# Patient Record
Sex: FEMALE | Race: WHITE | NOT HISPANIC OR LATINO | Employment: PART TIME | URBAN - METROPOLITAN AREA
[De-identification: names, ages, dates, MRNs, and addresses within clinical notes are randomized per-mention and may not be internally consistent; named-entity substitution may affect disease eponyms.]

---

## 2017-01-18 ENCOUNTER — ALLSCRIPTS OFFICE VISIT (OUTPATIENT)
Dept: OTHER | Facility: OTHER | Age: 22
End: 2017-01-18

## 2017-02-27 ENCOUNTER — ALLSCRIPTS OFFICE VISIT (OUTPATIENT)
Dept: OTHER | Facility: OTHER | Age: 22
End: 2017-02-27

## 2017-04-18 ENCOUNTER — ALLSCRIPTS OFFICE VISIT (OUTPATIENT)
Dept: OTHER | Facility: OTHER | Age: 22
End: 2017-04-18

## 2017-05-18 ENCOUNTER — ALLSCRIPTS OFFICE VISIT (OUTPATIENT)
Dept: OTHER | Facility: OTHER | Age: 22
End: 2017-05-18

## 2017-07-14 ENCOUNTER — HOSPITAL ENCOUNTER (EMERGENCY)
Facility: HOSPITAL | Age: 22
Discharge: HOME | End: 2017-07-14
Attending: EMERGENCY MEDICINE
Payer: COMMERCIAL

## 2017-07-14 VITALS
HEART RATE: 98 BPM | TEMPERATURE: 99.7 F | OXYGEN SATURATION: 99 % | SYSTOLIC BLOOD PRESSURE: 133 MMHG | DIASTOLIC BLOOD PRESSURE: 72 MMHG | RESPIRATION RATE: 18 BRPM | WEIGHT: 165.57 LBS

## 2017-07-14 DIAGNOSIS — H66.91 RIGHT OTITIS MEDIA: Primary | ICD-10-CM

## 2017-07-14 PROCEDURE — 86308 HETEROPHILE ANTIBODY SCREEN: CPT | Performed by: EMERGENCY MEDICINE

## 2017-07-14 PROCEDURE — 36415 COLL VENOUS BLD VENIPUNCTURE: CPT | Performed by: EMERGENCY MEDICINE

## 2017-07-14 PROCEDURE — 99284 EMERGENCY DEPT VISIT MOD MDM: CPT

## 2017-07-14 RX ORDER — IBUPROFEN 600 MG/1
600 TABLET ORAL ONCE
Status: COMPLETED | OUTPATIENT
Start: 2017-07-14 | End: 2017-07-14

## 2017-07-14 RX ORDER — AMOXICILLIN 250 MG/1
250 CAPSULE ORAL EVERY 8 HOURS SCHEDULED
Qty: 30 CAPSULE | Refills: 0 | Status: SHIPPED | OUTPATIENT
Start: 2017-07-14 | End: 2017-07-24

## 2017-07-14 RX ORDER — AMOXICILLIN 250 MG/1
250 CAPSULE ORAL ONCE
Status: COMPLETED | OUTPATIENT
Start: 2017-07-14 | End: 2017-07-14

## 2017-07-14 RX ORDER — PREDNISONE 20 MG/1
60 TABLET ORAL ONCE
Status: COMPLETED | OUTPATIENT
Start: 2017-07-14 | End: 2017-07-14

## 2017-07-14 RX ORDER — FLUTICASONE PROPIONATE 50 MCG
1 SPRAY, SUSPENSION (ML) NASAL DAILY
Qty: 16 G | Refills: 0 | Status: SHIPPED | OUTPATIENT
Start: 2017-07-14 | End: 2019-04-15 | Stop reason: ALTCHOICE

## 2017-07-14 RX ADMIN — AMOXICILLIN 250 MG: 250 CAPSULE ORAL at 08:03

## 2017-07-14 RX ADMIN — IBUPROFEN 600 MG: 600 TABLET ORAL at 08:03

## 2017-07-14 RX ADMIN — PREDNISONE 60 MG: 20 TABLET ORAL at 08:03

## 2017-07-15 LAB — HETEROPH AB SER QL: NEGATIVE

## 2017-08-09 ENCOUNTER — ALLSCRIPTS OFFICE VISIT (OUTPATIENT)
Dept: OTHER | Facility: OTHER | Age: 22
End: 2017-08-09

## 2017-12-12 ENCOUNTER — ALLSCRIPTS OFFICE VISIT (OUTPATIENT)
Dept: OTHER | Facility: OTHER | Age: 22
End: 2017-12-12

## 2017-12-13 NOTE — PROGRESS NOTES
Assessment  1  Multiple benign melanocytic nevi (216 9) (D22 9)    Plan     · Follow-up PRN Evaluation and Treatment  Follow-up  Status: Complete  Done:[id number]    Discussion/Summary  Discussion Summary:   Lesion appeared to be a congenital like nevus  No reason to remove this at this point photograph obtained for baseline appearance if any further changes are noted patient is to return follow-up  Nothing else of concern noted cursory exam       Chief Complaint  Chief Complaint Free Text Note Form: Patient states that she has a mole on her stomach that she wants checked out      History of Present Illness  HPI: 48-year-old female presents for concerns regarding a mole on her abdomen mother was concerned that the mole has developed some increased pigmentation  Patient notes that the mole been present long she can remember      Review of Systems  Complete Female Dermat[facility] [place] Patient:  Constitutional: Denies constitutional symptoms  Eyes: Denies eye symptoms  ENT:  denies ear symptoms, nasal symptoms, mouth or throat symptoms  Cardiovascular: Denies cardiovascular symptoms  Respiratory: Denies respiratory symptoms  Gastrointestinal: Denies gastrointestinal symptoms  Musculoskeletal: Denies musculoskeletal symptoms  Integumentary: Denies skin, hair and nail symptoms  Neurological: Denies neurologic symptoms  Psychiatric: Denies psychiatric symptoms  Endocrine: Denies endocrine symptoms  Hematologic/Lymphatic: Denies hematologic symptoms  Active Problems  1  Cervicalgia (723 1) (M54 2)   2  Flu vaccine need (V04 81) (Z23)   3  Heavy menstrual bleeding (626 2) (N92 0)   4  Iron deficiency anemia due to chronic blood loss (280 0) (D50 0)   5  Multiple benign melanocytic nevi (216 9) (D22 9)   6  Screening for skin condition (V82 0) (Z13 89)   7  Warts (078 10) (B07 9)    Past Medical History  Past Medical History Reviewed- Derm:   The past medical history was reviewed  Surgical History  1  History of Tonsillectomy With Adenoidectomy  Surgical History Reviewed [facility]- Derm:   Surgical History reviewed      Family History  Maternal Grandfather    1  Family history of Kidney Cancer (V16 51)  Maternal Aunt    2  Family history of Breast Cancer (V16 3)  Family History Reviewed- Derm:   Family History was reviewed      Social History     · Being A Social Drinker   · Never A Smoker  Social History Reviewed [facility]- Derm: The social history was reviewed      Current Meds   1  Viorele 0 15-0 02/0 01 MG (21/5) Oral Tablet; as directed; Therapy: [id number] to Recorded  Medication List Reviewed: The medication list was reviewed and updated today  Allergies    1  No Known Drug Allergies    Physical Exam   Constitutional  General appearance: Appears healthy and well developed  Lymphatic  No visible disturbance  Musculoskeletal  Digits and nails: No clubbing, cyanosis or edema  Cutaneous and nail exam normal    Skin  Scalp skin texture and hair distribution: Normal skin texture on scalp, normal hair distribution  Abdomen: Normal turgor, no rashes, no lesions  Neuro/Psych  Alert and oriented x 3  Displays comfort and cooperation during encounterl  Affect is normal    Finding 9mm pigmented macule Right mid abdomen without really any regular borders or multi colors        Signatures   Electronically signed by : KEATON Chowdhury ; Dec 12 2017  2:45PM EST                       (Author)

## 2018-09-06 ENCOUNTER — OFFICE VISIT (OUTPATIENT)
Dept: DERMATOLOGY | Facility: CLINIC | Age: 23
End: 2018-09-06
Payer: COMMERCIAL

## 2018-09-06 DIAGNOSIS — D22.9 NEVUS: Primary | ICD-10-CM

## 2018-09-06 PROCEDURE — 88305 TISSUE EXAM BY PATHOLOGIST: CPT | Performed by: PATHOLOGY

## 2018-09-06 PROCEDURE — 99212 OFFICE O/P EST SF 10 MIN: CPT | Performed by: DERMATOLOGY

## 2018-09-06 PROCEDURE — 11301 SHAVE SKIN LESION 0.6-1.0 CM: CPT | Performed by: DERMATOLOGY

## 2018-09-06 RX ORDER — DESOGESTREL AND ETHINYL ESTRADIOL 21-5 (28)
KIT ORAL
COMMUNITY
Start: 2015-01-14 | End: 2019-04-15 | Stop reason: ALTCHOICE

## 2018-09-06 NOTE — PROGRESS NOTES
[facility] DERMATOLOGY  [address] [place] [address]  [phone]     MRN: [id number] : 1995  Encounter: [id number]  Patient Information: Kelly Mensah    Subjective:     29-year-old female presents for follow-up for the congenital type nevus that we noted last year  Patient feels that the lesion has changed and darkened  Objective: There were no vitals taken for this visit  Physical Exam:    General Appearance:    Alert, cooperative, no distress   Skin:    9 mm pigmented area noted on abdomen no essential change noted from previous photograph  Shave excision Procedure Note    Pre-operative Diagnosis:   Congenital nevus    Plan:  1  Instructed to keep the wound dry and covered for 24 and clean thereafter  2  Warning signs of infection were reviewed  3  Recommended that the patient use OTC acetaminophen as needed for pain  Locations:   Right lower  abdomen  Indications:   Patient concerned that the areas changing    Anesthesia: Lidocaine 1% without epinephrine without added sodium bicarbonate    Procedure Details     Patient informed of the risks (including bleeding and infection) and benefits of the   procedure and Verbal informed consent obtained  The lesion and surrounding area were prepped using alcohol  A Blue blade razor was used to remove the lesion  Hemostasis achieved with aluminum chloride  Petrolatum and a sterile dressing applied  The specimen was sent for pathologic examination  The patient tolerated the procedure(s) well  Complications:  none  Assessment:     1  Nevus           Plan:    nevus removed because the patient concern await results of further treatment indicated probably congenital type nevus      Prior to Admission medications    Medication Sig Start Date End Date Taking?  Authorizing Provider   desogestrel-ethinyl estradiol (VIORELE) 0 15-0 02/0 01 MG () per tablet Take by mouth 1/14/15  Yes Historical Provider, MD   fluticasone Kellie Jersey) 50 mcg/act nasal spray 1 spray into each nostril daily  Patient not taking: Reported on 9/6/2018 7/14/17   Wong Gonzales MD     No Known Allergies    Alec Rivera MD  9/6/2018,10:26 AM    Portions of the record may have been created with voice recognition software   Occasional wrong word or "sound a like" substitutions may have occurred due to the inherent limitations of voice recognition software   Read the chart carefully and recognize, using context, where substitutions have occurred

## 2018-09-06 NOTE — PATIENT INSTRUCTIONS
nevus removed because the patient concern await results of further treatment indicated probably congenital type nevus  Wound care instructions given to patient

## 2018-10-01 ENCOUNTER — PROCEDURE VISIT (OUTPATIENT)
Dept: DERMATOLOGY | Facility: CLINIC | Age: 23
End: 2018-10-01
Payer: COMMERCIAL

## 2018-10-01 DIAGNOSIS — C43.59 MALIGNANT MELANOMA OF SKIN OF ABDOMEN (HCC): Primary | ICD-10-CM

## 2018-10-01 PROCEDURE — 11603 EXC TR-EXT MAL+MARG 2.1-3 CM: CPT | Performed by: DERMATOLOGY

## 2018-10-01 PROCEDURE — 88305 TISSUE EXAM BY PATHOLOGIST: CPT | Performed by: PATHOLOGY

## 2018-10-01 PROCEDURE — 12032 INTMD RPR S/A/T/EXT 2.6-7.5: CPT | Performed by: DERMATOLOGY

## 2018-10-01 NOTE — PROGRESS NOTES
[facility] DERMATOLOGY  [address] [facility] [address]  [phone]  [phone]     MRN: [id number] : 1995  Encounter: [id number]  Patient Information: Ana Setting    Subjective:     26-year-old female presents for planned excision of melanoma previously biopsied on the abdomen  Biopsy revealed a mostly superficial in situ lesion question of invasion noted to 0 4 mm Level II     Objective: There were no vitals taken for this visit  Physical Exam:    General Appearance:    Alert, cooperative, no distress   Skin:    Previous site of biopsy noted    Procedure name: Excision with intermediate layered closure        Location:  Right abdomen    Diagnosis: Melanoma    Size of lesion: 9 mm    Margins: 10 mm    Size + Margins: 29 mm    I explained the diagnosis to the patient and we recommend an excision of the lesion for diagnosis and/or treatment  Potential complications include, but are not limited to: scarring, bleeding, infection, incomplete removal, recurrence, and nerve damage  The risks, benefits, and alternatives were discussed with the patient in detail  The location of the tumor was identified, circled, and confirmed by the patient  The correct patient, site, and procedure were confirmed  Anesthesia: 1% xylocaine with 1:100,000 epinephrine 9 cc  The patient was brought into the room, prepped and draped sterilely in the usual manner and anesthesia was administered by local infiltration  A fusiform shape was drawn around the lesion, and the margins were incised to the level of the subcutaneous fat with a number 15cc Bard-Loki blade  The tissue was removed with sharp and blunt dissection  The lateral margins of the resulting defect were undermined with sharp and blunt dissection  Hemostasis was achieved with electrocautery    The deeper layers of the defect, including subcutaneous fat and fascia, had to be approximated to reduce tension on the suture line  Layered wound closure was performed  The wound was cleaned with saline, dried off, petroleum applied, and the wound was covered with a pressure dressing  The patient was given detailed verbal and written instructions on postoperative care  Suture for adipose/fascial/dermal layer closure: 4-0  vicryl 4sutures interrupted    Suture used to approximate epidermis: 4-0  nylon 11sutures interrupted    Final wound length: 6 cm    Follow-up in: 11 days  Patient tolerated procedure well without complications  Assessment:     1  Malignant melanoma of skin of abdomen ([facility] [address] )           Plan:   Wound care instructions given to patient        Prior to Admission medications    Medication Sig Start Date End Date Taking? Authorizing Provider   desogestrel-ethinyl estradiol (Floretta Locust) 0 15-0 02/0 01 MG (21/5) per tablet Take by mouth 1/14/15   Historical Provider, MD   fluticasone (FLONASE) 50 mcg/act nasal spray 1 spray into each nostril daily  Patient not taking: Reported on 9/6/2018 7/14/17   Jude Montejo MD     No Known Allergies    Yousif Boothe MD  10/1/2018,10:43 AM    Portions of the record may have been created with voice recognition software   Occasional wrong word or "sound a like" substitutions may have occurred due to the inherent limitations of voice recognition software   Read the chart carefully and recognize, using context, where substitutions have occurred

## 2018-10-09 ENCOUNTER — TELEPHONE (OUTPATIENT)
Dept: DERMATOLOGY | Facility: CLINIC | Age: 23
End: 2018-10-09

## 2018-10-11 ENCOUNTER — CLINICAL SUPPORT (OUTPATIENT)
Dept: DERMATOLOGY | Facility: CLINIC | Age: 23
End: 2018-10-11

## 2018-10-11 DIAGNOSIS — C43.59 MALIGNANT MELANOMA OF SKIN OF ABDOMEN (HCC): Primary | ICD-10-CM

## 2018-10-11 PROCEDURE — 99024 POSTOP FOLLOW-UP VISIT: CPT | Performed by: DERMATOLOGY

## 2018-10-11 NOTE — PROGRESS NOTES
[facility] DERMATOLOGY  [address] [place] [address]  [phone]  [phone]     MRN: [id number] : 1995  Encounter: [id number]  Patient Information: Lukasz Lynne    Subjective:     34-year-old female presents for follow-up secondary to previous excised melanoma on the abdomen     Objective: There were no vitals taken for this visit  Physical Exam:    General Appearance:    Alert, cooperative, no distress   Skin:   Previous site is healing well  Procedure:  Suture removal  Site of excision:  Abdomen  Wound was cleansed with saline  Wound is intact  Sutures removed  Steri-Strips applied  Biopsy revealed no further evidence of any atypical lesion margins adequate     Assessment:     1  Malignant melanoma of skin of abdomen ([address] )           Plan:   Area healing well will plan follow-up again in 3 months      Prior to Admission medications    Medication Sig Start Date End Date Taking? Authorizing Provider   desogestrel-ethinyl estradiol (Tiara Double) 0 15-0 02/0 01 MG () per tablet Take by mouth 1/14/15   Historical Provider, MD   fluticasone (FLONASE) 50 mcg/act nasal spray 1 spray into each nostril daily  Patient not taking: Reported on 2018   Selvin Gonsalves MD     No Known Allergies    Charity Talley MD  10/11/2018,8:49 AM    Portions of the record may have been created with voice recognition software   Occasional wrong word or "sound a like" substitutions may have occurred due to the inherent limitations of voice recognition software   Read the chart carefully and recognize, using context, where substitutions have occurred

## 2018-10-11 NOTE — PATIENT INSTRUCTIONS
Area healing well will plan follow-up again in 3 months  STERI-STRIPS (BUTTERFLY) POST SURGERY        Steri-Strips have been placed over your incision site to give added support  Please continue to bathe the area as usual     Eventually the Steri-Strips will begin to peel off on the ends  Snip the raised ends off with scissors and let the rest fall off on their own  If you have any questions, please call our office   [phone]

## 2019-01-14 ENCOUNTER — OFFICE VISIT (OUTPATIENT)
Dept: DERMATOLOGY | Facility: CLINIC | Age: 24
End: 2019-01-14
Payer: COMMERCIAL

## 2019-01-14 DIAGNOSIS — L81.9 CHANGING PIGMENTED SKIN LESION: ICD-10-CM

## 2019-01-14 DIAGNOSIS — D22.9 NEVUS: ICD-10-CM

## 2019-01-14 DIAGNOSIS — Z13.89 SCREENING FOR SKIN CONDITION: ICD-10-CM

## 2019-01-14 DIAGNOSIS — Z85.820 HISTORY OF MELANOMA: Primary | Chronic | ICD-10-CM

## 2019-01-14 PROCEDURE — 88342 IMHCHEM/IMCYTCHM 1ST ANTB: CPT | Performed by: PATHOLOGY

## 2019-01-14 PROCEDURE — 99213 OFFICE O/P EST LOW 20 MIN: CPT | Performed by: DERMATOLOGY

## 2019-01-14 PROCEDURE — 88305 TISSUE EXAM BY PATHOLOGIST: CPT | Performed by: PATHOLOGY

## 2019-01-14 PROCEDURE — 11102 TANGNTL BX SKIN SINGLE LES: CPT | Performed by: DERMATOLOGY

## 2019-01-14 NOTE — PROGRESS NOTES
[address] [facility] DERMATOLOGY  [address]  [address] [place] [address]  [phone]     MRN: [id number] : 1995  Encounter: [id number]  Patient Information: Broderick Regalado  Chief complaint: Three-month checkup    History of present illness:  71-year-old female status post excision of a melanoma which was mostly the in situ lesion that was excised patient was concerned regarding scarring of the area  Also patient concerned regarding a new mole she noted on her left forearm that is new  She also has moles on her breast that have been present for a long time has not changed as far she is aware no other concerns  No past medical history on file  No past surgical history on file  Social History   History   Alcohol Use    Yes     Comment: Socially     History   Drug Use No     History   Smoking Status    Never Smoker   Smokeless Tobacco    Not on file     No family history on file  Meds/Allergies   No Known Allergies    Meds:  Prior to Admission medications    Medication Sig Start Date End Date Taking?  Authorizing Provider   desogestrel-ethinyl estradiol (Darliss Fuse) 0 15-0 02/0 01 MG () per tablet Take by mouth 1/14/15  Yes Historical Provider, MD   fluticasone (FLONASE) 50 mcg/act nasal spray 1 spray into each nostril daily  Patient not taking: Reported on 2019   Nishant Bo MD       Subjective:     Review of Systems:    General: negative for - chills, fatigue, fever,  weight gain or weight loss  Psychological: negative for - anxiety, behavioral disorder, concentration difficulties, decreased libido, depression, irritability, memory difficulties, mood swings, sleep disturbances or suicidal ideation  ENT: negative for - hearing difficulties , nasal congestion, nasal discharge, oral lesions, sinus pain, sneezing, sore throat  Allergy and Immunology: negative for - hives, insect bite sensitivity,  Hematological and Lymphatic: negative for - bleeding problems, blood clots,bruising, swollen lymph nodes  Endocrine: negative for - hair pattern changes, hot flashes, malaise/lethargy, mood swings, palpitations, polydipsia/polyuria, skin changes, temperature intolerance or unexpected weight change  Respiratory: negative for - cough, hemoptysis, orthopnea, shortness of breath, or wheezing  Cardiovascular: negative for - chest pain, dyspnea on exertion, edema,  Gastrointestinal: negative for - abdominal pain, nausea/vomiting  Genito-Urinary: negative for - dysuria, incontinence, irregular/heavy menses or urinary frequency/urgency  Musculoskeletal: negative for - gait disturbance, joint pain, joint stiffness, joint swelling, muscle pain, muscular weakness  Dermatological:  As in HPI  Neurological: negative for confusion, dizziness, headaches, impaired coordination/balance, memory loss, numbness/tingling, seizures, speech problems, tremors or weakness       Objective: There were no vitals taken for this visit  Physical Exam:    General Appearance:    Alert, cooperative, no distress   Head:    Normocephalic, without obvious abnormality, atraumatic   Lymphatics:    No lymphadenopathy noted      Abdomen:   No hepatosplenomegaly   Skin:   A full skin exam was performed including scalp, head scalp, eyes, ears, nose, lips, neck, chest, axilla, abdomen, back, buttocks, bilateral upper extremities, bilateral lower extremities, hands, feet, fingers, toes, fingernails, and toenails 3 mm pigmented area noted on the left elbow area also pigmented areas both measure about 5 mm size on the breasts with some dysplasia noted normal pigmented lesions regular shape and color other than that the area on the excision site appears to be slightly hypertrophic scarring at both the suture line as well as where the sutures emanate from the skin    Nothing else of concern noted exam          Shave excision Procedure Note    Pre-operative Diagnosis:  Changing pigmented lesion    Plan:  1  Instructed to keep the wound dry and covered for 24 and clean thereafter  2  Warning signs of infection were reviewed  3  Recommended that the patient use OTC acetaminophen as needed for pain  Locations:  Left elbow  Indications:  Changing lesion    Anesthesia: Lidocaine 1% without epinephrine without added sodium bicarbonate    Procedure Details     Patient informed of the risks (including bleeding and infection) and benefits of the   procedure and Verbal informed consent obtained  The lesion and surrounding area were prepped using alcohol  A Blue blade razor was used to remove the lesion  Hemostasis achieved with aluminum chloride  Petrolatum and a sterile dressing applied  The specimen was sent for pathologic examination  The patient tolerated the procedure(s) well  Complications:  none  Assessment:     1  History of melanoma      Sukhdeep level 2 Breslow thickness 0 4mm right upper abdomen 10/18   2  Nevus     3  Screening for skin condition     4  Changing pigmented skin lesion           Plan:   Nevi reviewed the concept of ABCDE and ugly duckling nothing markedly atypical patient reassured changing pigmented lesions lesion biopsy because the patient concern and change in pigmentation noted  History of melanoma previous site shows a hypertrophic scar advised patient that it may take up to 1 year for this to settle down she can consider treatment with Arabellachantal Erwin however if no improvement noted over time we will consider intralesional steroid injection    Continue sun protection follow-up in 3 months  Screening for Dermatologic Disorders: Nothing else of concern noted on complete exam follow up in 3 months       Aury Vera MD  1/14/2019,9:08 AM    Portions of the record may have been created with voice recognition software   Occasional wrong word or "sound a like" substitutions may have occurred due to the inherent limitations of voice recognition software   Read the chart carefully and recognize, using context, where substitutions have occurred

## 2019-01-14 NOTE — PATIENT INSTRUCTIONS
Nevi reviewed the concept of ABCDE and ugly duckling nothing markedly atypical patient reassured changing pigmented lesions lesion biopsy because the patient concern and change in pigmentation noted  History of melanoma previous site shows a hypertrophic scar advised patient that it may take up to 1 year for this to settle down she can consider treatment with Lavone Guayama however if no improvement noted over time we will consider intralesional steroid injection    Continue sun protection follow-up in 3 months  Screening for Dermatologic Disorders: Nothing else of concern noted on complete exam follow up in 3 months   Wound care instructions given to patient

## 2019-01-24 ENCOUNTER — OFFICE VISIT (OUTPATIENT)
Dept: INTERNAL MEDICINE CLINIC | Facility: CLINIC | Age: 24
End: 2019-01-24
Payer: COMMERCIAL

## 2019-01-24 VITALS
TEMPERATURE: 96.6 F | SYSTOLIC BLOOD PRESSURE: 96 MMHG | WEIGHT: 162 LBS | HEIGHT: 64 IN | DIASTOLIC BLOOD PRESSURE: 60 MMHG | HEART RATE: 83 BPM | OXYGEN SATURATION: 97 % | RESPIRATION RATE: 20 BRPM | BODY MASS INDEX: 27.66 KG/M2

## 2019-01-24 DIAGNOSIS — H81.13 BENIGN PAROXYSMAL POSITIONAL VERTIGO DUE TO BILATERAL VESTIBULAR DISORDER: ICD-10-CM

## 2019-01-24 DIAGNOSIS — H92.02 DISCOMFORT OF LEFT EAR: ICD-10-CM

## 2019-01-24 DIAGNOSIS — D50.0 IRON DEFICIENCY ANEMIA DUE TO CHRONIC BLOOD LOSS: Primary | ICD-10-CM

## 2019-01-24 DIAGNOSIS — T75.3XXA SEA SICKNESS, INITIAL ENCOUNTER: ICD-10-CM

## 2019-01-24 PROBLEM — H60.333 SWIMMER'S EAR OF BOTH SIDES: Status: RESOLVED | Noted: 2019-01-24 | Resolved: 2019-01-24

## 2019-01-24 PROBLEM — H60.333 SWIMMER'S EAR OF BOTH SIDES: Status: ACTIVE | Noted: 2019-01-24

## 2019-01-24 PROBLEM — Z30.41 SURVEILLANCE FOR BIRTH CONTROL, ORAL CONTRACEPTIVES: Status: ACTIVE | Noted: 2018-04-24

## 2019-01-24 PROCEDURE — 99214 OFFICE O/P EST MOD 30 MIN: CPT | Performed by: INTERNAL MEDICINE

## 2019-01-24 PROCEDURE — 3008F BODY MASS INDEX DOCD: CPT | Performed by: INTERNAL MEDICINE

## 2019-01-24 RX ORDER — SCOLOPAMINE TRANSDERMAL SYSTEM 1 MG/1
1 PATCH, EXTENDED RELEASE TRANSDERMAL
Qty: 5 PATCH | Refills: 1 | Status: SHIPPED | OUTPATIENT
Start: 2019-01-24 | End: 2019-04-15 | Stop reason: ALTCHOICE

## 2019-01-24 NOTE — PATIENT INSTRUCTIONS
Benign Paroxysmal Positional Vertigo   WHAT YOU NEED TO KNOW:   What is benign paroxysmal positional vertigo (BPPV)? BPPV is an inner ear condition that causes you to suddenly feel dizzy  Benign means it is not serious or life-threatening  BPPV is caused by a problem with the nerves and structure of your inner ear  BPPV happens when small pieces of calcium break loose and lump together in one of your inner ear canals  What are the signs and symptoms of BPPV? You may feel that you or the room is moving or spinning  Turning your head, rolling over in bed, getting up or lying down may lead to sudden vertigo  You may also have any of the following symptoms:  · Nystagmus (quick shaky eye movement that you cannot control)    · Nausea    · Poor balance and feeling unsteady when you walk  What increases my risk for BPPV? · Older age    · An injury or trauma to your head or neck    · Frequent ear infections    · Long-term bed rest    · A medical condition such as diabetes, high blood pressure, migraine headaches, or Ménière disease  How is BPPV diagnosed? Your healthcare provider will ask about your symptoms and examine you  Your healthcare provider can usually determine if you have BPPV by doing a few simple tests  He or she may have you move your head or body in certain ways  Tell him or her if you feel dizzy or nauseated during these movements  How is BPPV managed? · Your healthcare provider will teach you how to move your head and body to prevent symptoms  For example, he or she may teach you certain ways to move your head or body  These movements usually help relieve your symptoms and keep the dizziness from returning  The exercises help move the calcium pieces to a different part of your ear  Do the movements only as directed  · Vestibular and balance rehabilitation therapy (VBRT)  is used to teach you exercises to improve your balance and strength   VBRT may help decrease your dizziness and prevent injuries if you are at risk for falls  · Medicines  may be recommended or prescribed to treat dizziness or nausea  How can I help prevent my symptoms? · Try to avoid sudden head movements  Stand up and lie down slowly  · Raise and support your head when you lie down  Place pillows under your upper back and head or rest in a recliner  · Change your position often when you are lying down  Try not to lie with your head on the same side for long periods of time  Roll over slowly  · Wear protective gear  when you ride a bike or play sports  A helmet helps protect your head from injury  When should I seek immediate care? · You fall during a BPPV episode and are injured  · You have a severe headache that does not go away  · You have new changes in your vision or feel weak or confused  · You have problems hearing, or you have ringing or buzzing in your ears  When should I contact my healthcare provider? · Your BPPV symptoms do not go away or they return  · You have problems with your balance, or you are falling often  · You have new or increased nausea or vomiting with vertigo  · You feel anxious or depressed and do not want to leave your home  · You have questions or concerns about your condition or care  CARE AGREEMENT:   You have the right to help plan your care  Learn about your health condition and how it may be treated  Discuss treatment options with your caregivers to decide what care you want to receive  You always have the right to refuse treatment  The above information is an  only  It is not intended as medical advice for individual conditions or treatments  Talk to your doctor, nurse or pharmacist before following any medical regimen to see if it is safe and effective for you  © 2017 [employer] Information is for End User's use only and may not be sold, redistributed or otherwise used for commercial purposes   All illustrations and images included in CareNotes® are the copyrighted property of A D A M , Inc  or Arjun Troy

## 2019-01-24 NOTE — PROGRESS NOTES
Assessment/Plan:    1  Left ear discomfort ear exam was normal will observe at this time  2  Patient with episode of benign positional vertigo will be trying exercises for that also the Epley maneuver on youtube if symptoms worsen to let me know  3  Patient is going on a cruise will Rx scopolamine patches as needed  4 history of iron deficiency from heavy periods which has resolved on birth control pills will check CBC and iron levels            Diagnoses and all orders for this visit:    Iron deficiency anemia due to chronic blood loss  -     CBC and differential; Future  -     Iron Panel; Future  -     Ferritin; Future        The patient was counseled regarding instructions for management, risk factor reductions, patient and family education,impressions, risks and benefits of treatment options, side effects of medications, importance of compliance with treatment  The treatment plan was reviewed with the patient/guardian and patient/guardian understands and agrees with the treatment plan  Current Outpatient Prescriptions:     desogestrel-ethinyl estradiol (VIORELE) 0 15-0 02/0 01 MG (21/5) per tablet, Take by mouth, Disp: , Rfl:     fluticasone (FLONASE) 50 mcg/act nasal spray, 1 spray into each nostril daily (Patient not taking: Reported on 1/14/2019 ), Disp: 16 g, Rfl: 0    Subjective:      Patient ID: Markie Tidwell is a 24 y o  female  Left ear clogged hearing alcholol burns itching at time, doing work out class, vertigo lasted an hour, postional doing abdominal         The following portions of the patient's history were reviewed and updated as appropriate:   She has no past medical history on file  ,   does not have any pertinent problems on file  ,   has a past surgical history that includes Tonsillectomy and adenoidectomy  ,  family history includes Breast cancer in her maternal aunt; Kidney cancer in her maternal grandfather  ,   reports that she has never smoked   She does not have any smokeless tobacco history on file  She reports that she drinks alcohol  She reports that she does not use drugs  ,  has No Known Allergies       Review of Systems   Constitutional: Negative for appetite change, chills, fatigue, fever and unexpected weight change  HENT: Positive for ear pain  Negative for congestion, facial swelling, hearing loss, mouth sores, nosebleeds, postnasal drip, rhinorrhea, sinus pain, sore throat, trouble swallowing and voice change  Eyes: Negative for pain, discharge, redness and visual disturbance  Respiratory: Negative for apnea, chest tightness, shortness of breath, wheezing and stridor  Cardiovascular: Negative for chest pain, palpitations and leg swelling  Gastrointestinal: Negative for abdominal distention, abdominal pain, blood in stool, constipation, diarrhea and vomiting  Endocrine: Negative for cold intolerance, heat intolerance, polydipsia, polyphagia and polyuria  Genitourinary: Negative for difficulty urinating, dysuria, flank pain, frequency, genital sores, hematuria and urgency  Musculoskeletal: Negative for arthralgias and back pain  Skin: Negative for rash and wound  Allergic/Immunologic: Negative for environmental allergies, food allergies and immunocompromised state  Neurological: Positive for dizziness  Negative for tremors, seizures, syncope, facial asymmetry, speech difficulty, weakness, light-headedness, numbness and headaches  Hematological: Negative for adenopathy  Does not bruise/bleed easily  Psychiatric/Behavioral: Negative for agitation, behavioral problems, dysphoric mood, hallucinations, self-injury, sleep disturbance and suicidal ideas  The patient is not hyperactive            Objective:  Pulse 83   Temp (!) 96 6 °F (35 9 °C)   Resp 20   Ht 5' 4" (1 626 m)   Wt 73 5 kg (162 lb)   SpO2 97%   BMI 27 81 kg/m²     Lab Review  Procedure visit on 10/01/2018   Component Date Value    Case Report 10/01/2018 Value:Surgical Pathology Report                         Case: [id number]                                   Authorizing Provider:  Homar Arreola MD          Collected:           10/01/2018 1107              Ordering Location:     [facility]           Received:            10/01/2018 1107                                     [id number]  [facility]                                                           Pathologist:           Marian Spangler MD                                                                  Specimen:    Abdominal, abdomen                                                                         Final Diagnosis 10/01/2018                      Value: This result contains rich text formatting which cannot be displayed here   Additional Information 10/01/2018                      Value: This result contains rich text formatting which cannot be displayed here  [facility] Gross Description 10/01/2018                      Value: This result contains rich text formatting which cannot be displayed here      Clinical Information 10/01/2018                      Value:malignant melanoma[excision/check margins   Office Visit on 09/06/2018   Component Date Value    Case Report 09/06/2018                      Value:Surgical Pathology Report                         Case: [id number]                                   Authorizing Provider:  Homar Arreola MD          Collected:           09/06/2018                   Ordering Location:     [facility]           Received:            09/07/2018 [address] [facility]                                                           Pathologist:           Marcela Reddy MD                                                             Specimen:    Abdominal, abdomen                                                                         Final Diagnosis 09/06/2018                      Value: This result contains rich text formatting which cannot be displayed here   Note 09/06/2018                      Value: This result contains rich text formatting which cannot be displayed here   Additional Information 09/06/2018                      Value: This result contains rich text formatting which cannot be displayed here  [facility] Gross Description 09/06/2018                      Value: This result contains rich text formatting which cannot be displayed here   Clinical Information 09/06/2018                      Value:probable congenital nevus[shave        Imaging: No results found  No orders to display     No results found for this or any previous visit  Physical Exam   Constitutional: She is oriented to person, place, and time  She appears well-developed  HENT:   Right Ear: Tympanic membrane, external ear and ear canal normal  No decreased hearing is noted  Left Ear: Tympanic membrane, external ear and ear canal normal  No decreased hearing is noted  Eyes: Right eye exhibits no discharge  Left eye exhibits no discharge  No scleral icterus  Neck: Carotid bruit is not present  No tracheal deviation present  No thyroid mass and no thyromegaly present  Cardiovascular: Normal rate, regular rhythm, normal heart sounds and intact distal pulses  Exam reveals no gallop and no friction rub  No murmur heard  Pulmonary/Chest: No respiratory distress  She has no wheezes  She has no rales  Musculoskeletal: She exhibits no edema  Lymphadenopathy:     She has no cervical adenopathy  Neurological: She is alert and oriented to person, place, and time  Coordination normal    - halpix maneuver   Psychiatric: She has a normal mood and affect  Her behavior is normal  Judgment and thought content normal    Nursing note and vitals reviewed

## 2019-02-05 ENCOUNTER — PROCEDURE VISIT (OUTPATIENT)
Dept: DERMATOLOGY | Facility: CLINIC | Age: 24
End: 2019-02-05
Payer: COMMERCIAL

## 2019-02-05 DIAGNOSIS — D22.62 SPITZ NEVUS OF FOREARM, LEFT: Primary | ICD-10-CM

## 2019-02-05 PROCEDURE — 12032 INTMD RPR S/A/T/EXT 2.6-7.5: CPT | Performed by: DERMATOLOGY

## 2019-02-05 PROCEDURE — 88305 TISSUE EXAM BY PATHOLOGIST: CPT | Performed by: PATHOLOGY

## 2019-02-05 PROCEDURE — 11601 EXC TR-EXT MAL+MARG 0.6-1 CM: CPT | Performed by: DERMATOLOGY

## 2019-02-05 NOTE — PROGRESS NOTES
[address]  [address]  [facility] [address]  [phone]  [phone]     MRN: [id number] : 1995  Encounter: [id number]  Patient Information: Adeola Pierre    Subjective:     58-year-old female presents for excision of previously biopsied Spitz nevus because of her recent history of melanoma and because of atypical nature we elected to perform a complete excision to make sure that this is completely resolved     Objective: There were no vitals taken for this visit  Physical Exam:    General Appearance:    Alert, cooperative, no distress   Skin:     Procedure name: Excision with intermediate layered closure        Location:  Left forearm    Diagnosis:  Spitz nevus    Size of lesion: 4 mm    Margins: 3 mm    Size + Margins: 10 mm    I explained the diagnosis to the patient and we recommend an excision of the lesion for diagnosis and/or treatment  Potential complications include, but are not limited to: scarring, bleeding, infection, incomplete removal, recurrence, and nerve damage  The risks, benefits, and alternatives were discussed with the patient in detail  The location of the tumor was identified, circled, and confirmed by the patient  The correct patient, site, and procedure were confirmed  Anesthesia: 1% xylocaine with 1:100,000 epinephrine 5 cc  The patient was brought into the room, prepped and draped sterilely in the usual manner and anesthesia was administered by local infiltration  A fusiform shape was drawn around the lesion, and the margins were incised to the level of the subcutaneous fat with a number 15cc Bard-Loki blade  The tissue was removed with sharp and blunt dissection  The lateral margins of the resulting defect were undermined with sharp and blunt dissection  Hemostasis was achieved with electrocautery    The deeper layers of the defect, including subcutaneous fat and fascia, had to be approximated to reduce tension on the suture line  Layered wound closure was performed  The wound was cleaned with saline, dried off, petroleum applied, and the wound was covered with a pressure dressing  The patient was given detailed verbal and written instructions on postoperative care  Suture for adipose/fascial/dermal layer closure: 4-0  vicryl 2 sutures interrupted    Suture used to approximate epidermis: 5-0  nylon 5 sutures interrupted    Final wound length: 2 8 cm    Follow-up in: 10 days   Patient tolerated procedure well without complications  Assessment:     1  Spitz nevus of forearm, left           Plan:   Wound care instructions given to patient        Prior to Admission medications    Medication Sig Start Date End Date Taking? Authorizing Provider   desogestrel-ethinyl estradiol (VIORELE) 0 15-0 02/0 01 MG (21/5) per tablet Take by mouth 1/14/15  Yes Historical Provider, MD   fluticasone (FLONASE) 50 mcg/act nasal spray 1 spray into each nostril daily  Patient not taking: Reported on 1/14/2019 7/14/17   Marcos Brennan MD   scopolamine (TRANSDERM-SCOP) 1 5 mg/3 days TD 72 hr patch Place 1 patch on the skin every third day  Patient not taking: Reported on 2/5/2019 1/24/19   Giorgi Wilson, DO     No Known Allergies    Hemant Marte MD  2/5/2019,10:34 AM    Portions of the record may have been created with voice recognition software   Occasional wrong word or "sound a like" substitutions may have occurred due to the inherent limitations of voice recognition software   Read the chart carefully and recognize, using context, where substitutions have occurred

## 2019-02-15 ENCOUNTER — CLINICAL SUPPORT (OUTPATIENT)
Dept: DERMATOLOGY | Facility: CLINIC | Age: 24
End: 2019-02-15

## 2019-02-15 DIAGNOSIS — D22.62 SPITZ NEVUS OF FOREARM, LEFT: Primary | ICD-10-CM

## 2019-02-15 PROCEDURE — 99024 POSTOP FOLLOW-UP VISIT: CPT | Performed by: DERMATOLOGY

## 2019-02-15 NOTE — PATIENT INSTRUCTIONS
STERI-STRIPS (BUTTERFLY) POST SURGERY        Steri-Strips have been placed over your incision site to give added support  Please continue to bathe the area as usual     Eventually the Steri-Strips will begin to peel off on the ends  Snip the raised ends off with scissors and let the rest fall off on their own  If you have any questions, please call our office   [phone]

## 2019-02-15 NOTE — PROGRESS NOTES
[address]  [address] [place] [address]  [phone]  [phone]     MRN: [id number] : 1995  Encounter: [id number]  Patient Information: Unknown Lard    Subjective:     74-year-old female presents for follow-up and suture removal from previously excised Spitz nevus left forearm no problems noted     Objective: There were no vitals taken for this visit  Physical Exam:    General Appearance:    Alert, cooperative, no distress   Skin:  Previous site of excision healing well  Procedure:  Suture removal  Site of excision:  Left forearm  Wound was cleansed with saline  Wound is intact  Sutures removed  Steri-Strips applied  Biopsy revealed Spitz nevus margins clear     Assessment:     1  Spitz nevus of forearm, left           Plan:   Wound care instructions given to patient      Prior to Admission medications    Medication Sig Start Date End Date Taking? Authorizing Provider   desogestrel-ethinyl estradiol (VIORELE) 0 15-0 02/0 01 MG () per tablet Take by mouth 1/14/15   Historical Provider, MD   fluticasone (FLONASE) 50 mcg/act nasal spray 1 spray into each nostril daily  Patient not taking: Reported on 2019   Julia Thorne MD   scopolamine (TRANSDERM-SCOP) 1 5 mg/3 days TD 72 hr patch Place 1 patch on the skin every third day  Patient not taking: Reported on 2019   Rosangela Howard,      No Known Allergies    Dharmesh Lara MD  2/15/2019,11:52 AM    Portions of the record may have been created with voice recognition software   Occasional wrong word or "sound a like" substitutions may have occurred due to the inherent limitations of voice recognition software   Read the chart carefully and recognize, using context, where substitutions have occurred

## 2019-04-01 ENCOUNTER — OFFICE VISIT (OUTPATIENT)
Dept: DERMATOLOGY | Facility: CLINIC | Age: 24
End: 2019-04-01

## 2019-04-01 DIAGNOSIS — D22.62 SPITZ NEVUS OF FOREARM, LEFT: Primary | ICD-10-CM

## 2019-04-01 PROCEDURE — 99024 POSTOP FOLLOW-UP VISIT: CPT | Performed by: DERMATOLOGY

## 2019-04-15 ENCOUNTER — APPOINTMENT (OUTPATIENT)
Dept: LAB | Facility: HOSPITAL | Age: 24
End: 2019-04-15
Attending: INTERNAL MEDICINE
Payer: COMMERCIAL

## 2019-04-15 ENCOUNTER — OFFICE VISIT (OUTPATIENT)
Dept: INTERNAL MEDICINE CLINIC | Facility: CLINIC | Age: 24
End: 2019-04-15
Payer: COMMERCIAL

## 2019-04-15 VITALS
HEIGHT: 64 IN | SYSTOLIC BLOOD PRESSURE: 114 MMHG | BODY MASS INDEX: 28.38 KG/M2 | RESPIRATION RATE: 18 BRPM | DIASTOLIC BLOOD PRESSURE: 60 MMHG | TEMPERATURE: 97.7 F | WEIGHT: 166.2 LBS | OXYGEN SATURATION: 98 % | HEART RATE: 75 BPM

## 2019-04-15 DIAGNOSIS — E04.9 ENLARGED THYROID: Primary | ICD-10-CM

## 2019-04-15 DIAGNOSIS — E04.9 ENLARGED THYROID: ICD-10-CM

## 2019-04-15 DIAGNOSIS — D50.0 IRON DEFICIENCY ANEMIA DUE TO CHRONIC BLOOD LOSS: ICD-10-CM

## 2019-04-15 LAB
ALBUMIN SERPL BCP-MCNC: 3.7 G/DL (ref 3.5–5)
ALP SERPL-CCNC: 52 U/L (ref 46–116)
ALT SERPL W P-5'-P-CCNC: 28 U/L (ref 12–78)
ANION GAP SERPL CALCULATED.3IONS-SCNC: 8 MMOL/L (ref 4–13)
AST SERPL W P-5'-P-CCNC: 16 U/L (ref 5–45)
BASOPHILS # BLD AUTO: 0.02 THOUSANDS/ΜL (ref 0–0.1)
BASOPHILS NFR BLD AUTO: 0 % (ref 0–1)
BILIRUB SERPL-MCNC: 0.4 MG/DL (ref 0.2–1)
BUN SERPL-MCNC: 6 MG/DL (ref 5–25)
CALCIUM SERPL-MCNC: 8.9 MG/DL (ref 8.3–10.1)
CHLORIDE SERPL-SCNC: 105 MMOL/L (ref 100–108)
CO2 SERPL-SCNC: 27 MMOL/L (ref 21–32)
CREAT SERPL-MCNC: 0.71 MG/DL (ref 0.6–1.3)
EOSINOPHIL # BLD AUTO: 0.1 THOUSAND/ΜL (ref 0–0.61)
EOSINOPHIL NFR BLD AUTO: 2 % (ref 0–6)
ERYTHROCYTE [DISTWIDTH] IN BLOOD BY AUTOMATED COUNT: 13.5 % (ref 11.6–15.1)
FERRITIN SERPL-MCNC: 7 NG/ML (ref 8–388)
GFR SERPL CREATININE-BSD FRML MDRD: 120 ML/MIN/1.73SQ M
GLUCOSE P FAST SERPL-MCNC: 86 MG/DL (ref 65–99)
HCT VFR BLD AUTO: 39.8 % (ref 34.8–46.1)
HGB BLD-MCNC: 12.6 G/DL (ref 11.5–15.4)
IMM GRANULOCYTES # BLD AUTO: 0.02 THOUSAND/UL (ref 0–0.2)
IMM GRANULOCYTES NFR BLD AUTO: 0 % (ref 0–2)
IRON SATN MFR SERPL: 25 %
IRON SERPL-MCNC: 118 UG/DL (ref 50–170)
LYMPHOCYTES # BLD AUTO: 1.97 THOUSANDS/ΜL (ref 0.6–4.47)
LYMPHOCYTES NFR BLD AUTO: 31 % (ref 14–44)
MCH RBC QN AUTO: 28.1 PG (ref 26.8–34.3)
MCHC RBC AUTO-ENTMCNC: 31.7 G/DL (ref 31.4–37.4)
MCV RBC AUTO: 89 FL (ref 82–98)
MONOCYTES # BLD AUTO: 0.47 THOUSAND/ΜL (ref 0.17–1.22)
MONOCYTES NFR BLD AUTO: 7 % (ref 4–12)
NEUTROPHILS # BLD AUTO: 3.85 THOUSANDS/ΜL (ref 1.85–7.62)
NEUTS SEG NFR BLD AUTO: 60 % (ref 43–75)
NRBC BLD AUTO-RTO: 0 /100 WBCS
PLATELET # BLD AUTO: 268 THOUSANDS/UL (ref 149–390)
PMV BLD AUTO: 11.2 FL (ref 8.9–12.7)
POTASSIUM SERPL-SCNC: 4 MMOL/L (ref 3.5–5.3)
PROT SERPL-MCNC: 7.4 G/DL (ref 6.4–8.2)
RBC # BLD AUTO: 4.48 MILLION/UL (ref 3.81–5.12)
SODIUM SERPL-SCNC: 140 MMOL/L (ref 136–145)
TIBC SERPL-MCNC: 464 UG/DL (ref 250–450)
TSH SERPL DL<=0.05 MIU/L-ACNC: 1.24 UIU/ML (ref 0.36–3.74)
WBC # BLD AUTO: 6.43 THOUSAND/UL (ref 4.31–10.16)

## 2019-04-15 PROCEDURE — 83550 IRON BINDING TEST: CPT

## 2019-04-15 PROCEDURE — 80053 COMPREHEN METABOLIC PANEL: CPT

## 2019-04-15 PROCEDURE — 99213 OFFICE O/P EST LOW 20 MIN: CPT | Performed by: NURSE PRACTITIONER

## 2019-04-15 PROCEDURE — 84443 ASSAY THYROID STIM HORMONE: CPT

## 2019-04-15 PROCEDURE — 83540 ASSAY OF IRON: CPT

## 2019-04-15 PROCEDURE — 85025 COMPLETE CBC W/AUTO DIFF WBC: CPT

## 2019-04-15 PROCEDURE — 82728 ASSAY OF FERRITIN: CPT

## 2019-04-15 PROCEDURE — 36415 COLL VENOUS BLD VENIPUNCTURE: CPT

## 2019-04-15 RX ORDER — NORETHINDRONE ACETATE AND ETHINYL ESTRADIOL AND FERROUS FUMARATE 1MG-20(24)
1 KIT ORAL
COMMUNITY
Start: 2016-04-13 | End: 2021-11-05

## 2019-04-23 ENCOUNTER — HOSPITAL ENCOUNTER (OUTPATIENT)
Dept: ULTRASOUND IMAGING | Facility: HOSPITAL | Age: 24
Discharge: HOME | End: 2019-04-23
Payer: COMMERCIAL

## 2019-04-23 DIAGNOSIS — E04.9 ENLARGED THYROID: ICD-10-CM

## 2019-04-23 PROCEDURE — 76536 US EXAM OF HEAD AND NECK: CPT

## 2019-05-21 ENCOUNTER — OFFICE VISIT (OUTPATIENT)
Dept: DERMATOLOGY | Facility: CLINIC | Age: 24
End: 2019-05-21
Payer: COMMERCIAL

## 2019-05-21 DIAGNOSIS — Z85.820 HISTORY OF MELANOMA: Chronic | ICD-10-CM

## 2019-05-21 DIAGNOSIS — D22.9 MULTIPLE BENIGN MELANOCYTIC NEVI: Primary | ICD-10-CM

## 2019-05-21 DIAGNOSIS — Z13.89 SCREENING FOR SKIN CONDITION: ICD-10-CM

## 2019-05-21 PROCEDURE — 99213 OFFICE O/P EST LOW 20 MIN: CPT | Performed by: DERMATOLOGY

## 2019-08-21 ENCOUNTER — OFFICE VISIT (OUTPATIENT)
Dept: DERMATOLOGY | Facility: CLINIC | Age: 24
End: 2019-08-21
Payer: COMMERCIAL

## 2019-08-21 DIAGNOSIS — Z13.89 SCREENING FOR SKIN CONDITION: ICD-10-CM

## 2019-08-21 DIAGNOSIS — D22.9 NEVUS: Primary | ICD-10-CM

## 2019-08-21 DIAGNOSIS — Z85.820 HISTORY OF MELANOMA: ICD-10-CM

## 2019-08-21 PROCEDURE — 99213 OFFICE O/P EST LOW 20 MIN: CPT | Performed by: DERMATOLOGY

## 2019-08-21 NOTE — PROGRESS NOTES
[facility]  [address] [place] [id number] [phone]     MRN: [id number] : 1995  Encounter: [id number]  Patient Information: Tiffanie Mann  Chief complaint: Three-month checkup for history of melanoma    History of present illness:  24-year-old female with history of both excision of melanoma and Spitz nevus presents for overall checkup no specific concerns noted today except he is bothered by a mole on the right back also hypertrophic scar on the abdomen still present may be getting little bit less indurated  No past medical history on file  Past Surgical History:   Procedure Laterality Date    TONSILLECTOMY AND ADENOIDECTOMY       Social History   Social History     Substance and Sexual Activity   Alcohol Use Yes    Comment: Socially     Social History     Substance and Sexual Activity   Drug Use No     Social History     Tobacco Use   Smoking Status Never Smoker   Smokeless Tobacco Never Used     Family History   Problem Relation Age of Onset    Kidney cancer Maternal Grandfather     Breast cancer Maternal Aunt      Meds/Allergies   No Known Allergies    Meds:  Prior to Admission medications    Medication Sig Start Date End Date Taking?  Authorizing Provider   Norethin Ace-Eth Estrad-FE (MINASTRIN 24 FE) 1-20 MG-MCG(24) CHEW Chew 1 tablet 16  Yes Historical Provider, MD       Subjective:     Review of Systems:    General: negative for - chills, fatigue, fever,  weight gain or weight loss  Psychological: negative for - anxiety, behavioral disorder, concentration difficulties, decreased libido, depression, irritability, memory difficulties, mood swings, sleep disturbances or suicidal ideation  ENT: negative for - hearing difficulties , nasal congestion, nasal discharge, oral lesions, sinus pain, sneezing, sore throat  Allergy and Immunology: negative for - hives, insect bite sensitivity,  Hematological and Lymphatic: negative for - bleeding problems, blood clots,bruising, swollen lymph nodes  Endocrine: negative for - hair pattern changes, hot flashes, malaise/lethargy, mood swings, palpitations, polydipsia/polyuria, skin changes, temperature intolerance or unexpected weight change  Respiratory: negative for - cough, hemoptysis, orthopnea, shortness of breath, or wheezing  Cardiovascular: negative for - chest pain, dyspnea on exertion, edema,  Gastrointestinal: negative for - abdominal pain, nausea/vomiting  Genito-Urinary: negative for - dysuria, incontinence, irregular/heavy menses or urinary frequency/urgency  Musculoskeletal: negative for - gait disturbance, joint pain, joint stiffness, joint swelling, muscle pain, muscular weakness  Dermatological:  As in HPI  Neurological: negative for confusion, dizziness, headaches, impaired coordination/balance, memory loss, numbness/tingling, seizures, speech problems, tremors or weakness       Objective: There were no vitals taken for this visit  Physical Exam:    General Appearance:    Alert, cooperative, no distress   Head:    Normocephalic, without obvious abnormality, atraumatic   Lymphatics:    No lymphadenopathy noted      Abdomen:   No hepatosplenomegaly   Skin:   A full skin exam was performed including scalp, head scalp, eyes, ears, nose, lips, neck, chest, axilla, abdomen, back, buttocks, bilateral upper extremities, bilateral lower extremities, hands, feet, fingers, toes, fingernails, and toenails previous sites of melanoma and Spitz nevus well-healed without signs of any atypia there is some hypertrophic scar noted it appears less than previous normal pigmented lesions regular shape and color lesion on the back that she is concerned about slightly elevated does not appear at all atypical also  dysplastic appearing moles on the breast have not changed when compared to previous obtain photographs nothing else atypical noted     Assessment:     1  Nevus     2  History of melanoma     3  Screening for skin condition           Plan:   Nevi reviewed the concept of ABCDE and ugly duckling nothing markedly atypical patient reassured  History of melanoma sun protection recommended continue to monitor closely follow-up in 3 months if all is well and six-month follow-up after that  Screening for Dermatologic Disorders: Nothing else of concern noted on complete exam follow up in 3 months  Meliton Smith MD  8/21/2019,2:42 PM    Portions of the record may have been created with voice recognition software   Occasional wrong word or "sound a like" substitutions may have occurred due to the inherent limitations of voice recognition software   Read the chart carefully and recognize, using context, where substitutions have occurred

## 2019-08-21 NOTE — PATIENT INSTRUCTIONS
Nevi reviewed the concept of ABCDE and ugly duckling nothing markedly atypical patient reassured  History of melanoma sun protection recommended continue to monitor closely follow-up in 3 months if all is well and six-month follow-up after that  Screening for Dermatologic Disorders: Nothing else of concern noted on complete exam follow up in 3 months

## 2019-10-09 ENCOUNTER — CLINICAL SUPPORT (OUTPATIENT)
Dept: INTERNAL MEDICINE CLINIC | Facility: CLINIC | Age: 24
End: 2019-10-09
Payer: COMMERCIAL

## 2019-10-09 DIAGNOSIS — Z23 ENCOUNTER FOR IMMUNIZATION: ICD-10-CM

## 2019-10-09 PROCEDURE — 90686 IIV4 VACC NO PRSV 0.5 ML IM: CPT

## 2019-10-09 PROCEDURE — 90471 IMMUNIZATION ADMIN: CPT

## 2020-01-28 ENCOUNTER — TELEPHONE (OUTPATIENT)
Dept: INTERNAL MEDICINE CLINIC | Facility: CLINIC | Age: 25
End: 2020-01-28

## 2020-01-28 DIAGNOSIS — Z20.828 EXPOSURE TO THE FLU: Primary | ICD-10-CM

## 2020-01-28 RX ORDER — OSELTAMIVIR PHOSPHATE 75 MG/1
75 CAPSULE ORAL DAILY
Qty: 10 CAPSULE | Refills: 0 | Status: CANCELLED | OUTPATIENT
Start: 2020-01-28 | End: 2020-02-07

## 2020-01-28 NOTE — TELEPHONE ENCOUNTER
CALL in Tamiflu??     Pt uses a pharmacy in [phone] and needs a Tamiflu script sent in as her boyfriend was just tested positive for flu by his fam pcp        [address], [address]

## 2020-07-01 ENCOUNTER — OFFICE VISIT (OUTPATIENT)
Dept: DERMATOLOGY | Facility: CLINIC | Age: 25
End: 2020-07-01
Payer: COMMERCIAL

## 2020-07-01 VITALS — TEMPERATURE: 99 F

## 2020-07-01 DIAGNOSIS — Z85.820 HISTORY OF MELANOMA: ICD-10-CM

## 2020-07-01 DIAGNOSIS — D22.9 NEVUS: Primary | ICD-10-CM

## 2020-07-01 DIAGNOSIS — Z13.89 SCREENING FOR SKIN CONDITION: ICD-10-CM

## 2020-07-01 PROCEDURE — 88305 TISSUE EXAM BY PATHOLOGIST: CPT | Performed by: STUDENT IN AN ORGANIZED HEALTH CARE EDUCATION/TRAINING PROGRAM

## 2020-07-01 PROCEDURE — 11300 SHAVE SKIN LESION 0.5 CM/<: CPT | Performed by: DERMATOLOGY

## 2020-07-01 PROCEDURE — 1036F TOBACCO NON-USER: CPT | Performed by: DERMATOLOGY

## 2020-07-01 PROCEDURE — 99213 OFFICE O/P EST LOW 20 MIN: CPT | Performed by: DERMATOLOGY

## 2020-07-01 NOTE — PATIENT INSTRUCTIONS
Nevus nothing really remarkable took photographs has baseline other lesion if any further changes noted remove the mole on her back because of her concerns  History of melanoma no recurrence nothing else markedly atypical advised the importance of follow up every 6 months especially if she relocated she should find someone else in that area  Screening for Dermatologic Disorders: Nothing else of concern noted on complete exam follow up in 6 months  Wound care instructions given to patient

## 2020-07-01 NOTE — PROGRESS NOTES
[facility] DERMATOLOGY  [address] [place] [id number]  [phone]  [phone]     MRN: [id number] : 1995  Encounter: [id number]  Patient Information: Alden Shepard  Chief complaint:  Skin cancer checkup    History of present illness:  41-year-old female with history of both melanoma and atypical Spitz nevus who had not seen for almost a year presents for follow-up patient relocated for awhile down in [place]  Patient on concerned regarding mole on her right lower back that gets irritated by her clothing no other concerns noted  History reviewed  No pertinent past medical history  Past Surgical History:   Procedure Laterality Date    TONSILLECTOMY AND ADENOIDECTOMY       Social History   Social History     Substance and Sexual Activity   Alcohol Use Yes    Comment: Socially     Social History     Substance and Sexual Activity   Drug Use No     Social History     Tobacco Use   Smoking Status Never Smoker   Smokeless Tobacco Never Used     Family History   Problem Relation Age of Onset    Kidney cancer Maternal Grandfather     Breast cancer Maternal Aunt      Meds/Allergies   No Known Allergies    Meds:  Prior to Admission medications    Medication Sig Start Date End Date Taking?  Authorizing Provider   Norethin Ace-Eth Estrad-FE (MINASTRIN 24 FE) 1-20 MG-MCG(24) CHEW Chew 1 tablet 16  Yes Historical Provider, MD       Subjective:     Review of Systems:    General: negative for - chills, fatigue, fever,  weight gain or weight loss  Psychological: negative for - anxiety, behavioral disorder, concentration difficulties, decreased libido, depression, irritability, memory difficulties, mood swings, sleep disturbances or suicidal ideation  ENT: negative for - hearing difficulties , nasal congestion, nasal discharge, oral lesions, sinus pain, sneezing, sore throat  Allergy and Immunology: negative for - hives, insect bite sensitivity,  Hematological and Lymphatic: negative for - bleeding problems, blood clots,bruising, swollen lymph nodes  Endocrine: negative for - hair pattern changes, hot flashes, malaise/lethargy, mood swings, palpitations, polydipsia/polyuria, skin changes, temperature intolerance or unexpected weight change  Respiratory: negative for - cough, hemoptysis, orthopnea, shortness of breath, or wheezing  Cardiovascular: negative for - chest pain, dyspnea on exertion, edema,  Gastrointestinal: negative for - abdominal pain, nausea/vomiting  Genito-Urinary: negative for - dysuria, incontinence, irregular/heavy menses or urinary frequency/urgency  Musculoskeletal: negative for - gait disturbance, joint pain, joint stiffness, joint swelling, muscle pain, muscular weakness  Dermatological:  As in HPI  Neurological: negative for confusion, dizziness, headaches, impaired coordination/balance, memory loss, numbness/tingling, seizures, speech problems, tremors or weakness       Objective:   Temp 99 °F (37 2 °C)     Physical Exam:    General Appearance:    Alert, cooperative, no distress   Head:    Normocephalic, without obvious abnormality, atraumatic           Skin:   A full skin exam was performed including scalp, head scalp, eyes, ears, nose, lips, neck, chest, axilla, abdomen, back, buttocks, bilateral upper extremities, bilateral lower extremities, hands, feet, fingers, toes, fingernails, and toenails previous sites of melanoma and Spitz nevus excisions well-healed without signs of any local recurrence several pigmented lesions all pretty much regular shape and color lesions noted on the breast previously have not really changed with examination previous photo on left lower back there is a dome-shaped papule which is flesh-colored next to it more this slightly dysplastic 4 mm macule elected remove this because the patient concern also photographed on couple other moles that she has          Shave excision Procedure Note    Pre-operative Diagnosis:  Irritated nevus    Plan:  1  Instructed to keep the wound dry and covered for 24 and clean thereafter  2  Warning signs of infection were reviewed  3  Recommended that the patient use OTC acetaminophen as needed for pain  Locations:  Right lower back  Indications:  Patient concerned    Anesthesia: Lidocaine 1% with epinephrine without added sodium bicarbonate    Procedure Details     Patient informed of the risks (including bleeding and infection) and benefits of the   procedure and Verbal informed consent obtained  The lesion and surrounding area were prepped using alcohol  A Blue blade razor was used to remove the lesion  Hemostasis achieved with aluminum chloride  Petrolatum and a sterile dressing applied  The specimen was sent for pathologic examination  The patient tolerated the procedure(s) well  Complications:  none  Assessment:     1  Nevus     2  History of melanoma      level II Breslow thickness 0 4 right lower abdomen 9/2018   3  Screening for skin condition           Plan:   Nevus nothing really remarkable took photographs has baseline other lesion if any further changes noted remove the mole on her back because of her concerns  History of melanoma no recurrence nothing else markedly atypical advised the importance of follow up every 6 months especially if she relocated she should find someone else in that area  Screening for Dermatologic Disorders: Nothing else of concern noted on complete exam follow up in 6 months    Gonzalo Knight MD  7/1/2020,4:18 PM    Portions of the record may have been created with voice recognition software   Occasional wrong word or "sound a like" substitutions may have occurred due to the inherent limitations of voice recognition software   Read the chart carefully and recognize, using context, where substitutions have occurred

## 2020-07-07 ENCOUNTER — TELEPHONE (OUTPATIENT)
Dept: DERMATOLOGY | Facility: CLINIC | Age: 25
End: 2020-07-07

## 2020-07-07 NOTE — TELEPHONE ENCOUNTER
----- Message from Lawrence Valle MD sent at 7/7/2020  2:28 PM EDT -----  Please call her of normal pathology

## 2020-07-09 ENCOUNTER — TELEPHONE (OUTPATIENT)
Dept: DERMATOLOGY | Facility: CLINIC | Age: 25
End: 2020-07-09

## 2021-06-29 ENCOUNTER — TELEPHONE (OUTPATIENT)
Dept: HEMATOLOGY ONCOLOGY | Facility: CLINIC | Age: 26
End: 2021-06-29

## 2021-06-29 ENCOUNTER — TELEPHONE (OUTPATIENT)
Dept: SURGICAL ONCOLOGY | Facility: CLINIC | Age: 26
End: 2021-06-29

## 2021-06-29 ENCOUNTER — TELEPHONE (OUTPATIENT)
Dept: MAMMOGRAPHY | Facility: CLINIC | Age: 26
End: 2021-06-29

## 2021-06-29 PROBLEM — Z17.0 MALIGNANT NEOPLASM OF UPPER-INNER QUADRANT OF RIGHT BREAST IN FEMALE, ESTROGEN RECEPTOR POSITIVE (HCC): Status: ACTIVE | Noted: 2021-06-29

## 2021-06-29 PROBLEM — C50.211 MALIGNANT NEOPLASM OF UPPER-INNER QUADRANT OF RIGHT BREAST IN FEMALE, ESTROGEN RECEPTOR POSITIVE (HCC): Status: ACTIVE | Noted: 2021-06-29

## 2021-06-29 NOTE — TELEPHONE ENCOUNTER
New Patient Request   Patient Details:     Mallory Roberts      1995      [id number]      Reason for Appointment   Who is calling to schedule? Physician Office   If not Patient, what is their name? Donna with [facility]    What is the diagnosis? Right invasive breast cancer     Who is the referring doctor? Scheduling Information   Which department are you scheduling with ? [address] [place] [address] Number to call back on? [phone]   Miscellaneous Information: Colon Peacemaker with [facility] calling to schedule patient  Patient was DX at [facility] and is not happy   David Durán has been in contact with Marge Fu with Dr Azalia Ndiaye team    Please advise the patient, a new patient  will be calling them back within 1 business day

## 2021-06-29 NOTE — TELEPHONE ENCOUNTER
Call placed to patient to discuss options of Dr Brina Fu and Dr Bernard Barrera, pt would like to see Dr Brina Fu but not opposed to seeing Dr Bernard Barrera depending on what dates are available, adv I will check on scheduling and give her a call back     [phone] - Call placed to Caroline Sands, Dr Sergei Maddox office, adv that first available appt is 7/7    1117 - Call placed to [address] [address] to start scheduling process, adv that someone will call patient to get her scheduled with surgeon appt    [phone] - Call placed to patient, adv that Dr Bernard Barrera can see her tomorrow however Dr Brina Fu is unable to see her until 7/7, pt states she will see Dr Bernard Barrera tomorrow, adv that scheduling will be calling her to set that appointment up, pt with no further questions at this time, pt has name/# for any further needs

## 2021-06-29 NOTE — TELEPHONE ENCOUNTER
New Patient Breast Form   Patient Details:     Melbourne Councilman     1995     [id number]     Background Information:   [address] starts by opening a telephone encounter and gathering the following information   Who is calling to schedule and relationship? Patient   Referring Provider [facility]   To which speciality is the referral?  Surgical Oncology   Reason for Visit? New Diagnosis   Tumor Type?  invasive breast cancer   Is this Cancer or Non-Cancer? Cancer   Is there a confimed diagnosis from biopsy/tissue reviewed by Pathology? If no, why?  yes   Date of Tissue Diagnosis (If done outside of [facility] please obtain report and slides) 6/2021 [facility]   Is patient aware of diagnosis? Whoo made them aware? If patient not aware, why? Yes [facility]   If no tissue diagnosis, please stop and discuss with Navigator prior to scheduling   When was the diagnosis made? 6/2021   Were outside slides requested? If no, why?  yes   If biopsy done externally, obtain reports and slides for internal review   Have you had any imaging or labs done? If no, why?  yes   If YES, when and  where was the blood work and imaging done? [facility]   If outside of [facility] obtain records   Was the patient told to bring a disk? If no why? yes   Are records in EPIC? If no, why? yes   Is there a personal history of cancer? If no, why? yes   If YES please list type and YR diagnosed Melanoma 10/2018   If patient has a prior history of breast cancer were old records obtained? If no, why? N/A   Have you ever had genetic testing done for breast cancer? If so, can you please bring a copy to appointment for timely treatment planning  If no, why? no   Is there a family history of cancer?  yes   If YES please list type breast   Scheduling Information:   [facility]   Are you willing to see another provider to be seen sooner?  yes   Are there any days the patient cannot be seen? If yes, please list dates   no   How was New Patient Packet Sent? Email   Miscellaneous:pt was treated for melanoma at [address] [place] [address] w/ Dr Gary Mena, did not see med onc or surg onc for this  Records are in [facility]   After completing the above information, please route to finance, nurse navigation and clinical trials for review

## 2021-06-30 ENCOUNTER — DOCUMENTATION (OUTPATIENT)
Dept: GENETICS | Facility: CLINIC | Age: 26
End: 2021-06-30

## 2021-06-30 ENCOUNTER — APPOINTMENT (OUTPATIENT)
Dept: LAB | Facility: HOSPITAL | Age: 26
End: 2021-06-30
Attending: SURGERY
Payer: COMMERCIAL

## 2021-06-30 ENCOUNTER — CONSULT (OUTPATIENT)
Dept: SURGICAL ONCOLOGY | Facility: CLINIC | Age: 26
End: 2021-06-30
Payer: COMMERCIAL

## 2021-06-30 VITALS
TEMPERATURE: 98.1 F | HEIGHT: 63 IN | DIASTOLIC BLOOD PRESSURE: 80 MMHG | HEART RATE: 76 BPM | WEIGHT: 167.2 LBS | BODY MASS INDEX: 29.62 KG/M2 | RESPIRATION RATE: 12 BRPM | OXYGEN SATURATION: 98 % | SYSTOLIC BLOOD PRESSURE: 128 MMHG

## 2021-06-30 DIAGNOSIS — Z80.3 FAMILY HISTORY OF MALIGNANT NEOPLASM OF BREAST: ICD-10-CM

## 2021-06-30 DIAGNOSIS — Z17.0 MALIGNANT NEOPLASM OF UPPER-INNER QUADRANT OF RIGHT BREAST IN FEMALE, ESTROGEN RECEPTOR POSITIVE (HCC): Primary | ICD-10-CM

## 2021-06-30 DIAGNOSIS — C50.211 MALIGNANT NEOPLASM OF UPPER-INNER QUADRANT OF RIGHT BREAST IN FEMALE, ESTROGEN RECEPTOR POSITIVE (HCC): Primary | ICD-10-CM

## 2021-06-30 DIAGNOSIS — C43.59 MELANOMA OF TRUNK (HCC): ICD-10-CM

## 2021-06-30 PROCEDURE — 36415 COLL VENOUS BLD VENIPUNCTURE: CPT

## 2021-06-30 PROCEDURE — 99205 OFFICE O/P NEW HI 60 MIN: CPT | Performed by: SURGERY

## 2021-06-30 NOTE — PROGRESS NOTES
Surgical Oncology Consult Note       [facility] [address] [facility] [address]  [address] [facility] [address]  [facility] [address] [address]  1995  [id number]      Chief Complaint   Patient presents with    Consult        Assessment/Plan    1  Malignant neoplasm of upper-inner quadrant of right breast in female, estrogen receptor positive ([facility] [address] )  -     Ambulatory referral to Hematology / Oncology; Future  -     NM bone scan whole body; Future; Expected date: 06/30/2021  -     MRI breast bilateral w and wo contrast w cad; Future; Expected date: 06/30/2021  -     CT chest abdomen pelvis w wo contrast; Future; Expected date: 06/30/2021         Oncology History   Malignant neoplasm of upper-inner quadrant of right breast in female, estrogen receptor positive ([address] )   6/24/2021 Initial Diagnosis    Malignant neoplasm of upper-inner quadrant of right breast in female, estrogen receptor positive ([address] )          Sirena Cantrell is a very pleasant 67-year-old female was recently diagnosed with right breast cancer  She is here with her mom, dad and sister  May of 2021 she was vacationing with her [place] at the beach  She palpated lump in her right breast upper inner quadrant just adjacent to the cleavage  This prompted her to see yTler Klein at [facility]  She promptly ordered ultrasound and met head and mammogram   These these studies were read BIRAD 4  This was biopsied and LYNDSAY  was placed at the same time  She has a personal history of resection of melanoma  Family history of breast cancer maternal aunt  She has family history of prostate cancer  Pathology is consistent  breast, right, 1 o'clock, 13-14 cm from nipple, needle biopsy:    Invasive ductal carcinoma, grade 3     Ductal carcinoma in situ, grade 3, comedo type     Perineural invasion present    INVASIVE COMPONENT: Present    HISTOLOGIC GRADE (MSBR):  3    Nuclear thGthrthathdtheth:th th4th Tubule Formation:  3    Mitotic Rate:  2    TUMOR SIZE: at least 13 mm    LYMPHATIC INVASION: Not identified    IN-SITU COMPONENT: Present    Nuclear grade: high grade, grade 3    Necrosis: Present, Central    Percent in-situ tumor:  <5%    MICROCALCIFICATIONS: absent    ER (SP1): Positive (90% of tumor nuclei stain moderately to    strongly positive)     SD (1E2): Positive (70% of tumor nuclei stain moderately to    strongly positive)     ERBB2 (HER2) (Pathway HER2/ratna-4B5): Positive (score +3)      She is here for further workup and management  Review of Systems   Constitutional: Negative for chills and fever  HENT: Negative for ear pain and sore throat  Eyes: Negative for pain and visual disturbance  Respiratory: Negative for cough and shortness of breath  Cardiovascular: Negative for chest pain and palpitations  Gastrointestinal: Negative for abdominal pain and vomiting  Genitourinary: Negative for dysuria and hematuria  Musculoskeletal: Negative for arthralgias and back pain  Skin: Negative for color change and rash  Neurological: Negative for seizures and syncope  All other systems reviewed and are negative  Past Medical History:      Patient Active Problem List   Diagnosis    Nevus    Malignant melanoma of skin of abdomen ([facility] [address] )    Neck pain    Multiple benign melanocytic nevi    Iron deficiency anemia due to chronic blood loss    Warts    Surveillance for birth control, oral contraceptives    Discomfort of left ear    Benign paroxysmal positional vertigo due to bilateral vestibular disorder    Spitz nevus of forearm, left    Enlarged thyroid    Malignant neoplasm of upper-inner quadrant of right breast in female, estrogen receptor positive ([facility] [address] )      History reviewed  No pertinent past medical history       Past Surgical History:   Procedure Laterality Date    TONSILLECTOMY AND ADENOIDECTOMY      WISDOM TOOTH EXTRACTION Family History   Problem Relation Age of Onset    Kidney cancer Maternal Grandfather     Breast cancer Maternal Aunt         Social History     Socioeconomic History    Marital status: Single     Spouse name: Not on file    Number of children: Not on file    Years of education: Not on file    Highest education level: Not on file   Occupational History    Not on file   Tobacco Use    Smoking status: Never Smoker    Smokeless tobacco: Never Used   Vaping Use    Vaping Use: Never used   Substance and Sexual Activity    Alcohol use: Yes     Comment: Socially    Drug use: No    Sexual activity: Yes   Other Topics Concern    Not on file   Social History Narrative    Not on file     Social Determinants of Health     Financial Resource Strain:     Difficulty of Paying Living Expenses:    Food Insecurity:     Worried About Running Out of Food in the Last Year:     Ran Out of Food in the Last Year:    Transportation Needs:     Lack of Transportation (Medical):      Lack of Transportation (Non-Medical):    Physical Activity:     Days of Exercise per Week:     Minutes of Exercise per Session:    Stress:     Feeling of Stress :    Social Connections:     Frequency of Communication with Friends and Family:     Frequency of Social Gatherings with Friends and Family:     Attends Worship Services:     Active Member of Clubs or Organizations:     Attends Club or Organization Meetings:     Marital Status:    Intimate Partner Violence:     Fear of Current or Ex-Partner:     Emotionally Abused:     Physically Abused:     Sexually Abused:         Current Outpatient Medications:     Norethin Ace-Eth Estrad-FE (MINASTRIN 24 FE) 1-20 MG-MCG(24) CHEW, Chew 1 tablet (Patient not taking: Reported on 6/30/2021), Disp: , Rfl:    No Known Allergies    Physical Exam:     Vitals:    06/30/21 1339   BP: 128/80   Pulse: 76   Resp: 12   Temp: 98 1 °F (36 7 °C)   SpO2: 98%     Physical Exam  Constitutional: Appearance: Normal appearance  HENT:      Head: Normocephalic and atraumatic  Nose: Nose normal       Mouth/Throat:      Mouth: Mucous membranes are moist    Eyes:      Pupils: Pupils are equal, round, and reactive to light  Cardiovascular:      Rate and Rhythm: Normal rate  Pulses: Normal pulses  Heart sounds: Normal heart sounds  Pulmonary:      Effort: Pulmonary effort is normal       Breath sounds: Normal breath sounds  Chest:          Comments: The right Breast(s) was examined  There was not any sign of an inverted nipple,  nipple discharge, skin changes or tenderness  Palpable mass at 1:00 a m  position right breast   Roughly 2 x 2 cm  Steri-Strips in place from recent biopsy  The right  breast(s) was examined in the sitting and supine position  There is not any worrisome skin changes, tenderness, nipple changes, swelling ,bleeding or evidence of mass/s in all four quadrants  Deni survey demonstrated that there is not any evidence of any clinically suspicious axillary, pectoral or supraclavicular lymph nodes  The left Breast(s) was examined  There was not any sign of an inverted nipple, mass, nipple discharge, skin changes or tenderness  The left  breast(s) was examined in the sitting and supine position  There is not any worrisome skin changes, tenderness, nipple changes, swelling ,bleeding or evidence of mass/s in all four quadrants  Deni survey demonstrated that there is not any evidence of any clinically suspicious axillary, pectoral or supraclavicular lymph nodes  Abdominal:      General: Bowel sounds are normal       Palpations: Abdomen is soft  Musculoskeletal:         General: Normal range of motion  Cervical back: Normal range of motion and neck supple  Skin:     General: Skin is warm  Neurological:      General: No focal deficit present  Mental Status: She is alert and oriented to person, place, and time     Psychiatric:         Mood and Affect: Mood normal          Behavior: Behavior normal          Thought Content: Thought content normal          Judgment: Judgment normal          Results:   Her films as well as the pathology results from outside were reviewed  Discussion/Summary: This is a 58-year-old female with newly diagnosed right breast cancer  We detail nature of breast cancer further workup and management  genetic testing  Bilateral breast MRI  Staging CT chest abdomen pelvis  Bone scan  Given her cancer is HER2 positive the may be new adjuvant Herceptin may benefit to delineate this we will also obtain medical oncology consult within this week  We talked to Medical Oncology and they agreed to see her this Friday 8 am   We will bring after all these studies next week to plan for further management  Overall I discussed with them in detail the nature of breast cancer specifically related to her cancer  For patient's and family members questions are answered to their satisfaction  In addition I also have provided my personal contact information for them to call me with any questions or concerns  They understand that I am available for them at any time  Advance Care Planning/Advance Directives: Khanh Onofre MD discussed the disease status, and treatment plans with Stevo Alexandra today 06/30/21 and will follow-up with the patient

## 2021-07-01 ENCOUNTER — HOSPITAL ENCOUNTER (OUTPATIENT)
Dept: RADIOLOGY | Facility: MEDICAL CENTER | Age: 26
Discharge: HOME | End: 2021-07-01
Payer: COMMERCIAL

## 2021-07-01 ENCOUNTER — PATIENT OUTREACH (OUTPATIENT)
Dept: SURGICAL ONCOLOGY | Facility: CLINIC | Age: 26
End: 2021-07-01

## 2021-07-01 ENCOUNTER — HOSPITAL ENCOUNTER (OUTPATIENT)
Dept: MRI IMAGING | Facility: HOSPITAL | Age: 26
Discharge: HOME | End: 2021-07-01
Attending: SURGERY
Payer: COMMERCIAL

## 2021-07-01 VITALS — WEIGHT: 167 LBS | HEIGHT: 62 IN | BODY MASS INDEX: 30.73 KG/M2

## 2021-07-01 DIAGNOSIS — Z78.9 NEED FOR FOLLOW-UP BY SOCIAL WORKER: Primary | ICD-10-CM

## 2021-07-01 DIAGNOSIS — Z17.0 MALIGNANT NEOPLASM OF UPPER-INNER QUADRANT OF RIGHT BREAST IN FEMALE, ESTROGEN RECEPTOR POSITIVE (HCC): ICD-10-CM

## 2021-07-01 DIAGNOSIS — C50.211 MALIGNANT NEOPLASM OF UPPER-INNER QUADRANT OF RIGHT BREAST IN FEMALE, ESTROGEN RECEPTOR POSITIVE (HCC): ICD-10-CM

## 2021-07-01 PROCEDURE — 74177 CT ABD & PELVIS W/CONTRAST: CPT

## 2021-07-01 PROCEDURE — C8908 MRI W/O FOL W/CONT, BREAST,: HCPCS

## 2021-07-01 PROCEDURE — C8937 CAD BREAST MRI: HCPCS

## 2021-07-01 PROCEDURE — 71260 CT THORAX DX C+: CPT

## 2021-07-01 PROCEDURE — A9585 GADOBUTROL INJECTION: HCPCS | Performed by: SURGERY

## 2021-07-01 RX ADMIN — GADOBUTROL 7 ML: 604.72 INJECTION INTRAVENOUS at 10:42

## 2021-07-01 RX ADMIN — IOHEXOL 100 ML: 350 INJECTION, SOLUTION INTRAVENOUS at 09:00

## 2021-07-01 NOTE — PROGRESS NOTES
Breast Oncology Nurse Navigator    Called patient for initial navigation outreach  Explained reason for call  Discussed the role of breast oncology nurse navigator as well as additional cancer support services available to utilize as needed  Patient appears to be very overwhelmed and eager to meet with medical oncology tomorrow  Currently denies any questions or support needs  Provided patient with my contact information and availability, encouraged to call as needed throughout treatment course with any questions or support needs  I will continue to follow up with patient as needed   referral completed

## 2021-07-01 NOTE — PROGRESS NOTES
Hematology/Oncology Consult Note    Date of Service: 7/2/2021    [facility] [address] [address]    Reason for Consultation:  Right breast cancer    Cancer Stage: [id number], Stage IA     Referral Physician: Dr Arvin Desouza    Oncology/Hematology History:  · History of skin malignant lymphoma, status post wide local excision  · May 2021, patient palpated a lump in the right breast at upper inner quadrant  · June 23, 2021 , Mammogram and ultrasound in [facility] showed an irregular complex cystic mass with microlobulated margins measuring 1 5 x 1 3 x 1 4 cm  No discrete nodes are found in the right axilla  · June 24, 2021, patient underwent ultrasound guided core biopsy of the right breast mass: Pathology showed evaluated Grade 3 invasive ductal carcinoma, positive for carcinoma in-situ component, positive perineural remission  Negative for lymphovascular invasion  ER 90%, AZ 70%, HER2 positive by IHC  · 06/30/2021, patient consulted Dr Mohsen Kaur  Staging CT scan and bone scan ordered  Patient was refered to genetic counseling  · July 1, 2021 CT scan chest/abdomen/ pelvis with contrast for staging showed bilateral ovarian cysts  with left adnexal cyst measures 4 2 x 4 4 x 3 3 cm, and right adnexal cyst measures 4 3 x 3 0 x 3 5 cm  No evidence of metastatic disease in C/ A/P   · MRI of bilateral breast in July 1, 2021  redemonstrated the 1:00 O'Clock  biopsy-proven breast cancer  No axillary adenopathy  No extension to the chest wall  No concurrent lesions in either breast   · July 2, 2021, bone scan showed no evidence of metastatic disease  Assessment and Recommendations:   1  Right breast invasive ductal carcinoma, grade 3, ER 90%, AZ 70%, HER2 positive by IHC  I had a  lengthy discussed with the patient and patient's family    We discussed the image study results, pathologic report, the cancer biology, stage and treatment options  The patient understood that this is an aggressive type breast cancer that will benefit from systemic chemotherapy as per NCCN guideline  Because of her young age, she needs genetic testing before surgery  I recommend neoadjuvant chemotherapy with TCHP x 6 cycles,  which has the highest pathologic CR data compared to THP  Patient is very emotional,  anxious and crying after we discussed the benefits and risks of the chemotherapy  She is very concerned about her hair loss  As we discussed,  in order to get better outcome, traditional chemotherapy is necessary in the chemo recommended  Traditional chemotherapy does cause hair loss,  but usually it is temporary  HER2 targeted therapy combination is an option but is a suboptimal treatment  Patient understood that I will change TCHP to THP if she cannot tolerate it  I also promised to get  on board for better support  By the end of the interview,  patient seems more calm and willing to consider the chemotherapy  I will check CBC with differential, CMP,  schedule MediPort placement,  Echo study to evaluate cardiac function  Patient will have a formal chemo medication to discuss  the side effect of the chemotherapy and the support care plan  2    Genetic predisposition:  Patient will have a genetic counseling as per NCCN guideline  3    History of palpitation  I will get echo study to evaluate the cardiac function  4    Fertility  Preservation:   The patient is 11-year-old and unmarried  Chemotherapy potentially will affect her fertility  Especially, she will need endocrine therapy as well  She is interested in embryo preservation  I will refer patient to Reproductive Medicine as soon as possible  5   History of melanoma  Status post wide local excision  No evidence of local recurrence  5   Follow-up: Return to clinic in 1-2 weeks  Thank you very much for your consultation and making us part of this nice patient's care  I will continue to follow closely with you  Please contact me with any additional questions  Disclaimer: This document was prepared using [employer] Fluency Direct technology  If a word or phrase is confusing, or does not make sense, this is likely due to recognition error which was not discovered during the providers review  If you believe an error has occurred, please contact me through [address] [place] line for toya? cation  HPI:   Heron Walter is a 31 y o  female with a past medical history of cutaneous malignant melanoma, status post wide local excision, iron deficiency anemia, and a benign paroxysmal positional vertigo, who noticed a lump in the right breast in May 2021  Patient underwent ultrasound and mammogram which confirmed the mass in the right breast at the 1 o'clock position  Biopsy of the mass in June 24, 2021 showed G3, invasive ductal carcinoma, triple positive  Positive perineural invasion, negative lymphovascular invasion  Bone scan,MRI of the breasts and CT scan chest abdomen pelvis showed no evidence of metastatic disease  No other suspicious masses in both breast   No evidence of lymphadenopathy  Dr Henry Chopra  consulted the patient yesterday  Genetic counseling was requested  Patient is referred to Medical Oncology for comanagement of breast cancer  Patient feels anxious and crying  because of her cancer diagnosis  Patient denied fever or chills  No chest pain or shortness breath  No cough and phlegm  No GI  symptoms  Appetite good  Her weight has been stable  No abnormal discharge from nipples      Review of system:  12-point review of system was performed, pertinent positive and negative were detailed as above    Past Medical History:   Diagnosis Date    Breast cancer ([facility] [address] )        Nevus    Malignant melanoma of skin of abdomen (HCC)    Neck pain    Multiple benign melanocytic nevi    Iron deficiency anemia due to chronic blood loss    Warts    Surveillance for birth control, oral contraceptives    Discomfort of left ear    Benign paroxysmal positional vertigo due to bilateral vestibular disorder    Spitz nevus of forearm, left    Enlarged thyroid    Malignant neoplasm of upper-inner quadrant of right breast in female, estrogen receptor positive ([facility] [address] )         Past Surgical History:   Procedure Laterality Date    BREAST BIOPSY      TONSILLECTOMY AND ADENOIDECTOMY      WISDOM TOOTH EXTRACTION         Family History   Problem Relation Age of Onset    Kidney cancer Maternal Grandfather     Breast cancer Maternal Aunt        Social History     Socioeconomic History    Marital status: Single     Spouse name: Not on file    Number of children: Not on file    Years of education: Not on file    Highest education level: Not on file   Occupational History    Not on file   Tobacco Use    Smoking status: Never Smoker    Smokeless tobacco: Never Used   Vaping Use    Vaping Use: Never used   Substance and Sexual Activity    Alcohol use: Yes     Comment: Socially    Drug use: No    Sexual activity: Yes   Other Topics Concern    Not on file   Social History Narrative    Not on file     Social Determinants of Health     Financial Resource Strain:     Difficulty of Paying Living Expenses:    Food Insecurity:     Worried About Running Out of Food in the Last Year:     Ran Out of Food in the Last Year:    Transportation Needs:     Lack of Transportation (Medical):      Lack of Transportation (Non-Medical):    Physical Activity:     Days of Exercise per Week:     Minutes of Exercise per Session:    Stress:     Feeling of Stress :    Social Connections:     Frequency of Communication with Friends and Family:     Frequency of Social Gatherings with Friends and Family:     Attends [employer] Services:     Active Member of Clubs or Organizations:     Attends Club or Organization Meetings:     Marital Status:    Intimate Partner Violence:     Fear of Current or Ex-Partner:     Emotionally Abused:     Physically Abused:     Sexually Abused:        No Known Allergies    Current Outpatient Medications   Medication Sig Dispense Refill    lidocaine-prilocaine (EMLA) cream Apply topically as needed for mild pain 30 g 0    Norethin Ace-Eth Estrad-FE (MINASTRIN 24 FE) 1-20 MG-MCG(24) CHEW Chew 1 tablet (Patient not taking: Reported on 7/2/2021)      ondansetron (ZOFRAN) 4 mg tablet Take 1 tablet (4 mg total) by mouth every 8 (eight) hours as needed for nausea or vomiting 20 tablet 0    promethazine (PHENERGAN) 25 mg tablet Take 1 tablet (25 mg total) by mouth every 6 (six) hours as needed for nausea or vomiting 60 tablet 2     No current facility-administered medications for this visit  (Not in a hospital admission)      Pain Score: 0    ECOG PS: 0    Objective:     24 Hour Vitals Assessment:  Vitals:    07/02/21 0810   BP: 132/88   Pulse: 72   Resp: 18   Temp: 97 8 °F (36 6 °C)   SpO2: 98%       PHYSICIAN EXAM:    General: Appearance: alert, cooperative, no distress  HEENT: Normocephalic, atraumatic  No scleral icterus  conjunctivae clear  PERRL, EOM's intact  No sinus drainage or tenderness  Chest: No tenderness  Breasts:  Bilateral breast symmetrical  There is a about 2cm tender mass in the right breast at 1 o'clock position  No skin change  No nipple retraction or abnormal discharge  No palpable mass  No palpable axillary LN enlargement  Lungs: Clear to auscultation bilaterally, Respirations unlabored  Cardiac: Regular rate and rhythm, S1and S2 are normal, no murmur, click, rubs or gallops  Abdomen: Soft, non-tender, non-distended  Bowel sounds are normal  No masses, no organomegaly  Pelvic: deferred  Extremities:  No edema, cyanosis, clubbing  Skin: Skin color, turgor are normal  No rashes    Lymphatics: no palpable adenopathy  Neurologic: Alert and oriented X 3, Cranial nerve 2-12 grosely intact  Normal strength and sensation  DATA REVIEW:    Pathology Result:    Reviewed    Image Results: They are reviewed and documented in Hematology/Oncology history    NM bone scan whole body  Narrative: BONE SCAN  WHOLE BODY    INDICATION: C50 211: Malignant neoplasm of upper-inner quadrant of right female breast  Z17 0: Estrogen receptor positive status (ER+)    PREVIOUS FILM CORRELATION:    CT chest abdomen pelvis of 7/1/21    TECHNIQUE:   This study was performed following the intravenous administration of 24 3 mCi Tc-99m labeled MDP  Delayed, anterior and posterior whole body images were acquired, 2-3 hours after radiopharmaceutical administration  Additional delayed   oblique static images acquired of the bilateral ribs and pelvis  FINDINGS:     There is a normal distribution of radiotracer throughout the visualized skeleton  There is no scintigraphic evidence of osseous metastasis  Renal activity is symmetric  Impression: 1  No scintigraphic evidence of osseous metastasis  Workstation performed: [id number]      Mammo outside images    Result Date: 6/29/2021  Narrative: 7 99 287 8 [id number]    US breast outside images    Result Date: 6/29/2021  Narrative: 3 52 396 5 [id number]    US breast outside images    Result Date: 6/29/2021  Narrative: 2 16 840 1 [id number]      LABS:  Lab data are reviewed and documented in HemOnc history  No results found for this or any previous visit (from the past 48 hour(s))      By:  Ingrid Sands MD, 7/2/2021, 6:07 PM                                  Primary Care Physician:  Maggie Aguayo DO

## 2021-07-02 ENCOUNTER — PATIENT OUTREACH (OUTPATIENT)
Dept: CASE MANAGEMENT | Facility: HOSPITAL | Age: 26
End: 2021-07-02

## 2021-07-02 ENCOUNTER — HOSPITAL ENCOUNTER (OUTPATIENT)
Dept: NUCLEAR MEDICINE | Facility: HOSPITAL | Age: 26
Discharge: HOME | End: 2021-07-02
Payer: COMMERCIAL

## 2021-07-02 ENCOUNTER — CONSULT (OUTPATIENT)
Dept: HEMATOLOGY ONCOLOGY | Facility: CLINIC | Age: 26
End: 2021-07-02
Payer: COMMERCIAL

## 2021-07-02 ENCOUNTER — TELEPHONE (OUTPATIENT)
Dept: HEMATOLOGY ONCOLOGY | Facility: CLINIC | Age: 26
End: 2021-07-02

## 2021-07-02 VITALS
HEIGHT: 62 IN | SYSTOLIC BLOOD PRESSURE: 132 MMHG | WEIGHT: 168 LBS | OXYGEN SATURATION: 98 % | DIASTOLIC BLOOD PRESSURE: 88 MMHG | BODY MASS INDEX: 30.91 KG/M2 | HEART RATE: 72 BPM | TEMPERATURE: 97.8 F | RESPIRATION RATE: 18 BRPM

## 2021-07-02 DIAGNOSIS — Z51.11 CHEMOTHERAPY MANAGEMENT, ENCOUNTER FOR: ICD-10-CM

## 2021-07-02 DIAGNOSIS — Z17.0 MALIGNANT NEOPLASM OF UPPER-INNER QUADRANT OF RIGHT BREAST IN FEMALE, ESTROGEN RECEPTOR POSITIVE (HCC): Primary | ICD-10-CM

## 2021-07-02 DIAGNOSIS — C50.211 MALIGNANT NEOPLASM OF UPPER-INNER QUADRANT OF RIGHT BREAST IN FEMALE, ESTROGEN RECEPTOR POSITIVE (HCC): Primary | ICD-10-CM

## 2021-07-02 DIAGNOSIS — Z51.11 CHEMOTHERAPY MANAGEMENT, ENCOUNTER FOR: Primary | ICD-10-CM

## 2021-07-02 DIAGNOSIS — R11.0 NAUSEA: ICD-10-CM

## 2021-07-02 DIAGNOSIS — C50.211 MALIGNANT NEOPLASM OF UPPER-INNER QUADRANT OF RIGHT BREAST IN FEMALE, ESTROGEN RECEPTOR POSITIVE (HCC): ICD-10-CM

## 2021-07-02 DIAGNOSIS — Z17.0 MALIGNANT NEOPLASM OF UPPER-INNER QUADRANT OF RIGHT BREAST IN FEMALE, ESTROGEN RECEPTOR POSITIVE (HCC): ICD-10-CM

## 2021-07-02 DIAGNOSIS — R52 PAIN AT APPLICATION SITE: ICD-10-CM

## 2021-07-02 DIAGNOSIS — Z95.828 PORT-A-CATH IN PLACE: Primary | ICD-10-CM

## 2021-07-02 DIAGNOSIS — C43.59 MALIGNANT MELANOMA OF SKIN OF ABDOMEN (HCC): ICD-10-CM

## 2021-07-02 PROCEDURE — 99205 OFFICE O/P NEW HI 60 MIN: CPT | Performed by: INTERNAL MEDICINE

## 2021-07-02 PROCEDURE — 78306 BONE IMAGING WHOLE BODY: CPT

## 2021-07-02 PROCEDURE — A9503 TC99M MEDRONATE: HCPCS

## 2021-07-02 RX ORDER — SODIUM CHLORIDE 9 MG/ML
20 INJECTION, SOLUTION INTRAVENOUS ONCE
Status: CANCELLED | OUTPATIENT
Start: 2021-09-07

## 2021-07-02 RX ORDER — SODIUM CHLORIDE 9 MG/ML
20 INJECTION, SOLUTION INTRAVENOUS ONCE
Status: CANCELLED | OUTPATIENT
Start: 2021-08-17

## 2021-07-02 RX ORDER — ONDANSETRON 4 MG/1
4 TABLET, FILM COATED ORAL EVERY 8 HOURS PRN
Qty: 20 TABLET | Refills: 0 | Status: SHIPPED | OUTPATIENT
Start: 2021-07-02 | End: 2021-11-16

## 2021-07-02 RX ORDER — PROMETHAZINE HYDROCHLORIDE 25 MG/1
25 TABLET ORAL EVERY 6 HOURS PRN
Qty: 60 TABLET | Refills: 2 | Status: SHIPPED | OUTPATIENT
Start: 2021-07-02 | End: 2021-11-16

## 2021-07-02 RX ORDER — SODIUM CHLORIDE 9 MG/ML
20 INJECTION, SOLUTION INTRAVENOUS ONCE
Status: CANCELLED | OUTPATIENT
Start: 2021-09-28

## 2021-07-02 RX ORDER — LIDOCAINE AND PRILOCAINE 25; 25 MG/G; MG/G
CREAM TOPICAL AS NEEDED
Qty: 30 G | Refills: 0 | Status: SHIPPED | OUTPATIENT
Start: 2021-07-02 | End: 2021-11-16

## 2021-07-02 NOTE — PROGRESS NOTES
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  I encouraged her to not give that up and to see how she feels, as we have many patients who work through their treatment and this could still be possible for her  MSW suggested a future tour of the infusion center, pt felt that she would like to do that today  I showed pt and her family around the infusion center and talked about what she could bring with her to treatment, that she can now have a guest, etc   Pt and her family were understandably very emotional throughout the tour  MSW offered to email pt a list of resources so that she can review them at her own pace, as today's appointment was full of a lot of information, appointment dates, etc   She was agreeable; earlier today I sent her an email with information on support available through the [facility], [facility], [facility] [place], and online support groups with cancer[facility] org   I also included information on wig salons  I did confirm with Dr Roula Hobbs afterward that he is referring pt to reproductive endocrinology to discuss fertility preservation before she starts treatment as well  MSW will r/o to pt next week to touch base and see how she is doing  I will continue to support pt and her family as needed moving forward  No other needs at this time

## 2021-07-02 NOTE — PROGRESS NOTES
Oncology LSW received a referral for outreach from breast cancer NN  Noted pt had appointment with medical oncology this morning at [address] then received message from MSW colleague (Hawa Gomez ) she was asked by staff to assist pt and family for emotional support during visit  This writer will cancel referral since colleague provided in-person support today

## 2021-07-02 NOTE — TELEPHONE ENCOUNTER
Returned call to patient  Patient uses lotion with self tanning properties  OK to use in moderation  Patient verbalizes understanding  FYI to DR Kaufman RNs

## 2021-07-02 NOTE — PROGRESS NOTES
Completed nursing teach on TCHP regimen for patient's breast cancer  Patient was accompanied by both parents and her sister  Reviewed general information of the infusion center and what to expect for her first day of treatment  Pt is aware that she can be accompanied by one visitor during treatment  Reviewed the hypersensitivity protocol in place if she was to experience an allergic reaction  Discussed the need to get blood work completed 3 to 5 days prior to her next treatment  Briefly reviewed the port placement process and how it will be accessed for treatment, as well as Emla cream for port discomfort  Reviewed potential side effects of the medication and proper interventions  Discussion of potential hemolytic side effects  Use of Zofran for nausea and Imodium for diarrhea was reviewed  Due to patients age, egg banking was reviewed and the option for a cranial prosthetic for hair loss  I did review the "hope line" process if she was to call in  Stated our office number is a 24 hour service for non emergent situations  Pt and family members verbalized understanding of above information  Pt was agreeable to start treatment and sign consent  Zofran and Emla cream pended to Dr Jesus Gonzales for signature  Tin Trejo,  came and met with patient and family after my teach

## 2021-07-02 NOTE — TELEPHONE ENCOUNTER
Patient would like to confirm if she can continue to use a self tanning lotion  Best call back  [phone]

## 2021-07-06 ENCOUNTER — TELEPHONE (OUTPATIENT)
Dept: HEMATOLOGY ONCOLOGY | Facility: CLINIC | Age: 26
End: 2021-07-06

## 2021-07-06 NOTE — TELEPHONE ENCOUNTER
I called patient to explain to her the infusion appoints scheduled for her starting 7/26/21  I also explained to her that she can check her mychart or call for a better understanding

## 2021-07-08 DIAGNOSIS — Z17.0 MALIGNANT NEOPLASM OF UPPER-INNER QUADRANT OF RIGHT BREAST IN FEMALE, ESTROGEN RECEPTOR POSITIVE (HCC): Primary | ICD-10-CM

## 2021-07-08 DIAGNOSIS — C50.211 MALIGNANT NEOPLASM OF UPPER-INNER QUADRANT OF RIGHT BREAST IN FEMALE, ESTROGEN RECEPTOR POSITIVE (HCC): Primary | ICD-10-CM

## 2021-07-09 ENCOUNTER — HOSPITAL ENCOUNTER (OUTPATIENT)
Dept: NON INVASIVE DIAGNOSTICS | Facility: CLINIC | Age: 26
Discharge: HOME | End: 2021-07-09
Payer: COMMERCIAL

## 2021-07-09 DIAGNOSIS — Z17.0 MALIGNANT NEOPLASM OF UPPER-INNER QUADRANT OF RIGHT BREAST IN FEMALE, ESTROGEN RECEPTOR POSITIVE (HCC): ICD-10-CM

## 2021-07-09 DIAGNOSIS — C50.211 MALIGNANT NEOPLASM OF UPPER-INNER QUADRANT OF RIGHT BREAST IN FEMALE, ESTROGEN RECEPTOR POSITIVE (HCC): ICD-10-CM

## 2021-07-09 PROCEDURE — 93306 TTE W/DOPPLER COMPLETE: CPT

## 2021-07-09 PROCEDURE — 93306 TTE W/DOPPLER COMPLETE: CPT | Performed by: INTERNAL MEDICINE

## 2021-07-12 ENCOUNTER — DOCUMENTATION (OUTPATIENT)
Dept: SURGICAL ONCOLOGY | Facility: CLINIC | Age: 26
End: 2021-07-12

## 2021-07-12 ENCOUNTER — APPOINTMENT (OUTPATIENT)
Dept: LAB | Facility: HOSPITAL | Age: 26
End: 2021-07-12
Attending: SURGERY
Payer: COMMERCIAL

## 2021-07-12 ENCOUNTER — OFFICE VISIT (OUTPATIENT)
Dept: SURGICAL ONCOLOGY | Facility: CLINIC | Age: 26
End: 2021-07-12
Payer: COMMERCIAL

## 2021-07-12 ENCOUNTER — TELEPHONE (OUTPATIENT)
Dept: HEMATOLOGY ONCOLOGY | Facility: CLINIC | Age: 26
End: 2021-07-12

## 2021-07-12 VITALS
HEART RATE: 84 BPM | OXYGEN SATURATION: 99 % | WEIGHT: 167.2 LBS | TEMPERATURE: 98.3 F | SYSTOLIC BLOOD PRESSURE: 118 MMHG | DIASTOLIC BLOOD PRESSURE: 74 MMHG | BODY MASS INDEX: 30.77 KG/M2 | RESPIRATION RATE: 12 BRPM | HEIGHT: 62 IN

## 2021-07-12 DIAGNOSIS — C50.211 MALIGNANT NEOPLASM OF UPPER-INNER QUADRANT OF RIGHT BREAST IN FEMALE, ESTROGEN RECEPTOR POSITIVE (HCC): Primary | ICD-10-CM

## 2021-07-12 DIAGNOSIS — C50.211 MALIGNANT NEOPLASM OF UPPER-INNER QUADRANT OF RIGHT BREAST IN FEMALE, ESTROGEN RECEPTOR POSITIVE (HCC): ICD-10-CM

## 2021-07-12 DIAGNOSIS — Z17.0 MALIGNANT NEOPLASM OF UPPER-INNER QUADRANT OF RIGHT BREAST IN FEMALE, ESTROGEN RECEPTOR POSITIVE (HCC): ICD-10-CM

## 2021-07-12 DIAGNOSIS — Z01.818 PRE-OP EXAMINATION: ICD-10-CM

## 2021-07-12 DIAGNOSIS — Z17.0 MALIGNANT NEOPLASM OF UPPER-INNER QUADRANT OF RIGHT BREAST IN FEMALE, ESTROGEN RECEPTOR POSITIVE (HCC): Primary | ICD-10-CM

## 2021-07-12 LAB
ALBUMIN SERPL BCP-MCNC: 3.8 G/DL (ref 3.5–5)
ALP SERPL-CCNC: 80 U/L (ref 46–116)
ALT SERPL W P-5'-P-CCNC: 25 U/L (ref 12–78)
ANION GAP SERPL CALCULATED.3IONS-SCNC: 5 MMOL/L (ref 4–13)
AST SERPL W P-5'-P-CCNC: 16 U/L (ref 5–45)
BASOPHILS # BLD AUTO: 0.03 THOUSANDS/ΜL (ref 0–0.1)
BASOPHILS NFR BLD AUTO: 0 % (ref 0–1)
BILIRUB SERPL-MCNC: 0.29 MG/DL (ref 0.2–1)
BUN SERPL-MCNC: 10 MG/DL (ref 5–25)
CALCIUM SERPL-MCNC: 9.1 MG/DL (ref 8.3–10.1)
CHLORIDE SERPL-SCNC: 104 MMOL/L (ref 100–108)
CO2 SERPL-SCNC: 29 MMOL/L (ref 21–32)
CREAT SERPL-MCNC: 0.64 MG/DL (ref 0.6–1.3)
EOSINOPHIL # BLD AUTO: 0.15 THOUSAND/ΜL (ref 0–0.61)
EOSINOPHIL NFR BLD AUTO: 2 % (ref 0–6)
ERYTHROCYTE [DISTWIDTH] IN BLOOD BY AUTOMATED COUNT: 12.8 % (ref 11.6–15.1)
GFR SERPL CREATININE-BSD FRML MDRD: 124 ML/MIN/1.73SQ M
GLUCOSE P FAST SERPL-MCNC: 91 MG/DL (ref 65–99)
HCT VFR BLD AUTO: 42.5 % (ref 34.8–46.1)
HGB BLD-MCNC: 13.7 G/DL (ref 11.5–15.4)
IMM GRANULOCYTES # BLD AUTO: 0.03 THOUSAND/UL (ref 0–0.2)
IMM GRANULOCYTES NFR BLD AUTO: 0 % (ref 0–2)
LYMPHOCYTES # BLD AUTO: 2.5 THOUSANDS/ΜL (ref 0.6–4.47)
LYMPHOCYTES NFR BLD AUTO: 33 % (ref 14–44)
MCH RBC QN AUTO: 28.5 PG (ref 26.8–34.3)
MCHC RBC AUTO-ENTMCNC: 32.2 G/DL (ref 31.4–37.4)
MCV RBC AUTO: 89 FL (ref 82–98)
MONOCYTES # BLD AUTO: 0.63 THOUSAND/ΜL (ref 0.17–1.22)
MONOCYTES NFR BLD AUTO: 8 % (ref 4–12)
NEUTROPHILS # BLD AUTO: 4.21 THOUSANDS/ΜL (ref 1.85–7.62)
NEUTS SEG NFR BLD AUTO: 57 % (ref 43–75)
NRBC BLD AUTO-RTO: 0 /100 WBCS
PLATELET # BLD AUTO: 292 THOUSANDS/UL (ref 149–390)
PMV BLD AUTO: 10.6 FL (ref 8.9–12.7)
POTASSIUM SERPL-SCNC: 4 MMOL/L (ref 3.5–5.3)
PROT SERPL-MCNC: 7.6 G/DL (ref 6.4–8.2)
RBC # BLD AUTO: 4.8 MILLION/UL (ref 3.81–5.12)
SODIUM SERPL-SCNC: 138 MMOL/L (ref 136–145)
WBC # BLD AUTO: 7.55 THOUSAND/UL (ref 4.31–10.16)

## 2021-07-12 PROCEDURE — 3008F BODY MASS INDEX DOCD: CPT | Performed by: SURGERY

## 2021-07-12 PROCEDURE — 80053 COMPREHEN METABOLIC PANEL: CPT

## 2021-07-12 PROCEDURE — 99215 OFFICE O/P EST HI 40 MIN: CPT | Performed by: SURGERY

## 2021-07-12 PROCEDURE — 36415 COLL VENOUS BLD VENIPUNCTURE: CPT

## 2021-07-12 PROCEDURE — 1036F TOBACCO NON-USER: CPT | Performed by: SURGERY

## 2021-07-12 PROCEDURE — 85025 COMPLETE CBC W/AUTO DIFF WBC: CPT

## 2021-07-12 RX ORDER — OXYCODONE HYDROCHLORIDE AND ACETAMINOPHEN 5; 325 MG/1; MG/1
1 TABLET ORAL EVERY 6 HOURS PRN
Qty: 20 TABLET | Refills: 0 | Status: SHIPPED | OUTPATIENT
Start: 2021-07-12 | End: 2021-11-16

## 2021-07-12 NOTE — TELEPHONE ENCOUNTER
Patient was seen in the office today and is now at the lab  No lab orders from Dr Gerry Joshua on file      Please generate appropriate orders

## 2021-07-12 NOTE — PROGRESS NOTES
Surgical Oncology Follow Up  [facility] [address] [facility]  [address] [address]  Ankush Oleary [address]  1995  [id number]      Chief Complaint   Patient presents with    Follow-up     2 week         Assessment & Plan:   Volga Party is here with her entire family status post workup included genetic testing  Diagnosis of right breast invasive ductal carcinoma ER [place] and HER2 positive  We did discuss in detail in our breast working group this morning  Consents service was to undergo breast conservation surgery with dual tracer sentinel lymph node biopsy  She will receive her Herceptin , hormone therapy +/-chemotherapy based on final pathology   She already has LYNDSAY  placed at outside institution  Plan is LYNDSAY directed right breast lumpectomy with dual tracersentinel lymph node biopsy   Surgical consent was obtained after explaining benefits, alternative, procedure and possible complications with regards above mentioned procedure  All her questions regarding the visit  as well as the  surgery were answered to  the patient's satisfaction  Cancer History:     Oncology History   Malignant neoplasm of upper-inner quadrant of right breast in female, estrogen receptor positive ([facility] [address] )   6/24/2021 Biopsy    US guided Right breast core biopsy:  1 o'clock 13-14 cm from the nipple  Right invasive ductal carcinoma  Grade 3  ER 90  WV 70   HER2 3+  Lymphovascular invasion: not identified    Results are malignant and concordant  Recommend surgical consultation  Additionally, given patient's age and density of breast tissue, recommend enhanced breast MRI for further evaluation  Done at [facility]     6/30/2021 Genetic Testing    Genetics pending       7/1/2021 Observation    Bilateral breast MRI with and without contrast: B6     Biopsy confirmed malignancy  01:00 o'clock location right breast   No axillary lymphadenopathy, extension to the chest wall, or concurrent lesions in either breast        7/19/2021 -  Chemotherapy    fosaprepitant (EMEND) IVPB, 150 mg, Intravenous, Once, 0 of 6 cycles  pertuzumab (PERJETA) IVPB, 840 mg, Intravenous, Once, 0 of 18 cycles  CARBOplatin (PARAPLATIN) IVPB (GOG AUC DOSING), 760 8 mg, Intravenous, Once, 0 of 6 cycles  DOCEtaxel (TAXOTERE) chemo infusion, 75 mg/m2 = 132 8 mg, Intravenous, Once, 0 of 6 cycles  trastuzumab (HERCEPTIN) chemo infusion, 8 mg/kg = 610 mg, Intravenous, Once, 0 of 18 cycles           Interval History:   Staging CT scan, bone scan breast MRI and genetic testing results were reviewed and discussed in detail  Review of Systems:   Review of Systems   Constitutional: Negative for chills and fever  HENT: Negative for ear pain and sore throat  Eyes: Negative for pain and visual disturbance  Respiratory: Negative for cough and shortness of breath  Cardiovascular: Negative for chest pain and palpitations  Gastrointestinal: Negative for abdominal pain and vomiting  Genitourinary: Negative for dysuria and hematuria  Musculoskeletal: Negative for arthralgias and back pain  Skin: Negative for color change and rash  Neurological: Negative for seizures and syncope  All other systems reviewed and are negative        Past Medical History     Patient Active Problem List   Diagnosis    Nevus    Malignant melanoma of skin of abdomen ([address] )    Neck pain    Multiple benign melanocytic nevi    Iron deficiency anemia due to chronic blood loss    Warts    Surveillance for birth control, oral contraceptives    Discomfort of left ear    Benign paroxysmal positional vertigo due to bilateral vestibular disorder    Spitz nevus of forearm, left    Enlarged thyroid    Malignant neoplasm of upper-inner quadrant of right breast in female, estrogen receptor positive ([address] )     Past Medical History:   Diagnosis Date    Breast cancer [facility])      Past Surgical History:   Procedure Laterality Date    BREAST BIOPSY      TONSILLECTOMY AND ADENOIDECTOMY      WISDOM TOOTH EXTRACTION       Family History   Problem Relation Age of Onset    Kidney cancer Maternal Grandfather     Breast cancer Maternal Aunt      Social History     Socioeconomic History    Marital status: Single     Spouse name: Not on file    Number of children: Not on file    Years of education: Not on file    Highest education level: Not on file   Occupational History    Not on file   Tobacco Use    Smoking status: Never Smoker    Smokeless tobacco: Never Used   Vaping Use    Vaping Use: Never used   Substance and Sexual Activity    Alcohol use: Yes     Comment: Socially    Drug use: No    Sexual activity: Yes   Other Topics Concern    Not on file   Social History Narrative    Not on file     Social Determinants of Health     Financial Resource Strain:     Difficulty of Paying Living Expenses:    Food Insecurity:     Worried About Running Out of Food in the Last Year:     Ran Out of Food in the Last Year:    Transportation Needs:     Lack of Transportation (Medical):      Lack of Transportation (Non-Medical):    Physical Activity:     Days of Exercise per Week:     Minutes of Exercise per Session:    Stress:     Feeling of Stress :    Social Connections:     Frequency of Communication with Friends and Family:     Frequency of Social Gatherings with Friends and Family:     Attends Mandaeism Services:     Active Member of Clubs or Organizations:     Attends Club or Organization Meetings:     Marital Status:    Intimate Partner Violence:     Fear of Current or Ex-Partner:     Emotionally Abused:     Physically Abused:     Sexually Abused:        Current Outpatient Medications:     lidocaine-prilocaine (EMLA) cream, Apply topically as needed for mild pain, Disp: 30 g, Rfl: 0    ondansetron (ZOFRAN) 4 mg tablet, Take 1 tablet (4 mg total) by mouth every 8 (eight) hours as needed for nausea or vomiting, Disp: 20 tablet, Rfl: 0    promethazine (PHENERGAN) 25 mg tablet, Take 1 tablet (25 mg total) by mouth every 6 (six) hours as needed for nausea or vomiting, Disp: 60 tablet, Rfl: 2    Norethin Ace-Eth Estrad-FE (MINASTRIN 24 FE) 1-20 MG-MCG(24) CHEW, Chew 1 tablet (Patient not taking: Reported on 7/2/2021), Disp: , Rfl:   No Known Allergies    Physical Exam:     Vitals:    07/12/21 1113   BP: 118/74   Pulse: 84   Resp: 12   Temp: 98 3 °F (36 8 °C)   SpO2: 99%     Physical Exam  Constitutional:       Appearance: Normal appearance  HENT:      Head: Normocephalic and atraumatic  Nose: Nose normal       Mouth/Throat:      Mouth: Mucous membranes are moist    Eyes:      Pupils: Pupils are equal, round, and reactive to light  Cardiovascular:      Rate and Rhythm: Normal rate  Pulses: Normal pulses  Heart sounds: Normal heart sounds  Pulmonary:      Effort: Pulmonary effort is normal       Breath sounds: Normal breath sounds  Chest:          Comments: The right Breast(s) was examined  There was not any sign of an inverted nipple,  nipple discharge, skin changes or tenderness  The right  breast(s) was examined in the sitting and supine position  There is not any worrisome skin changes, tenderness, nipple changes, swelling or bleeding   Right breast mass at 1:00 a m  position  Deni survey demonstrated that there is not any evidence of any clinically suspicious axillary, pectoral or supraclavicular lymph nodes  The left Breast(s) was examined  There was not any sign of an inverted nipple, mass, nipple discharge, skin changes or tenderness  The left  breast(s) was examined in the sitting and supine position  There is not any worrisome skin changes, tenderness, nipple changes, swelling ,bleeding or evidence of mass/s in all four quadrants   Deni survey demonstrated that there is not any evidence of any clinically suspicious axillary, pectoral or supraclavicular lymph nodes   Abdominal:      General: Bowel sounds are normal       Palpations: Abdomen is soft  Musculoskeletal:         General: Normal range of motion  Cervical back: Normal range of motion and neck supple  Skin:     General: Skin is warm  Neurological:      General: No focal deficit present  Mental Status: She is alert and oriented to person, place, and time  Psychiatric:         Mood and Affect: Mood normal          Behavior: Behavior normal          Thought Content: Thought content normal          Judgment: Judgment normal            Results & Discussion:   I did review I did review the imaging, pathology and genetic testing in detail  I also discussed in detail our multidisciplinary disciplinary tumor board discussion with the family  She will undergo breast conservation surgery with sentinel lymph node biopsy followed by whole breast radiation, Herceptin as well as hormone therapy +/- additional chemotherapy based on final pathology  We also discussed need for additional surgery based on final pathology such as re-excision as well as mastectomy  We discussed about cosmetic deformities surgical scarring lymphedema etc  chronic breast pain as well  Advance Care Planning/Advance Directives: Bita Harrell MD discussed the disease status with Luis Dominguez  today 07/12/21  treatment plans and follow-up with the patient  Barbaraann Seip as well as the entire family is in agreement with the above plan  All patient's questions were answered to their satisfaction

## 2021-07-12 NOTE — H&P (VIEW-ONLY)
Surgical Oncology Follow Up  [facility] [address] [facility] [address] [address]  Ankush Graves [place]  1995  [id number]      Chief Complaint   Patient presents with    Follow-up     2 week         Assessment & Plan:   Erick Tony is here with her entire family status post workup included genetic testing  Diagnosis of right breast invasive ductal carcinoma ER [place] and HER2 positive  We did discuss in detail in our breast working group this morning  Consents service was to undergo breast conservation surgery with dual tracer sentinel lymph node biopsy  She will receive her Herceptin , hormone therapy +/-chemotherapy based on final pathology   She already has LYNDSAY  placed at outside institution  Plan is LYNDSAY directed right breast lumpectomy with dual tracersentinel lymph node biopsy   Surgical consent was obtained after explaining benefits, alternative, procedure and possible complications with regards above mentioned procedure  All her questions regarding the visit  as well as the  surgery were answered to  the patient's satisfaction  Cancer History:     Oncology History   Malignant neoplasm of upper-inner quadrant of right breast in female, estrogen receptor positive ([facility] [address] )   6/24/2021 Biopsy    US guided Right breast core biopsy:  1 o'clock 13-14 cm from the nipple  Right invasive ductal carcinoma  Grade 3  ER 90  OR 70   HER2 3+  Lymphovascular invasion: not identified    Results are malignant and concordant  Recommend surgical consultation  Additionally, given patient's age and density of breast tissue, recommend enhanced breast MRI for further evaluation  Done at [facility]     6/30/2021 Genetic Testing    Genetics pending       7/1/2021 Observation    Bilateral breast MRI with and without contrast: B6     Biopsy confirmed malignancy  01:00 o'clock location right breast   No axillary lymphadenopathy, extension to the chest wall, or concurrent lesions in either breast        7/19/2021 -  Chemotherapy    fosaprepitant (EMEND) IVPB, 150 mg, Intravenous, Once, 0 of 6 cycles  pertuzumab (PERJETA) IVPB, 840 mg, Intravenous, Once, 0 of 18 cycles  CARBOplatin (PARAPLATIN) IVPB (GOG AUC DOSING), 760 8 mg, Intravenous, Once, 0 of 6 cycles  DOCEtaxel (TAXOTERE) chemo infusion, 75 mg/m2 = 132 8 mg, Intravenous, Once, 0 of 6 cycles  trastuzumab (HERCEPTIN) chemo infusion, 8 mg/kg = 610 mg, Intravenous, Once, 0 of 18 cycles           Interval History:   Staging CT scan, bone scan breast MRI and genetic testing results were reviewed and discussed in detail  Review of Systems:   Review of Systems   Constitutional: Negative for chills and fever  HENT: Negative for ear pain and sore throat  Eyes: Negative for pain and visual disturbance  Respiratory: Negative for cough and shortness of breath  Cardiovascular: Negative for chest pain and palpitations  Gastrointestinal: Negative for abdominal pain and vomiting  Genitourinary: Negative for dysuria and hematuria  Musculoskeletal: Negative for arthralgias and back pain  Skin: Negative for color change and rash  Neurological: Negative for seizures and syncope  All other systems reviewed and are negative        Past Medical History     Patient Active Problem List   Diagnosis    Nevus    Malignant melanoma of skin of abdomen ([address] )    Neck pain    Multiple benign melanocytic nevi    Iron deficiency anemia due to chronic blood loss    Warts    Surveillance for birth control, oral contraceptives    Discomfort of left ear    Benign paroxysmal positional vertigo due to bilateral vestibular disorder    Spitz nevus of forearm, left    Enlarged thyroid    Malignant neoplasm of upper-inner quadrant of right breast in female, estrogen receptor positive ([address] )     Past Medical History:   Diagnosis Date    Breast cancer [facility])      Past Surgical History:   Procedure Laterality Date    BREAST BIOPSY      TONSILLECTOMY AND ADENOIDECTOMY      WISDOM TOOTH EXTRACTION       Family History   Problem Relation Age of Onset    Kidney cancer Maternal Grandfather     Breast cancer Maternal Aunt      Social History     Socioeconomic History    Marital status: Single     Spouse name: Not on file    Number of children: Not on file    Years of education: Not on file    Highest education level: Not on file   Occupational History    Not on file   Tobacco Use    Smoking status: Never Smoker    Smokeless tobacco: Never Used   Vaping Use    Vaping Use: Never used   Substance and Sexual Activity    Alcohol use: Yes     Comment: Socially    Drug use: No    Sexual activity: Yes   Other Topics Concern    Not on file   Social History Narrative    Not on file     Social Determinants of Health     Financial Resource Strain:     Difficulty of Paying Living Expenses:    Food Insecurity:     Worried About Running Out of Food in the Last Year:     Ran Out of Food in the Last Year:    Transportation Needs:     Lack of Transportation (Medical):      Lack of Transportation (Non-Medical):    Physical Activity:     Days of Exercise per Week:     Minutes of Exercise per Session:    Stress:     Feeling of Stress :    Social Connections:     Frequency of Communication with Friends and Family:     Frequency of Social Gatherings with Friends and Family:     Attends Confucianist Services:     Active Member of Clubs or Organizations:     Attends Club or Organization Meetings:     Marital Status:    Intimate Partner Violence:     Fear of Current or Ex-Partner:     Emotionally Abused:     Physically Abused:     Sexually Abused:        Current Outpatient Medications:     lidocaine-prilocaine (EMLA) cream, Apply topically as needed for mild pain, Disp: 30 g, Rfl: 0    ondansetron (ZOFRAN) 4 mg tablet, Take 1 tablet (4 mg total) by mouth every 8 (eight) hours as needed for nausea or vomiting, Disp: 20 tablet, Rfl: 0    promethazine (PHENERGAN) 25 mg tablet, Take 1 tablet (25 mg total) by mouth every 6 (six) hours as needed for nausea or vomiting, Disp: 60 tablet, Rfl: 2    Norethin Ace-Eth Estrad-FE (MINASTRIN 24 FE) 1-20 MG-MCG(24) CHEW, Chew 1 tablet (Patient not taking: Reported on 7/2/2021), Disp: , Rfl:   No Known Allergies    Physical Exam:     Vitals:    07/12/21 1113   BP: 118/74   Pulse: 84   Resp: 12   Temp: 98 3 °F (36 8 °C)   SpO2: 99%     Physical Exam  Constitutional:       Appearance: Normal appearance  HENT:      Head: Normocephalic and atraumatic  Nose: Nose normal       Mouth/Throat:      Mouth: Mucous membranes are moist    Eyes:      Pupils: Pupils are equal, round, and reactive to light  Cardiovascular:      Rate and Rhythm: Normal rate  Pulses: Normal pulses  Heart sounds: Normal heart sounds  Pulmonary:      Effort: Pulmonary effort is normal       Breath sounds: Normal breath sounds  Chest:          Comments: The right Breast(s) was examined  There was not any sign of an inverted nipple,  nipple discharge, skin changes or tenderness  The right  breast(s) was examined in the sitting and supine position  There is not any worrisome skin changes, tenderness, nipple changes, swelling or bleeding   Right breast mass at 1:00 a m  position  Deni survey demonstrated that there is not any evidence of any clinically suspicious axillary, pectoral or supraclavicular lymph nodes  The left Breast(s) was examined  There was not any sign of an inverted nipple, mass, nipple discharge, skin changes or tenderness  The left  breast(s) was examined in the sitting and supine position  There is not any worrisome skin changes, tenderness, nipple changes, swelling ,bleeding or evidence of mass/s in all four quadrants   Deni survey demonstrated that there is not any evidence of any clinically suspicious axillary, pectoral or supraclavicular lymph nodes   Abdominal:      General: Bowel sounds are normal       Palpations: Abdomen is soft  Musculoskeletal:         General: Normal range of motion  Cervical back: Normal range of motion and neck supple  Skin:     General: Skin is warm  Neurological:      General: No focal deficit present  Mental Status: She is alert and oriented to person, place, and time  Psychiatric:         Mood and Affect: Mood normal          Behavior: Behavior normal          Thought Content: Thought content normal          Judgment: Judgment normal            Results & Discussion:   I did review I did review the imaging, pathology and genetic testing in detail  I also discussed in detail our multidisciplinary disciplinary tumor board discussion with the family  She will undergo breast conservation surgery with sentinel lymph node biopsy followed by whole breast radiation, Herceptin as well as hormone therapy +/- additional chemotherapy based on final pathology  We also discussed need for additional surgery based on final pathology such as re-excision as well as mastectomy  We discussed about cosmetic deformities surgical scarring lymphedema etc  chronic breast pain as well  Advance Care Planning/Advance Directives: Abdullahi Leon MD discussed the disease status with Brooke Norman  today 07/12/21  treatment plans and follow-up with the patient  Jaiden Garcia as well as the entire family is in agreement with the above plan  All patient's questions were answered to their satisfaction

## 2021-07-12 NOTE — PATIENT INSTRUCTIONS
Pre-Surgery Instructions:   Medication Instructions    lidocaine-prilocaine (EMLA) cream per anesthesia guidelines     ondansetron (ZOFRAN) 4 mg tablet Take morning of surgery if needed     promethazine (PHENERGAN) 25 mg tablet Take morning of surgery if needed       Breast Lumpectomy   AMBULATORY CARE:   What you need to know about a lumpectomy:  A lumpectomy is surgery to remove a mass in your breast  Breast tissue that surrounds the mass may also be taken  A lumpectomy is also known as breast-conserving surgery, a partial mastectomy, or a segmental mastectomy  How to prepare for a lumpectomy:   · You may need a mammogram or ultrasound before surgery  These tests may be done the same day as your surgery or at an earlier time  Your healthcare provider may use pictures from these tests to carrol the location of the mass  The marker will show him where to make your incision  · Your healthcare provider will talk to you about how to prepare for surgery  He may tell you not to eat or drink anything after midnight on the day of your surgery  He will tell you what medicines to take or not take on the day of your surgery  You may need to stop taking blood thinners or aspirin several days before your surgery  Arrange for someone to drive you home and stay with you for 24 hours after surgery  This person can help care for you, and monitor for any problems  What will happen during a lumpectomy:  You will be given general anesthesia to keep you asleep and free from pain during surgery  You may be given an antibiotic through your IV to help prevent a bacterial infection  Your healthcare provider will make an incision in your breast and remove the mass  He may also remove breast tissue or lymph nodes that are close to the mass  A drain may be inserted near your incision to remove extra fluid   This will decrease swelling and help your incision heal  Your healthcare provider will close your incision with stitches or strips of medical tape and cover it with a bandage  He may also wrap a tight-fitting bandage around both of your breasts  This may decrease swelling, bleeding, and pain  What will happen after a lumpectomy:  Healthcare providers will monitor you until you are awake  You may able to go home when you are awake and your pain is controlled  Instead you may need to spend the night in the hospital    Risks of a lumpectomy:  You may bleed more than expected or get an infection  Nerves, blood vessels, and muscles may be damaged during your surgery  You may have swelling in your arm closest to the lumpectomy or where lymph nodes were removed  This swelling is called lymphedema  Lymphedema may cause tingling, numbness, stiffness, and weakness in your arm  This may be permanent  You may get a blood clot in your arm or leg  The blood clot may travel to your heart lungs, or brain  This may become life-threatening  Call 911 for any of the following:   · You feel lightheaded, short of breath, and have chest pain  · You cough up blood  · You have trouble breathing  Seek care immediately if:   · Blood soaks through your bandage  · Your stitches come apart  · Your bruise suddenly gets bigger  · Your leg or arm is larger than normal and painful  Contact your healthcare provider if:   · You have a fever or chills  · Your wound is red, swollen, or draining pus  · You have nausea or are vomiting  · Your skin is itchy, swollen, or you have a rash  · Your pain does not get better after you take pain medicine  · Your drain falls out or stops draining fluid  · Your drain has pus or foul-smelling fluid coming out of it  · You have questions or concerns about your condition or care  Medicines: You may need any of the following:  · Antibiotics  help prevent a bacterial infection  · Prescription pain medicine  may be given  Ask your healthcare provider how to take this medicine safely  Some prescription pain medicines contain acetaminophen  Do not take other medicines that contain acetaminophen without talking to your healthcare provider  Too much acetaminophen may cause liver damage  Prescription pain medicine may cause constipation  Ask your healthcare provider how to prevent or treat constipation  · NSAIDs , such as ibuprofen, help decrease swelling, pain, and fever  NSAIDs can cause stomach bleeding or kidney problems in certain people  If you take blood thinner medicine, always ask your healthcare provider if NSAIDs are safe for you  Always read the medicine label and follow directions  · Take your medicine as directed  Contact your healthcare provider if you think your medicine is not helping or if you have side effects  Tell him or her if you are allergic to any medicine  Keep a list of the medicines, vitamins, and herbs you take  Include the amounts, and when and why you take them  Bring the list or the pill bottles to follow-up visits  Carry your medicine list with you in case of an emergency  Care for your wound as directed: If you have a tight-fitting bandage, you can remove it in 24 to 48 hours, or as directed  Ask your healthcare provider when your incision can get wet  You may need to take a sponge bath until your drain is removed  Carefully wash around the incision with soap and water  It is okay to allow the soap and water to gently run over your incision  Gently pat dry the area and put on new, clean bandages as directed  Change your bandages when they get wet or dirty  Check your incision every day for redness, pus, or swelling  Self-care:   · Apply ice  on your breast for 15 to 20 minutes every hour or as directed  Use an ice pack, or put crushed ice in a plastic bag  Cover it with a towel  Ice helps prevent tissue damage and decreases swelling and pain  · Rest  as directed  Do not lift anything heavy  Do not push or pull with your arms   Take short walks around the house  Gradually walk further as you feel better  Ask your healthcare provider when you can return to your normal activities  · Empty your drain  as directed  You may need to write down how much you empty from your drain each day  Ask your healthcare provider for more information about how to empty your drain  · Wear a supportive bra  as directed  Wait until you remove the tight-fitting bandage to wear a bra  You may be given a surgical bra or told to wear a sports bra  A supportive bra may help hold your bandages in place  It may also help with swelling and pain  Do not  wear bras with lace or underwire  They may rub against your incision and cause discomfort  Arm stretches: Your healthcare provider may show you how to do arm stretches  Arm stretches may prevent stiff arms or shoulders  You may need to wait until after your drains are removed to begin stretching  Do not do arm stretches until your healthcare provider says it is okay  Ask your healthcare provider for more information about arm stretches  Follow up with your healthcare provider as directed:  Write down your questions so you remember to ask them during your visits  © Copyright [address] Information is for End User's use only and may not be sold, redistributed or otherwise used for commercial purposes  All illustrations and images included in CareNotes® are the copyrighted property of A D A M , Inc  or [address] [place] [address]  The above information is an  only  It is not intended as medical advice for individual conditions or treatments  Talk to your doctor, nurse or pharmacist before following any medical regimen to see if it is safe and effective for you  [place] Lymph Node Biopsy   AMBULATORY CARE:   What you need to know about a sentinel lymph node biopsy:  A sentinel lymph node (SLN) is usually the lymph node closest to a tumor  A biopsy is a procedure used to find and remove a SLN   During the biopsy, the SLN will be tested for cancer cells  If the test is positive, it may mean that cancer has spread to other places in your body  This information can help your healthcare provider decide what other treatments you need  How to prepare for a sentinel lymph node biopsy:   · You may need a nuclear scan before your procedure  During a nuclear scan, healthcare providers will inject a small amount of radioactive liquid near the tumor  Radioactive liquid will move to the location of your lymph nodes and help them show up better in pictures  A camera will take pictures of the lymph nodes  The pictures will help your healthcare provider plan for your procedure  · Your healthcare provider will talk to you about how to prepare for your procedure  He may tell you not to eat or drink anything after midnight on the day of your procedure  He will tell you what medicines to take or not take on the day of your procedure  You may be given contrast liquid during your biopsy  Tell your healthcare provider if you have ever had an allergic reaction to contrast liquid  Arrange for someone to drive you home and stay with you after your procedure  What will happen during a sentinel lymph node biopsy:   · You may be given an antibiotic through your IV to help prevent a bacterial infection  You may be given general anesthesia to keep you asleep and free from pain during procedure  You may instead be given local anesthesia to numb the area  With local anesthesia, you may still feel pressure or pushing during the procedure, but you should not feel any pain  · Your healthcare provider will inject blue contrast liquid, radioactive liquid, or both near the tumor  The liquid will move to the SLN  Your healthcare provider may use an instrument to help find the SLN  He will do this by gently moving an instrument over your skin  The instrument will show pictures of the SLN on a monitor   Your healthcare provider will make a small incision in the skin that covers the SLN  The SLN will be removed and checked for cancer cells  If cancer is found, your healthcare provider may remove several more lymph nodes for testing  Your incision may be closed with stitches or strips of medical tape and covered with a bandage  What will happen after a sentinel lymph node biopsy:  Healthcare providers will monitor you until you are awake  You may be able to go home after you are awake and your pain is controlled  Your urine or bowel movement may be blue for 24 to 48 hours after your procedure  This is caused by the blue contrast liquid given to you during the procedure  You may have bruising or swelling at the biopsy site  This is normal and expected  The arm or leg closest to the biopsy site may be sore  This should get better within 48 to 72 hours  Risks of a sentinel lymph node biopsy: You may bleed more than expected or get an infection  You may develop a condition called lymphedema  Lymphedema is tissue swelling in the body part nearest to where the SLN was removed  You may have long-term pain or discomfort in this area  Your skin in this area may be permanently thick or hard  Your nerves may be damaged during the procedure  This may cause numbness or tingling where the SLN was removed  It may also cause difficulty moving the body part closest to the SLN  You may have an allergic reaction to the contrast liquid  This may require medicine or other treatments  Seek care immediately if:   · Blood soaks through your bandage  · Your stitches come apart  · Your bruise suddenly gets larger and feels firm  Contact your healthcare provider if:   · You have a fever or chills  · Your wound is red, swollen, or draining pus  · You have nausea or are vomiting  · Your skin is itchy, swollen, or you have a rash  · Your pain does not get better after you take medicine for pain       · You have questions or concerns about your condition or care     Medicines: You may need any of the following:  · NSAIDs , such as ibuprofen, help decrease swelling, pain, and fever  This medicine is available with or without a doctor's order  NSAIDs can cause stomach bleeding or kidney problems in certain people  If you take blood thinner medicine, always ask your healthcare provider if NSAIDs are safe for you  Always read the medicine label and follow directions  · Acetaminophen  decreases pain and fever  It is available without a doctor's order  Ask how much to take and how often to take it  Follow directions  Read the labels of all other medicines you are using to see if they also contain acetaminophen, or ask your doctor or pharmacist  Acetaminophen can cause liver damage if not taken correctly  Do not use more than 4 grams (4,000 milligrams) total of acetaminophen in one day  · Prescription pain medicine  may be given  Ask your healthcare provider how to take this medicine safely  Some prescription pain medicines contain acetaminophen  Do not take other medicines that contain acetaminophen without talking to your healthcare provider  Too much acetaminophen may cause liver damage  Prescription pain medicine may cause constipation  Ask your healthcare provider how to prevent or treat constipation  · Take your medicine as directed  Contact your healthcare provider if you think your medicine is not helping or if you have side effects  Tell him or her if you are allergic to any medicine  Keep a list of the medicines, vitamins, and herbs you take  Include the amounts, and when and why you take them  Bring the list or the pill bottles to follow-up visits  Carry your medicine list with you in case of an emergency  Care for your wound as directed:  Ask your healthcare provider when your incision can get wet  Carefully wash around the incision with soap and water  It is okay to let soap and water gently run over your incision  Do not  scrub your incision  Gently pat dry the area and put on new, clean bandages as directed  Change your bandages when they get wet or dirty  If you have strips of medical tape, let them fall of on their own  It may take 10 to 14 days for them to fall off  Check your incision every day for signs of infection, such as redness, swelling, or pus  Do not put powders or lotions on your incision  If lymph nodes have been taken from your armpit, ask your healthcare provider when you can wear deodorant  Self-care:   · Apply ice  on your incision for 15 to 20 minutes every hour or as directed  Use an ice pack, or put crushed ice in a plastic bag  Cover it with a towel before you apply it to your skin  Ice helps prevent tissue damage and decreases swelling and pain  · Elevate  your arm or leg nearest to the biopsy site as often as you can  This will help decrease swelling and pain  Prop your arm or leg on pillows or blankets to keep it elevated above the level of your heart comfortably  · Do not do strenuous activities  for 24 to 48 hours  Strenuous activities include heavy lifting, sports, or running  If lymph nodes were taken from your armpit, do not push or pull with your arm  These activities may put too much stress on your incision  Rest and take short walks around the house  Ask your healthcare provider when you can return to your normal activities  · Drink plenty of liquids  as directed  This will help flush out contrast liquid from your body  Ask how much liquid to drink each day and which liquids are best for you  Ask your healthcare provider how to prevent lymphedema and infection:  Lymphedema is fluid buildup in fatty tissues under your skin  Lymphedema may happen where lymph nodes were removed or in the arm or leg closest to this area  An infection in your skin can make lymphedema worse  Ask your healthcare provider how you can decrease your risk for skin infections and lymphedema     Follow up with your healthcare provider as directed:  Write down your questions so you remember to ask them during your visits  © Copyright [address] Information is for End User's use only and may not be sold, redistributed or otherwise used for commercial purposes  All illustrations and images included in CareNotes® are the copyrighted property of A D A M , Inc  or Faizan Pressley  The above information is an  only  It is not intended as medical advice for individual conditions or treatments  Talk to your doctor, nurse or pharmacist before following any medical regimen to see if it is safe and effective for you

## 2021-07-13 ENCOUNTER — PATIENT OUTREACH (OUTPATIENT)
Dept: CASE MANAGEMENT | Facility: HOSPITAL | Age: 26
End: 2021-07-13

## 2021-07-13 LAB — MISCELLANEOUS LAB TEST RESULT: NORMAL

## 2021-07-13 NOTE — PROGRESS NOTES
MSW s/w pt by phone this morning following her appt with Dr Gerry Joshua yesterday  Pt shared that the plans have changed somewhat and she is scheduled for surgery next week, chemo has been pushed off at this point  She is happy with this news and is feeling better after seeing Dr Gerry Joshua yesterday  She says that she did get a wig at the [facility] and had a good experience there  She denies any other needs at this point but is appreciative of the call and support so far  MSW will check in with pt following her surgery to see how she is doing and will continue to support her as needs moving forward  No other needs at this time

## 2021-07-14 NOTE — PRE-PROCEDURE INSTRUCTIONS
Pre-Surgery Instructions:   Medication Instructions    lidocaine-prilocaine (EMLA) cream Post-Op    ondansetron (ZOFRAN) 4 mg tablet Post-Op    oxyCODONE-acetaminophen (PERCOCET) 5-325 mg per tablet Post-Op    promethazine (PHENERGAN) 25 mg tablet Post-Op      My Surgical Experience    The following information was developed to assist you to prepare for your operation  What do I need to do before coming to the hospital?   Arrange for a responsible person to drive you to and from the hospital    Arrange care for your children at home  Children are not allowed in the recovery areas of the hospital   Plan to wear clothing that is easy to put on and take off  If you are having shoulder surgery, wear a shirt that buttons or zippers in the front  Bathing  o Shower the evening before and the morning of your surgery with an antibacterial soap  Please refer to the Pre Op Showering Instructions for Surgery Patients Sheet   o Remove nail polish and all body piercing jewelry  o Do not shave any body part for at least 24 hours before surgery-this includes face, arms, legs and upper body  Food  o Nothing to eat or drink after midnight the night before your surgery  This includes candy and chewing gum  o Exception: If your surgery is after 12:00pm (noon), you may have clear liquids such as 7-Up®, ginger ale, apple or cranberry juice, Jell-O®, water, or clear broth until 8:00 am  o Do not drink milk or juice with pulp on the morning before surgery  o Do not drink alcohol 24 hours before surgery  Medicine  o Follow instructions you received from your surgeon about which medicines you may take on the day of surgery  o If instructed to take medicine on the morning of surgery, take pills with just a small sip of water   Call your prescribing doctor for specific infroamtion on what to do if you take insulin    What should I bring to the hospital?    Bring:  Breanne Cortes or a walker, if you have them, for foot or knee surgery   A list of the daily medicines, vitamins, minerals, herbals and nutritional supplements you take  Include the dosages of medicines and the time you take them each day   Glasses, dentures or hearing aids   Minimal clothing; you will be wearing hospital sleepwear   Photo ID; required to verify your identity   If you have a Living Will or Power of , bring a copy of the documents   If you have an ostomy, bring an extra pouch and any supplies you use    Do not bring   Medicines or inhalers   Money, valuables or jewelry    What other information should I know about the day of surgery?  Notify your surgeons if you develop a cold, sore throat, cough, fever, rash or any other illness   Report to the Ambulatory Surgical/Same Day Surgery Unit   You will be instructed to stop at Registration only if you have not been pre-registered   Inform your  fi they do not stay that they will be asked by the staff to leave a phone number where they can be reached   Be available to be reached before surgery  In the event the operating room schedule changes, you may be asked to come in earlier or later than expected    *It is important to tell your doctor and others involved in your health care if you are taking or have been taking any non-prescription drugs, vitamins, minerals, herbals or other nutritional supplements   Any of these may interact with some food or medicines and cause a reaction

## 2021-07-19 ENCOUNTER — ANESTHESIA EVENT (OUTPATIENT)
Dept: PERIOP | Facility: HOSPITAL | Age: 26
End: 2021-07-19
Payer: COMMERCIAL

## 2021-07-19 ENCOUNTER — APPOINTMENT (OUTPATIENT)
Dept: MAMMOGRAPHY | Facility: CLINIC | Age: 26
End: 2021-07-19
Payer: COMMERCIAL

## 2021-07-19 ENCOUNTER — ANESTHESIA (OUTPATIENT)
Dept: PERIOP | Facility: HOSPITAL | Age: 26
End: 2021-07-19
Payer: COMMERCIAL

## 2021-07-19 ENCOUNTER — HOSPITAL ENCOUNTER (OUTPATIENT)
Facility: HOSPITAL | Age: 26
Setting detail: OUTPATIENT SURGERY
Discharge: HOME | End: 2021-07-19
Attending: SURGERY | Admitting: SURGERY
Payer: COMMERCIAL

## 2021-07-19 ENCOUNTER — HOSPITAL ENCOUNTER (OUTPATIENT)
Dept: NUCLEAR MEDICINE | Facility: HOSPITAL | Age: 26
Discharge: HOME | End: 2021-07-19
Attending: SURGERY
Payer: COMMERCIAL

## 2021-07-19 VITALS
HEART RATE: 78 BPM | DIASTOLIC BLOOD PRESSURE: 63 MMHG | WEIGHT: 166.23 LBS | RESPIRATION RATE: 14 BRPM | BODY MASS INDEX: 29.45 KG/M2 | SYSTOLIC BLOOD PRESSURE: 109 MMHG | TEMPERATURE: 97.3 F | OXYGEN SATURATION: 97 % | HEIGHT: 63 IN

## 2021-07-19 DIAGNOSIS — Z01.818 PRE-OP EXAMINATION: ICD-10-CM

## 2021-07-19 DIAGNOSIS — Z17.0 MALIGNANT NEOPLASM OF UPPER-INNER QUADRANT OF RIGHT BREAST IN FEMALE, ESTROGEN RECEPTOR POSITIVE (HCC): ICD-10-CM

## 2021-07-19 DIAGNOSIS — Z17.0 MALIGNANT NEOPLASM OF UPPER-INNER QUADRANT OF RIGHT BREAST IN FEMALE, ESTROGEN RECEPTOR POSITIVE (HCC): Primary | ICD-10-CM

## 2021-07-19 DIAGNOSIS — C50.211 MALIGNANT NEOPLASM OF UPPER-INNER QUADRANT OF RIGHT BREAST IN FEMALE, ESTROGEN RECEPTOR POSITIVE (HCC): ICD-10-CM

## 2021-07-19 DIAGNOSIS — C50.211 MALIGNANT NEOPLASM OF UPPER-INNER QUADRANT OF RIGHT BREAST IN FEMALE, ESTROGEN RECEPTOR POSITIVE (HCC): Primary | ICD-10-CM

## 2021-07-19 DIAGNOSIS — N63.0 BREAST LUMP OR MASS: ICD-10-CM

## 2021-07-19 LAB
EXT PREGNANCY TEST URINE: NEGATIVE
EXT. CONTROL: NORMAL

## 2021-07-19 PROCEDURE — 78195 LYMPH SYSTEM IMAGING: CPT

## 2021-07-19 PROCEDURE — 19301 PARTIAL MASTECTOMY: CPT | Performed by: CLINICAL NURSE SPECIALIST

## 2021-07-19 PROCEDURE — 88361 TUMOR IMMUNOHISTOCHEM/COMPUT: CPT | Performed by: PATHOLOGY

## 2021-07-19 PROCEDURE — 88363 XM ARCHIVE TISSUE MOLEC ANAL: CPT | Performed by: PATHOLOGY

## 2021-07-19 PROCEDURE — 88342 IMHCHEM/IMCYTCHM 1ST ANTB: CPT | Performed by: PATHOLOGY

## 2021-07-19 PROCEDURE — 88341 IMHCHEM/IMCYTCHM EA ADD ANTB: CPT | Performed by: PATHOLOGY

## 2021-07-19 PROCEDURE — 88307 TISSUE EXAM BY PATHOLOGIST: CPT | Performed by: PATHOLOGY

## 2021-07-19 PROCEDURE — 38900 IO MAP OF SENT LYMPH NODE: CPT | Performed by: SURGERY

## 2021-07-19 PROCEDURE — 19301 PARTIAL MASTECTOMY: CPT | Performed by: SURGERY

## 2021-07-19 PROCEDURE — 81025 URINE PREGNANCY TEST: CPT | Performed by: SURGERY

## 2021-07-19 PROCEDURE — 88305 TISSUE EXAM BY PATHOLOGIST: CPT | Performed by: PATHOLOGY

## 2021-07-19 PROCEDURE — NC001 PR NO CHARGE: Performed by: CLINICAL NURSE SPECIALIST

## 2021-07-19 PROCEDURE — 38525 BIOPSY/REMOVAL LYMPH NODES: CPT | Performed by: SURGERY

## 2021-07-19 PROCEDURE — A9541 TC99M SULFUR COLLOID: HCPCS

## 2021-07-19 RX ORDER — METOCLOPRAMIDE HYDROCHLORIDE 5 MG/ML
10 INJECTION INTRAMUSCULAR; INTRAVENOUS ONCE AS NEEDED
Status: DISCONTINUED | OUTPATIENT
Start: 2021-07-19 | End: 2021-07-19 | Stop reason: HOSPADM

## 2021-07-19 RX ORDER — SODIUM CHLORIDE, SODIUM LACTATE, POTASSIUM CHLORIDE, CALCIUM CHLORIDE 600; 310; 30; 20 MG/100ML; MG/100ML; MG/100ML; MG/100ML
50 INJECTION, SOLUTION INTRAVENOUS CONTINUOUS
Status: CANCELLED | OUTPATIENT
Start: 2021-07-19

## 2021-07-19 RX ORDER — PROPOFOL 10 MG/ML
INJECTION, EMULSION INTRAVENOUS AS NEEDED
Status: DISCONTINUED | OUTPATIENT
Start: 2021-07-19 | End: 2021-07-19

## 2021-07-19 RX ORDER — DEXAMETHASONE 4 MG/1
8 TABLET ORAL 2 TIMES DAILY WITH MEALS
Qty: 12 TABLET | Refills: 6 | Status: SHIPPED | OUTPATIENT
Start: 2021-07-19 | End: 2021-07-22

## 2021-07-19 RX ORDER — LIDOCAINE HYDROCHLORIDE 10 MG/ML
INJECTION, SOLUTION EPIDURAL; INFILTRATION; INTRACAUDAL; PERINEURAL AS NEEDED
Status: DISCONTINUED | OUTPATIENT
Start: 2021-07-19 | End: 2021-07-19

## 2021-07-19 RX ORDER — SODIUM CHLORIDE, SODIUM LACTATE, POTASSIUM CHLORIDE, CALCIUM CHLORIDE 600; 310; 30; 20 MG/100ML; MG/100ML; MG/100ML; MG/100ML
125 INJECTION, SOLUTION INTRAVENOUS CONTINUOUS
Status: DISCONTINUED | OUTPATIENT
Start: 2021-07-19 | End: 2021-07-19 | Stop reason: HOSPADM

## 2021-07-19 RX ORDER — DEXMEDETOMIDINE HYDROCHLORIDE 100 UG/ML
INJECTION, SOLUTION INTRAVENOUS AS NEEDED
Status: DISCONTINUED | OUTPATIENT
Start: 2021-07-19 | End: 2021-07-19

## 2021-07-19 RX ORDER — MIDAZOLAM HYDROCHLORIDE 2 MG/2ML
INJECTION, SOLUTION INTRAMUSCULAR; INTRAVENOUS AS NEEDED
Status: DISCONTINUED | OUTPATIENT
Start: 2021-07-19 | End: 2021-07-19

## 2021-07-19 RX ORDER — FENTANYL CITRATE/PF 50 MCG/ML
50 SYRINGE (ML) INJECTION
Status: COMPLETED | OUTPATIENT
Start: 2021-07-19 | End: 2021-07-19

## 2021-07-19 RX ORDER — ISOSULFAN BLUE 50 MG/5ML
INJECTION, SOLUTION SUBCUTANEOUS AS NEEDED
Status: DISCONTINUED | OUTPATIENT
Start: 2021-07-19 | End: 2021-07-19 | Stop reason: HOSPADM

## 2021-07-19 RX ORDER — MAGNESIUM HYDROXIDE 1200 MG/15ML
LIQUID ORAL AS NEEDED
Status: DISCONTINUED | OUTPATIENT
Start: 2021-07-19 | End: 2021-07-19 | Stop reason: HOSPADM

## 2021-07-19 RX ORDER — ONDANSETRON 2 MG/ML
INJECTION INTRAMUSCULAR; INTRAVENOUS AS NEEDED
Status: DISCONTINUED | OUTPATIENT
Start: 2021-07-19 | End: 2021-07-19

## 2021-07-19 RX ORDER — ONDANSETRON 4 MG/1
4 TABLET, ORALLY DISINTEGRATING ORAL EVERY 6 HOURS PRN
Status: DISCONTINUED | OUTPATIENT
Start: 2021-07-19 | End: 2021-07-19 | Stop reason: HOSPADM

## 2021-07-19 RX ORDER — GLYCOPYRROLATE 0.2 MG/ML
INJECTION INTRAMUSCULAR; INTRAVENOUS AS NEEDED
Status: DISCONTINUED | OUTPATIENT
Start: 2021-07-19 | End: 2021-07-19

## 2021-07-19 RX ORDER — OXYCODONE HYDROCHLORIDE AND ACETAMINOPHEN 5; 325 MG/1; MG/1
1 TABLET ORAL EVERY 4 HOURS PRN
Status: DISCONTINUED | OUTPATIENT
Start: 2021-07-19 | End: 2021-07-19 | Stop reason: HOSPADM

## 2021-07-19 RX ORDER — CEFAZOLIN SODIUM 1 G/50ML
1000 SOLUTION INTRAVENOUS ONCE
Status: COMPLETED | OUTPATIENT
Start: 2021-07-19 | End: 2021-07-19

## 2021-07-19 RX ORDER — HYDROMORPHONE HCL/PF 1 MG/ML
0.5 SYRINGE (ML) INJECTION
Status: DISCONTINUED | OUTPATIENT
Start: 2021-07-19 | End: 2021-07-19 | Stop reason: HOSPADM

## 2021-07-19 RX ORDER — ONDANSETRON 2 MG/ML
4 INJECTION INTRAMUSCULAR; INTRAVENOUS ONCE AS NEEDED
Status: DISCONTINUED | OUTPATIENT
Start: 2021-07-19 | End: 2021-07-19 | Stop reason: HOSPADM

## 2021-07-19 RX ORDER — DEXAMETHASONE SODIUM PHOSPHATE 4 MG/ML
INJECTION, SOLUTION INTRA-ARTICULAR; INTRALESIONAL; INTRAMUSCULAR; INTRAVENOUS; SOFT TISSUE AS NEEDED
Status: DISCONTINUED | OUTPATIENT
Start: 2021-07-19 | End: 2021-07-19

## 2021-07-19 RX ORDER — FENTANYL CITRATE 50 UG/ML
INJECTION, SOLUTION INTRAMUSCULAR; INTRAVENOUS AS NEEDED
Status: DISCONTINUED | OUTPATIENT
Start: 2021-07-19 | End: 2021-07-19

## 2021-07-19 RX ORDER — KETAMINE HCL IN NACL, ISO-OSM 100MG/10ML
SYRINGE (ML) INJECTION AS NEEDED
Status: DISCONTINUED | OUTPATIENT
Start: 2021-07-19 | End: 2021-07-19

## 2021-07-19 RX ADMIN — FENTANYL CITRATE 25 MCG: 50 INJECTION, SOLUTION INTRAMUSCULAR; INTRAVENOUS at 14:08

## 2021-07-19 RX ADMIN — FENTANYL CITRATE 50 MCG: 50 INJECTION, SOLUTION INTRAMUSCULAR; INTRAVENOUS at 12:31

## 2021-07-19 RX ADMIN — Medication 10 MG: at 12:55

## 2021-07-19 RX ADMIN — SODIUM CHLORIDE, SODIUM LACTATE, POTASSIUM CHLORIDE, AND CALCIUM CHLORIDE 125 ML/HR: .6; .31; .03; .02 INJECTION, SOLUTION INTRAVENOUS at 12:09

## 2021-07-19 RX ADMIN — Medication 10 MG: at 12:31

## 2021-07-19 RX ADMIN — FENTANYL CITRATE 25 MCG: 50 INJECTION, SOLUTION INTRAMUSCULAR; INTRAVENOUS at 14:23

## 2021-07-19 RX ADMIN — MIDAZOLAM HYDROCHLORIDE 2 MG: 1 INJECTION, SOLUTION INTRAMUSCULAR; INTRAVENOUS at 12:15

## 2021-07-19 RX ADMIN — SODIUM CHLORIDE, SODIUM LACTATE, POTASSIUM CHLORIDE, AND CALCIUM CHLORIDE: .6; .31; .03; .02 INJECTION, SOLUTION INTRAVENOUS at 13:23

## 2021-07-19 RX ADMIN — GLYCOPYRROLATE 0.2 MG: 0.2 INJECTION, SOLUTION INTRAMUSCULAR; INTRAVENOUS at 12:24

## 2021-07-19 RX ADMIN — FENTANYL CITRATE 50 MCG: 50 INJECTION, SOLUTION INTRAMUSCULAR; INTRAVENOUS at 14:39

## 2021-07-19 RX ADMIN — OXYCODONE HYDROCHLORIDE AND ACETAMINOPHEN 1 TABLET: 5; 325 TABLET ORAL at 15:37

## 2021-07-19 RX ADMIN — DEXMEDETOMIDINE HCL 8 MCG: 100 INJECTION INTRAVENOUS at 12:45

## 2021-07-19 RX ADMIN — DEXAMETHASONE SODIUM PHOSPHATE 8 MG: 4 INJECTION, SOLUTION INTRAMUSCULAR; INTRAVENOUS at 12:26

## 2021-07-19 RX ADMIN — FENTANYL CITRATE 50 MCG: 50 INJECTION, SOLUTION INTRAMUSCULAR; INTRAVENOUS at 14:47

## 2021-07-19 RX ADMIN — FENTANYL CITRATE 25 MCG: 50 INJECTION, SOLUTION INTRAMUSCULAR; INTRAVENOUS at 13:19

## 2021-07-19 RX ADMIN — DEXMEDETOMIDINE HCL 8 MCG: 100 INJECTION INTRAVENOUS at 12:35

## 2021-07-19 RX ADMIN — ONDANSETRON 4 MG: 4 TABLET, ORALLY DISINTEGRATING ORAL at 15:57

## 2021-07-19 RX ADMIN — Medication 30 MG: at 12:24

## 2021-07-19 RX ADMIN — FENTANYL CITRATE 50 MCG: 50 INJECTION, SOLUTION INTRAMUSCULAR; INTRAVENOUS at 12:54

## 2021-07-19 RX ADMIN — ONDANSETRON 4 MG: 2 INJECTION INTRAMUSCULAR; INTRAVENOUS at 13:04

## 2021-07-19 RX ADMIN — CEFAZOLIN SODIUM 1000 MG: 1 SOLUTION INTRAVENOUS at 12:08

## 2021-07-19 RX ADMIN — DEXMEDETOMIDINE HCL 8 MCG: 100 INJECTION INTRAVENOUS at 12:31

## 2021-07-19 RX ADMIN — DEXMEDETOMIDINE HCL 8 MCG: 100 INJECTION INTRAVENOUS at 12:25

## 2021-07-19 RX ADMIN — LIDOCAINE HYDROCHLORIDE 50 MG: 10 INJECTION, SOLUTION EPIDURAL; INFILTRATION; INTRACAUDAL; PERINEURAL at 12:23

## 2021-07-19 RX ADMIN — PROPOFOL 50 MG: 10 INJECTION, EMULSION INTRAVENOUS at 12:30

## 2021-07-19 RX ADMIN — PROPOFOL 200 MG: 10 INJECTION, EMULSION INTRAVENOUS at 12:24

## 2021-07-19 RX ADMIN — PHENYLEPHRINE HYDROCHLORIDE 10 MCG/MIN: 10 INJECTION INTRAVENOUS at 13:16

## 2021-07-19 RX ADMIN — FENTANYL CITRATE 25 MCG: 50 INJECTION, SOLUTION INTRAMUSCULAR; INTRAVENOUS at 13:20

## 2021-07-19 NOTE — OP NOTE
OPERATIVE REPORT  PATIENT NAME: Pryor Baumgarten    :  1995  MRN: [id number]  Pt Location: [facility] OR ROOM 04    SURGERY DATE: 2021    Surgeon(s) and Role:     * Jl Payne MD - Primary     * Alis Reyna PA-C - Assisting    Preop Diagnosis:  Malignant neoplasm of upper-inner quadrant of right breast in female, estrogen receptor positive ([facility] [address] ) [C50 211, Z17 0]    Post-Op Diagnosis Codes:     * Malignant neoplasm of upper-inner quadrant of right breast in female, estrogen receptor positive ([facility] [address] ) [C50 211, Z17 0]    Procedure(s) (LRB):  LYNDSAY  DIRECTED LUMPECTOMY (Right)  LYMPHATIC MAPPING WITH BLUE AND RADIOACTIVE DYES, SENTINEL LYMPH NODE BIOPSY, injection at 1030 (Right)    Specimen(s):  ID Type Source Tests Collected by Time Destination   1 : RIGHT BREAST LUMPECTOMY  MARKED PER PROTOCOL WITH MARGIN MARKER  Tissue Breast, Right TISSUE EXAM Jl Payne MD 2021 1254    2 : RIGHT AXILLARY SENTINEL LYMPH NODE #1 HOT AND BLUE  Tissue Lymph Node, [place] TISSUE EXAM Jl Payne MD 2021 1258    3 : RIGHT AXILLARY ADIPOSE TISSUE WITH NON SENTINEL LYMPH NODES Tissue Soft Tissue, Other TISSUE EXAM Jl Payne MD 2021 1301    4 : SUPERIOR MARGIN ORANGE  Tissue Breast, Right TISSUE EXAM Jl Payne MD 2021 1327    5 : MEDIAL MARGIN YELLOW Tissue Breast, Right TISSUE EXAM Jl Payne MD 2021 1342    6 : INFERIOR MARGIN BLUE  Tissue Breast, Right TISSUE EXAM Jl Payne MD 2021 1343    7 : LATERAL MARGIN RED Tissue Breast, Right TISSUE EXAM Jl Payne MD 2021 1359        Estimated Blood Loss:   50 mL    Drains:  * No LDAs found *    Anesthesia Type:   General    Operative Indications:  Malignant neoplasm of upper-inner quadrant of right breast in female, estrogen receptor positive ([facility] [address] ) [C50 211, Z17 0]      Operative Findings:  Satisfactory specimen with LYNDSAY in place      Complications:   None    Procedure and Technique:    The prior post pathology  SLN Findings  The SLN was blue and radioactive  The lymph node was grossly normal and sent for permanent pathologic analysis    This wound also was irrigated extensively: superficial bleeding was controlled with electrocautery and then closed in 2 layers - an inner layer of 3-0 Vicryl and a subcuticular layer of 4-0 Monocryl  Prineo was  applied  I was present for the entire procedure and A physician assistant was required during the procedure for retraction tissue handling,dissection and suturing Elijah Francois assisted      Patient Disposition:  PACU     SIGNATURE: Rodolfo Mane MD  DATE: July 19, 2021  TIME: 2:02 PM

## 2021-07-19 NOTE — INTERVAL H&P NOTE
H&P reviewed  After examining the patient I find no changes in the patients condition since the H&P had been written      Vitals:    07/19/21 0916   BP: 128/77   Pulse: 91   Resp: 18   Temp: 97 7 °F (36 5 °C)   SpO2: 96%

## 2021-07-19 NOTE — ANESTHESIA PREPROCEDURE EVALUATION
Procedure:  LYNDSAY  DIRECTED LUMPECTOMY (Right Breast)  LYMPHATIC MAPPING WITH BLUE AND RADIOACTIVE DYES, SENTINEL LYMPH NODE BIOPSY, injection at 1030 (Right Axilla)    Relevant Problems   GYN   (+) Malignant neoplasm of upper-inner quadrant of right breast in female, estrogen receptor positive (HCC)        Physical Exam    Airway    Mallampati score: II  TM Distance: >3 FB  Neck ROM: full     Dental   Comment: Denies loose teeth  Lower permanent retainer,     Cardiovascular  Cardiovascular exam normal    Pulmonary  Pulmonary exam normal     Other Findings  Portions of exam deferred due to low yield and/or unknown COVID status      Anesthesia Plan  ASA Score- 2     Anesthesia Type- general with ASA Monitors  Additional Monitors:   Airway Plan: LMA  Plan Factors-Exercise tolerance (METS): >4 METS  Chart reviewed  Existing labs reviewed  Patient summary reviewed  Patient is not a current smoker  Induction- intravenous  Postoperative Plan-     Informed Consent- Anesthetic plan and risks discussed with patient  I personally reviewed this patient with the CRNA  Discussed and agreed on the Anesthesia Plan with the CRNA  Anne Hauser

## 2021-07-26 NOTE — PROGRESS NOTES
07/12/2021 Breast Working Group Recommended Treatment plan to be considered       Lumpectomy with SLNB     Herceptin after surgery (pending patients decision/final pathology)    Endocrine therapy    The final treatment plan will be left to the discretion of the patient and the treating physician  DISCLAIMERS:    TO THE TREATING PHYSICIAN:  This conference is a meeting of clinicians from various specialty areas who evaluate and discuss patients for whom a multidisciplinary treatment approach is being considered  Please note that the above opinion was a consensus of the conference attendees and is intended only to assist in quality care of the discussed patient  The responsibility for follow up on the input given during the conference, along with any final decisions regarding plan of care, is that of the patient and the patient's provider  Accordingly, appointments have only been recommended based on this information and have NOT been scheduled unless otherwise noted  TO THE PATIENT:  This summary is a brief record of major aspects of your cancer treatment  You may choose to share a copy with any of your doctors or nurses  However, this is not a detailed or comprehensive record of your care

## 2021-08-02 ENCOUNTER — OFFICE VISIT (OUTPATIENT)
Dept: HEMATOLOGY ONCOLOGY | Facility: CLINIC | Age: 26
End: 2021-08-02
Payer: COMMERCIAL

## 2021-08-02 ENCOUNTER — OFFICE VISIT (OUTPATIENT)
Dept: SURGICAL ONCOLOGY | Facility: CLINIC | Age: 26
End: 2021-08-02

## 2021-08-02 VITALS
TEMPERATURE: 97 F | DIASTOLIC BLOOD PRESSURE: 72 MMHG | SYSTOLIC BLOOD PRESSURE: 116 MMHG | RESPIRATION RATE: 12 BRPM | OXYGEN SATURATION: 99 % | WEIGHT: 167.2 LBS | HEART RATE: 76 BPM | BODY MASS INDEX: 29.62 KG/M2 | HEIGHT: 63 IN

## 2021-08-02 VITALS
WEIGHT: 168 LBS | RESPIRATION RATE: 16 BRPM | DIASTOLIC BLOOD PRESSURE: 78 MMHG | OXYGEN SATURATION: 99 % | HEART RATE: 79 BPM | HEIGHT: 63 IN | BODY MASS INDEX: 29.77 KG/M2 | TEMPERATURE: 97.8 F | SYSTOLIC BLOOD PRESSURE: 112 MMHG

## 2021-08-02 DIAGNOSIS — Z71.89 OTHER SPECIFIED COUNSELING: Primary | ICD-10-CM

## 2021-08-02 DIAGNOSIS — C50.211 MALIGNANT NEOPLASM OF UPPER-INNER QUADRANT OF RIGHT BREAST IN FEMALE, ESTROGEN RECEPTOR POSITIVE (HCC): Primary | ICD-10-CM

## 2021-08-02 DIAGNOSIS — Z17.0 MALIGNANT NEOPLASM OF UPPER-INNER QUADRANT OF RIGHT BREAST IN FEMALE, ESTROGEN RECEPTOR POSITIVE (HCC): Primary | ICD-10-CM

## 2021-08-02 DIAGNOSIS — Z98.890 STATUS POST RIGHT BREAST LUMPECTOMY: ICD-10-CM

## 2021-08-02 DIAGNOSIS — Z17.0 MALIGNANT NEOPLASM OF UPPER-INNER QUADRANT OF RIGHT BREAST IN FEMALE, ESTROGEN RECEPTOR POSITIVE (HCC): ICD-10-CM

## 2021-08-02 DIAGNOSIS — C50.211 MALIGNANT NEOPLASM OF UPPER-INNER QUADRANT OF RIGHT BREAST IN FEMALE, ESTROGEN RECEPTOR POSITIVE (HCC): ICD-10-CM

## 2021-08-02 PROCEDURE — 99215 OFFICE O/P EST HI 40 MIN: CPT | Performed by: INTERNAL MEDICINE

## 2021-08-02 PROCEDURE — 1036F TOBACCO NON-USER: CPT | Performed by: INTERNAL MEDICINE

## 2021-08-02 PROCEDURE — 99024 POSTOP FOLLOW-UP VISIT: CPT | Performed by: SURGERY

## 2021-08-02 PROCEDURE — 3008F BODY MASS INDEX DOCD: CPT | Performed by: INTERNAL MEDICINE

## 2021-08-02 NOTE — PROGRESS NOTES
Hematology/Oncology Progress Note    Date of Service: 2021    [address] [facility] [address] [address]    Hem/Onc Problem List:   1  Right breast invasive ductal carcinoma, triple positive, grade 3 ,  pT1c pN0, stage IA    Chief Complaint:       Post surgery follow-up to discuss the pathology result and management plan    Assessment/Plan:     I personally reviewed the lab results, image studies results and other specialties/physicians consult notes and recommendations  I shared the findings with patient and family, discussed the diagnosis and management plan as below  1  Right breast invasive ductal carcinoma, grade 3, ER 90%, MI 70%, HER2 positive by IHC  Genetic testing negative  Patient underwent right breast lumpectomy with sentinel lymph node biopsy on  2021  Pathology showed invasive mammary  Carcinoma of no special type  The tumor measures 1 8 cm  Grade 3  All margins negative  All lymph nodes negative  Grade 2 DCIS present  No evidence of lymphovascular invasion  Final pathologic stage [id number]  Overall, the patient tolerated the procedure very well  I recommend  adjuvant chemotherapy with THP  for 6 cycles or at least TH  weekly for 3 months followed by total of 1 year of maintenance Herceptin target therapy  Patient again, reluctant  to start chemotherapy  in fear of losing her hair  Patient asked to be treated with Herceptin only  I spent more than 1 hour of face to face discussion with the patient and family  We discussed the data of different efficacy from different combination chemotherapy by pCR rate at neoadjuvant settin- 55% with TCHP, 27-31% in [address], 26% in THP, 20% in [address], and 6% in [place]  We also discussed the APT trial data: adjuvant chemotherapy with TH can achieve 93% DFS, 95% OS in the 7-years followup   TH is a reasonable adjuvant treatment option  THP will achieve a better outcome, theoretically  with tolerable toxicity  ECHO showed LVEF 65%  Patient would like to have a 2nd opinion from [facility]  I will refer patient to [facility] breast cancer specialist for consult  Patient also will need adjuvant radiation therapy as per NCCN guideline  Patient will consult radiation oncologist to discuss the rationale, risks and benefits of adjuvant radiation therapy  We also discussed endocrine therapy with tamoxifen +/- Lupron because of positive ER/MA status  Because of the small tumor, CARRASCO alone       2    Genetic predisposition:   Invitae breast cancer panel negative     3    History of palpitation  Echo study showed normal LV function, LV EF 65%  4    Fertility  Preservation:   The patient is 54-year-old and unmarried  Chemotherapy potentially will affect her fertility  Especially, she will need endocrine therapy as well  She is interested in embryo preservation  Patient was referred to Reproductive Medicine  She has not made her decision yet  5   History of melanoma  Status post wide local excision  No evidence of local recurrence  5   Follow-up: Return to clinic in 1-2 weeks  Disclaimer: This document was prepared using [employer] technology  If a word or phrase is confusing, or does not make sense, this is likely due to recognition error which was not discovered during the providers review  If you believe an error has occurred, please Contact me through [employer] service for toya? cation  Pain Score and Plan:     0    Hematology/Oncology History:   · History of skin malignant lymphoma, status post wide local excision  · May 2021, patient palpated a lump in the right breast at upper inner quadrant  · June 23, 2021 , Mammogram and ultrasound in [facility] showed an irregular complex cystic mass with microlobulated margins measuring 1 5 x 1 3 x 1 4 cm    No discrete nodes are found in the right axilla  · June 24, 2021, patient underwent ultrasound guided core biopsy of the right breast mass: Pathology showed Grade 3 invasive ductal carcinoma, positive for carcinoma in-situ component, positive perineural remission  Negative for lymphovascular invasion  ER 90%, SD 70%, HER2 positive by IHC  · 06/30/2021, patient consulted Dr Zion Aguayo  Staging CT scan and bone scan ordered  Patient was refered to genetic counseling  · July 1, 2021 CT scan chest/abdomen/ pelvis with contrast for staging showed bilateral ovarian cysts  with left adnexal cyst measures 4 2 x 4 4 x 3 3 cm, and right adnexal cyst measures 4 3 x 3 0 x 3 5 cm  No evidence of metastatic disease in C/ A/P   · MRI of bilateral breast in July 1, 2021  redemonstrated the 1:00 O'Clock  biopsy-proven breast cancer  No axillary adenopathy  No extension to the chest wall  No concurrent lesions in either breast   · July 2, 2021, bone scan showed no evidence of metastatic disease  · This case was discussed in our multidisciplinary breast cancer tumor board with recommendation of surgery followed by adjuvant chemotherapy  · July 9, 2021, echo study showed normal left ventricle ejection fraction 65%  · July 12, 2021, genetic test: Invitae breast cancer panel negative  · July 19, 2021, patient underwent right breast lumpectomy with sentinel lymph node biopsy  Pathology showed invasive mammary carcinoma of no special type( ductal NST)  The tumor measures 18 mm, grade 3  All margins negative with closest margin 8 mm from tumor involving the posterior margin  5 lymph nodes negative for metastatic disease  Grade 2 DCIS present  No evidence of lymphovascular invasion     Final pathologic stage [id number],     History of Present Illiness:   Garfield Bradshaw is a 31 y o  female with the above-noted HemOnc history who is here  to discuss the pathology result and adjuvant treatment plan      echo study in July 9, 2021 showed normal left ventricular function, with LVEF 65%  Invitae breast cancer panel negative  This case was discussed in the multidisciplinary tumor board with recommendation of lumpectomy followed by adjuvant chemotherapy and adjuvant radiation therapy  Lumpectomy was done in July 19, 2021  Pathology showed invasive mammary carcinoma of no specific type  Triple positive  The tumor measures 1 8 cm, grade 3  All margins negative  All lymph node negative  No evidence of lymphovascular invasion  Grade 2 DCIS are present  Final pathologic stage [id number]  Overall, the patient tolerated the procedure very well  Patient still feels sore in the surgical site  Patient denies fever or chills  No chest pain or shortness breast   No cough or phlegm  No GI or  symptoms  Appetite good  No significant weight loss   or weight gain  Patient still does not want chemotherapy  She consulted reproductive medicine for egg preservation  But she has not made her final decision yet  ROS: A 12-point of review of systems is obtained and other than the above is noncontributory  Objective:   VITALS:   /78 (BP Location: Left arm, Patient Position: Sitting, Cuff Size: Large)   Pulse 79   Temp 97 8 °F (36 6 °C) (Tympanic)   Resp 16   Ht 5' 3" (1 6 m)   Wt 76 2 kg (168 lb)   SpO2 99%   BMI 29 76 kg/m²     Physical EXAM:  General:  Alert, cooperative, no distress, appears stated age  Head:  Normocephalic, without obvious abnormality, atraumatic  Eyes:  Conjunctivae/corneas clear  PERRL, EOMs intact  No evidence of conjunctivitis     Throat: Lips, mucosa, and tongue normal  No lesions in the oropharynx   Neck: Supple, symmetrical, trachea midline, no adenopathy    Lungs:   Clear to auscultation bilaterally  Respiratory effort easy, nonlabored    Heart:  Regular rate and rhythm, S1, S2 normal, no murmur, click, rub or the gallop  Abdomen:   Soft, non-tender,nondistended   Bowel sounds normal  No masses,  No organomegaly  Extremities: Extremities normal, atraumatic, no cyanosis or edema  No axillary or inguinal adenopathy   Skin: Skin color, texture, turgor normal  No rashes or lesions    Neurologic: A&Ox4  No focal neuro deficits       No Known Allergies    Past Medical History:   Diagnosis Date    [facility] cancer ([facility] )     Cancer ([facility] )     Melanoma ([facility] )        Past Surgical History:   Procedure Laterality Date    BREAST BIOPSY      BREAST LUMPECTOMY Right 7/19/2021    Procedure: LYNDSAY  DIRECTED LUMPECTOMY;  Surgeon: Carla Haney MD;  Location: [facility];  Service: Surgical Oncology    LYMPH NODE BIOPSY Right 7/19/2021    Procedure: LYMPHATIC MAPPING WITH BLUE AND RADIOACTIVE DYES, SENTINEL LYMPH NODE BIOPSY, injection at 56;  Surgeon: Carla Haney MD;  Location: [facility];  Service: Surgical Oncology    SKIN CANCER EXCISION      TONSILLECTOMY AND ADENOIDECTOMY      WISDOM TOOTH EXTRACTION         Family History   Problem Relation Age of Onset    Kidney cancer Maternal Grandfather     Breast cancer Maternal Aunt        Social History     Socioeconomic History    Marital status: Single     Spouse name: Not on file    Number of children: Not on file    Years of education: Not on file    Highest education level: Not on file   Occupational History    Not on file   Tobacco Use    Smoking status: Never Smoker    Smokeless tobacco: Never Used   Vaping Use    Vaping Use: Never used   Substance and Sexual Activity    Alcohol use: Yes     Comment: Socially    Drug use: No    Sexual activity: Yes   Other Topics Concern    Not on file   Social History Narrative    Not on file     Social Determinants of Health     Financial Resource Strain:     Difficulty of Paying Living Expenses:    Food Insecurity:     Worried About Running Out of Food in the Last Year:     Ran Out of Food in the Last Year:    Transportation Needs:     Lack of Transportation (Medical):      Lack of Transportation (Non-Medical):    Physical Activity:     Days of Exercise per Week:     Minutes of Exercise per Session:    Stress:     Feeling of Stress :    Social Connections:     Frequency of Communication with Friends and Family:     Frequency of Social Gatherings with Friends and Family:     Attends Nondenominational Services:     Active Member of Clubs or Organizations:     Attends Club or Organization Meetings:     Marital Status:    Intimate Partner Violence:     Fear of Current or Ex-Partner:     Emotionally Abused:     Physically Abused:     Sexually Abused:        Current Outpatient Medications   Medication Sig Dispense Refill    lidocaine-prilocaine (EMLA) cream Apply topically as needed for mild pain (Patient not taking: Reported on 8/2/2021) 30 g 0    Norethin Ace-Eth Estrad-FE (MINASTRIN 24 FE) 1-20 MG-MCG(24) CHEW Chew 1 tablet  (Patient not taking: Reported on 8/2/2021)      ondansetron (ZOFRAN) 4 mg tablet Take 1 tablet (4 mg total) by mouth every 8 (eight) hours as needed for nausea or vomiting (Patient not taking: Reported on 8/2/2021) 20 tablet 0    oxyCODONE-acetaminophen (PERCOCET) 5-325 mg per tablet Take 1 tablet by mouth every 6 (six) hours as needed for moderate painMax Daily Amount: 4 tablets (Patient not taking: Reported on 8/2/2021) 20 tablet 0    promethazine (PHENERGAN) 25 mg tablet Take 1 tablet (25 mg total) by mouth every 6 (six) hours as needed for nausea or vomiting (Patient not taking: Reported on 8/2/2021) 60 tablet 2     No current facility-administered medications for this visit  (Not in a hospital admission)      DATA REVIEW:    Pathology Result:    Final Diagnosis   Date Value Ref Range Status   07/19/2021   Final    A  Right breast (lumpectomy):     - Invasive mammary carcinoma of no special type (ductal NST)  - Tumor size: 18 mm  Tumor thgthrthathdtheth:th th4th of 3       - Closest surgical margin: posterior (8 mm from tumor)  Comment:  ER / [place] / Her-2 pending  B  [place] lymph node #1, right axilla (excision):      - One (1) lymph node, negative for carcinoma  C  Adipose tissue, right axilla (excision):     - Four (4) lymph nodes, negative for carcinoma  D  Superior margin, right breast (excision):     - Benign fibroadipose tissue        - Superior margin negative for carcinoma  E   Medial margin, right breast (excision):      - Benign fibroadipose tissue        - Medial margin negative for carcinoma  F  Inferior margin, right breast (excision):      - Benign fibroadipose tissue and scant benign breast tissue  - Inferior margin negative for carcinoma  G  Lateral margin, right breast (excision):     - Benign fibroadipose tissue        - Lateral margin negative for carcinoma  Comment: This is an appended report  These results have been appended to a previously preliminary verified report  07/01/2020   Final    A  Skin, right lower back, shave biopsy:    COMPOUND NEVUS; extending to the tissue edges  02/05/2019   Final    A  Skin, Left Forearm, excision:  - Early scar and residual Spitz nevus, peripheral and deep margins uninvolved  See Note  Interpretation performed at [facility], [address] [place] [address]      01/14/2019   Final    A  Skin, Arm, Left, shave biopsy:  - Spitz's nevus; see note  Note:  Sections show a fairly circumscribed and mostly nested melanocytic proliferation along the basal layer  The cells are spindled and epithelioid, with fairly uniform cytology  Occasional Kamino bodies are also seen  P16 immunostain is positive in the lesional cells  On section planes studied, lesional cells are close to bilateral tissue edges  If this is a portion of a larger clinical lesion, complete removal to preclude recurrence would be prudent  Clinical correlation is recommended        Interpretation performed at [facility], [address]       10/01/2018 Final    A  Skin, abdomen, excision:  - Prior biopsy site reaction, no residual melanoma seen  - Inked margins with no tumor seen  Interpretation performed at [facility], [address] [place] [address], [address] [place] [address]           09/06/2018   Final    A  Skin Abdominal, abdomen, shave biopsy:  - Malignant melanoma in situ with small focus suspicious for early invasive melanoma, 0 4 mm thickness,     extending to peripheral margins  MELANOMA OF SKIN TUMOR STAGING SUMMARY  1  Specimen identification:     - Procedure: Shave biopsy       - Laterality: Not specified  2  Tumor     - Tumor Site: Abdomen      - Macroscopic satellite nodule(s) (invasive tumor only): Not identified  - Histologic type:       -- Invasive Melanoma:  Superficial spreading melanoma  -- Melanoma in-situ (anatomic level I): N/A      - Maximum tumor (Breslow) thickness (pT1a)(invasive tumor only): 0 4 mm       - Anatomic (Sukhdeep) level (invasive tumor only): II       - Ulceration (invasive tumor only): Not identified  3  Margins:     - Peripheral margins: Involved by in-situ melanoma  - Deep margins (invasive tumor only): Uninvolved by invasive melanoma by 1 3 mm     4  Mitotic Rate (specify number / mm2)(invasive tumor only): 0/mm2    5  Microsatellite(s) (invasive tumor only): Not identified  6  Lymphovascular invasion (invasive tumor only): Not identified  7  Neurotropism (invasive tumor only): Not identified  8  Tumor-infiltrating lymphocytes (invasive tumor only): Present, brisk  9  Tumor regression (excision, if present < or more than 75%): Not identified  10  Growth Phase: Radial growth phase  11: Ancillary Studies:  None  -- Best representative tumor block: N/A (one block)  12  Additional pathologic findings:       -- Associated Nevus:  Cannot exclude dermal nevus  -- Other (specify): Epithelioid cell type  13  AJCC 8th Ed   Pathologic Stage Classification:  at least Stage  IA- pT1a, pNX  Interpretation performed at [facility], [address] [place] [address]  Image Results: They are reviewed and documented in Hematology/Oncology history    NM lymphatic breast  Narrative: SENTINEL NODE LYMPHOSCINTIGRAPHY    INDICATION: Right breast carcinoma    FINDINGS:    0 548 mCi Tc-99m sulfur colloid (0 6 cc volume) was administered in divided doses by Dr Cristina Field in the right biopsy site and periareolar region  Scintigraphic images were obtained over the right hemithorax and axilla in multiple projections  After 15   minutes, a node was identified  Using scintigraphic guidance, the corresponding skin site was marked with an indelible marker  The patient was transferred to the operating room in satisfactory condition  Impression: [place] lymph node localized to right axilla  Workstation performed: [id number]        LABS:  Lab data are reviewed and documented in HemOnc history  No results found for this or any previous visit (from the past 48 hour(s))            Earnest Subramanian MD  8/2/2021, 10:33 PM

## 2021-08-02 NOTE — PROGRESS NOTES
Surgical Oncology Follow Up  [facility]  [facility] [place]  [address] [facility] [address]  [place] [address]  [address]  1995  [id number]      Chief Complaint   Patient presents with    Post-op        Assessment & Plan:   Jaiden Garcia is here for postop follow-up status post right breast conservation surgery with sentinel lymph node biopsy  Pathology was discussed in detail  [place] lymph node is negative margins are clear  Mass size is 18 mm she is healing well  she has an appointment today to see her medical oncologist to discuss endocrine therapy as well as Herceptin  She will come to see me in 2 weeks after discussion with her medical oncologist    Cancer History:     Oncology History   Malignant neoplasm of upper-inner quadrant of right breast in female, estrogen receptor positive ([facility] [address] )   6/24/2021 Biopsy    US guided Right breast core biopsy:  1 o'clock 13-14 cm from the nipple  Right invasive ductal carcinoma  Grade 3  ER 90  WY 70   HER2 3+  Lymphovascular invasion: not identified    Results are malignant and concordant  Recommend surgical consultation  Additionally, given patient's age and density of breast tissue, recommend enhanced breast MRI for further evaluation       Done at [facility]     6/30/2021 Genetic Testing    The following genes were evaluated: RADHA, BRCA1, BRCA2, CDH1, CHEK2, PALB2, PTEN, STK11, TP53  Additional genes tested for a total of 36 genes  VUS NF1   [employer]     7/1/2021 Observation    Bilateral breast MRI with and without contrast: B6     Biopsy confirmed malignancy  01:00 o'clock location right breast   No axillary lymphadenopathy, extension to the chest wall, or concurrent lesions in either breast        7/19/2021 Surgery    Right breast LYNDSAY  directed lumpectomy with sentinel lymph node biopsy:  Invasive mammary carcinoma of no special type; ductal  Grade 3  ER 65  WY 90  HER2 3+  18 mm  Margins negative  0/5 Lymph nodes  Stage IA      8/17/2021 -  Chemotherapy    fosaprepitant (EMEND) IVPB, 150 mg, Intravenous, Once, 0 of 6 cycles  pertuzumab (PERJETA) IVPB, 840 mg, Intravenous, Once, 0 of 18 cycles  CARBOplatin (PARAPLATIN) IVPB ([facility] AUC DOSING), 760 8 mg, Intravenous, Once, 0 of 6 cycles  DOCEtaxel (TAXOTERE) chemo infusion, 75 mg/m2 = 132 8 mg, Intravenous, Once, 0 of 6 cycles  trastuzumab (HERCEPTIN) chemo infusion, 8 mg/kg = 610 mg, Intravenous, Once, 0 of 18 cycles           Interval History:     Final Diagnosis   A  Right breast (lumpectomy):     - Invasive mammary carcinoma of no special type (ductal NST)  - Tumor size: 18 mm  Tumor thgthrthathdtheth:th th4th of 3       - Closest surgical margin: posterior (8 mm from tumor)     Comment:  ER / CO / Her-2 pending  B  [place] lymph node #1, right axilla (excision):      - One (1) lymph node, negative for carcinoma  C  Adipose tissue, right axilla (excision):     - Four (4) lymph nodes, negative for carcinoma       D  Superior margin, right breast (excision):     - Benign fibroadipose tissue        - Superior margin negative for carcinoma  E   Medial margin, right breast (excision):      - Benign fibroadipose tissue        - Medial margin negative for carcinoma  F  Inferior margin, right breast (excision):      - Benign fibroadipose tissue and scant benign breast tissue  - Inferior margin negative for carcinoma  G  Lateral margin, right breast (excision):     - Benign fibroadipose tissue        - Lateral margin negative for carcinoma  Review of Systems:   Review of Systems   Constitutional: Negative for chills and fever  HENT: Negative for ear pain and sore throat  Eyes: Negative for pain and visual disturbance  Respiratory: Negative for cough and shortness of breath  Cardiovascular: Negative for chest pain and palpitations  Gastrointestinal: Negative for abdominal pain and vomiting     Genitourinary: Negative for dysuria and hematuria  Musculoskeletal: Negative for arthralgias and back pain  Skin: Negative for color change and rash  Neurological: Negative for seizures and syncope  All other systems reviewed and are negative        Past Medical History     Patient Active Problem List   Diagnosis    Nevus    Malignant melanoma of skin of abdomen ([facility] [address] )    Neck pain    Multiple benign melanocytic nevi    Iron deficiency anemia due to chronic blood loss    Warts    Surveillance for birth control, oral contraceptives    Discomfort of left ear    Benign paroxysmal positional vertigo due to bilateral vestibular disorder    Spitz nevus of forearm, left    Enlarged thyroid    Malignant neoplasm of upper-inner quadrant of right breast in female, estrogen receptor positive (HCC)    Breast lump or mass     Past Medical History:   Diagnosis Date    Breast cancer ([facility] [address] [facility]     Cancer ([facility] [address] [facility]     Melanoma ([facility] [address] )      Past Surgical History:   Procedure Laterality Date    BREAST BIOPSY      BREAST LUMPECTOMY Right 7/19/2021    Procedure: LYNDSAY  DIRECTED LUMPECTOMY;  Surgeon: Octavio Brunner MD;  [facility];  Service: Surgical Oncology    LYMPH NODE BIOPSY Right 7/19/2021    Procedure: LYMPHATIC MAPPING WITH BLUE AND RADIOACTIVE DYES, SENTINEL LYMPH NODE BIOPSY, injection at 56;  Surgeon: Octavio Brunner MD;  [facility];  Service: Surgical Oncology    SKIN CANCER EXCISION      TONSILLECTOMY AND ADENOIDECTOMY      WISDOM TOOTH EXTRACTION       Family History   Problem Relation Age of Onset    Kidney cancer Maternal Grandfather     Breast cancer Maternal Aunt      Social History     Socioeconomic History    Marital status: Single     Spouse name: Not on file    Number of children: Not on file    Years of education: Not on file    Highest education level: Not on file   Occupational History    Not on file   Tobacco Use    Smoking status: Never Smoker    Smokeless tobacco: Never Used   Vaping Use    Vaping Use: Never used   Substance and Sexual Activity    Alcohol use: Yes     Comment: Socially    Drug use: No    Sexual activity: Yes   Other Topics Concern    Not on file   Social History Narrative    Not on file     Social Determinants of Health     Financial Resource Strain:     Difficulty of Paying Living Expenses:    Food Insecurity:     Worried About Running Out of Food in the Last Year:     [address] in the Last Year:    Transportation Needs:     Lack of Transportation (Medical):      Lack of Transportation (Non-Medical):    Physical Activity:     Days of Exercise per Week:     Minutes of Exercise per Session:    Stress:     Feeling of Stress :    Social Connections:     Frequency of Communication with Friends and Family:     Frequency of Social Gatherings with Friends and Family:     Attends Taoist Services:     Active Member of Clubs or Organizations:     Attends Club or Organization Meetings:     Marital Status:    Intimate Partner Violence:     Fear of Current or Ex-Partner:     Emotionally Abused:     Physically Abused:     Sexually Abused:        Current Outpatient Medications:     lidocaine-prilocaine (EMLA) cream, Apply topically as needed for mild pain (Patient not taking: Reported on 8/2/2021), Disp: 30 g, Rfl: 0    Norethin Ace-Eth Estrad-FE (MINASTRIN 24 FE) 1-20 MG-MCG(24) CHEW, Chew 1 tablet  (Patient not taking: Reported on 8/2/2021), Disp: , Rfl:     ondansetron (ZOFRAN) 4 mg tablet, Take 1 tablet (4 mg total) by mouth every 8 (eight) hours as needed for nausea or vomiting (Patient not taking: Reported on 8/2/2021), Disp: 20 tablet, Rfl: 0    oxyCODONE-acetaminophen (PERCOCET) 5-325 mg per tablet, Take 1 tablet by mouth every 6 (six) hours as needed for moderate painMax Daily Amount: 4 tablets (Patient not taking: Reported on 8/2/2021), Disp: 20 tablet, Rfl: 0    promethazine (PHENERGAN) 25 mg tablet, Take 1 tablet (25 mg total) by mouth every 6 (six) hours as needed for nausea or vomiting (Patient not taking: Reported on 8/2/2021), Disp: 60 tablet, Rfl: 2  No Known Allergies    Physical Exam:     Vitals:    08/02/21 0857   BP: 116/72   Pulse: 76   Resp: 12   Temp: (!) 97 °F (36 1 °C)   SpO2: 99%     Physical Exam  Constitutional:       Appearance: Normal appearance  HENT:      Head: Normocephalic and atraumatic  Nose: Nose normal       Mouth/Throat:      Mouth: Mucous membranes are moist    Eyes:      Pupils: Pupils are equal, round, and reactive to light  Cardiovascular:      Rate and Rhythm: Normal rate  Pulses: Normal pulses  Heart sounds: Normal heart sounds  Pulmonary:      Effort: Pulmonary effort is normal       Breath sounds: Normal breath sounds  Chest:       Abdominal:      General: Bowel sounds are normal       Palpations: Abdomen is soft  Musculoskeletal:         General: Normal range of motion  Cervical back: Normal range of motion and neck supple  Skin:     General: Skin is warm  Neurological:      General: No focal deficit present  Mental Status: She is alert and oriented to person, place, and time  Psychiatric:         Mood and Affect: Mood normal          Behavior: Behavior normal          Thought Content: Thought content normal          Judgment: Judgment normal            Results & Discussion:   I did review pathology in detail and copy of the report was given to  Patient  she understands and  agrees   All patient questions were answered  Advance Care Planning/Advance Directives: Jeff Bobo MD discussed the disease status with Dayanara Marie  today 08/02/21  treatment plans and follow-up with the patient

## 2021-08-03 ENCOUNTER — PATIENT OUTREACH (OUTPATIENT)
Dept: CASE MANAGEMENT | Facility: HOSPITAL | Age: 26
End: 2021-08-03

## 2021-08-03 NOTE — PROGRESS NOTES
YANICK s/w pt by phone this morning following her Surg Onc and Med Onc f/t appts yesterday  Pt tells me that everything went well overall, they are still recommending that she do chemo and are also encouraging her to have a second opinion  She says that she is waiting to hear back from Pallavi Nguyen for an appt date, and agrees to let me know if she needs help with getting her medical records sent there for this appointment  Pt has my direct number and my email address and says that she will keep me updated with any needs  MSW will f/u in 2 weeks if I have not heard back from her, no other needs at this time

## 2021-08-16 ENCOUNTER — TELEPHONE (OUTPATIENT)
Dept: HEMATOLOGY ONCOLOGY | Facility: CLINIC | Age: 26
End: 2021-08-16

## 2021-08-16 NOTE — TELEPHONE ENCOUNTER
Patient is calling to see if [address] offers scalp cooling cap while undergoing treatment  Patient would like information on scalp cooling cap  How much would the cost be? What would be covered by insurance? Will forward to Dr Viviana Sharif RN to review      Tova Santos (Self) [phone] (H)

## 2021-08-18 ENCOUNTER — TELEPHONE (OUTPATIENT)
Dept: HEMATOLOGY ONCOLOGY | Facility: CLINIC | Age: 26
End: 2021-08-18

## 2021-08-18 ENCOUNTER — OFFICE VISIT (OUTPATIENT)
Dept: SURGICAL ONCOLOGY | Facility: CLINIC | Age: 26
End: 2021-08-18

## 2021-08-18 VITALS
WEIGHT: 166.2 LBS | RESPIRATION RATE: 12 BRPM | HEIGHT: 63 IN | OXYGEN SATURATION: 99 % | TEMPERATURE: 99.4 F | DIASTOLIC BLOOD PRESSURE: 70 MMHG | BODY MASS INDEX: 29.45 KG/M2 | SYSTOLIC BLOOD PRESSURE: 110 MMHG | HEART RATE: 77 BPM

## 2021-08-18 DIAGNOSIS — Z17.0 MALIGNANT NEOPLASM OF UPPER-INNER QUADRANT OF RIGHT BREAST IN FEMALE, ESTROGEN RECEPTOR POSITIVE (HCC): ICD-10-CM

## 2021-08-18 DIAGNOSIS — Z98.890 STATUS POST RIGHT BREAST LUMPECTOMY: ICD-10-CM

## 2021-08-18 DIAGNOSIS — Z71.89 OTHER SPECIFIED COUNSELING: Primary | ICD-10-CM

## 2021-08-18 DIAGNOSIS — C50.211 MALIGNANT NEOPLASM OF UPPER-INNER QUADRANT OF RIGHT BREAST IN FEMALE, ESTROGEN RECEPTOR POSITIVE (HCC): ICD-10-CM

## 2021-08-18 PROCEDURE — 99024 POSTOP FOLLOW-UP VISIT: CPT | Performed by: SURGERY

## 2021-08-18 NOTE — PROGRESS NOTES
Surgical Oncology Follow Up  [facility] [address] [facility] [place]  [address] [facility] [address]  [place] [address]  [address]  1995  [id number]      Chief Complaint   Patient presents with    Post-op     2 nd         Assessment & Plan:   She is here for a postop follow-up    well-healed surgical site     No surgical site infection  she has seen medical oncologist and plan for [facility]  He also has an appointment to see radiation oncologist   We will see her in 6 months  They will call with any questions or concerns  Cancer History:     Oncology History   Malignant neoplasm of upper-inner quadrant of right breast in female, estrogen receptor positive ([facility] [address] )   6/24/2021 Biopsy    US guided Right breast core biopsy:  1 o'clock 13-14 cm from the nipple  Right invasive ductal carcinoma  Grade 3  ER 90  NM 70   HER2 3+  Lymphovascular invasion: not identified    Results are malignant and concordant  Recommend surgical consultation  Additionally, given patient's age and density of breast tissue, recommend enhanced breast MRI for further evaluation       Done at [facility]     6/30/2021 Genetic Testing    The following genes were evaluated: RADHA, BRCA1, BRCA2, CDH1, CHEK2, PALB2, PTEN, STK11, TP53  Additional genes tested for a total of 36 genes  VUS NF1   [employer]     7/1/2021 Observation    Bilateral breast MRI with and without contrast: B6     Biopsy confirmed malignancy  01:00 o'clock location right breast   No axillary lymphadenopathy, extension to the chest wall, or concurrent lesions in either breast        7/19/2021 Surgery    Right breast LYNDSAY  directed lumpectomy with sentinel lymph node biopsy:  Invasive mammary carcinoma of no special type; ductal  Grade 3  ER 65  NM 90  HER2 3+  18 mm  Margins negative  0/5 Lymph nodes  Stage IA      8/17/2021 -  Chemotherapy    fosaprepitant (EMEND) IVPB, 150 mg, Intravenous, Once, 0 of 6 cycles  pertuzumab (PERJETA) IVPB, 840 mg, Intravenous, Once, 0 of 18 cycles  CARBOplatin (PARAPLATIN) IVPB ([facility] AUC DOSING), 760 8 mg, Intravenous, Once, 0 of 6 cycles  DOCEtaxel (TAXOTERE) chemo infusion, 75 mg/m2 = 132 8 mg, Intravenous, Once, 0 of 6 cycles  trastuzumab (HERCEPTIN) chemo infusion, 8 mg/kg = 610 mg, Intravenous, Once, 0 of 18 cycles           Interval History:   Postop follow-up after medical oncology consult  She also had a 2nd opinion at [facility] with regard to her adjuvant chemotherapy  Plan for [facility]  Review of Systems:   Review of Systems   Constitutional: Negative for chills and fever  HENT: Negative for ear pain and sore throat  Eyes: Negative for pain and visual disturbance  Respiratory: Negative for cough and shortness of breath  Cardiovascular: Negative for chest pain and palpitations  Gastrointestinal: Negative for abdominal pain and vomiting  Genitourinary: Negative for dysuria and hematuria  Musculoskeletal: Negative for arthralgias and back pain  Skin: Negative for color change and rash  Neurological: Negative for seizures and syncope  All other systems reviewed and are negative        Past Medical History     Patient Active Problem List   Diagnosis    Nevus    Malignant melanoma of skin of abdomen ([facility] [address]    Neck pain    Multiple benign melanocytic nevi    Iron deficiency anemia due to chronic blood loss    Warts    Surveillance for birth control, oral contraceptives    Discomfort of left ear    Benign paroxysmal positional vertigo due to bilateral vestibular disorder    Spitz nevus of forearm, left    Enlarged thyroid    Malignant neoplasm of upper-inner quadrant of right breast in female, estrogen receptor positive (HCC)    Breast lump or mass     Past Medical History:   Diagnosis Date    Breast cancer ([facility] [address]     Cancer ([facility] [address]     Melanoma ([facility] [address] )      Past Surgical History:   Procedure Laterality Date    BREAST BIOPSY      BREAST LUMPECTOMY Right 7/19/2021    Procedure: LYNDSAY  DIRECTED LUMPECTOMY;  Surgeon: Chika Ahumdaa MD;  [facility]: [facility];  Service: Surgical Oncology    LYMPH NODE BIOPSY Right 7/19/2021    Procedure: LYMPHATIC MAPPING WITH BLUE AND RADIOACTIVE DYES, SENTINEL LYMPH NODE BIOPSY, injection at 56;  Surgeon: Chika Ahumada MD;  [facility];  Service: Surgical Oncology    SKIN CANCER EXCISION      TONSILLECTOMY AND ADENOIDECTOMY      WISDOM TOOTH EXTRACTION       Family History   Problem Relation Age of Onset    Kidney cancer Maternal Grandfather     Breast cancer Maternal Aunt      Social History     Socioeconomic History    Marital status: Single     Spouse name: Not on file    Number of children: Not on file    Years of education: Not on file    Highest education level: Not on file   Occupational History    Not on file   Tobacco Use    Smoking status: Never Smoker    Smokeless tobacco: Never Used   Vaping Use    Vaping Use: Never used   Substance and Sexual Activity    Alcohol use: Yes     Comment: Socially    Drug use: No    Sexual activity: Yes   Other Topics Concern    Not on file   Social History Narrative    Not on file     Social Determinants of Health     Financial Resource Strain:     Difficulty of Paying Living Expenses:    Food Insecurity:     Worried About Running Out of Food in the Last Year:     Ran Out of Food in the Last Year:    Transportation Needs:     Lack of Transportation (Medical):      Lack of Transportation (Non-Medical):    Physical Activity:     Days of Exercise per Week:     Minutes of Exercise per Session:    Stress:     Feeling of Stress :    Social Connections:     Frequency of Communication with Friends and Family:     Frequency of Social Gatherings with Friends and Family:     Attends Congregational Services:     Active Member of Clubs or Organizations:     Attends Club or Organization Meetings:     Marital Status:    Intimate Partner Violence:  Fear of Current or Ex-Partner:     Emotionally Abused:     Physically Abused:     Sexually Abused:        Current Outpatient Medications:     lidocaine-prilocaine (EMLA) cream, Apply topically as needed for mild pain (Patient not taking: Reported on 8/2/2021), Disp: 30 g, Rfl: 0    Norethin Ace-Eth Estrad-FE (MINASTRIN 24 FE) 1-20 MG-MCG(24) CHEW, Chew 1 tablet  (Patient not taking: Reported on 8/2/2021), Disp: , Rfl:     ondansetron (ZOFRAN) 4 mg tablet, Take 1 tablet (4 mg total) by mouth every 8 (eight) hours as needed for nausea or vomiting (Patient not taking: Reported on 8/2/2021), Disp: 20 tablet, Rfl: 0    oxyCODONE-acetaminophen (PERCOCET) 5-325 mg per tablet, Take 1 tablet by mouth every 6 (six) hours as needed for moderate painMax Daily Amount: 4 tablets (Patient not taking: Reported on 8/2/2021), Disp: 20 tablet, Rfl: 0    promethazine (PHENERGAN) 25 mg tablet, Take 1 tablet (25 mg total) by mouth every 6 (six) hours as needed for nausea or vomiting (Patient not taking: Reported on 8/2/2021), Disp: 60 tablet, Rfl: 2  No Known Allergies    Physical Exam:     Vitals:    08/18/21 1103   BP: 110/70   Pulse: 77   Resp: 12   Temp: 99 4 °F (37 4 °C)   SpO2: 99%     Physical Exam  Constitutional:       Appearance: Normal appearance  HENT:      Head: Normocephalic and atraumatic  Nose: Nose normal       Mouth/Throat:      Mouth: Mucous membranes are moist    Eyes:      Pupils: Pupils are equal, round, and reactive to light  Cardiovascular:      Rate and Rhythm: Normal rate  Pulses: Normal pulses  Heart sounds: Normal heart sounds  Pulmonary:      Effort: Pulmonary effort is normal       Breath sounds: Normal breath sounds  Chest:          Comments: The bilateral Breast(s) were examined  There was not any sign of an inverted nipple, mass, nipple discharge, skin changes or tenderness  The bilateral  breast(s) were examined in the sitting and supine position   There are not any worrisome skin changes, tenderness, nipple changes, swelling ,bleeding or evidence of mass/s in all four quadrants  Deni survey demonstrated that there is not any evidence of any clinically suspicious axillary, pectoral or supraclavicular lymph nodes  Well-healed right breast and right axillary surgical incisions  Abdominal:      General: Bowel sounds are normal       Palpations: Abdomen is soft  Musculoskeletal:         General: Normal range of motion  Cervical back: Normal range of motion and neck supple  Skin:     General: Skin is warm  Neurological:      General: No focal deficit present  Mental Status: She is alert and oriented to person, place, and time  Psychiatric:         Mood and Affect: Mood normal          Behavior: Behavior normal          Thought Content: Thought content normal          Judgment: Judgment normal            Results & Discussion:    she understands and  agrees   All patient questions were answered  Advance Care Planning/Advance Directives: Oanh Jain MD discussed the disease status with Heron Walter  today 08/18/21  treatment plans and follow-up with the patient

## 2021-08-18 NOTE — TELEPHONE ENCOUNTER
Patient would like to know how long it will take for order to process and if we would do it? Please see below note

## 2021-08-18 NOTE — TELEPHONE ENCOUNTER
Patient stated she would like to do Chemo cold cap but infusion center said that Dr Callie Cervantes needs to place an order  Please reach back to patient at [phone]

## 2021-08-19 ENCOUNTER — TELEPHONE (OUTPATIENT)
Dept: HEMATOLOGY ONCOLOGY | Facility: CLINIC | Age: 26
End: 2021-08-19

## 2021-08-19 NOTE — TELEPHONE ENCOUNTER
Call from patient  Patient starting process of freezing eggs tomorrow and will complete prior to chemotherapy starting    Patient will be fitted for cold caps at [facility] on Monday  Reviewed process of cold cap intervention  Reviewed treatment plan with patient  Emotional support given  FYI to Dr Lester Hall RNs

## 2021-08-19 NOTE — TELEPHONE ENCOUNTER
Pt to be fitted for cold cap at [facility] on Monday 8/23  Dr Alice Dubose will return order form to [address] or [address] on 8/24 for processing

## 2021-08-20 DIAGNOSIS — C50.211 MALIGNANT NEOPLASM OF UPPER-INNER QUADRANT OF RIGHT BREAST IN FEMALE, ESTROGEN RECEPTOR POSITIVE (HCC): Primary | ICD-10-CM

## 2021-08-20 DIAGNOSIS — Z17.0 MALIGNANT NEOPLASM OF UPPER-INNER QUADRANT OF RIGHT BREAST IN FEMALE, ESTROGEN RECEPTOR POSITIVE (HCC): Primary | ICD-10-CM

## 2021-08-23 ENCOUNTER — PATIENT OUTREACH (OUTPATIENT)
Dept: CASE MANAGEMENT | Facility: HOSPITAL | Age: 26
End: 2021-08-23

## 2021-08-23 ENCOUNTER — OFFICE VISIT (OUTPATIENT)
Dept: HEMATOLOGY ONCOLOGY | Facility: CLINIC | Age: 26
End: 2021-08-23
Payer: COMMERCIAL

## 2021-08-23 ENCOUNTER — TELEPHONE (OUTPATIENT)
Dept: HEMATOLOGY ONCOLOGY | Facility: CLINIC | Age: 26
End: 2021-08-23

## 2021-08-23 VITALS
TEMPERATURE: 97.7 F | RESPIRATION RATE: 16 BRPM | SYSTOLIC BLOOD PRESSURE: 120 MMHG | HEIGHT: 63 IN | HEART RATE: 71 BPM | WEIGHT: 167.5 LBS | DIASTOLIC BLOOD PRESSURE: 76 MMHG | OXYGEN SATURATION: 99 % | BODY MASS INDEX: 29.68 KG/M2

## 2021-08-23 DIAGNOSIS — Z17.0 MALIGNANT NEOPLASM OF UPPER-INNER QUADRANT OF RIGHT BREAST IN FEMALE, ESTROGEN RECEPTOR POSITIVE (HCC): Primary | ICD-10-CM

## 2021-08-23 DIAGNOSIS — C50.211 MALIGNANT NEOPLASM OF UPPER-INNER QUADRANT OF RIGHT BREAST IN FEMALE, ESTROGEN RECEPTOR POSITIVE (HCC): Primary | ICD-10-CM

## 2021-08-23 PROCEDURE — 3008F BODY MASS INDEX DOCD: CPT | Performed by: INTERNAL MEDICINE

## 2021-08-23 PROCEDURE — 99214 OFFICE O/P EST MOD 30 MIN: CPT | Performed by: INTERNAL MEDICINE

## 2021-08-23 PROCEDURE — 1036F TOBACCO NON-USER: CPT | Performed by: INTERNAL MEDICINE

## 2021-08-23 RX ORDER — SODIUM CHLORIDE 9 MG/ML
20 INJECTION, SOLUTION INTRAVENOUS ONCE
Status: CANCELLED | OUTPATIENT
Start: 2021-09-13

## 2021-08-23 RX ORDER — SODIUM CHLORIDE 9 MG/ML
20 INJECTION, SOLUTION INTRAVENOUS ONCE
Status: CANCELLED | OUTPATIENT
Start: 2021-09-20

## 2021-08-23 RX ORDER — LETROZOLE 2.5 MG/1
5 TABLET, FILM COATED ORAL EVERY EVENING
COMMUNITY
Start: 2021-08-20 | End: 2021-08-23 | Stop reason: CLARIF

## 2021-08-23 NOTE — TELEPHONE ENCOUNTER
I phoned the patient to clarify the details of the letter  After clarification was procured, the letter was completed and signed by Dr Nadia Guzmán and faxed to [phone] [facility] at the provided fax number   The letter was also emailed to the patient, per her request

## 2021-08-23 NOTE — PROGRESS NOTES
Hematology/Oncology Progress Note    Date of Service: 8/23/2021    [address] [facility] HEMATOLOGY ONCOLOGY SPECIAL[facility]  [address] [facility] [address]    Hem/Onc Problem List:   1  Right breast invasive ductal carcinoma, triple positive, grade 3 ,  pT1c pN0, stage IA    Chief Complaint:         Routine follow-up for management of breast cancer    Assessment/Plan:     I personally reviewed the old lab results, and other specialties/physicians consult notes and recommendations  I shared the findings with patient and family, discussed the diagnosis and management plan as below  1  Right breast invasive ductal carcinoma, grade 3, ER 90%, PA 70%, HER2 positive by IHC  Genetic testing negative  Status post right breast lumpectomy with sentinel lymph node biopsy on  July 19, 2021  Pathology showed invasive mammary carcinoma of no special type  The tumor measures 1 8 cm  Grade 3  All margins negative  All lymph nodes negative  Grade 2 DCIS present  No evidence of lymphovascular invasion  Final pathologic stage [id number]  Overall, the patient tolerated the procedure very well  ECHO showed EF 65%  Patient had a 2nd opinion from [facility]  Dr Michelle Suggs recommended adjuvant TH weekly for 3 months followed by total of 1 year of maintenance Herceptin target therapy  Patient agrees with the plan  The patient agreed using cold cap to preserve her hair  The cold cap device is available in our   [facility]  She also can have a wig from [employer] if she has significant hair lose  Patient will have a formal chemo education  Patient also will need adjuvant radiation therapy after chemotherapy as per NCCN guideline  Patient will consult radiation oncologist to discuss the rationale, risks and benefits of adjuvant radiation therapy           Dr Qasim Mon also agreed endocrine therapy with tamoxifen + ovarian suppression with Lupron or  AI + Lupron because of high risk ER/ND positive disease  Patient agreed AI+Lupron to avoid the risk of endometrial cancer  Endocrine therapy to be started after radiation therapy  Patient also mentioned using GnRH  agonist to protect her ovary during chemotherapy  The study showed, using GnRH as the primary ovary preservation does not increase the rate of spontaneous pregnancy or live birth rate  It may have some benefits in hormonal receptor negative breast cancer  But, she has triple positive breast cancer  I do not think it is necessary, especially she will have Oocytes cryopreservation  Patient agreed the plan  2    Genetic predisposition:   Invitae breast cancer panel negative     3    History of palpitation  Echo study showed normal LV function, LV EF 65%  4    Fertility  Preservation:   The patient is 79-year-old and unmarried  Chemotherapy potentially will affect her fertility  Especially, she will need endocrine therapy as well  Patient consulted Reproductive Medicine and will have oocytes cryopreservation  5   History of melanoma  Status post wide local excision  No evidence of local recurrence  5   Follow-up: Return to clinic in 3 weeks  Disclaimer: This document was prepared using [employer] technology  If a word or phrase is confusing, or does not make sense, this is likely due to recognition error which was not discovered during the providers review  If you believe an error has occurred, please Contact me through [employer] service for toya? cation  Pain Score and Plan:     0    Hematology/Oncology History:   · History of skin malignant lymphoma, status post wide local excision  · May 2021, patient palpated a lump in the right breast at upper inner quadrant  · June 23, 2021 , Mammogram and ultrasound in [facility] showed an irregular complex cystic mass with microlobulated margins measuring 1 5 x 1 3 x 1 4 cm  No discrete nodes are found in the right axilla  · June 24, 2021, patient underwent ultrasound guided core biopsy of the right breast mass: Pathology showed Grade 3 invasive ductal carcinoma, positive for carcinoma in-situ component, positive perineural remission  Negative for lymphovascular invasion  ER 90%, SD 70%, HER2 positive by IHC  · 06/30/2021, patient consulted Dr Velvet Castillo  Staging CT scan and bone scan ordered  Patient was refered to genetic counseling  · July 1, 2021 CT scan chest/abdomen/ pelvis with contrast for staging showed bilateral ovarian cysts  with left adnexal cyst measures 4 2 x 4 4 x 3 3 cm, and right adnexal cyst measures 4 3 x 3 0 x 3 5 cm  No evidence of metastatic disease in C/ A/P   · MRI of bilateral breast in July 1, 2021  redemonstrated the 1:00 O'Clock  biopsy-proven breast cancer  No axillary adenopathy  No extension to the chest wall  No concurrent lesions in either breast   · July 2, 2021, bone scan showed no evidence of metastatic disease  · This case was discussed in our multidisciplinary breast cancer tumor board with recommendation of surgery followed by adjuvant chemotherapy  · July 9, 2021, echo study showed normal left ventricle ejection fraction 65%  · July 12, 2021, genetic test: [employer]ae breast cancer panel negative  · July 19, 2021, patient underwent right breast lumpectomy with sentinel lymph node biopsy  Pathology showed invasive mammary carcinoma of no special type( ductal NST)  The tumor measures 18 mm, grade 3  All margins negative with closest margin 8 mm from tumor involving the posterior margin  5 lymph nodes negative for metastatic disease  Grade 2 DCIS present  No evidence of lymphovascular invasion     Final pathologic stage [id number],   · August 17, 2021, patient consulted Dr Jennifer Austin from [facility] with recommendation of [facility] [place]  · August 19 2021, patient consulted Reproductive Medicine and started oocytes cryopreservation  History of Present Illiness:   July Porras is a 31 y o  female with the above-noted HemOnc history who is here  to discuss the adjuvant treatment plan  Patient had a 2nd opinion from Dr Saida Little from [facility] with recommendation of adjuvant chemotherapy with TH, using Cooling Cap to preserve her hair, adjuvant radiation therapy followed by endocrine therapy with AI+ ovarian suppression  Patient already discussed oocytes cryopreservation with Reproductive Medicine in [place]  She currently takes   Letrozole 5 mg once daily  She will have eggs harvesting in the 1st week of September  Patient feels good  She recovered from the surgery very well  She has agreed the plan as discussed in [facility]  Patient denies fever or chills  No chest pain or shortness breast   No cough or phlegm  No GI or  symptoms  Appetite good  No significant weight loss   or weight gain  The patient is not interested in MediPort placement at this time  ROS: A 12-point of review of systems is obtained and other than the above is noncontributory  Objective:   VITALS:   /76 (BP Location: Left arm, Patient Position: Sitting, Cuff Size: Standard)   Pulse 71   Temp 97 7 °F (36 5 °C) (Tympanic)   Resp 16   Ht 5' 3" (1 6 m)   Wt 76 kg (167 lb 8 oz)   SpO2 99%   BMI 29 67 kg/m²     Physical EXAM:  General:  Alert, cooperative, no distress, appears stated age  Head:  Normocephalic, without obvious abnormality  Eyes:  Conjunctivae/corneas clear  No evidence of conjunctivitis     Throat:  no gum bleeding  Neck: Supple, symmetrical, trachea midline, no adenopathy    Lungs:   Clear to auscultation bilaterally  Respiratory effort easy, nonlabored    Heart:  Regular rate and rhythm, S1, S2 normal, no murmur, click, rub or the gallop  Abdomen:   Soft, non-tender,nondistended  Bowel sounds normal  No masses,  No organomegaly       Extremities: Extremities normal, atraumatic, no cyanosis or edema  No axillary or inguinal adenopathy   Skin: Skin color, texture, turgor normal  No rashes or lesions    Neurologic: A&Ox4  No focal neuro deficits       No Known Allergies    Past Medical History:   Diagnosis Date    Breast cancer ([facility] )     Cancer ([facility] )     Melanoma ([facility] )        Past Surgical History:   Procedure Laterality Date    BREAST BIOPSY      BREAST LUMPECTOMY Right 7/19/2021    Procedure: LYNDSAY  DIRECTED LUMPECTOMY;  Surgeon: Swathi Moss MD;  Location: [facility];  Service: Surgical Oncology    LYMPH NODE BIOPSY Right 7/19/2021    Procedure: LYMPHATIC MAPPING WITH BLUE AND RADIOACTIVE DYES, SENTINEL LYMPH NODE BIOPSY, injection at 56;  Surgeon: Swathi Moss MD;  Location: [facility];  Service: Surgical Oncology    SKIN CANCER EXCISION      TONSILLECTOMY AND ADENOIDECTOMY      WISDOM TOOTH EXTRACTION         Family History   Problem Relation Age of Onset    Kidney cancer Maternal Grandfather     Breast cancer Maternal Aunt        Social History     Socioeconomic History    Marital status: Single     Spouse name: Not on file    Number of children: Not on file    Years of education: Not on file    Highest education level: Not on file   Occupational History    Not on file   Tobacco Use    Smoking status: Never Smoker    Smokeless tobacco: Never Used   Vaping Use    Vaping Use: Never used   Substance and Sexual Activity    Alcohol use: Yes     Comment: Socially    Drug use: No    Sexual activity: Yes   Other Topics Concern    Not on file   Social History Narrative    Not on file     Social Determinants of Health     Financial Resource Strain:     Difficulty of Paying Living Expenses:    Food Insecurity:     Worried About Running Out of Food in the Last Year:     Ran Out of Food in the Last Year:    Transportation Needs:     Lack of Transportation (Medical):      Lack of Transportation (Non-Medical):    Physical Activity:     Days of Exercise per Week:     Minutes of Exercise per Session:    Stress:     Feeling of Stress :    Social Connections:     Frequency of Communication with Friends and Family:     Frequency of Social Gatherings with Friends and Family:     Attends Faith Services:     Active Member of Clubs or Organizations:     Attends Club or Organization Meetings:     Marital Status:    Intimate Partner Violence:     Fear of Current or Ex-Partner:     Emotionally Abused:     Physically Abused:     Sexually Abused:        Current Outpatient Medications   Medication Sig Dispense Refill    letrozole ([address] [facility]) 2 5 mg tablet Take 5 mg by mouth every evening      lidocaine-prilocaine (EMLA) cream Apply topically as needed for mild pain (Patient not taking: Reported on 8/2/2021) 30 g 0    Norethin Ace-Eth Estrad-FE (MINASTRIN 24 FE) 1-20 MG-MCG(24) CHEW Chew 1 tablet  (Patient not taking: Reported on 8/2/2021)      ondansetron (ZOFRAN) 4 mg tablet Take 1 tablet (4 mg total) by mouth every 8 (eight) hours as needed for nausea or vomiting (Patient not taking: Reported on 8/2/2021) 20 tablet 0    oxyCODONE-acetaminophen (PERCOCET) 5-325 mg per tablet Take 1 tablet by mouth every 6 (six) hours as needed for moderate painMax Daily Amount: 4 tablets (Patient not taking: Reported on 8/2/2021) 20 tablet 0    promethazine (PHENERGAN) 25 mg tablet Take 1 tablet (25 mg total) by mouth every 6 (six) hours as needed for nausea or vomiting (Patient not taking: Reported on 8/2/2021) 60 tablet 2     No current facility-administered medications for this visit  (Not in a hospital admission)      DATA REVIEW:    Pathology Result:    Final Diagnosis   Date Value Ref Range Status   07/19/2021   Final    A  Right breast (lumpectomy):     - Invasive mammary carcinoma of no special type (ductal NST)  - Tumor size: 18 mm  Tumor thgthrthathdtheth:th th4th of 3       - Closest surgical margin: posterior (8 mm from tumor)       Comment:  ER / NM / Her-2 pending  B  [place] lymph node #1, right axilla (excision):      - One (1) lymph node, negative for carcinoma  C  Adipose tissue, right axilla (excision):     - Four (4) lymph nodes, negative for carcinoma  D  Superior margin, right breast (excision):     - Benign fibroadipose tissue        - Superior margin negative for carcinoma  E   Medial margin, right breast (excision):      - Benign fibroadipose tissue        - Medial margin negative for carcinoma  F  Inferior margin, right breast (excision):      - Benign fibroadipose tissue and scant benign breast tissue  - Inferior margin negative for carcinoma  G  Lateral margin, right breast (excision):     - Benign fibroadipose tissue        - Lateral margin negative for carcinoma  Comment: This is an appended report  These results have been appended to a previously preliminary verified report  07/01/2020   Final    A  Skin, right lower back, shave biopsy:    COMPOUND NEVUS; extending to the tissue edges  02/05/2019   Final    A  Skin, Left Forearm, excision:  - Early scar and residual Spitz nevus, peripheral and deep margins uninvolved  See Note  Interpretation performed at [facility], [address] [place] [address]      01/14/2019   Final    A  Skin, Arm, Left, shave biopsy:  - Spitz's nevus; see note  Note:  Sections show a fairly circumscribed and mostly nested melanocytic proliferation along the basal layer  The cells are spindled and epithelioid, with fairly uniform cytology  Occasional Kamino bodies are also seen  P16 immunostain is positive in the lesional cells  On section planes studied, lesional cells are close to bilateral tissue edges  If this is a portion of a larger clinical lesion, complete removal to preclude recurrence would be prudent  Clinical correlation is recommended        Interpretation performed at [facility], [address] [place] [address], [address] [place] [address] 10/01/2018   Final    A  Skin, abdomen, excision:  - Prior biopsy site reaction, no residual melanoma seen  - Inked margins with no tumor seen  Interpretation performed at [facility], [address] [place] [address], [address] [place] [address] Drive           09/06/2018   Final    A  Skin Abdominal, abdomen, shave biopsy:  - Malignant melanoma in situ with small focus suspicious for early invasive melanoma, 0 4 mm thickness,     extending to peripheral margins  MELANOMA OF SKIN TUMOR STAGING SUMMARY  1  Specimen identification:     - Procedure: Shave biopsy       - Laterality: Not specified  2  Tumor     - Tumor Site: Abdomen      - Macroscopic satellite nodule(s) (invasive tumor only): Not identified  - Histologic type:       -- Invasive Melanoma:  Superficial spreading melanoma  -- Melanoma in-situ (anatomic level I): N/A      - Maximum tumor (Breslow) thickness (pT1a)(invasive tumor only): 0 4 mm       - Anatomic (Sukhdeep) level (invasive tumor only): II       - Ulceration (invasive tumor only): Not identified  3  Margins:     - Peripheral margins: Involved by in-situ melanoma  - Deep margins (invasive tumor only): Uninvolved by invasive melanoma by 1 3 mm     4  Mitotic Rate (specify number / mm2)(invasive tumor only): 0/mm2    5  Microsatellite(s) (invasive tumor only): Not identified  6  Lymphovascular invasion (invasive tumor only): Not identified  7  Neurotropism (invasive tumor only): Not identified  8  Tumor-infiltrating lymphocytes (invasive tumor only): Present, brisk  9  Tumor regression (excision, if present < or more than 75%): Not identified  10  Growth Phase: Radial growth phase  11: Ancillary Studies:  None  -- Best representative tumor block: N/A (one block)  12  Additional pathologic findings:       -- Associated Nevus:  Cannot exclude dermal nevus  -- Other (specify): Epithelioid cell type  13  AJCC 8th Ed   Pathologic Stage Classification: at least Stage  IA- pT1a, pNX  Interpretation performed at [facility], [address] [place] [address]  Image Results: They are reviewed and documented in Hematology/Oncology history    NM lymphatic breast  Narrative: SENTINEL NODE LYMPHOSCINTIGRAPHY    INDICATION: Right breast carcinoma    FINDINGS:    0 548 mCi Tc-99m sulfur colloid (0 6 cc volume) was administered in divided doses by Dr Brady Bustillos in the right biopsy site and periareolar region  Scintigraphic images were obtained over the right hemithorax and axilla in multiple projections  After 15   minutes, a node was identified  Using scintigraphic guidance, the corresponding skin site was marked with an indelible marker  The patient was transferred to the operating room in satisfactory condition  Impression: [place] lymph node localized to right axilla  Workstation performed: [id number]        LABS:  Lab data are reviewed and documented in HemOnc history  No results found for this or any previous visit (from the past 48 hour(s))            Chito Cheema MD  8/23/2021, 9:09 AM

## 2021-08-23 NOTE — TELEPHONE ENCOUNTER
Patient states she will need a medical clearance for the  anaesthesiologist for her fertility doctor  to preform surgery, can be sent to e mail nova [email or web address] and faxed to   Fax # [phone] call back [phone]

## 2021-08-23 NOTE — PROGRESS NOTES
Met with pt in the Med Onc office today following her appt with Dr Carey [place]  She is scheduling her infusions for [address] [address], as she wants to use the cold cap to try and preserve her hair  Pt says that she has already been fitted for the cap, and is hopeful that it will work well for her  She has been working with Yoko Levine at [facility] and is very complimentary of her services with getting a wig so far  MSW will connect pt with MSW Sharita Guerra at the [address] [address] for when she is there having treatment  Pt asked today about only having one support person with her in the infusion center, and I advised her that at this time we are not allowing visitors in the infusion or radiation offices  Pt and her mother immediately became tearful and asked if there was any way around this, I suggested that we can ask if [facility] might make an exception given pt's age and distance travelled, however I could not promise that it would be allowed  Pt and her mother are appreciative of the effort and would definitely like to try and have a support person there with her during treatment  MSW s/w Sharita Guerra afterward and made her aware of this request   Mali [place] will schedule to be with pt at her first infusion and [facility] if it would be at all possible to make an exception  MSW advised pt that I am still able to be a contact and resource for her, should she need anything else moving forward  She was appreciative of the assistance so far and agreed to keep in touch with how the cold cap is going and how she is doing overall  No other needs at this time

## 2021-08-24 ENCOUNTER — PATIENT OUTREACH (OUTPATIENT)
Dept: CASE MANAGEMENT | Facility: HOSPITAL | Age: 26
End: 2021-08-24

## 2021-08-24 DIAGNOSIS — C50.211 MALIGNANT NEOPLASM OF UPPER-INNER QUADRANT OF RIGHT BREAST IN FEMALE, ESTROGEN RECEPTOR POSITIVE (HCC): Primary | ICD-10-CM

## 2021-08-24 DIAGNOSIS — Z17.0 MALIGNANT NEOPLASM OF UPPER-INNER QUADRANT OF RIGHT BREAST IN FEMALE, ESTROGEN RECEPTOR POSITIVE (HCC): Primary | ICD-10-CM

## 2021-08-24 NOTE — PROGRESS NOTES
S/w pt today by phone to let her know that she will need to have a support person with her to help manage the cold cap during treatments  Pt is very appreciative of this information and says that she feels relieved at being able to have someone familiar with her  I explained that she should keep it to one support person only for the day, so that there is not too much in and out in the infusion center, and she voiced her understanding  No other needs at this time

## 2021-09-02 ENCOUNTER — TELEPHONE (OUTPATIENT)
Dept: SURGICAL ONCOLOGY | Facility: CLINIC | Age: 26
End: 2021-09-02

## 2021-09-02 NOTE — TELEPHONE ENCOUNTER
Called patient to report MammaPrint results which is high risk  Reviewed the results and answered all patient questions  Patient appreciative of the phone call  Confirmed next appointment with Dr Johnny Slaughter  Will mail copy of MammaPrint test results to patient

## 2021-09-08 ENCOUNTER — RADIATION ONCOLOGY CONSULT (OUTPATIENT)
Dept: RADIATION ONCOLOGY | Facility: CLINIC | Age: 26
End: 2021-09-08
Attending: RADIOLOGY
Payer: COMMERCIAL

## 2021-09-08 VITALS
HEART RATE: 107 BPM | RESPIRATION RATE: 20 BRPM | HEIGHT: 63 IN | WEIGHT: 167 LBS | TEMPERATURE: 97.6 F | SYSTOLIC BLOOD PRESSURE: 110 MMHG | DIASTOLIC BLOOD PRESSURE: 70 MMHG | BODY MASS INDEX: 29.59 KG/M2 | OXYGEN SATURATION: 99 %

## 2021-09-08 DIAGNOSIS — C50.211 MALIGNANT NEOPLASM OF UPPER-INNER QUADRANT OF RIGHT BREAST IN FEMALE, ESTROGEN RECEPTOR POSITIVE (HCC): ICD-10-CM

## 2021-09-08 DIAGNOSIS — Z17.0 MALIGNANT NEOPLASM OF UPPER-INNER QUADRANT OF RIGHT BREAST IN FEMALE, ESTROGEN RECEPTOR POSITIVE (HCC): ICD-10-CM

## 2021-09-08 DIAGNOSIS — C43.59 MALIGNANT MELANOMA OF SKIN OF ABDOMEN (HCC): Primary | ICD-10-CM

## 2021-09-08 PROCEDURE — 99204 OFFICE O/P NEW MOD 45 MIN: CPT | Performed by: RADIOLOGY

## 2021-09-08 PROCEDURE — G0463 HOSPITAL OUTPT CLINIC VISIT: HCPCS | Performed by: RADIOLOGY

## 2021-09-08 PROCEDURE — 99211 OFF/OP EST MAY X REQ PHY/QHP: CPT | Performed by: RADIOLOGY

## 2021-09-08 RX ORDER — LETROZOLE 2.5 MG/1
5 TABLET, FILM COATED ORAL DAILY
COMMUNITY
Start: 2021-08-20 | End: 2021-09-12

## 2021-09-08 NOTE — PROGRESS NOTES
Consultation - Radiation Oncology      QFE:[id number] : 1995  Encounter: [id number]  Patient Information: [address]  Chief Complaint   Patient presents with   [facility]     Cancer Staging  Malignant neoplasm of upper-inner quadrant of right breast in female, estrogen receptor positive ([facility] [address] )  Staging form: Breast, AJCC 8th Edition  - Clinical: Stage IA (cT1c, cN0, cM0, G3, ER+, NC+, HER2+) - Signed by Coy Grant MD on 2021  Histologic grading system: 3 grade system           History of Present Illness   Coral Fofana is a 31y o  year old female with pat medical history of early stage melanoma status post resection from abdomen, currently LUCINA who was recently diagnosed with stage IA, grade 3 invasive ductal carcinoma of the right breast status post resection  She presents today for consultation for adjuvant radiation  Briefly, the patient self palpated a lump in her right breast on 21 and presented to her GYN     21 [facility] - 3D bilateral mammogram and targeted ultrasound demonstrated suspicious right breast mass with 1 5cm correlate irregular cystic mass  Left breast was benign  Biopsy was recommended  21 US guided right breast core biopsy  Pathology demonstrated grade 3 invasive ductal carcinoma associated with DCIS  Tumor cells were strongly ER/NC/Nds4Mkr (+)     21 CT chest, abdomen, and pelvis was negative for metastases  21 Bilateral breast MRI showed a 17 cm irregularly shaped mass with rim enhancement in the right breast at 1 o'clock  The mass correlates with the palpable mass reported by the patient and is biopsy proven malignancy  No axillary lymphadenopathy, extension to the chest wall, or concurrent lesions in either breast       Per medical records, [facility] breast cancer genetic panel was negative      21 The patient underwent LYNDSAY directed right breast lumpectomy with sentinel lymph node biopsy under the care of Dr Gina Estrada  Pathology confirmed a 1 8cm grade 3 invasive breast carcinoma associated with grade 2 DCIS  Margins were negative  There was no LVI or dermal LVI  Perineural invasion was identified  0/1 sentinel lymph node and 0/5 axillary nodes demonstrated metastatic disease  Pathologic stage IA ([id number])     8/2/21 Consultation with Dr Darien Haney, Medical Oncology  He offered patient adjuvant chemotherapy with Taxotere, Herceptin, Perjeta versus Taxotere and Herceptin  Endocrine therapy to follow with Herceptin monotherapy  The patient had a 2nd opinion at [facility] with Dr Hossein Ritter   adjuvant chemothearpy for 3 months with 1 year of maintenance Herceptin  Endocrine therapy to be started after radiation therapy  The patient has undergone cyropreservation of ova for fertility preservation  9/13/21: to start Paclitaxel and Herceptin, per patient last cycle scheduled 11/29/21    Currently, the patient offers no breast related complaints  She denies new palpable nodules, suspicious skin changes, or nipple discharge of the breasts bilaterally  She denies significant pain or tenderness of the breasts  No upper extremity edema or shoulder restriction  Last menstrual period 8/23/21  Historical Information   Oncology History Overview Note      Malignant neoplasm of upper-inner quadrant of right breast in female, estrogen receptor positive ([facility] [address] )   6/24/2021 Biopsy    US guided Right breast core biopsy:  1 o'clock 13-14 cm from the nipple  Right invasive ductal carcinoma  Grade 3  ER 90  WA 70   HER2 3+  Lymphovascular invasion: not identified    Results are malignant and concordant  Recommend surgical consultation  Additionally, given patient's age and density of breast tissue, recommend enhanced breast MRI for further evaluation       Done at [facility]     6/30/2021 Genetic Testing    The following genes were evaluated: RADHA, BRCA1, BRCA2, CDH1, CHEK2, PALB2, PTEN, STK11, TP53  Additional genes tested for a total of 36 genes  VUS NF1   [facility]     2021 Observation    Bilateral breast MRI with and without contrast: B6     Biopsy confirmed malignancy  01:00 o'clock location right breast   No axillary lymphadenopathy, extension to the chest wall, or concurrent lesions in either breast        2021 Surgery    Right breast LYNDSAY  directed lumpectomy with sentinel lymph node biopsy:  Invasive mammary carcinoma of no special type; ductal  Grade 3  ER 65  IN 90  HER2 3+  18 mm  Margins negative  0/5 Lymph nodes  Stage IA      2021 Genomic Testing    MammaPrint: High Risk  HER2 Type     2021 -  Cancer Staged    Staging form: Breast, AJCC 8th Edition  - Clinical: Stage IA (cT1c, cN0, cM0, G3, ER+, IN+, HER2+) - Signed by Rose Nugent MD on 2021  Histologic grading system: 3 grade system       2021 - 2021 Chemotherapy    fosaprepitant (EMEND) IVPB, 150 mg, Intravenous, Once, 0 of 6 cycles  pertuzumab (PERJETA) IVPB, 840 mg, Intravenous, Once, 0 of 18 cycles  CARBOplatin (PARAPLATIN) IVPB (GOG AUC DOSING), 760 8 mg, Intravenous, Once, 0 of 6 cycles  DOCEtaxel (TAXOTERE) chemo infusion, 75 mg/m2 = 132 8 mg, Intravenous, Once, 0 of 6 cycles  trastuzumab (HERCEPTIN) chemo infusion, 8 mg/kg = 610 mg, Intravenous, Once, 0 of 18 cycles       2021 -  Chemotherapy    PACLItaxel (TAXOL) chemo IVPB, 80 mg/m2 = 143 4 mg, Intravenous, Once, 0 of 12 cycles  trastuzumab (HERCEPTIN) chemo infusion, 4 mg/kg = 304 mg, Intravenous, Once, 0 of 26 cycles         OB/GYN History:  The patient underwent menarche at 12 years  Menopause Status Pre, Krupa, Post and Unknown  No LMP recorded  Menopause at     Menopause Reason  Hormone replacement therapy: for fertility only    {0  Para 0  Age at first delivery being none  Nursing: no  Birth control pills: yes - 10 years  Pregnancy test needed:  yes      Past Medical History:   Diagnosis Date    Breast cancer ([facility] [address] )     Cancer ([facility] [address] )     Melanoma ([facility] [address] )      Past Surgical History:   Procedure Laterality Date    BREAST BIOPSY      BREAST LUMPECTOMY Right 7/19/2021    Procedure: LYNDSAY  DIRECTED LUMPECTOMY;  Surgeon: Dolan Barthel, MD;  Location: [facility];  Service: Surgical Oncology    LYMPH NODE BIOPSY Right 7/19/2021    Procedure: LYMPHATIC MAPPING WITH BLUE AND RADIOACTIVE DYES, SENTINEL LYMPH NODE BIOPSY, injection at 4633;  Surgeon: Dolan Barthel, MD;  Location: [facility];  Service: Surgical Oncology    SKIN CANCER EXCISION      TONSILLECTOMY AND ADENOIDECTOMY      WISDOM TOOTH EXTRACTION         Family History   Problem Relation Age of Onset    Kidney cancer Maternal Grandfather     Breast cancer Maternal Aunt     Prostate cancer Father        Social History   Social History     Substance and Sexual Activity   Alcohol Use Yes    Comment: Socially     Social History     Substance and Sexual Activity   Drug Use No     Social History     Tobacco Use   Smoking Status Never Smoker   Smokeless Tobacco Never Used       Meds/Allergies     Current Outpatient Medications:     letrozole (FEMARA) 2 5 mg tablet, Take 5 mg by mouth daily Egg retrival, Disp: , Rfl:     lidocaine-prilocaine (EMLA) cream, Apply topically as needed for mild pain (Patient not taking: Reported on 8/2/2021), Disp: 30 g, Rfl: 0    Norethin Ace-Eth Estrad-FE (MINASTRIN 24 FE) 1-20 MG-MCG(24) CHEW, Chew 1 tablet  (Patient not taking: Reported on 8/2/2021), Disp: , Rfl:     ondansetron (ZOFRAN) 4 mg tablet, Take 1 tablet (4 mg total) by mouth every 8 (eight) hours as needed for nausea or vomiting (Patient not taking: Reported on 8/2/2021), Disp: 20 tablet, Rfl: 0    oxyCODONE-acetaminophen (PERCOCET) 5-325 mg per tablet, Take 1 tablet by mouth every 6 (six) hours as needed for moderate painMax Daily Amount: 4 tablets (Patient not taking: Reported on 8/2/2021), Disp: 20 tablet, Rfl: 0    promethazine (PHENERGAN) 25 mg tablet, Take 1 tablet (25 mg total) by mouth every 6 (six) hours as needed for nausea or vomiting (Patient not taking: Reported on 8/2/2021), Disp: 60 tablet, Rfl: 2  No Known Allergies    Review of Systems  Constitutional: Negative  HENT: Negative  Eyes: Negative  Respiratory: Negative  Cardiovascular: Negative  Gastrointestinal: Negative  Endocrine: Negative  Genitourinary: Negative  Musculoskeletal: Negative  Allergic/Immunologic: Negative  Neurological: Negative  Hematological: Negative  Psychiatric/Behavioral: Negative          OBJECTIVE:   /70   Pulse (!) 107   Temp 97 6 °F (36 4 °C)   Resp 20   Ht 5' 3" (1 6 m)   Wt 75 8 kg (167 lb)   SpO2 99%   BMI 29 58 kg/m²   Performance Status: Karnofsky: 90 - Able to carry on normal activity; minor signs or symptoms of disease     Physical Exam  Vitals and nursing note reviewed  Constitutional:       General: She is not in acute distress  Appearance: She is well-developed  Eyes:      General: No scleral icterus  Cardiovascular:      Rate and Rhythm: Normal rate and regular rhythm  Heart sounds: Normal heart sounds  No murmur heard  Pulmonary:      Effort: No respiratory distress  Breath sounds: No wheezing, rhonchi or rales  Chest:          Comments: Breast examination demonstrates the patient to be symmetric in contour and size  There is a well-healed lumpectomy scar in the inner upper quadrant of the right breast with a second well healed axillary scar  There are no palpable masses or suspicious skin changes of the breasts bilaterally  Abdominal:      General: There is no distension  Palpations: Abdomen is soft  Tenderness: There is no abdominal tenderness  Musculoskeletal:      Right lower leg: No edema  Left lower leg: No edema  Comments: No upper extremity edema  Full range of motion of upper extremities bilaterally  Lymphadenopathy:      Cervical: No cervical adenopathy  Upper Body:      Right upper body: No supraclavicular or axillary adenopathy  Left upper body: No supraclavicular or axillary adenopathy  Neurological:      Mental Status: She is alert and oriented to person, place, and time  Gait: Gait normal           RESULTS  Lab Results    Chemistry        Component Value Date/Time    K 4 0 07/12/2021 1326     07/12/2021 1326    CO2 29 07/12/2021 1326    BUN 10 07/12/2021 1326    CREATININE 0 64 07/12/2021 1326        Component Value Date/Time    CALCIUM 9 1 07/12/2021 1326    ALKPHOS 80 07/12/2021 1326    AST 16 07/12/2021 1326    ALT 25 07/12/2021 1326          Lab Results   Component Value Date    WBC 7 55 07/12/2021    HGB 13 7 07/12/2021    HCT 42 5 07/12/2021    MCV 89 07/12/2021     07/12/2021       Pathology:  Collected:  7/19/2021 12:54 Status:  Edited Result - FINAL   Visible to patient:  Yes ([address]) Dx: Malignant neoplasm of upper-inner shoshana      0 Result Notes  Component    Case Report   Surgical Pathology Report                         Case: [id number]                                    Authorizing Provider: Jl Payne MD         Collected:           07/19/2021 1254               Ordering Location:     [facility] Received:            07/19/2021 1513                                      Operating Room                                                                Pathologist:           Kit Sam MD                                                                 Specimens:   A) - Breast, Right, RIGHT BREAST LUMPECTOMY  MARKED PER PROTOCOL WITH MARGIN MARKER                  B) - Lymph Node, [place], RIGHT AXILLARY SENTINEL LYMPH NODE #1 HOT AND BLUE                        C) - Lymph Node, RIGHT AXILLARY ADIPOSE TISSUE WITH NON SENTINEL LYMPH NODES                         D) - Breast, Right, SUPERIOR MARGIN ORANGE                                                           E) - Breast, Right, MEDIAL MARGIN YELLOW                                                             F) - Breast, Right, INFERIOR MARGIN BLUE                                                             G) - Breast, Right, LATERAL MARGIN RED                                                     Addendum 2   At the request of Dr Ronnell Landau, unstained slides from paraffin BLOCK A6 containing the patient's cancer cells were sent to [facility]  for Breast Cancer Suite testing  Upon completion of testing, the [facility]  Laboratory report will be directly sent to the requesting physician as well as posted in the Media Tab of the patient's [employer] EMR by the [place] [address] Pathology Department      Please note: The [address] IQ analysis and report is performed independently of [facility], and neither the Hospital nor the Pathology Department screen, review or comment upon [facility] Channel IQ report  Because the role of testing in cancer diagnosis and management is subject to evolving development, usage and interpretation, we strongly urge that the test limitations described in the [facility]  Lab report be carefully read, appropriately shared with the patient, and critically considered when reaching treatment decisions      This pathology material was removed from archive for purpose of molecular testing      Addendum electronically signed by Lupis Sanchez MD on 8/24/2021 at 1107   Addendum   Part A tumor:     Test Description                                     Result                            Prognostic Interpretation  Estrogen Receptor (clone SP-1)              65-70%                           Positive   Internal control: Positive                           Staining Intensity: Strong  External control: Positive                          Kayleigh Score*:  7                                                         Progesterone Receptor (clone 1E2)         90-95%                           Positive   Internal control: Positive Staining Intensity: Strong  External control: Positive                          Kayleigh Score*:  7     HER2 by IHC (clone 4B5)                        3+                                   Positive     Fixative Duration of Fixation (hrs)             Cold Ischemia Time (mins)           Sample Adequacy  Formalin            31 5 hours                                   Not stated                                    Adequate     Appropriate positive and negative controls were reviewed      These immunohistochemical tests were performed at [facility] in [place], [place] and interpreted by Dr Karthik Murray  An electronic copy of this report will be kept on file in the Medical Records Department at [facility]      * The Kayleigh score is a semi quantitative system that takes into consideration the proportion of positive cells (scored on a scale of 0-5) and staining intensity (scored on a scale of 0-3)  The proportion and intensity are summed to produce total scores of 0 or 2 through 8  A score of 0-2 is regarded as negative while 3-8 as positive            - Best representative tumor block: A6       -- Sufficient tumor present for          Agendia Mammaprint/Blueprint (1 cm2 of invasive tumor in aggregate): Yes          MI Profile/Foundation One (at least 5 x 5 mm of tumor): Yes  2   Pathologic Stage Classification (pTNM, AJCC 8th Edition): IA       8th ed, AJCC Anatomic Stage:  at least Stage IA - pT1c, pN0(sn), cM0, G3  3   8th ed  AJCC Pathologic Prognostic Stage: IA   Addendum electronically signed by Kassandra Mott MD on 7/26/2021 at 1312   Final Diagnosis   A  Right breast (lumpectomy):     - Invasive mammary carcinoma of no special type (ductal NST)  - Tumor size: 18 mm  Tumor ndgndrndanddndend:nd nd2nd of 3       - Closest surgical margin: posterior (8 mm from tumor)     Comment:  ER / UT / Her-2 pending  B   [place] lymph node #1, right axilla (excision):      - One (1) lymph node, negative for carcinoma  C  Adipose tissue, right axilla (excision):     - Four (4) lymph nodes, negative for carcinoma       D  Superior margin, right breast (excision):     - Benign fibroadipose tissue        - Superior margin negative for carcinoma  E   Medial margin, right breast (excision):      - Benign fibroadipose tissue        - Medial margin negative for carcinoma  F  Inferior margin, right breast (excision):      - Benign fibroadipose tissue and scant benign breast tissue  - Inferior margin negative for carcinoma  G  Lateral margin, right breast (excision):     - Benign fibroadipose tissue        - Lateral margin negative for carcinoma  Electronically signed by Isreal Martinez MD on 7/26/2021 at 1048   Comments: This is an appended report  These results have been appended to a previously preliminary verified report  Preliminary Diagnosis   A  Right breast (lumpectomy):      - Invasive and in-situ carcinoma, pending additional studies  B  [place] lymph node #1, right axilla (excision):      - One (1) lymph node, negative for carcinoma  C  Adipose tissue, right axilla (excision):     - Four (4) lymph nodes, negative for carcinoma       D  Superior margin, right breast (excision):     - Benign fibroadipose tissue        - Superior margin negative for carcinoma  E   Medial margin, right breast (excision):      - Benign fibroadipose tissue        - Medial margin negative for carcinoma  F  Inferior margin, right breast (excision):      - Benign fibroadipose tissue and scant benign breast tissue  - Inferior margin negative for carcinoma  G  Lateral margin, right breast (excision):     - Benign fibroadipose tissue        - Lateral margin negative for carcinoma  Preliminary result electronically signed by Isreal Martinez MD on 7/22/2021 at 1124   Microscopic Description    - Representative section: A6   - Part A tumor: Positive ROSALINA-3  E-cadherin and P120 catenin membranous  P63 and SMM-HC highlights invasive carcinoma and DCIS  Comment: This is an appended report  These results have been appended to a previously preliminary verified report  Note    - Intradepartmental consultation concurs with the diagnosis  Comment: This is an appended report  These results have been appended to a previously preliminary verified report  Additional Information    All reported additional testing was performed with appropriately reactive controls   These tests were developed and their performance characteristics determined by [address] [place] [address] Specialty Laboratory or appropriate performing facility, though some tests may be performed on tissues which have not been validated for performance characteristics (such as staining performed on alcohol exposed cell blocks and decalcified tissues)   Results should be interpreted with caution and in the context of the patients clinical condition  These tests may not be cleared or approved by the [place]  Food and Drug Administration, though the FDA has determined that such clearance or approval is not necessary  These tests are used for clinical purposes and they should not be regarded as investigational or for research  This laboratory has been approved by CLIA 88, designated as a high-complexity laboratory and is qualified to perform these tests  Interpretation performed at [facility], [address] [place] [address]  Lázaro Box    Synoptic Checklist   INVASIVE CARCINOMA OF THE BREAST: Resection  8th Edition - Protocol posted: 2/26/2020  INVASIVE CARCINOMA OF THE BREAST: COMPLETE EXCISION - All Specimens  SPECIMEN   Procedure  Excision (less than total mastectomy)    Specimen Laterality  Right    TUMOR   Tumor Site  Not specified    Histologic Type  Invasive carcinoma of no special type (ductal)    Glandular (Acinar) / Tubular Differentiation  Score 3    Nuclear Pleomorphism  Score 3    Mitotic Rate  Score 2    Overall Grade  Grade 3 (scores of 8 or 9)    Tumor Size  Greatest dimension of largest invasive focus (Millimeters): 18 mm   Additional Dimension (Millimeters)  16 mm     15 mm   Tumor Focality  Single focus of invasive carcinoma    Ductal Carcinoma In Situ (DCIS)  Present      Negative for extensive intraductal component (EIC)    Architectural Patterns  Solid    Nuclear Grade  Grade II (intermediate)    Necrosis  Present, central (expansive "comedo" necrosis)    Lobular Carcinoma In Situ (LCIS)  Not identified    Tumor Extent     Lymphovascular Invasion  Cannot be determined: Not definitively identified    Dermal Lymphovascular Invasion  Not identified    Microcalcifications  Not identified    Treatment Effect in the Breast  No known presurgical therapy    MARGINS   Invasive Carcinoma Margins  Uninvolved by invasive carcinoma    Distance from Closest Margin (Millimeters)  8 mm   Closest Margin(s)  Posterior    DCIS Margins  Uninvolved by DCIS    Distance from Closest Margin (Millimeters)  9 mm   Closest Margin(s)  Posterior    LYMPH NODES   Regional Lymph Nodes  Uninvolved by tumor cells    Total Number of Lymph Nodes Examined  5    Number of [place] Nodes Examined  1    PATHOLOGIC STAGE CLASSIFICATION (pTNM, AJCC 8th Edition)      Primary Tumor (pT)  pT1c    Regional Lymph Nodes Modifier  (sn): [place] node(s) evaluated  Regional Lymph Nodes (pN)  pN0    ADDITIONAL FINDINGS   Additional Findings  Perineural invasion by tumor      ASSESSMENT  1   Malignant neoplasm of upper-inner quadrant of right breast in female, estrogen receptor positive ([facility] [address] )  Ambulatory referral to Radiation Oncology     Cancer Staging  Malignant neoplasm of upper-inner quadrant of right breast in female, estrogen receptor positive ([facility] [address] )  Staging form: Breast, AJCC 8th Edition  - Clinical: Stage IA (cT1c, cN0, cM0, G3, ER+, MT+, HER2+) - Signed by Jyoti Elizabeth MD on 9/8/2021  Histologic grading system: 3 grade system      PLAN/DISCUSSION  Nirav Mendoza is a 31y o  year old female with past medical history of early stage melanoma status post resection from abdomen who was recently diagnosed with stage IA, grade 3 invasive ductal carcinoma of the right breast status post lumpectomy and sentinel lymph node biopsy  Negative margins were achieved  Tumor was ER/ SD / HER2 Precious positive  She is scheduled to initiate adjuvant chemotherapy with TH x 3 months with Herceptin monotherapy to follow and then endocrine therapy likely with Lupron and aromatase inhibitor  I recommended the patient undergo adjuvant hypofractionated whole breast radiation to a total dose of 40 Gy with boost to the lumpectomy cavity to a total dose of 50 Gy  Radiation improves local control and EBCTCG meta-analysis has shown survival benefit by improved local control over 20 years  Recommendation is in accordance with TRACIE and NCCN guidelines  The patient is large breasted and if dose constraints cannot be met for hypofractionated breast radiation, then we will plan for conventional radiation to 50Gy with boost to 60Gy  The rationale and potential benefits, as well as the risks and acute and late side effects and potential toxicities of radiation were discussed with the patient at length  Side effects discussed included, but were not limited to: Fatigue, skin erythema, hyperpigmentation, desquamation, fibrosis, shrinkage of the breast resulting asymmetry, rib weakening, pulmonary fibrosis, and secondary malignancy  We discussed that radiation is a known teratogen and recommended that the patient practice contraception if sexually active during treatment  We also discussed recommendation that if she wished to pursue pregnancy post radiation that she wait a minimum of 6 months  Alternative treatment options including systemic therapy and conventional whole breast radiation were discussed with the patient    Given her presentation, I did not recommend systemic therapy alone as local control can offer survival benefit in early stage breast cancer patients over a 20 year period as demonstrated on EBCTCG meta-analysis  he patient and her mother, present at consultation, were given the opportunity to ask questions and all questions were answered to their satisfaction  She wished to proceed with the recommended treatment plan  We will plan to initiate radiation after completion of chemotherapy  We will see her in follow-up and for CT simulation after chemotherapy completed  Beata Perdue MD  9/8/2021,2:25 PM      Portions of the record may have been created with voice recognition software  Occasional wrong word or "sound a like" substitutions may have occurred due to the inherent limitations of voice recognition software  Read the chart carefully and recognize, using context, where substitutions have occurred

## 2021-09-08 NOTE — PROGRESS NOTES
Coral Fofana 1995 is a 31 y o  female    Oncology History Overview Note   Coral Fofana 1995 is a 31 y o  female     Patient felt a right lump to right breast on 5/25/21 and went for imaging which revealed  right breast invasive ductal carcinoma, grade 3, ER 90%, PR70%, HER2 positive by IHC  Genetic testing negative  Status post right breast lumpectomy w ith sentinel lymph node biopsy on  July 19, 2021 with Dr Lizabeth Martinez  Pathology showed invasive mammary carcinoma of no special type  The tumor measures 1 8 cm  Grade 3  All margins negative  All lymph nodes negative  Grade 2 DCIS present  No evidence of lymphovascular invasion  Final pathologic stage [id number]       8/23:  ECHO showed EF 65%  Patient had a 2nd opinion from Felipa Swann recommended adjuvant TH weekly for 3 months followed by total of 1 year of maintenance Herceptin target therapy  Dr Sugar Medina also agreed endocrine therapy with tamoxifen + ovarian suppression with Lupron or  AI + Lupron because of high risk ER/OH positive disease  Patient agreed AI+Lupron to avoid the risk of endometrial cancer  Endocrine therapy to be started after radiation therapy  Genomic Testing Genomic Testing   MammaPrint: High Risk  HER2 Type    Referred to Rad Onc by Dr Lizabeth Martinez - Patient also will need adjuvant radiation therapy after chemotherapy  9/13: to start Paclitaxel and Herceptin       9/24 Dr Karimi Brothers    History of melanoma  Status post wide local excision  No evidence of local recurrence  Malignant neoplasm of upper-inner quadrant of right breast in female, estrogen receptor positive ([facility] [address] )   6/24/2021 Biopsy    US guided Right breast core biopsy:  1 o'clock 13-14 cm from the nipple  Right invasive ductal carcinoma  Grade 3  ER 90  OH 70   HER2 3+  Lymphovascular invasion: not identified    Results are malignant and concordant  Recommend surgical consultation   Additionally, given patient's age and density of breast tissue, recommend enhanced breast MRI for further evaluation       Done at [facility]     6/30/2021 Genetic Testing    The following genes were evaluated: RADHA, BRCA1, BRCA2, CDH1, CHEK2, PALB2, PTEN, STK11, TP53  Additional genes tested for a total of 36 genes  VUS NF1   [employer]     7/1/2021 Observation    Bilateral breast MRI with and without contrast: B6     Biopsy confirmed malignancy  01:00 o'clock location right breast   No axillary lymphadenopathy, extension to the chest wall, or concurrent lesions in either breast        7/19/2021 Surgery    Right breast LYNDSAY  directed lumpectomy with sentinel lymph node biopsy:  Invasive mammary carcinoma of no special type; ductal  Grade 3  ER 65  WV 90  HER2 3+  18 mm  Margins negative  0/5 Lymph nodes  Stage IA      8/23/2021 Genomic Testing    MammaPrint: High Risk  HER2 Type     9/13/2021 - 11/29/2021 Chemotherapy    fosaprepitant (EMEND) IVPB, 150 mg, Intravenous, Once, 0 of 6 cycles  pertuzumab (PERJETA) IVPB, 840 mg, Intravenous, Once, 0 of 18 cycles  CARBOplatin (PARAPLATIN) IVPB (GOG AUC DOSING), 760 8 mg, Intravenous, Once, 0 of 6 cycles  DOCEtaxel (TAXOTERE) chemo infusion, 75 mg/m2 = 132 8 mg, Intravenous, Once, 0 of 6 cycles  trastuzumab (HERCEPTIN) chemo infusion, 8 mg/kg = 610 mg, Intravenous, Once, 0 of 18 cycles       9/20/2021 -  Chemotherapy    PACLItaxel (TAXOL) chemo IVPB, 80 mg/m2 = 143 4 mg, Intravenous, Once, 0 of 12 cycles  trastuzumab (HERCEPTIN) chemo infusion, 4 mg/kg = 304 mg, Intravenous, Once, 0 of 26 cycles         Clinical Trial: no    [unfilled]    [facility] Maintenance   Topic Date Due    Hepatitis C Screening  Never done    HIV Screening  Never done    BMI: Followup Plan  Never done    Annual Physical  Never done    Cervical Cancer Screening  Never done    Depression Screening PHQ  01/24/2020    Influenza Vaccine (1) 09/01/2021    BMI: Adult  08/23/2022    DTaP,Tdap,and Td Vaccines (5 - Td or Tdap) 07/22/2023    Hepatitis B Vaccine  Completed    IPV Vaccine  Completed    Meningococcal ACWY Vaccine  Completed    HPV Vaccine  Completed    COVID-19 Vaccine  Completed    Pneumococcal Vaccine: Pediatrics (0 to 5 Years) and At-Risk Patients (6 to 59 Years)  Aged Out    HIB Vaccine  Aged Out    Hepatitis A Vaccine  Aged Out       Past Medical History:   Diagnosis Date    Breast cancer ([facility] [address] [facility]     Cancer ([facility] [address] [facility]     Melanoma ([facility] [address] )        Past Surgical History:   Procedure Laterality Date    BREAST BIOPSY      BREAST LUMPECTOMY Right 7/19/2021    Procedure: LYNDSAY  DIRECTED LUMPECTOMY;  Surgeon: Adonis Giraldo MD;  Location: [facility];  Service: Surgical Oncology    LYMPH NODE BIOPSY Right 7/19/2021    Procedure: LYMPHATIC MAPPING WITH BLUE AND RADIOACTIVE DYES, SENTINEL LYMPH NODE BIOPSY, injection at 56;  Surgeon: Adonis Giraldo MD;  Location: [facility];  Service: Surgical Oncology    SKIN CANCER EXCISION      TONSILLECTOMY AND ADENOIDECTOMY      WISDOM TOOTH EXTRACTION         Family History   Problem Relation Age of Onset    Kidney cancer Maternal Grandfather     Breast cancer Maternal Aunt     Prostate cancer Father        Social History     Tobacco Use    Smoking status: Never Smoker    Smokeless tobacco: Never Used   Vaping Use    Vaping Use: Never used   Substance Use Topics    Alcohol use: Yes     Comment: Socially    Drug use: No          Current Outpatient Medications:     letrozole (FEMARA) 2 5 mg tablet, Take 5 mg by mouth daily Egg retrival, Disp: , Rfl:     lidocaine-prilocaine (EMLA) cream, Apply topically as needed for mild pain (Patient not taking: Reported on 8/2/2021), Disp: 30 g, Rfl: 0    Norethin Ace-Eth Estrad-FE (MINASTRIN 24 FE) 1-20 MG-MCG(24) CHEW, Chew 1 tablet  (Patient not taking: Reported on 8/2/2021), Disp: , Rfl:     ondansetron (ZOFRAN) 4 mg tablet, Take 1 tablet (4 mg total) by mouth every 8 (eight) hours as needed for nausea or vomiting (Patient not taking: Reported on 2021), Disp: 20 tablet, Rfl: 0    oxyCODONE-acetaminophen (PERCOCET) 5-325 mg per tablet, Take 1 tablet by mouth every 6 (six) hours as needed for moderate painMax Daily Amount: 4 tablets (Patient not taking: Reported on 2021), Disp: 20 tablet, Rfl: 0    promethazine (PHENERGAN) 25 mg tablet, Take 1 tablet (25 mg total) by mouth every 6 (six) hours as needed for nausea or vomiting (Patient not taking: Reported on 2021), Disp: 60 tablet, Rfl: 2    No Known Allergies     Review of Systems:  Review of Systems   Constitutional: Negative  HENT: Negative  Eyes: Negative  Respiratory: Negative  Cardiovascular: Negative  Gastrointestinal: Negative  Endocrine: Negative  Genitourinary: Negative  Musculoskeletal: Negative  Allergic/Immunologic: Negative  Neurological: Negative  Hematological: Negative  Psychiatric/Behavioral: Negative  Vitals:    21 1316   BP: 110/70   Pulse: (!) 107   Resp: 20   Temp: 97 6 °F (36 4 °C)   SpO2: 99%   Weight: 75 8 kg (167 lb)   Height: 5' 3" (1 6 m)            OB/GYN History:  The patient underwent menarche at 14 years  Menopause Status Pre, Krupa, Post and Unknown  No LMP recorded  Menopause at   Menopause Reason  Hormone replacement therapy: for fertility only    {0  Para 0  Age at first delivery being none  Nursing: no  Birth control pills: yes - 10 years  Pregnancy test needed:  yes    ONCOTYPE/MAMMOPRINT results: yes high risk -     PFT no    Imaging:No images are attached to the encounter       Teaching: yes    MST yes    Implantable Devices (Port, Pacemaker, pain stimulator)no    Hip Replacement no

## 2021-09-08 NOTE — LETTER
2021     Stiven Stevens, [address] [address]    Patient: Kelly Mensah   YOB: 1995   Date of Visit: 2021       Dear Dr Maris Mcallister: Thank you for referring Heriberto Echevarria to me for evaluation  Below are my notes for this consultation  If you have questions, please do not hesitate to call me  I look forward to following your patient along with you  Sincerely,        Chaka Segovia MD        CC: No Recipients  Chaka Segovia MD  2021  3:15 PM  Sign when Signing Visit  Consultation - Radiation Oncology      JOQ:[id number] : 1995  Encounter: [id number]  Patient Information: [address]  Chief Complaint   Patient presents with   [facility]     Cancer Staging  Malignant neoplasm of upper-inner quadrant of right breast in female, estrogen receptor positive ([facility] )  Staging form: Breast, AJCC 8th Edition  - Clinical: Stage IA (cT1c, cN0, cM0, G3, ER+, AR+, HER2+) - Signed by Chaka Segovia MD on 2021  Histologic grading system: 3 grade system           History of Present Illness   Kelly Mensah is a 31y o  year old female with pat medical history of early stage melanoma status post resection from abdomen, currently LUCINA who was recently diagnosed with stage IA, grade 3 invasive ductal carcinoma of the right breast status post resection  She presents today for consultation for adjuvant radiation  Briefly, the patient self palpated a lump in her right breast on 21 and presented to her GYN     21 [facility] - 3D bilateral mammogram and targeted ultrasound demonstrated suspicious right breast mass with 1 5cm correlate irregular cystic mass  Left breast was benign  Biopsy was recommended  21 US guided right breast core biopsy  Pathology demonstrated grade 3 invasive ductal carcinoma associated with DCIS    Tumor cells were strongly ER/AR/Zdd6Dem (+)     21 CT chest, abdomen, and pelvis was negative for metastases  7/1/21 Bilateral breast MRI showed a 17 cm irregularly shaped mass with rim enhancement in the right breast at 1 o'clock  The mass correlates with the palpable mass reported by the patient and is biopsy proven malignancy  No axillary lymphadenopathy, extension to the chest wall, or concurrent lesions in either breast       Per medical records, [facility] breast cancer genetic panel was negative  7/9/21 The patient underwent LYNDSAY directed right breast lumpectomy with sentinel lymph node biopsy under the care of Dr Walter Taylor  Pathology confirmed a 1 8cm grade 3 invasive breast carcinoma associated with grade 2 DCIS  Margins were negative  There was no LVI or dermal LVI  Perineural invasion was identified  0/1 sentinel lymph node and 0/5 axillary nodes demonstrated metastatic disease  Pathologic stage IA ([id number])     8/2/21 Consultation with Dr Shaniqua Beck, Medical Oncology  He offered patient adjuvant chemotherapy with Taxotere, Herceptin, Perjeta versus Taxotere and Herceptin  Endocrine therapy to follow with Herceptin monotherapy  The patient had a 2nd opinion at [facility] with Dr Nakul Bal   adjuvant chemothearpy for 3 months with 1 year of maintenance Herceptin  Endocrine therapy to be started after radiation therapy  The patient has undergone cyropreservation of ova for fertility preservation  9/13/21: to start Paclitaxel and Herceptin, per patient last cycle scheduled 11/29/21    Currently, the patient offers no breast related complaints  She denies new palpable nodules, suspicious skin changes, or nipple discharge of the breasts bilaterally  She denies significant pain or tenderness of the breasts  No upper extremity edema or shoulder restriction  Last menstrual period 8/23/21      Historical Information   Oncology History Overview Note      Malignant neoplasm of upper-inner quadrant of right breast in female, estrogen receptor positive ([facility] )   6/24/2021 Biopsy    US guided Right breast core biopsy:  1 o'clock 13-14 cm from the nipple  Right invasive ductal carcinoma  Grade 3  ER 90  OR 70   HER2 3+  Lymphovascular invasion: not identified    Results are malignant and concordant  Recommend surgical consultation  Additionally, given patient's age and density of breast tissue, recommend enhanced breast MRI for further evaluation       Done at [facility]     6/30/2021 Genetic Testing    The following genes were evaluated: RADHA, BRCA1, BRCA2, CDH1, CHEK2, PALB2, PTEN, STK11, TP53  Additional genes tested for a total of 36 genes  VUS NF1   [facility]     7/1/2021 Observation    Bilateral breast MRI with and without contrast: B6     Biopsy confirmed malignancy  01:00 o'clock location right breast   No axillary lymphadenopathy, extension to the chest wall, or concurrent lesions in either breast        7/19/2021 Surgery    Right breast LYNDSAY  directed lumpectomy with sentinel lymph node biopsy:  Invasive mammary carcinoma of no special type; ductal  Grade 3  ER 65  OR 90  HER2 3+  18 mm  Margins negative  0/5 Lymph nodes  Stage IA      8/23/2021 Genomic Testing    MammaPrint: High Risk  HER2 Type     9/8/2021 -  Cancer Staged    Staging form: Breast, AJCC 8th Edition  - Clinical: Stage IA (cT1c, cN0, cM0, G3, ER+, OR+, HER2+) - Signed by Alex Rodriguez MD on 9/8/2021  Histologic grading system: 3 grade system       9/13/2021 - 11/29/2021 Chemotherapy    fosaprepitant (EMEND) IVPB, 150 mg, Intravenous, Once, 0 of 6 cycles  pertuzumab (PERJETA) IVPB, 840 mg, Intravenous, Once, 0 of 18 cycles  CARBOplatin (PARAPLATIN) IVPB (GOG AUC DOSING), 760 8 mg, Intravenous, Once, 0 of 6 cycles  DOCEtaxel (TAXOTERE) chemo infusion, 75 mg/m2 = 132 8 mg, Intravenous, Once, 0 of 6 cycles  trastuzumab (HERCEPTIN) chemo infusion, 8 mg/kg = 610 mg, Intravenous, Once, 0 of 18 cycles       9/20/2021 -  Chemotherapy    PACLItaxel (TAXOL) chemo IVPB, 80 mg/m2 = 143 4 mg, Intravenous, Once, 0 of 12 cycles  trastuzumab (HERCEPTIN) chemo infusion, 4 mg/kg = 304 mg, Intravenous, Once, 0 of 26 cycles         OB/GYN History:  The patient underwent menarche at 14 years  Menopause Status Pre, Krupa, Post and Unknown  No LMP recorded  Menopause at     Menopause Reason  Hormone replacement therapy: for fertility only    {0  Para 0  Age at first delivery being none  Nursing: no  Birth control pills: yes - 10 years  Pregnancy test needed:  yes      Past Medical History:   Diagnosis Date    Breast cancer ([facility] )     Cancer ([facility] )     Melanoma ([facility] )      Past Surgical History:   Procedure Laterality Date    BREAST BIOPSY      BREAST LUMPECTOMY Right 2021    Procedure: LYNDSAY  DIRECTED LUMPECTOMY;  Surgeon: Stiven Stevens MD;  Location: [facility];  Service: Surgical Oncology    LYMPH NODE BIOPSY Right 2021    Procedure: LYMPHATIC MAPPING WITH BLUE AND RADIOACTIVE DYES, SENTINEL LYMPH NODE BIOPSY, injection at 56;  Surgeon: Stiven Stevens MD;  Location: [facility];  Service: Surgical Oncology    SKIN CANCER EXCISION      TONSILLECTOMY AND ADENOIDECTOMY      WISDOM TOOTH EXTRACTION         Family History   Problem Relation Age of Onset    Kidney cancer Maternal Grandfather     Breast cancer Maternal Aunt     Prostate cancer Father        Social History   Social History     Substance and Sexual Activity   Alcohol Use Yes    Comment: Socially     Social History     Substance and Sexual Activity   Drug Use No     Social History     Tobacco Use   Smoking Status Never Smoker   Smokeless Tobacco Never Used       Meds/Allergies     Current Outpatient Medications:     letrozole (FEMARA) 2 5 mg tablet, Take 5 mg by mouth daily Egg retrival, Disp: , Rfl:     lidocaine-prilocaine (EMLA) cream, Apply topically as needed for mild pain (Patient not taking: Reported on 2021), Disp: 30 g, Rfl: 0    Norethin Ace-Eth Estrad-FE (MINASTRIN 24 FE) 1-20 MG-MCG(24) CHEW, Chew 1 tablet  (Patient not taking: Reported on 8/2/2021), Disp: , Rfl:     ondansetron (ZOFRAN) 4 mg tablet, Take 1 tablet (4 mg total) by mouth every 8 (eight) hours as needed for nausea or vomiting (Patient not taking: Reported on 8/2/2021), Disp: 20 tablet, Rfl: 0    oxyCODONE-acetaminophen (PERCOCET) 5-325 mg per tablet, Take 1 tablet by mouth every 6 (six) hours as needed for moderate painMax Daily Amount: 4 tablets (Patient not taking: Reported on 8/2/2021), Disp: 20 tablet, Rfl: 0    promethazine (PHENERGAN) 25 mg tablet, Take 1 tablet (25 mg total) by mouth every 6 (six) hours as needed for nausea or vomiting (Patient not taking: Reported on 8/2/2021), Disp: 60 tablet, Rfl: 2  No Known Allergies    Review of Systems  Constitutional: Negative  HENT: Negative  Eyes: Negative  Respiratory: Negative  Cardiovascular: Negative  Gastrointestinal: Negative  Endocrine: Negative  Genitourinary: Negative  Musculoskeletal: Negative  Allergic/Immunologic: Negative  Neurological: Negative  Hematological: Negative  Psychiatric/Behavioral: Negative          OBJECTIVE:   /70   Pulse (!) 107   Temp 97 6 °F (36 4 °C)   Resp 20   Ht 5' 3" (1 6 m)   Wt 75 8 kg (167 lb)   SpO2 99%   BMI 29 58 kg/m²   Performance Status: Karnofsky: 90 - Able to carry on normal activity; minor signs or symptoms of disease     Physical Exam  Vitals and nursing note reviewed  Constitutional:       General: She is not in acute distress  Appearance: She is well-developed  Eyes:      General: No scleral icterus  Cardiovascular:      Rate and Rhythm: Normal rate and regular rhythm  Heart sounds: Normal heart sounds  No murmur heard  Pulmonary:      Effort: No respiratory distress  Breath sounds: No wheezing, rhonchi or rales  Chest:          Comments: Breast examination demonstrates the patient to be symmetric in contour and size    There is a well-healed lumpectomy scar in the inner upper quadrant of the right breast with a second well healed axillary scar  There are no palpable masses or suspicious skin changes of the breasts bilaterally  Abdominal:      General: There is no distension  Palpations: Abdomen is soft  Tenderness: There is no abdominal tenderness  Musculoskeletal:      Right lower leg: No edema  Left lower leg: No edema  Comments: No upper extremity edema  Full range of motion of upper extremities bilaterally  Lymphadenopathy:      Cervical: No cervical adenopathy  Upper Body:      Right upper body: No supraclavicular or axillary adenopathy  Left upper body: No supraclavicular or axillary adenopathy  Neurological:      Mental Status: She is alert and oriented to person, place, and time  Gait: Gait normal           RESULTS  Lab Results    Chemistry        Component Value Date/Time    K 4 0 07/12/2021 1326     07/12/2021 1326    CO2 29 07/12/2021 1326    BUN 10 07/12/2021 1326    CREATININE 0 64 07/12/2021 1326        Component Value Date/Time    CALCIUM 9 1 07/12/2021 1326    ALKPHOS 80 07/12/2021 1326    AST 16 07/12/2021 1326    ALT 25 07/12/2021 1326          Lab Results   Component Value Date    WBC 7 55 07/12/2021    HGB 13 7 07/12/2021    HCT 42 5 07/12/2021    MCV 89 07/12/2021     07/12/2021       Pathology:  Collected:  7/19/2021 12:54 Status:  Edited Result - FINAL   Visible to patient:  Yes ([address]) Dx: Malignant neoplasm of upper-inner shoshana      0 Result Notes  Component    Case Report   Surgical Pathology Report                         Case: [id number]                                    Authorizing Provider: Yomaira Marr MD         Collected:           07/19/2021 1254               Ordering Location:     [facility] Received:            07/19/2021 1513                                      Operating Room                                                             Pathologist:           Raymond Arellano MD                                                                 Specimens:   A) - Breast, Right, RIGHT BREAST LUMPECTOMY  MARKED PER PROTOCOL WITH MARGIN MARKER                  B) - Lymph Node, [place], RIGHT AXILLARY SENTINEL LYMPH NODE #1 HOT AND BLUE                        C) - Lymph Node, RIGHT AXILLARY ADIPOSE TISSUE WITH NON SENTINEL LYMPH NODES                         D) - Breast, Right, SUPERIOR MARGIN ORANGE                                                           E) - Breast, Right, MEDIAL MARGIN YELLOW                                                             F) - Breast, Right, INFERIOR MARGIN BLUE                                                             G) - Breast, Right, LATERAL MARGIN RED                                                     Addendum 2   At the request of Dr Jane Zhao, unstained slides from paraffin BLOCK A6 containing the patient's cancer cells were sent to [facility]  for Breast Cancer Suite testing  Upon completion of testing, the [facility]  Laboratory report will be directly sent to the requesting physician as well as posted in the Media Tab of the patient's [facility] EMR by the [place] [address] Pathology Department      Please note: The [employer] analysis and report is performed independently of [facility], and neither the Hospital nor the Pathology Department screen, review or comment upon [facility] [employer] report  Because the role of testing in cancer diagnosis and management is subject to evolving development, usage and interpretation, we strongly urge that the test limitations described in the [facility]  Lab report be carefully read, appropriately shared with the patient, and critically considered when reaching treatment decisions      This pathology material was removed from archive for purpose of molecular testing      Addendum electronically signed by Raymond Arellano MD on 8/24/2021 at 1107   Addendum Part A tumor:     Test Description                                     Result                            Prognostic Interpretation  Estrogen Receptor (clone SP-1)              65-70%                           Positive   Internal control: Positive                           Staining Intensity: Strong  External control: Positive                          Kayleigh Score*:  7                                                         Progesterone Receptor (clone 1E2)         90-95%                           Positive   Internal control: Positive                           Staining Intensity: Strong  External control: Positive                          Kayleigh Score*:  7     HER2 by IHC (clone 4B5)                        3+                                   Positive     Fixative Duration of Fixation (hrs)             Cold Ischemia Time (mins)           Sample Adequacy  Formalin            31 5 hours                                   Not stated                                    Adequate     Appropriate positive and negative controls were reviewed      These immunohistochemical tests were performed at [facility] in [facility] and interpreted by Dr Camden Beatty  An electronic copy of this report will be kept on file in the Medical Records Department at [facility]      * The Kayleigh score is a semi quantitative system that takes into consideration the proportion of positive cells (scored on a scale of 0-5) and staining intensity (scored on a scale of 0-3)  The proportion and intensity are summed to produce total scores of 0 or 2 through 8  A score of 0-2 is regarded as negative while 3-8 as positive            - Best representative tumor block: A6       -- Sufficient tumor present for          Agendia Mammaprint/Blueprint (1 cm2 of invasive tumor in aggregate):  Yes          MI Profile/Foundation One (at least 5 x 5 mm of tumor): Yes  2   Pathologic Stage Classification (pTNM, AJCC 8th Edition): IA       8th ed, AJCC Anatomic Stage:  at least [address], pN0(sn), cM0, G3  3   8th ed  AJCC Pathologic Prognostic Stage: IA   Addendum electronically signed by Kushal Gray MD on 7/26/2021 at 1312   Final Diagnosis   A  Right breast (lumpectomy):     - Invasive mammary carcinoma of no special type (ductal NST)  - Tumor size: 18 mm  Tumor thgthrthathdtheth:th th4th of 3       - Closest surgical margin: posterior (8 mm from tumor)     Comment:  ER / MD / Her-2 pending  B  [place] lymph node #1, right axilla (excision):      - One (1) lymph node, negative for carcinoma  C  Adipose tissue, right axilla (excision):     - Four (4) lymph nodes, negative for carcinoma       D  Superior margin, right breast (excision):     - Benign fibroadipose tissue        - Superior margin negative for carcinoma  E   Medial margin, right breast (excision):      - Benign fibroadipose tissue        - Medial margin negative for carcinoma  F  Inferior margin, right breast (excision):      - Benign fibroadipose tissue and scant benign breast tissue  - Inferior margin negative for carcinoma  G  Lateral margin, right breast (excision):     - Benign fibroadipose tissue        - Lateral margin negative for carcinoma  Electronically signed by Kushal Gray MD on 7/26/2021 at 1048   Comments: This is an appended report  These results have been appended to a previously preliminary verified report  Preliminary Diagnosis   A  Right breast (lumpectomy):      - Invasive and in-situ carcinoma, pending additional studies  B  [place] lymph node #1, right axilla (excision):      - One (1) lymph node, negative for carcinoma  C  Adipose tissue, right axilla (excision):     - Four (4) lymph nodes, negative for carcinoma       D  Superior margin, right breast (excision):     - Benign fibroadipose tissue        - Superior margin negative for carcinoma  E   Medial margin, right breast (excision):      - Benign fibroadipose tissue  - Medial margin negative for carcinoma  F  Inferior margin, right breast (excision):      - Benign fibroadipose tissue and scant benign breast tissue  - Inferior margin negative for carcinoma  G  Lateral margin, right breast (excision):     - Benign fibroadipose tissue        - Lateral margin negative for carcinoma  Preliminary result electronically signed by Kianna Denney MD on 7/22/2021 at 1124   Microscopic Description    - Representative section: A6   - Part A tumor: Positive ROSALINA-3  E-cadherin and P120 catenin membranous  P63 and SMM-HC highlights invasive carcinoma and DCIS  Comment: This is an appended report  These results have been appended to a previously preliminary verified report  Note    - Intradepartmental consultation concurs with the diagnosis  Comment: This is an appended report  These results have been appended to a previously preliminary verified report  Additional Information    All reported additional testing was performed with appropriately reactive controls   These tests were developed and their performance characteristics determined by [facility] or appropriate performing facility, though some tests may be performed on tissues which have not been validated for performance characteristics (such as staining performed on alcohol exposed cell blocks and decalcified tissues)   Results should be interpreted with caution and in the context of the patients clinical condition  These tests may not be cleared or approved by the [place]  Food and Drug Administration, though the FDA has determined that such clearance or approval is not necessary  These tests are used for clinical purposes and they should not be regarded as investigational or for research  This laboratory has been approved by CLIA 88, designated as a high-complexity laboratory and is qualified to perform these tests   Interpretation performed at [facility], [address] [place] [address],[address] [place], [address] [place] [id number]  [place] [facility] Synoptic Checklist   INVASIVE CARCINOMA OF THE BREAST: Resection  8th Edition - Protocol posted: 2/26/2020  INVASIVE CARCINOMA OF THE BREAST: COMPLETE EXCISION - All Specimens  SPECIMEN   Procedure  Excision (less than total mastectomy)    Specimen Laterality  Right    TUMOR   Tumor Site  Not specified    Histologic Type  Invasive carcinoma of no special type (ductal)    Glandular (Acinar) / Tubular Differentiation  Score 3    Nuclear Pleomorphism  Score 3    Mitotic Rate  Score 2    Overall Grade  Grade 3 (scores of 8 or 9)    Tumor Size  Greatest dimension of largest invasive focus (Millimeters): 18 mm   Additional Dimension (Millimeters)  16 mm     15 mm   Tumor Focality  Single focus of invasive carcinoma    Ductal Carcinoma In Situ (DCIS)  Present      Negative for extensive intraductal component (EIC)    Architectural Patterns  Solid    Nuclear Grade  Grade II (intermediate)    Necrosis  Present, central (expansive "comedo" necrosis)    Lobular Carcinoma In Situ (LCIS)  Not identified    Tumor Extent     Lymphovascular Invasion  Cannot be determined: Not definitively identified    Dermal Lymphovascular Invasion  Not identified    Microcalcifications  Not identified    Treatment Effect in the Breast  No known presurgical therapy    MARGINS   Invasive Carcinoma Margins  Uninvolved by invasive carcinoma    Distance from Closest Margin (Millimeters)  8 mm   Closest Margin(s)  Posterior    DCIS Margins  Uninvolved by DCIS    Distance from Closest Margin (Millimeters)  9 mm   Closest Margin(s)  Posterior    LYMPH NODES   Regional Lymph Nodes  Uninvolved by tumor cells    Total Number of Lymph Nodes Examined  5    Number of [place] Nodes Examined  1    PATHOLOGIC STAGE CLASSIFICATION (pTNM, AJCC 8th Edition)      Primary Tumor (pT)  pT1c    Regional Lymph Nodes Modifier  (sn): [place] node(s) evaluated      Regional Lymph Nodes (pN)  pN0    ADDITIONAL FINDINGS   Additional Findings Perineural invasion by tumor      ASSESSMENT  1  Malignant neoplasm of upper-inner quadrant of right breast in female, estrogen receptor positive ([facility] )  Ambulatory referral to Radiation Oncology     Cancer Staging  Malignant neoplasm of upper-inner quadrant of right breast in female, estrogen receptor positive ([facility] )  Staging form: Breast, AJCC 8th Edition  - Clinical: Stage IA (cT1c, cN0, cM0, G3, ER+, CT+, HER2+) - Signed by Gama Pham MD on 9/8/2021  Histologic grading system: 3 grade system      PLAN/DISCUSSION  Garfield Bradshaw is a 31y o  year old female with past medical history of early stage melanoma status post resection from abdomen who was recently diagnosed with stage IA, grade 3 invasive ductal carcinoma of the right breast status post lumpectomy and sentinel lymph node biopsy  Negative margins were achieved  Tumor was ER/ CT / HER2 Precious positive  She is scheduled to initiate adjuvant chemotherapy with TH x 3 months with Herceptin monotherapy to follow and then endocrine therapy likely with Lupron and aromatase inhibitor  I recommended the patient undergo adjuvant hypofractionated whole breast radiation to a total dose of 40 Gy with boost to the lumpectomy cavity to a total dose of 50 Gy  Radiation improves local control and EBCTCG meta-analysis has shown survival benefit by improved local control over 20 years  Recommendation is in accordance with TRACIE and NCCN guidelines  The patient is large breasted and if dose constraints cannot be met for hypofractionated breast radiation, then we will plan for conventional radiation to 50Gy with boost to 60Gy  The rationale and potential benefits, as well as the risks and acute and late side effects and potential toxicities of radiation were discussed with the patient at length  Side effects discussed included, but were not limited to:  Fatigue, skin erythema, hyperpigmentation, desquamation, fibrosis, shrinkage of the breast resulting asymmetry, rib weakening, pulmonary fibrosis, and secondary malignancy  We discussed that radiation is a known teratogen and recommended that the patient practice contraception if sexually active during treatment  We also discussed recommendation that if she wished to pursue pregnancy post radiation that she wait a minimum of 6 months  Alternative treatment options including systemic therapy and conventional whole breast radiation were discussed with the patient  Given her presentation, I did not recommend systemic therapy alone as local control can offer survival benefit in early stage breast cancer patients over a 20 year period as demonstrated on EBCTCG meta-analysis  he patient and her mother, present at consultation, were given the opportunity to ask questions and all questions were answered to their satisfaction  She wished to proceed with the recommended treatment plan  We will plan to initiate radiation after completion of chemotherapy  We will see her in follow-up and for CT simulation after chemotherapy completed  Rosalino Singh MD  9/8/2021,2:25 PM      Portions of the record may have been created with voice recognition software  Occasional wrong word or "sound a like" substitutions may have occurred due to the inherent limitations of voice recognition software  Read the chart carefully and recognize, using context, where substitutions have occurred

## 2021-09-09 ENCOUNTER — APPOINTMENT (OUTPATIENT)
Dept: LAB | Facility: HOSPITAL | Age: 26
End: 2021-09-09
Attending: INTERNAL MEDICINE
Payer: COMMERCIAL

## 2021-09-09 ENCOUNTER — DOCUMENTATION (OUTPATIENT)
Dept: HEMATOLOGY ONCOLOGY | Facility: CLINIC | Age: 26
End: 2021-09-09

## 2021-09-09 DIAGNOSIS — C50.211 MALIGNANT NEOPLASM OF UPPER-INNER QUADRANT OF RIGHT BREAST IN FEMALE, ESTROGEN RECEPTOR POSITIVE (HCC): ICD-10-CM

## 2021-09-09 DIAGNOSIS — Z17.0 MALIGNANT NEOPLASM OF UPPER-INNER QUADRANT OF RIGHT BREAST IN FEMALE, ESTROGEN RECEPTOR POSITIVE (HCC): ICD-10-CM

## 2021-09-09 LAB
ALBUMIN SERPL BCP-MCNC: 3.6 G/DL (ref 3.5–5)
ALP SERPL-CCNC: 63 U/L (ref 46–116)
ALT SERPL W P-5'-P-CCNC: 40 U/L (ref 12–78)
ANION GAP SERPL CALCULATED.3IONS-SCNC: 10 MMOL/L (ref 4–13)
AST SERPL W P-5'-P-CCNC: 32 U/L (ref 5–45)
BASOPHILS # BLD AUTO: 0.02 THOUSANDS/ΜL (ref 0–0.1)
BASOPHILS NFR BLD AUTO: 0 % (ref 0–1)
BILIRUB SERPL-MCNC: 0.55 MG/DL (ref 0.2–1)
BUN SERPL-MCNC: 8 MG/DL (ref 5–25)
CALCIUM SERPL-MCNC: 8.5 MG/DL (ref 8.3–10.1)
CHLORIDE SERPL-SCNC: 103 MMOL/L (ref 100–108)
CO2 SERPL-SCNC: 25 MMOL/L (ref 21–32)
CREAT SERPL-MCNC: 0.65 MG/DL (ref 0.6–1.3)
EOSINOPHIL # BLD AUTO: 0.06 THOUSAND/ΜL (ref 0–0.61)
EOSINOPHIL NFR BLD AUTO: 1 % (ref 0–6)
ERYTHROCYTE [DISTWIDTH] IN BLOOD BY AUTOMATED COUNT: 12.8 % (ref 11.6–15.1)
GFR SERPL CREATININE-BSD FRML MDRD: 123 ML/MIN/1.73SQ M
GLUCOSE SERPL-MCNC: 80 MG/DL (ref 65–140)
HCT VFR BLD AUTO: 36.4 % (ref 34.8–46.1)
HGB BLD-MCNC: 11.9 G/DL (ref 11.5–15.4)
IMM GRANULOCYTES # BLD AUTO: 0.03 THOUSAND/UL (ref 0–0.2)
IMM GRANULOCYTES NFR BLD AUTO: 0 % (ref 0–2)
LYMPHOCYTES # BLD AUTO: 2.09 THOUSANDS/ΜL (ref 0.6–4.47)
LYMPHOCYTES NFR BLD AUTO: 31 % (ref 14–44)
MCH RBC QN AUTO: 28.7 PG (ref 26.8–34.3)
MCHC RBC AUTO-ENTMCNC: 32.7 G/DL (ref 31.4–37.4)
MCV RBC AUTO: 88 FL (ref 82–98)
MONOCYTES # BLD AUTO: 0.67 THOUSAND/ΜL (ref 0.17–1.22)
MONOCYTES NFR BLD AUTO: 10 % (ref 4–12)
NEUTROPHILS # BLD AUTO: 3.95 THOUSANDS/ΜL (ref 1.85–7.62)
NEUTS SEG NFR BLD AUTO: 58 % (ref 43–75)
NRBC BLD AUTO-RTO: 0 /100 WBCS
PLATELET # BLD AUTO: 300 THOUSANDS/UL (ref 149–390)
PMV BLD AUTO: 10.1 FL (ref 8.9–12.7)
POTASSIUM SERPL-SCNC: 4.1 MMOL/L (ref 3.5–5.3)
PROT SERPL-MCNC: 7.3 G/DL (ref 6.4–8.2)
RBC # BLD AUTO: 4.14 MILLION/UL (ref 3.81–5.12)
SODIUM SERPL-SCNC: 138 MMOL/L (ref 136–145)
WBC # BLD AUTO: 6.82 THOUSAND/UL (ref 4.31–10.16)

## 2021-09-09 PROCEDURE — 36415 COLL VENOUS BLD VENIPUNCTURE: CPT

## 2021-09-09 PROCEDURE — 85025 COMPLETE CBC W/AUTO DIFF WBC: CPT

## 2021-09-09 PROCEDURE — 80053 COMPREHEN METABOLIC PANEL: CPT

## 2021-09-09 NOTE — PROGRESS NOTES
recvd email from [facility] asking me to verify the benefits & to make sure we are par & in the pts network     Per my current t ins list [facility] is par & in the network w/the ins     Called ins & pt has an active [employer] y priority blue flex epo plan that runs on a cal year   Effective 01/01/21  Pt's plan is standard  Deduct $3800 met   Co-ins 40/60  Out of pocket $8500 met that 09/02/21 since the deduct & the out of pocket have been met all services are covered 100%  Emailed that to [facility]

## 2021-09-13 ENCOUNTER — HOSPITAL ENCOUNTER (OUTPATIENT)
Dept: INFUSION CENTER | Facility: HOSPITAL | Age: 26
Discharge: HOME | End: 2021-09-13
Attending: INTERNAL MEDICINE
Payer: COMMERCIAL

## 2021-09-13 ENCOUNTER — PATIENT OUTREACH (OUTPATIENT)
Dept: HEMATOLOGY ONCOLOGY | Facility: CLINIC | Age: 26
End: 2021-09-13

## 2021-09-13 VITALS
OXYGEN SATURATION: 100 % | HEART RATE: 79 BPM | DIASTOLIC BLOOD PRESSURE: 88 MMHG | WEIGHT: 168.43 LBS | BODY MASS INDEX: 28.76 KG/M2 | SYSTOLIC BLOOD PRESSURE: 144 MMHG | TEMPERATURE: 97.6 F | HEIGHT: 64 IN | RESPIRATION RATE: 16 BRPM

## 2021-09-13 DIAGNOSIS — C50.211 MALIGNANT NEOPLASM OF UPPER-INNER QUADRANT OF RIGHT BREAST IN FEMALE, ESTROGEN RECEPTOR POSITIVE (HCC): Primary | ICD-10-CM

## 2021-09-13 DIAGNOSIS — Z17.0 MALIGNANT NEOPLASM OF UPPER-INNER QUADRANT OF RIGHT BREAST IN FEMALE, ESTROGEN RECEPTOR POSITIVE (HCC): Primary | ICD-10-CM

## 2021-09-13 PROCEDURE — 96415 CHEMO IV INFUSION ADDL HR: CPT

## 2021-09-13 PROCEDURE — 96413 CHEMO IV INFUSION 1 HR: CPT

## 2021-09-13 PROCEDURE — 96417 CHEMO IV INFUS EACH ADDL SEQ: CPT

## 2021-09-13 PROCEDURE — 96367 TX/PROPH/DG ADDL SEQ IV INF: CPT

## 2021-09-13 RX ORDER — SODIUM CHLORIDE 9 MG/ML
20 INJECTION, SOLUTION INTRAVENOUS ONCE
Status: COMPLETED | OUTPATIENT
Start: 2021-09-13 | End: 2021-09-13

## 2021-09-13 RX ADMIN — PACLITAXEL 143.4 MG: 6 INJECTION, SOLUTION INTRAVENOUS at 10:41

## 2021-09-13 RX ADMIN — DIPHENHYDRAMINE HYDROCHLORIDE 25 MG: 50 INJECTION, SOLUTION INTRAMUSCULAR; INTRAVENOUS at 09:35

## 2021-09-13 RX ADMIN — TRASTUZUMAB 300 MG: 150 INJECTION, POWDER, LYOPHILIZED, FOR SOLUTION INTRAVENOUS at 11:47

## 2021-09-13 RX ADMIN — DEXAMETHASONE SODIUM PHOSPHATE 10 MG: 10 INJECTION, SOLUTION INTRAMUSCULAR; INTRAVENOUS at 09:09

## 2021-09-13 RX ADMIN — SODIUM CHLORIDE 20 ML/HR: 0.9 INJECTION, SOLUTION INTRAVENOUS at 09:09

## 2021-09-13 RX ADMIN — FAMOTIDINE 20 MG: 10 INJECTION INTRAVENOUS at 10:05

## 2021-09-13 NOTE — PROGRESS NOTES
Pt arrived for her first infusion this day at the [facility], along with her parents and boyfriend  All were tearful in the waiting room as the pt was registered  MSW went out to meet and speak with them in an attempt to reduce their anxiety, increase their comfort level and to answer any questions they may have at this time  MSW did inform the family that they could take turns spending time with the pt in the treatment area and alternate at the pt's discretion  This appeared to bring some immediate relief  The pt's family was wearing gemini shirts with a slogan for the pt and they shared how all of that came about from the making of the shirts to the selling of them in their local community  As the pt and her family discussed the shirts and the sales, they began to show signs of comfort and less anxiety  The infusion nurse came to take the pt back and the pt's mother appeared to struggle the most   MSW stayed with her in the waiting room to offer support, along with the pt's father  They shared how the pt and her boyfriend were living in [place], when 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 and they shared how this was something that she has always wanted to do, having gone to school for hospitality and management  The pt quit her job and moved home to pursue this new adventure, all before receiving her diagnosis  Her boyfriend also left his job to move to the area as well and has been supporting her emotionally through this time  MSW spent time with the pt while she was receiving her treatment and her anxiety level did decrease as her treatment started  The pt is able to verbalize her greatest fear is the loss of her hair  MSW acknowledged this fear and validated her feelings  The pt has been able to get 2 wigs and is hopeful not to have to wear them    MSW provided significant support and will continue to be available as needed

## 2021-09-13 NOTE — PROGRESS NOTES
Pt tolerated chemo treatment with no adverse reaction  Pt educated on s/s to report to MD  Pt left unit ambulatory with steady gait  AVS printed and given to pt

## 2021-09-16 ENCOUNTER — APPOINTMENT (OUTPATIENT)
Dept: LAB | Facility: HOSPITAL | Age: 26
End: 2021-09-16
Attending: INTERNAL MEDICINE
Payer: COMMERCIAL

## 2021-09-16 DIAGNOSIS — C50.211 MALIGNANT NEOPLASM OF UPPER-INNER QUADRANT OF RIGHT BREAST IN FEMALE, ESTROGEN RECEPTOR POSITIVE (HCC): ICD-10-CM

## 2021-09-16 DIAGNOSIS — Z17.0 MALIGNANT NEOPLASM OF UPPER-INNER QUADRANT OF RIGHT BREAST IN FEMALE, ESTROGEN RECEPTOR POSITIVE (HCC): ICD-10-CM

## 2021-09-16 LAB
BASOPHILS # BLD AUTO: 0.01 THOUSANDS/ΜL (ref 0–0.1)
BASOPHILS NFR BLD AUTO: 0 % (ref 0–1)
EOSINOPHIL # BLD AUTO: 0.08 THOUSAND/ΜL (ref 0–0.61)
EOSINOPHIL NFR BLD AUTO: 1 % (ref 0–6)
ERYTHROCYTE [DISTWIDTH] IN BLOOD BY AUTOMATED COUNT: 12.8 % (ref 11.6–15.1)
HCT VFR BLD AUTO: 32.9 % (ref 34.8–46.1)
HGB BLD-MCNC: 11 G/DL (ref 11.5–15.4)
IMM GRANULOCYTES # BLD AUTO: 0.02 THOUSAND/UL (ref 0–0.2)
IMM GRANULOCYTES NFR BLD AUTO: 0 % (ref 0–2)
LYMPHOCYTES # BLD AUTO: 2.18 THOUSANDS/ΜL (ref 0.6–4.47)
LYMPHOCYTES NFR BLD AUTO: 36 % (ref 14–44)
MCH RBC QN AUTO: 28.9 PG (ref 26.8–34.3)
MCHC RBC AUTO-ENTMCNC: 33.4 G/DL (ref 31.4–37.4)
MCV RBC AUTO: 87 FL (ref 82–98)
MONOCYTES # BLD AUTO: 0.3 THOUSAND/ΜL (ref 0.17–1.22)
MONOCYTES NFR BLD AUTO: 5 % (ref 4–12)
NEUTROPHILS # BLD AUTO: 3.49 THOUSANDS/ΜL (ref 1.85–7.62)
NEUTS SEG NFR BLD AUTO: 58 % (ref 43–75)
NRBC BLD AUTO-RTO: 0 /100 WBCS
PLATELET # BLD AUTO: 275 THOUSANDS/UL (ref 149–390)
PMV BLD AUTO: 9.8 FL (ref 8.9–12.7)
RBC # BLD AUTO: 3.8 MILLION/UL (ref 3.81–5.12)
WBC # BLD AUTO: 6.08 THOUSAND/UL (ref 4.31–10.16)

## 2021-09-16 PROCEDURE — 85025 COMPLETE CBC W/AUTO DIFF WBC: CPT

## 2021-09-16 PROCEDURE — 36415 COLL VENOUS BLD VENIPUNCTURE: CPT

## 2021-09-20 ENCOUNTER — HOSPITAL ENCOUNTER (OUTPATIENT)
Dept: INFUSION CENTER | Facility: HOSPITAL | Age: 26
Discharge: HOME | End: 2021-09-20
Attending: INTERNAL MEDICINE
Payer: COMMERCIAL

## 2021-09-20 VITALS
WEIGHT: 166.4 LBS | SYSTOLIC BLOOD PRESSURE: 139 MMHG | OXYGEN SATURATION: 100 % | DIASTOLIC BLOOD PRESSURE: 66 MMHG | TEMPERATURE: 96.4 F | RESPIRATION RATE: 12 BRPM | HEIGHT: 66 IN | HEART RATE: 73 BPM | BODY MASS INDEX: 26.74 KG/M2

## 2021-09-20 DIAGNOSIS — C50.211 MALIGNANT NEOPLASM OF UPPER-INNER QUADRANT OF RIGHT BREAST IN FEMALE, ESTROGEN RECEPTOR POSITIVE (HCC): Primary | ICD-10-CM

## 2021-09-20 DIAGNOSIS — Z17.0 MALIGNANT NEOPLASM OF UPPER-INNER QUADRANT OF RIGHT BREAST IN FEMALE, ESTROGEN RECEPTOR POSITIVE (HCC): Primary | ICD-10-CM

## 2021-09-20 PROCEDURE — 96417 CHEMO IV INFUS EACH ADDL SEQ: CPT

## 2021-09-20 PROCEDURE — 96367 TX/PROPH/DG ADDL SEQ IV INF: CPT

## 2021-09-20 PROCEDURE — 96413 CHEMO IV INFUSION 1 HR: CPT

## 2021-09-20 RX ORDER — SODIUM CHLORIDE 9 MG/ML
20 INJECTION, SOLUTION INTRAVENOUS ONCE
Status: CANCELLED | OUTPATIENT
Start: 2021-10-04

## 2021-09-20 RX ORDER — MELATONIN
1000 DAILY
COMMUNITY

## 2021-09-20 RX ORDER — SODIUM CHLORIDE 9 MG/ML
20 INJECTION, SOLUTION INTRAVENOUS ONCE
Status: CANCELLED | OUTPATIENT
Start: 2021-09-27

## 2021-09-20 RX ORDER — SODIUM CHLORIDE 9 MG/ML
20 INJECTION, SOLUTION INTRAVENOUS ONCE
Status: COMPLETED | OUTPATIENT
Start: 2021-09-20 | End: 2021-09-20

## 2021-09-20 RX ORDER — SODIUM CHLORIDE 9 MG/ML
20 INJECTION, SOLUTION INTRAVENOUS ONCE
Status: CANCELLED | OUTPATIENT
Start: 2021-10-11

## 2021-09-20 RX ADMIN — FAMOTIDINE 20 MG: 10 INJECTION INTRAVENOUS at 10:04

## 2021-09-20 RX ADMIN — DIPHENHYDRAMINE HYDROCHLORIDE 25 MG: 50 INJECTION, SOLUTION INTRAMUSCULAR; INTRAVENOUS at 09:39

## 2021-09-20 RX ADMIN — DEXAMETHASONE SODIUM PHOSPHATE 10 MG: 10 INJECTION, SOLUTION INTRAMUSCULAR; INTRAVENOUS at 09:14

## 2021-09-20 RX ADMIN — SODIUM CHLORIDE 20 ML/HR: 0.9 INJECTION, SOLUTION INTRAVENOUS at 09:13

## 2021-09-20 RX ADMIN — TRASTUZUMAB 150 MG: 150 INJECTION, POWDER, LYOPHILIZED, FOR SOLUTION INTRAVENOUS at 12:12

## 2021-09-20 RX ADMIN — PACLITAXEL 143.4 MG: 6 INJECTION, SOLUTION INTRAVENOUS at 11:02

## 2021-09-20 NOTE — PROGRESS NOTES
Pt tolerated tx well with no adverse reactions  Paxman cooling complete  PIV removed  Next appts scheduled  Pt left ambulatory with steady gait for d/c to home

## 2021-09-24 ENCOUNTER — APPOINTMENT (OUTPATIENT)
Dept: LAB | Facility: HOSPITAL | Age: 26
End: 2021-09-24
Attending: INTERNAL MEDICINE
Payer: COMMERCIAL

## 2021-09-24 ENCOUNTER — OFFICE VISIT (OUTPATIENT)
Dept: HEMATOLOGY ONCOLOGY | Facility: CLINIC | Age: 26
End: 2021-09-24
Payer: COMMERCIAL

## 2021-09-24 VITALS
SYSTOLIC BLOOD PRESSURE: 126 MMHG | BODY MASS INDEX: 29.23 KG/M2 | OXYGEN SATURATION: 99 % | TEMPERATURE: 97.5 F | HEART RATE: 83 BPM | RESPIRATION RATE: 16 BRPM | HEIGHT: 63 IN | WEIGHT: 165 LBS | DIASTOLIC BLOOD PRESSURE: 78 MMHG

## 2021-09-24 DIAGNOSIS — Z17.0 MALIGNANT NEOPLASM OF UPPER-INNER QUADRANT OF RIGHT BREAST IN FEMALE, ESTROGEN RECEPTOR POSITIVE (HCC): ICD-10-CM

## 2021-09-24 DIAGNOSIS — C50.211 MALIGNANT NEOPLASM OF UPPER-INNER QUADRANT OF RIGHT BREAST IN FEMALE, ESTROGEN RECEPTOR POSITIVE (HCC): ICD-10-CM

## 2021-09-24 DIAGNOSIS — C43.59 MALIGNANT MELANOMA OF SKIN OF ABDOMEN (HCC): Primary | ICD-10-CM

## 2021-09-24 LAB
BASOPHILS # BLD AUTO: 0 THOUSANDS/ΜL (ref 0–0.1)
BASOPHILS NFR BLD AUTO: 0 % (ref 0–1)
EOSINOPHIL # BLD AUTO: 0.06 THOUSAND/ΜL (ref 0–0.61)
EOSINOPHIL NFR BLD AUTO: 1 % (ref 0–6)
ERYTHROCYTE [DISTWIDTH] IN BLOOD BY AUTOMATED COUNT: 13.1 % (ref 11.6–15.1)
HCT VFR BLD AUTO: 39.5 % (ref 34.8–46.1)
HGB BLD-MCNC: 13 G/DL (ref 11.5–15.4)
IMM GRANULOCYTES # BLD AUTO: 0.01 THOUSAND/UL (ref 0–0.2)
IMM GRANULOCYTES NFR BLD AUTO: 0 % (ref 0–2)
LYMPHOCYTES # BLD AUTO: 1.66 THOUSANDS/ΜL (ref 0.6–4.47)
LYMPHOCYTES NFR BLD AUTO: 38 % (ref 14–44)
MCH RBC QN AUTO: 28.7 PG (ref 26.8–34.3)
MCHC RBC AUTO-ENTMCNC: 32.9 G/DL (ref 31.4–37.4)
MCV RBC AUTO: 87 FL (ref 82–98)
MONOCYTES # BLD AUTO: 0.24 THOUSAND/ΜL (ref 0.17–1.22)
MONOCYTES NFR BLD AUTO: 5 % (ref 4–12)
NEUTROPHILS # BLD AUTO: 2.46 THOUSANDS/ΜL (ref 1.85–7.62)
NEUTS SEG NFR BLD AUTO: 56 % (ref 43–75)
NRBC BLD AUTO-RTO: 0 /100 WBCS
PLATELET # BLD AUTO: 338 THOUSANDS/UL (ref 149–390)
PMV BLD AUTO: 10.1 FL (ref 8.9–12.7)
RBC # BLD AUTO: 4.53 MILLION/UL (ref 3.81–5.12)
WBC # BLD AUTO: 4.43 THOUSAND/UL (ref 4.31–10.16)

## 2021-09-24 PROCEDURE — 85025 COMPLETE CBC W/AUTO DIFF WBC: CPT

## 2021-09-24 PROCEDURE — 36415 COLL VENOUS BLD VENIPUNCTURE: CPT

## 2021-09-24 PROCEDURE — 3008F BODY MASS INDEX DOCD: CPT | Performed by: INTERNAL MEDICINE

## 2021-09-24 PROCEDURE — 99214 OFFICE O/P EST MOD 30 MIN: CPT | Performed by: INTERNAL MEDICINE

## 2021-09-24 PROCEDURE — 1036F TOBACCO NON-USER: CPT | Performed by: INTERNAL MEDICINE

## 2021-09-24 RX ORDER — LANOLIN ALCOHOL/MO/W.PET/CERES
1000 CREAM (GRAM) TOPICAL DAILY
COMMUNITY
End: 2022-06-23

## 2021-09-24 NOTE — PROGRESS NOTES
Hematology/Oncology Progress Note    Date of Service: 9/24/2021    [address] [facility]  [address] [address]    Hem/Onc Problem List:   1  Right breast invasive ductal carcinoma, triple positive, grade 3 ,  pT1c pN0, stage IA    Chief Complaint:         Routine follow-up for management of breast cancer    Assessment/Plan:     1  Right breast invasive ductal carcinoma, grade 3, ER 90%, MO 70%, HER2 positive by IHC  Genetic testing negative  Status post right breast lumpectomy with sentinel lymph node biopsy on  July 19, 2021  Pathology showed invasive mammary carcinoma of no special type  The tumor measures 1 8 cm  Grade 3  All margins negative  All lymph nodes negative  Grade 2 DCIS present  No evidence of lymphovascular invasion  Final pathologic stage [id number]  Overall, the patient tolerated the procedure very well  ECHO showed EF 65%  Patient had a 2nd opinion from [facility]  Dr Rod Barnett recommended adjuvant TH weekly for 3 months followed by total of 1 year of maintenance Herceptin target therapy  Patient agrees with the plan  The patient agreed using cold cap to preserve her hair  The cold cap device is available in our [facility]  She also can have a wig from [employer] if she has significant hair lose  Patient has a formal chemo medication  The patient also has oocytes cryopreservation done on September 3, 2020  Patient started adjuvant chemotherapy with TH on September 13, 2021  Patient also used cold cap to preserve her hair  She did fairly well without significant side effect  Patient will continue current treatment plan  Labs before every cycle of chemo  Patient also will need adjuvant radiation therapy after chemotherapy as per NCCN guideline    Patient will consult radiation oncologist to discuss the rationale, risks and benefits of adjuvant radiation therapy  Patient will start endocrine therapy with AI +  Zoladex or [place] + Zoladex because of high risk ER/VA positive disease  Patient agreed AI+Lupron to avoid the risk of endometrial cancer  Endocrine therapy to be started after radiation therapy  2    Genetic predisposition:   Invitae breast cancer panel negative     3    History of palpitation  Echo study showed normal LV function, LV EF 65%  4    Fertility  Preservation:  Patient consulted Reproductive Medicine  Oocytes cryopreservation with 24 eggs saved was done on September 3, 2021     5   History of melanoma  Status post wide local excision  No evidence of local recurrence  5   Follow-up: Return to clinic in 3 weeks  Disclaimer: This document was prepared using [employer] Direct technology  If a word or phrase is confusing, or does not make sense, this is likely due to recognition error which was not discovered during the providers review  If you believe an error has occurred, please Contact me through [employer] service for toya? cation  Pain Score and Plan:     0    Hematology/Oncology History:   · History of skin malignant lymphoma, status post wide local excision  · May 2021, patient palpated a lump in the right breast at upper inner quadrant  · June 23, 2021 , Mammogram and ultrasound in [facility] showed an irregular complex cystic mass with microlobulated margins measuring 1 5 x 1 3 x 1 4 cm  No discrete nodes are found in the right axilla  · June 24, 2021, patient underwent ultrasound guided core biopsy of the right breast mass: Pathology showed Grade 3 invasive ductal carcinoma, positive for carcinoma in-situ component, positive perineural remission  Negative for lymphovascular invasion  ER 90%, VA 70%, HER2 positive by IHC  · 06/30/2021, patient consulted Dr Gerry Joshua  Staging CT scan and bone scan ordered  Patient was refered to genetic counseling    · July 1, 2021 CT scan chest/abdomen/ pelvis with contrast for staging showed bilateral ovarian cysts  with left adnexal cyst measures 4 2 x 4 4 x 3 3 cm, and right adnexal cyst measures 4 3 x 3 0 x 3 5 cm  No evidence of metastatic disease in C/ A/P   · MRI of bilateral breast in July 1, 2021  redemonstrated the 1:00 O'Clock  biopsy-proven breast cancer  No axillary adenopathy  No extension to the chest wall  No concurrent lesions in either breast   · July 2, 2021, bone scan showed no evidence of metastatic disease  · This case was discussed in our multidisciplinary breast cancer tumor board with recommendation of surgery followed by adjuvant chemotherapy  · July 9, 2021, echo study showed normal left ventricle ejection fraction 65%  · July 12, 2021, genetic test: Invitae breast cancer panel negative  · July 19, 2021, patient underwent right breast lumpectomy with sentinel lymph node biopsy  Pathology showed invasive mammary carcinoma of no special type( ductal NST)  The tumor measures 18 mm, grade 3  All margins negative with closest margin 8 mm from tumor involving the posterior margin  5 lymph nodes negative for metastatic disease  Grade 2 DCIS present  No evidence of lymphovascular invasion  Final pathologic stage [id number],   · August 17, 2021, patient consulted Dr Yuridia Garcia from [facility] with recommendation of [facility]  · August 19 2021, patient consulted Reproductive Medicine and started oocytes cryopreservation,  completed on September 3, 2021 with 24  Eggs saved  · September 13, 2021, patient started adjuvant chemotherapy with TH    History of Present Illiness:   Domonique Barrera is a 31 y o  female with the above-noted HemOnc history who is here   for midcycle follow-up     patient with triple positive right breast cancer, status post lumpectomy on July 19, 2021  [id number]  Patient had Oocytes  crypreservation on September 3, 2021    Patient started adjuvant chemotherapy with TH on September 13, 2021  She also used cold cap to preserve her hair  Patient did fairly well  Patient already discussed oocytes cryopreservation with Reproductive Medicine in [place]  She currently takes   Letrozole 5 mg once daily  She will have eggs harvesting in the 1st week of September  Patient feels good  She tolerated the chemotherapy very well  Patient denies fever or chills  No chest pain or shortness breast   No cough or phlegm  No GI or  symptoms  Appetite good  No significant weight loss   or weight gain  The patient is not interested in MediPort placement at this time  ROS: A 12-point of review of systems is obtained and other than the above is noncontributory  Objective:   VITALS:   There were no vitals taken for this visit  Physical EXAM:  General:  Alert, cooperative, no distress, appears stated age  Head:  Normocephalic, without obvious abnormality  Eyes:  Conjunctivae/corneas clear  No evidence of conjunctivitis     Throat:  no gum bleeding  No nose bleeding  Neck: Supple, symmetrical, trachea midline, no adenopathy    Lungs:   Clear to auscultation bilaterally  Respiratory effort easy, nonlabored    Heart:  Regular rate and rhythm, S1, S2 normal, no murmur  Abdomen:   Soft, non-tender,nondistended  Bowel sounds normal  No masses,  No organomegaly  Extremities: Extremities normal, atraumatic, no cyanosis or edema  No axillary or inguinal adenopathy   Skin: Skin color, texture, turgor normal  No rashes or lesions    Neurologic: A&Ox4   No focal neuro deficits       No Known Allergies    Past Medical History:   Diagnosis Date    [facility]     Cancer ([facility] )     Melanoma ([facility] )        Past Surgical History:   Procedure Laterality Date    BREAST BIOPSY      BREAST LUMPECTOMY Right 7/19/2021    Procedure: LYNDSAY  DIRECTED LUMPECTOMY;  Surgeon: Gerardo Salcedo MD;  Location[facility];  Service: Surgical Oncology    LYMPH NODE BIOPSY Right 7/19/2021 Procedure: LYMPHATIC MAPPING WITH BLUE AND RADIOACTIVE DYES, SENTINEL LYMPH NODE BIOPSY, injection at 1030;  Surgeon: Octavio Brunner MD;  Location: [facility];  Service: Surgical Oncology    SKIN CANCER EXCISION      TONSILLECTOMY AND ADENOIDECTOMY      WISDOM TOOTH EXTRACTION         Family History   Problem Relation Age of Onset    Kidney cancer Maternal Grandfather     Breast cancer Maternal Aunt     Prostate cancer Father        Social History     Socioeconomic History    Marital status: Single     Spouse name: Not on file    Number of children: Not on file    Years of education: Not on file    Highest education level: Not on file   Occupational History    Not on file   Tobacco Use    Smoking status: Never Smoker    Smokeless tobacco: Never Used   Vaping Use    Vaping Use: Never used   Substance and Sexual Activity    Alcohol use: Yes     Comment: Socially    Drug use: No    Sexual activity: Yes   Other Topics Concern    Not on file   Social History Narrative    Not on file     Social Determinants of Health     Financial Resource Strain:     Difficulty of Paying Living Expenses:    Food Insecurity:     Worried About Running Out of Food in the Last Year:     Ran Out of Food in the Last Year:    Transportation Needs:     Lack of Transportation (Medical):      Lack of Transportation (Non-Medical):    Physical Activity:     Days of Exercise per Week:     Minutes of Exercise per Session:    Stress:     Feeling of Stress :    Social Connections:     Frequency of Communication with Friends and Family:     Frequency of Social Gatherings with Friends and Family:     Attends Buddhism Services:     Active Member of Clubs or Organizations:     Attends Club or Organization Meetings:     Marital Status:    Intimate Partner Violence:     Fear of Current or Ex-Partner:     Emotionally Abused:     Physically Abused:     Sexually Abused:        Current Outpatient Medications   Medication Sig Dispense Refill    cholecalciferol (VITAMIN D3) 1,000 units tablet Take 1,000 Units by mouth daily      lidocaine-prilocaine (EMLA) cream Apply topically as needed for mild pain (Patient not taking: Reported on 8/2/2021) 30 g 0    Norethin Ace-Eth Estrad-FE (MINASTRIN 24 FE) 1-20 MG-MCG(24) CHEW Chew 1 tablet  (Patient not taking: Reported on 8/2/2021)      ondansetron (ZOFRAN) 4 mg tablet Take 1 tablet (4 mg total) by mouth every 8 (eight) hours as needed for nausea or vomiting (Patient not taking: Reported on 8/2/2021) 20 tablet 0    oxyCODONE-acetaminophen (PERCOCET) 5-325 mg per tablet Take 1 tablet by mouth every 6 (six) hours as needed for moderate painMax Daily Amount: 4 tablets (Patient not taking: Reported on 8/2/2021) 20 tablet 0    promethazine (PHENERGAN) 25 mg tablet Take 1 tablet (25 mg total) by mouth every 6 (six) hours as needed for nausea or vomiting (Patient not taking: Reported on 8/2/2021) 60 tablet 2     No current facility-administered medications for this visit  (Not in a hospital admission)      DATA REVIEW:    Pathology Result:    Final Diagnosis   Date Value Ref Range Status   07/19/2021   Final    A  Right breast (lumpectomy):     - Invasive mammary carcinoma of no special type (ductal NST)  - Tumor size: 18 mm  Tumor ndgndrndanddndend:nd nd2nd of 3       - Closest surgical margin: posterior (8 mm from tumor)  Comment:  ER / [place] / Her-2 pending  B  [place] lymph node #1, right axilla (excision):      - One (1) lymph node, negative for carcinoma  C  Adipose tissue, right axilla (excision):     - Four (4) lymph nodes, negative for carcinoma  D  Superior margin, right breast (excision):     - Benign fibroadipose tissue        - Superior margin negative for carcinoma  E   Medial margin, right breast (excision):      - Benign fibroadipose tissue        - Medial margin negative for carcinoma  F   Inferior margin, right breast (excision):      - Benign fibroadipose tissue and scant benign breast tissue  - Inferior margin negative for carcinoma  G  Lateral margin, right breast (excision):     - Benign fibroadipose tissue        - Lateral margin negative for carcinoma  Comment: This is an appended report  These results have been appended to a previously preliminary verified report  07/01/2020   Final    A  Skin, right lower back, shave biopsy:    COMPOUND NEVUS; extending to the tissue edges  02/05/2019   Final    A  Skin, Left Forearm, excision:  - Early scar and residual Spitz nevus, peripheral and deep margins uninvolved  See Note  Interpretation performed at [facility], [address] [place] [address]      01/14/2019   Final    A  Skin, Arm, Left, shave biopsy:  - Spitz's nevus; see note  Note:  Sections show a fairly circumscribed and mostly nested melanocytic proliferation along the basal layer  The cells are spindled and epithelioid, with fairly uniform cytology  Occasional Kamino bodies are also seen  P16 immunostain is positive in the lesional cells  On section planes studied, lesional cells are close to bilateral tissue edges  If this is a portion of a larger clinical lesion, complete removal to preclude recurrence would be prudent  Clinical correlation is recommended  Interpretation performed at [facility], [address] [place] [address], [address]       10/01/2018   Final    A  Skin, abdomen, excision:  - Prior biopsy site reaction, no residual melanoma seen  - Inked margins with no tumor seen  Interpretation performed at [facility], [address] [place] [address], [address]           09/06/2018   Final    A  Skin Abdominal, abdomen, shave biopsy:  - Malignant melanoma in situ with small focus suspicious for early invasive melanoma, 0 4 mm thickness,     extending to peripheral margins       MELANOMA OF SKIN TUMOR STAGING SUMMARY  1  Specimen identification:     - Procedure: Shave biopsy       - Laterality: Not specified  2  Tumor     - Tumor Site: Abdomen      - Macroscopic satellite nodule(s) (invasive tumor only): Not identified  - Histologic type:       -- Invasive Melanoma:  Superficial spreading melanoma  -- Melanoma in-situ (anatomic level I): N/A      - Maximum tumor (Breslow) thickness (pT1a)(invasive tumor only): 0 4 mm       - Anatomic (Sukhdeep) level (invasive tumor only): II       - Ulceration (invasive tumor only): Not identified  3  Margins:     - Peripheral margins: Involved by in-situ melanoma  - Deep margins (invasive tumor only): Uninvolved by invasive melanoma by 1 3 mm     4  Mitotic Rate (specify number / mm2)(invasive tumor only): 0/mm2    5  Microsatellite(s) (invasive tumor only): Not identified  6  Lymphovascular invasion (invasive tumor only): Not identified  7  Neurotropism (invasive tumor only): Not identified  8  Tumor-infiltrating lymphocytes (invasive tumor only): Present, brisk  9  Tumor regression (excision, if present < or more than 75%): Not identified  10  Growth Phase: Radial growth phase  11: Ancillary Studies:  None  -- Best representative tumor block: N/A (one block)  12  Additional pathologic findings:       -- Associated Nevus:  Cannot exclude dermal nevus  -- Other (specify): Epithelioid cell type  13  AJCC 8th Ed  Pathologic Stage Classification:  at least Stage  IA- pT1a, pNX  Interpretation performed at [place] [address]  Image Results: They are reviewed and documented in Hematology/Oncology history    NM lymphatic breast  Narrative: SENTINEL NODE LYMPHOSCINTIGRAPHY    INDICATION: Right breast carcinoma    FINDINGS:    0 548 mCi Tc-99m sulfur colloid (0 6 cc volume) was administered in divided doses by Dr Viri Castellon in the right biopsy site and periareolar region   Scintigraphic images were obtained over the right hemithorax and axilla in multiple projections  After 15   minutes, a node was identified  Using scintigraphic guidance, the corresponding skin site was marked with an indelible marker  The patient was transferred to the operating room in satisfactory condition  Impression: [place] lymph node localized to right axilla  Workstation performed: [id number]        LABS:  Lab data are reviewed and documented in HemOnc history  No results found for this or any previous visit (from the past 48 hour(s))            Gasper Stone MD  9/24/2021, 7:12 AM

## 2021-09-27 ENCOUNTER — PATIENT OUTREACH (OUTPATIENT)
Dept: HEMATOLOGY ONCOLOGY | Facility: CLINIC | Age: 26
End: 2021-09-27

## 2021-09-27 ENCOUNTER — HOSPITAL ENCOUNTER (OUTPATIENT)
Dept: INFUSION CENTER | Facility: HOSPITAL | Age: 26
Discharge: HOME | End: 2021-09-27
Attending: INTERNAL MEDICINE
Payer: COMMERCIAL

## 2021-09-27 VITALS
DIASTOLIC BLOOD PRESSURE: 75 MMHG | WEIGHT: 164.8 LBS | HEART RATE: 76 BPM | SYSTOLIC BLOOD PRESSURE: 118 MMHG | HEIGHT: 65 IN | BODY MASS INDEX: 27.46 KG/M2 | OXYGEN SATURATION: 99 % | TEMPERATURE: 96.8 F | RESPIRATION RATE: 14 BRPM

## 2021-09-27 DIAGNOSIS — Z17.0 MALIGNANT NEOPLASM OF UPPER-INNER QUADRANT OF RIGHT BREAST IN FEMALE, ESTROGEN RECEPTOR POSITIVE (HCC): Primary | ICD-10-CM

## 2021-09-27 DIAGNOSIS — C50.211 MALIGNANT NEOPLASM OF UPPER-INNER QUADRANT OF RIGHT BREAST IN FEMALE, ESTROGEN RECEPTOR POSITIVE (HCC): Primary | ICD-10-CM

## 2021-09-27 PROCEDURE — 96417 CHEMO IV INFUS EACH ADDL SEQ: CPT

## 2021-09-27 PROCEDURE — 96413 CHEMO IV INFUSION 1 HR: CPT

## 2021-09-27 PROCEDURE — 96367 TX/PROPH/DG ADDL SEQ IV INF: CPT

## 2021-09-27 RX ORDER — SODIUM CHLORIDE 9 MG/ML
20 INJECTION, SOLUTION INTRAVENOUS ONCE
Status: COMPLETED | OUTPATIENT
Start: 2021-09-27 | End: 2021-09-27

## 2021-09-27 RX ADMIN — FAMOTIDINE 20 MG: 10 INJECTION INTRAVENOUS at 09:17

## 2021-09-27 RX ADMIN — DIPHENHYDRAMINE HYDROCHLORIDE 25 MG: 50 INJECTION, SOLUTION INTRAMUSCULAR; INTRAVENOUS at 09:41

## 2021-09-27 RX ADMIN — PACLITAXEL 143.4 MG: 6 INJECTION, SOLUTION INTRAVENOUS at 10:44

## 2021-09-27 RX ADMIN — DEXAMETHASONE SODIUM PHOSPHATE 10 MG: 10 INJECTION, SOLUTION INTRAMUSCULAR; INTRAVENOUS at 08:51

## 2021-09-27 RX ADMIN — TRASTUZUMAB 150 MG: 150 INJECTION, POWDER, LYOPHILIZED, FOR SOLUTION INTRAVENOUS at 11:47

## 2021-09-27 RX ADMIN — SODIUM CHLORIDE 20 ML/HR: 0.9 INJECTION, SOLUTION INTRAVENOUS at 08:48

## 2021-09-27 NOTE — PROGRESS NOTES
Pt tolerated chemo treatment with no adverse reaction  Pt left unit ambulatory with steady gait   Pt refused AVS

## 2021-09-27 NOTE — PROGRESS NOTES
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

## 2021-09-28 ENCOUNTER — TELEPHONE (OUTPATIENT)
Dept: HEMATOLOGY ONCOLOGY | Facility: CLINIC | Age: 26
End: 2021-09-28

## 2021-09-28 NOTE — TELEPHONE ENCOUNTER
I phoned the patient and discussed with her that her appointment with Dr Cherry Hsu scheduled on 10/22 would need to be rescheduled to Monday 10/25 at [place] [address] [facility], as Dr Cherry Hsu will not be in the office on 10/22  The patient was agreeable to this appointment change

## 2021-09-29 ENCOUNTER — TELEPHONE (OUTPATIENT)
Dept: SURGICAL ONCOLOGY | Facility: CLINIC | Age: 26
End: 2021-09-29

## 2021-09-29 NOTE — TELEPHONE ENCOUNTER
Please call Kimberlee Calderon from [employer] because she requires additional information on a form for a william that was sent to her from Dr Ashleigh Castillo  Please call her at [phone]

## 2021-09-29 NOTE — TELEPHONE ENCOUNTER
I phoned Warren Larry from [employer] to provide the necessary information requested  We discussed the required information (chemo meds, duration of treatment) and this was provided in order to complete the form

## 2021-09-30 ENCOUNTER — APPOINTMENT (OUTPATIENT)
Dept: LAB | Facility: HOSPITAL | Age: 26
End: 2021-09-30
Attending: INTERNAL MEDICINE
Payer: COMMERCIAL

## 2021-09-30 DIAGNOSIS — C50.211 MALIGNANT NEOPLASM OF UPPER-INNER QUADRANT OF RIGHT BREAST IN FEMALE, ESTROGEN RECEPTOR POSITIVE (HCC): ICD-10-CM

## 2021-09-30 DIAGNOSIS — Z17.0 MALIGNANT NEOPLASM OF UPPER-INNER QUADRANT OF RIGHT BREAST IN FEMALE, ESTROGEN RECEPTOR POSITIVE (HCC): ICD-10-CM

## 2021-09-30 LAB
ALBUMIN SERPL BCP-MCNC: 4.2 G/DL (ref 3.5–5)
ALP SERPL-CCNC: 78 U/L (ref 46–116)
ALT SERPL W P-5'-P-CCNC: 40 U/L (ref 12–78)
ANION GAP SERPL CALCULATED.3IONS-SCNC: 5 MMOL/L (ref 4–13)
AST SERPL W P-5'-P-CCNC: 21 U/L (ref 5–45)
BASOPHILS # BLD AUTO: 0.01 THOUSANDS/ΜL (ref 0–0.1)
BASOPHILS NFR BLD AUTO: 0 % (ref 0–1)
BILIRUB SERPL-MCNC: 0.38 MG/DL (ref 0.2–1)
BUN SERPL-MCNC: 9 MG/DL (ref 5–25)
CALCIUM SERPL-MCNC: 8.8 MG/DL (ref 8.3–10.1)
CHLORIDE SERPL-SCNC: 101 MMOL/L (ref 100–108)
CO2 SERPL-SCNC: 29 MMOL/L (ref 21–32)
CREAT SERPL-MCNC: 0.65 MG/DL (ref 0.6–1.3)
EOSINOPHIL # BLD AUTO: 0.04 THOUSAND/ΜL (ref 0–0.61)
EOSINOPHIL NFR BLD AUTO: 1 % (ref 0–6)
ERYTHROCYTE [DISTWIDTH] IN BLOOD BY AUTOMATED COUNT: 13.1 % (ref 11.6–15.1)
GFR SERPL CREATININE-BSD FRML MDRD: 123 ML/MIN/1.73SQ M
GLUCOSE SERPL-MCNC: 113 MG/DL (ref 65–140)
HCT VFR BLD AUTO: 39 % (ref 34.8–46.1)
HGB BLD-MCNC: 12.6 G/DL (ref 11.5–15.4)
IMM GRANULOCYTES # BLD AUTO: 0.03 THOUSAND/UL (ref 0–0.2)
IMM GRANULOCYTES NFR BLD AUTO: 1 % (ref 0–2)
LYMPHOCYTES # BLD AUTO: 1.63 THOUSANDS/ΜL (ref 0.6–4.47)
LYMPHOCYTES NFR BLD AUTO: 28 % (ref 14–44)
MCH RBC QN AUTO: 28.8 PG (ref 26.8–34.3)
MCHC RBC AUTO-ENTMCNC: 32.3 G/DL (ref 31.4–37.4)
MCV RBC AUTO: 89 FL (ref 82–98)
MONOCYTES # BLD AUTO: 0.35 THOUSAND/ΜL (ref 0.17–1.22)
MONOCYTES NFR BLD AUTO: 6 % (ref 4–12)
NEUTROPHILS # BLD AUTO: 3.7 THOUSANDS/ΜL (ref 1.85–7.62)
NEUTS SEG NFR BLD AUTO: 64 % (ref 43–75)
NRBC BLD AUTO-RTO: 0 /100 WBCS
PLATELET # BLD AUTO: 321 THOUSANDS/UL (ref 149–390)
PMV BLD AUTO: 10.2 FL (ref 8.9–12.7)
POTASSIUM SERPL-SCNC: 3.6 MMOL/L (ref 3.5–5.3)
PROT SERPL-MCNC: 7.7 G/DL (ref 6.4–8.2)
RBC # BLD AUTO: 4.37 MILLION/UL (ref 3.81–5.12)
SODIUM SERPL-SCNC: 135 MMOL/L (ref 136–145)
WBC # BLD AUTO: 5.76 THOUSAND/UL (ref 4.31–10.16)

## 2021-09-30 PROCEDURE — 36415 COLL VENOUS BLD VENIPUNCTURE: CPT

## 2021-09-30 PROCEDURE — 85025 COMPLETE CBC W/AUTO DIFF WBC: CPT

## 2021-09-30 PROCEDURE — 80053 COMPREHEN METABOLIC PANEL: CPT

## 2021-10-01 ENCOUNTER — TELEPHONE (OUTPATIENT)
Dept: HEMATOLOGY ONCOLOGY | Facility: CLINIC | Age: 26
End: 2021-10-01

## 2021-10-04 ENCOUNTER — PATIENT OUTREACH (OUTPATIENT)
Dept: HEMATOLOGY ONCOLOGY | Facility: CLINIC | Age: 26
End: 2021-10-04

## 2021-10-04 ENCOUNTER — HOSPITAL ENCOUNTER (OUTPATIENT)
Dept: INFUSION CENTER | Facility: HOSPITAL | Age: 26
Discharge: HOME | End: 2021-10-04
Attending: INTERNAL MEDICINE
Payer: COMMERCIAL

## 2021-10-04 VITALS
HEART RATE: 77 BPM | TEMPERATURE: 96.9 F | WEIGHT: 166.01 LBS | BODY MASS INDEX: 27.66 KG/M2 | SYSTOLIC BLOOD PRESSURE: 108 MMHG | RESPIRATION RATE: 14 BRPM | OXYGEN SATURATION: 100 % | HEIGHT: 65 IN | DIASTOLIC BLOOD PRESSURE: 60 MMHG

## 2021-10-04 DIAGNOSIS — C50.211 MALIGNANT NEOPLASM OF UPPER-INNER QUADRANT OF RIGHT BREAST IN FEMALE, ESTROGEN RECEPTOR POSITIVE (HCC): Primary | ICD-10-CM

## 2021-10-04 DIAGNOSIS — Z17.0 MALIGNANT NEOPLASM OF UPPER-INNER QUADRANT OF RIGHT BREAST IN FEMALE, ESTROGEN RECEPTOR POSITIVE (HCC): Primary | ICD-10-CM

## 2021-10-04 PROCEDURE — 96413 CHEMO IV INFUSION 1 HR: CPT

## 2021-10-04 PROCEDURE — 96367 TX/PROPH/DG ADDL SEQ IV INF: CPT

## 2021-10-04 PROCEDURE — 96417 CHEMO IV INFUS EACH ADDL SEQ: CPT

## 2021-10-04 RX ORDER — SODIUM CHLORIDE 9 MG/ML
20 INJECTION, SOLUTION INTRAVENOUS ONCE
Status: COMPLETED | OUTPATIENT
Start: 2021-10-04 | End: 2021-10-04

## 2021-10-04 RX ADMIN — PACLITAXEL 143.4 MG: 6 INJECTION, SOLUTION INTRAVENOUS at 11:28

## 2021-10-04 RX ADMIN — FAMOTIDINE 20 MG: 10 INJECTION INTRAVENOUS at 10:08

## 2021-10-04 RX ADMIN — SODIUM CHLORIDE 20 ML/HR: 0.9 INJECTION, SOLUTION INTRAVENOUS at 09:14

## 2021-10-04 RX ADMIN — TRASTUZUMAB 150 MG: 150 INJECTION, POWDER, LYOPHILIZED, FOR SOLUTION INTRAVENOUS at 12:32

## 2021-10-04 RX ADMIN — DEXAMETHASONE SODIUM PHOSPHATE 10 MG: 10 INJECTION, SOLUTION INTRAMUSCULAR; INTRAVENOUS at 09:15

## 2021-10-04 RX ADMIN — DIPHENHYDRAMINE HYDROCHLORIDE 25 MG: 50 INJECTION, SOLUTION INTRAMUSCULAR; INTRAVENOUS at 09:34

## 2021-10-07 ENCOUNTER — APPOINTMENT (OUTPATIENT)
Dept: LAB | Facility: HOSPITAL | Age: 26
End: 2021-10-07
Attending: INTERNAL MEDICINE
Payer: COMMERCIAL

## 2021-10-07 ENCOUNTER — TELEPHONE (OUTPATIENT)
Dept: HEMATOLOGY ONCOLOGY | Facility: CLINIC | Age: 26
End: 2021-10-07

## 2021-10-07 DIAGNOSIS — C50.211 MALIGNANT NEOPLASM OF UPPER-INNER QUADRANT OF RIGHT BREAST IN FEMALE, ESTROGEN RECEPTOR POSITIVE (HCC): ICD-10-CM

## 2021-10-07 DIAGNOSIS — Z17.0 MALIGNANT NEOPLASM OF UPPER-INNER QUADRANT OF RIGHT BREAST IN FEMALE, ESTROGEN RECEPTOR POSITIVE (HCC): ICD-10-CM

## 2021-10-07 LAB
BASOPHILS # BLD AUTO: 0.02 THOUSANDS/ΜL (ref 0–0.1)
BASOPHILS NFR BLD AUTO: 0 % (ref 0–1)
EOSINOPHIL # BLD AUTO: 0.06 THOUSAND/ΜL (ref 0–0.61)
EOSINOPHIL NFR BLD AUTO: 1 % (ref 0–6)
ERYTHROCYTE [DISTWIDTH] IN BLOOD BY AUTOMATED COUNT: 13.2 % (ref 11.6–15.1)
HCT VFR BLD AUTO: 35 % (ref 34.8–46.1)
HGB BLD-MCNC: 11.6 G/DL (ref 11.5–15.4)
IMM GRANULOCYTES # BLD AUTO: 0.01 THOUSAND/UL (ref 0–0.2)
IMM GRANULOCYTES NFR BLD AUTO: 0 % (ref 0–2)
LYMPHOCYTES # BLD AUTO: 1.92 THOUSANDS/ΜL (ref 0.6–4.47)
LYMPHOCYTES NFR BLD AUTO: 38 % (ref 14–44)
MCH RBC QN AUTO: 29.6 PG (ref 26.8–34.3)
MCHC RBC AUTO-ENTMCNC: 33.1 G/DL (ref 31.4–37.4)
MCV RBC AUTO: 89 FL (ref 82–98)
MONOCYTES # BLD AUTO: 0.29 THOUSAND/ΜL (ref 0.17–1.22)
MONOCYTES NFR BLD AUTO: 6 % (ref 4–12)
NEUTROPHILS # BLD AUTO: 2.77 THOUSANDS/ΜL (ref 1.85–7.62)
NEUTS SEG NFR BLD AUTO: 55 % (ref 43–75)
NRBC BLD AUTO-RTO: 0 /100 WBCS
PLATELET # BLD AUTO: 311 THOUSANDS/UL (ref 149–390)
PMV BLD AUTO: 10.1 FL (ref 8.9–12.7)
RBC # BLD AUTO: 3.92 MILLION/UL (ref 3.81–5.12)
WBC # BLD AUTO: 5.07 THOUSAND/UL (ref 4.31–10.16)

## 2021-10-07 PROCEDURE — 85025 COMPLETE CBC W/AUTO DIFF WBC: CPT

## 2021-10-07 PROCEDURE — 36415 COLL VENOUS BLD VENIPUNCTURE: CPT

## 2021-10-08 ENCOUNTER — DOCUMENTATION (OUTPATIENT)
Dept: HEMATOLOGY ONCOLOGY | Facility: CLINIC | Age: 26
End: 2021-10-08

## 2021-10-11 ENCOUNTER — HOSPITAL ENCOUNTER (OUTPATIENT)
Dept: INFUSION CENTER | Facility: HOSPITAL | Age: 26
Discharge: HOME | End: 2021-10-11
Attending: INTERNAL MEDICINE
Payer: COMMERCIAL

## 2021-10-11 VITALS
TEMPERATURE: 97.1 F | BODY MASS INDEX: 27.99 KG/M2 | WEIGHT: 167.99 LBS | SYSTOLIC BLOOD PRESSURE: 115 MMHG | OXYGEN SATURATION: 99 % | RESPIRATION RATE: 14 BRPM | HEIGHT: 65 IN | HEART RATE: 77 BPM | DIASTOLIC BLOOD PRESSURE: 56 MMHG

## 2021-10-11 DIAGNOSIS — C50.211 MALIGNANT NEOPLASM OF UPPER-INNER QUADRANT OF RIGHT BREAST IN FEMALE, ESTROGEN RECEPTOR POSITIVE (HCC): Primary | ICD-10-CM

## 2021-10-11 DIAGNOSIS — Z17.0 MALIGNANT NEOPLASM OF UPPER-INNER QUADRANT OF RIGHT BREAST IN FEMALE, ESTROGEN RECEPTOR POSITIVE (HCC): Primary | ICD-10-CM

## 2021-10-11 PROCEDURE — 96417 CHEMO IV INFUS EACH ADDL SEQ: CPT

## 2021-10-11 PROCEDURE — 96367 TX/PROPH/DG ADDL SEQ IV INF: CPT

## 2021-10-11 PROCEDURE — 96413 CHEMO IV INFUSION 1 HR: CPT

## 2021-10-11 RX ORDER — SODIUM CHLORIDE 9 MG/ML
20 INJECTION, SOLUTION INTRAVENOUS ONCE
Status: COMPLETED | OUTPATIENT
Start: 2021-10-11 | End: 2021-10-11

## 2021-10-11 RX ORDER — SODIUM CHLORIDE 9 MG/ML
20 INJECTION, SOLUTION INTRAVENOUS ONCE
Status: CANCELLED | OUTPATIENT
Start: 2021-10-25

## 2021-10-11 RX ORDER — SODIUM CHLORIDE 9 MG/ML
20 INJECTION, SOLUTION INTRAVENOUS ONCE
Status: CANCELLED | OUTPATIENT
Start: 2021-11-01

## 2021-10-11 RX ADMIN — DIPHENHYDRAMINE HYDROCHLORIDE 25 MG: 50 INJECTION, SOLUTION INTRAMUSCULAR; INTRAVENOUS at 09:41

## 2021-10-11 RX ADMIN — PACLITAXEL 143.4 MG: 6 INJECTION, SOLUTION INTRAVENOUS at 10:40

## 2021-10-11 RX ADMIN — FAMOTIDINE 20 MG: 10 INJECTION INTRAVENOUS at 10:07

## 2021-10-11 RX ADMIN — DEXAMETHASONE SODIUM PHOSPHATE 10 MG: 10 INJECTION, SOLUTION INTRAMUSCULAR; INTRAVENOUS at 09:13

## 2021-10-11 RX ADMIN — TRASTUZUMAB 150 MG: 150 INJECTION, POWDER, LYOPHILIZED, FOR SOLUTION INTRAVENOUS at 11:58

## 2021-10-11 RX ADMIN — SODIUM CHLORIDE 20 ML/HR: 0.9 INJECTION, SOLUTION INTRAVENOUS at 09:11

## 2021-10-12 RX ORDER — SODIUM CHLORIDE 9 MG/ML
20 INJECTION, SOLUTION INTRAVENOUS ONCE
Status: CANCELLED | OUTPATIENT
Start: 2021-10-18

## 2021-10-15 ENCOUNTER — TELEPHONE (OUTPATIENT)
Dept: SURGICAL ONCOLOGY | Facility: CLINIC | Age: 26
End: 2021-10-15

## 2021-10-15 ENCOUNTER — APPOINTMENT (OUTPATIENT)
Dept: LAB | Facility: HOSPITAL | Age: 26
End: 2021-10-15
Attending: INTERNAL MEDICINE
Payer: COMMERCIAL

## 2021-10-15 ENCOUNTER — OFFICE VISIT (OUTPATIENT)
Dept: HEMATOLOGY ONCOLOGY | Facility: CLINIC | Age: 26
End: 2021-10-15
Payer: COMMERCIAL

## 2021-10-15 VITALS
SYSTOLIC BLOOD PRESSURE: 116 MMHG | DIASTOLIC BLOOD PRESSURE: 64 MMHG | RESPIRATION RATE: 16 BRPM | HEART RATE: 97 BPM | BODY MASS INDEX: 27.82 KG/M2 | TEMPERATURE: 98.4 F | HEIGHT: 65 IN | OXYGEN SATURATION: 98 % | WEIGHT: 167 LBS

## 2021-10-15 DIAGNOSIS — C50.211 MALIGNANT NEOPLASM OF UPPER-INNER QUADRANT OF RIGHT BREAST IN FEMALE, ESTROGEN RECEPTOR POSITIVE (HCC): Primary | ICD-10-CM

## 2021-10-15 DIAGNOSIS — Z17.0 MALIGNANT NEOPLASM OF UPPER-INNER QUADRANT OF RIGHT BREAST IN FEMALE, ESTROGEN RECEPTOR POSITIVE (HCC): ICD-10-CM

## 2021-10-15 DIAGNOSIS — C50.211 MALIGNANT NEOPLASM OF UPPER-INNER QUADRANT OF RIGHT BREAST IN FEMALE, ESTROGEN RECEPTOR POSITIVE (HCC): ICD-10-CM

## 2021-10-15 DIAGNOSIS — Z17.0 MALIGNANT NEOPLASM OF UPPER-INNER QUADRANT OF RIGHT BREAST IN FEMALE, ESTROGEN RECEPTOR POSITIVE (HCC): Primary | ICD-10-CM

## 2021-10-15 LAB
BASOPHILS # BLD AUTO: 0.02 THOUSANDS/ΜL (ref 0–0.1)
BASOPHILS NFR BLD AUTO: 0 % (ref 0–1)
EOSINOPHIL # BLD AUTO: 0.06 THOUSAND/ΜL (ref 0–0.61)
EOSINOPHIL NFR BLD AUTO: 1 % (ref 0–6)
ERYTHROCYTE [DISTWIDTH] IN BLOOD BY AUTOMATED COUNT: 13.3 % (ref 11.6–15.1)
HCT VFR BLD AUTO: 37.2 % (ref 34.8–46.1)
HGB BLD-MCNC: 12 G/DL (ref 11.5–15.4)
IMM GRANULOCYTES # BLD AUTO: 0.02 THOUSAND/UL (ref 0–0.2)
IMM GRANULOCYTES NFR BLD AUTO: 0 % (ref 0–2)
LYMPHOCYTES # BLD AUTO: 1.69 THOUSANDS/ΜL (ref 0.6–4.47)
LYMPHOCYTES NFR BLD AUTO: 35 % (ref 14–44)
MCH RBC QN AUTO: 28.8 PG (ref 26.8–34.3)
MCHC RBC AUTO-ENTMCNC: 32.3 G/DL (ref 31.4–37.4)
MCV RBC AUTO: 89 FL (ref 82–98)
MONOCYTES # BLD AUTO: 0.31 THOUSAND/ΜL (ref 0.17–1.22)
MONOCYTES NFR BLD AUTO: 7 % (ref 4–12)
NEUTROPHILS # BLD AUTO: 2.67 THOUSANDS/ΜL (ref 1.85–7.62)
NEUTS SEG NFR BLD AUTO: 57 % (ref 43–75)
NRBC BLD AUTO-RTO: 0 /100 WBCS
PLATELET # BLD AUTO: 327 THOUSANDS/UL (ref 149–390)
PMV BLD AUTO: 9.8 FL (ref 8.9–12.7)
RBC # BLD AUTO: 4.17 MILLION/UL (ref 3.81–5.12)
WBC # BLD AUTO: 4.77 THOUSAND/UL (ref 4.31–10.16)

## 2021-10-15 PROCEDURE — 36415 COLL VENOUS BLD VENIPUNCTURE: CPT

## 2021-10-15 PROCEDURE — 99214 OFFICE O/P EST MOD 30 MIN: CPT | Performed by: INTERNAL MEDICINE

## 2021-10-15 PROCEDURE — 85025 COMPLETE CBC W/AUTO DIFF WBC: CPT

## 2021-10-18 ENCOUNTER — HOSPITAL ENCOUNTER (OUTPATIENT)
Dept: INFUSION CENTER | Facility: HOSPITAL | Age: 26
Discharge: HOME | End: 2021-10-18
Attending: INTERNAL MEDICINE
Payer: COMMERCIAL

## 2021-10-18 VITALS
SYSTOLIC BLOOD PRESSURE: 131 MMHG | DIASTOLIC BLOOD PRESSURE: 84 MMHG | RESPIRATION RATE: 16 BRPM | WEIGHT: 164.9 LBS | HEART RATE: 73 BPM | BODY MASS INDEX: 27.47 KG/M2 | OXYGEN SATURATION: 100 % | TEMPERATURE: 97.3 F | HEIGHT: 65 IN

## 2021-10-18 DIAGNOSIS — Z17.0 MALIGNANT NEOPLASM OF UPPER-INNER QUADRANT OF RIGHT BREAST IN FEMALE, ESTROGEN RECEPTOR POSITIVE (HCC): Primary | ICD-10-CM

## 2021-10-18 DIAGNOSIS — C50.211 MALIGNANT NEOPLASM OF UPPER-INNER QUADRANT OF RIGHT BREAST IN FEMALE, ESTROGEN RECEPTOR POSITIVE (HCC): Primary | ICD-10-CM

## 2021-10-18 PROCEDURE — 96413 CHEMO IV INFUSION 1 HR: CPT

## 2021-10-18 PROCEDURE — 96417 CHEMO IV INFUS EACH ADDL SEQ: CPT

## 2021-10-18 PROCEDURE — 96367 TX/PROPH/DG ADDL SEQ IV INF: CPT

## 2021-10-18 RX ORDER — SODIUM CHLORIDE 9 MG/ML
20 INJECTION, SOLUTION INTRAVENOUS ONCE
Status: COMPLETED | OUTPATIENT
Start: 2021-10-18 | End: 2021-10-18

## 2021-10-18 RX ADMIN — PACLITAXEL 143.4 MG: 6 INJECTION, SOLUTION INTRAVENOUS at 10:34

## 2021-10-18 RX ADMIN — DEXAMETHASONE SODIUM PHOSPHATE 10 MG: 10 INJECTION, SOLUTION INTRAMUSCULAR; INTRAVENOUS at 09:05

## 2021-10-18 RX ADMIN — TRASTUZUMAB 150 MG: 150 INJECTION, POWDER, LYOPHILIZED, FOR SOLUTION INTRAVENOUS at 11:38

## 2021-10-18 RX ADMIN — SODIUM CHLORIDE 20 ML/HR: 0.9 INJECTION, SOLUTION INTRAVENOUS at 09:04

## 2021-10-18 RX ADMIN — DIPHENHYDRAMINE HYDROCHLORIDE 25 MG: 50 INJECTION, SOLUTION INTRAMUSCULAR; INTRAVENOUS at 09:28

## 2021-10-18 RX ADMIN — FAMOTIDINE 20 MG: 10 INJECTION INTRAVENOUS at 09:58

## 2021-10-19 ENCOUNTER — PATIENT OUTREACH (OUTPATIENT)
Dept: CASE MANAGEMENT | Facility: HOSPITAL | Age: 26
End: 2021-10-19

## 2021-10-21 ENCOUNTER — APPOINTMENT (OUTPATIENT)
Dept: LAB | Facility: HOSPITAL | Age: 26
End: 2021-10-21
Attending: INTERNAL MEDICINE
Payer: COMMERCIAL

## 2021-10-21 DIAGNOSIS — C50.211 MALIGNANT NEOPLASM OF UPPER-INNER QUADRANT OF RIGHT BREAST IN FEMALE, ESTROGEN RECEPTOR POSITIVE (HCC): ICD-10-CM

## 2021-10-21 DIAGNOSIS — Z17.0 MALIGNANT NEOPLASM OF UPPER-INNER QUADRANT OF RIGHT BREAST IN FEMALE, ESTROGEN RECEPTOR POSITIVE (HCC): ICD-10-CM

## 2021-10-21 LAB
ALBUMIN SERPL BCP-MCNC: 4 G/DL (ref 3.5–5)
ALP SERPL-CCNC: 70 U/L (ref 46–116)
ALT SERPL W P-5'-P-CCNC: 28 U/L (ref 12–78)
ANION GAP SERPL CALCULATED.3IONS-SCNC: 8 MMOL/L (ref 4–13)
AST SERPL W P-5'-P-CCNC: 14 U/L (ref 5–45)
BASOPHILS # BLD AUTO: 0.01 THOUSANDS/ΜL (ref 0–0.1)
BASOPHILS NFR BLD AUTO: 0 % (ref 0–1)
BILIRUB SERPL-MCNC: 0.55 MG/DL (ref 0.2–1)
BUN SERPL-MCNC: 11 MG/DL (ref 5–25)
CALCIUM SERPL-MCNC: 9 MG/DL (ref 8.3–10.1)
CHLORIDE SERPL-SCNC: 105 MMOL/L (ref 100–108)
CO2 SERPL-SCNC: 28 MMOL/L (ref 21–32)
CREAT SERPL-MCNC: 0.66 MG/DL (ref 0.6–1.3)
EOSINOPHIL # BLD AUTO: 0.04 THOUSAND/ΜL (ref 0–0.61)
EOSINOPHIL NFR BLD AUTO: 1 % (ref 0–6)
ERYTHROCYTE [DISTWIDTH] IN BLOOD BY AUTOMATED COUNT: 13.4 % (ref 11.6–15.1)
GFR SERPL CREATININE-BSD FRML MDRD: 122 ML/MIN/1.73SQ M
GLUCOSE SERPL-MCNC: 90 MG/DL (ref 65–140)
HCT VFR BLD AUTO: 35.3 % (ref 34.8–46.1)
HGB BLD-MCNC: 11.9 G/DL (ref 11.5–15.4)
IMM GRANULOCYTES # BLD AUTO: 0.02 THOUSAND/UL (ref 0–0.2)
IMM GRANULOCYTES NFR BLD AUTO: 0 % (ref 0–2)
LYMPHOCYTES # BLD AUTO: 1.83 THOUSANDS/ΜL (ref 0.6–4.47)
LYMPHOCYTES NFR BLD AUTO: 37 % (ref 14–44)
MCH RBC QN AUTO: 29.4 PG (ref 26.8–34.3)
MCHC RBC AUTO-ENTMCNC: 33.7 G/DL (ref 31.4–37.4)
MCV RBC AUTO: 87 FL (ref 82–98)
MONOCYTES # BLD AUTO: 0.23 THOUSAND/ΜL (ref 0.17–1.22)
MONOCYTES NFR BLD AUTO: 5 % (ref 4–12)
NEUTROPHILS # BLD AUTO: 2.87 THOUSANDS/ΜL (ref 1.85–7.62)
NEUTS SEG NFR BLD AUTO: 57 % (ref 43–75)
NRBC BLD AUTO-RTO: 0 /100 WBCS
PLATELET # BLD AUTO: 343 THOUSANDS/UL (ref 149–390)
PMV BLD AUTO: 10.2 FL (ref 8.9–12.7)
POTASSIUM SERPL-SCNC: 3.9 MMOL/L (ref 3.5–5.3)
PROT SERPL-MCNC: 7.4 G/DL (ref 6.4–8.2)
RBC # BLD AUTO: 4.05 MILLION/UL (ref 3.81–5.12)
SODIUM SERPL-SCNC: 141 MMOL/L (ref 136–145)
WBC # BLD AUTO: 5 THOUSAND/UL (ref 4.31–10.16)

## 2021-10-21 PROCEDURE — 36415 COLL VENOUS BLD VENIPUNCTURE: CPT

## 2021-10-21 PROCEDURE — 85025 COMPLETE CBC W/AUTO DIFF WBC: CPT

## 2021-10-21 PROCEDURE — 80053 COMPREHEN METABOLIC PANEL: CPT

## 2021-10-25 ENCOUNTER — PATIENT OUTREACH (OUTPATIENT)
Dept: HEMATOLOGY ONCOLOGY | Facility: CLINIC | Age: 26
End: 2021-10-25

## 2021-10-25 ENCOUNTER — HOSPITAL ENCOUNTER (OUTPATIENT)
Dept: INFUSION CENTER | Facility: HOSPITAL | Age: 26
Discharge: HOME | End: 2021-10-25
Attending: INTERNAL MEDICINE
Payer: COMMERCIAL

## 2021-10-25 VITALS
HEART RATE: 77 BPM | TEMPERATURE: 96.6 F | BODY MASS INDEX: 27.22 KG/M2 | RESPIRATION RATE: 14 BRPM | WEIGHT: 163.36 LBS | DIASTOLIC BLOOD PRESSURE: 63 MMHG | OXYGEN SATURATION: 100 % | SYSTOLIC BLOOD PRESSURE: 124 MMHG | HEIGHT: 65 IN

## 2021-10-25 DIAGNOSIS — Z17.0 MALIGNANT NEOPLASM OF UPPER-INNER QUADRANT OF RIGHT BREAST IN FEMALE, ESTROGEN RECEPTOR POSITIVE (HCC): Primary | ICD-10-CM

## 2021-10-25 DIAGNOSIS — C50.211 MALIGNANT NEOPLASM OF UPPER-INNER QUADRANT OF RIGHT BREAST IN FEMALE, ESTROGEN RECEPTOR POSITIVE (HCC): Primary | ICD-10-CM

## 2021-10-25 PROCEDURE — 96417 CHEMO IV INFUS EACH ADDL SEQ: CPT

## 2021-10-25 PROCEDURE — 96367 TX/PROPH/DG ADDL SEQ IV INF: CPT

## 2021-10-25 PROCEDURE — 96413 CHEMO IV INFUSION 1 HR: CPT

## 2021-10-25 RX ORDER — SODIUM CHLORIDE 9 MG/ML
20 INJECTION, SOLUTION INTRAVENOUS ONCE
Status: COMPLETED | OUTPATIENT
Start: 2021-10-25 | End: 2021-10-25

## 2021-10-25 RX ADMIN — PACLITAXEL 143.4 MG: 6 INJECTION, SOLUTION INTRAVENOUS at 10:20

## 2021-10-25 RX ADMIN — DEXAMETHASONE SODIUM PHOSPHATE 10 MG: 10 INJECTION, SOLUTION INTRAMUSCULAR; INTRAVENOUS at 08:54

## 2021-10-25 RX ADMIN — DIPHENHYDRAMINE HYDROCHLORIDE 25 MG: 50 INJECTION, SOLUTION INTRAMUSCULAR; INTRAVENOUS at 09:17

## 2021-10-25 RX ADMIN — SODIUM CHLORIDE 20 ML/HR: 0.9 INJECTION, SOLUTION INTRAVENOUS at 08:53

## 2021-10-25 RX ADMIN — FAMOTIDINE 20 MG: 10 INJECTION INTRAVENOUS at 09:40

## 2021-10-25 RX ADMIN — TRASTUZUMAB 150 MG: 150 INJECTION, POWDER, LYOPHILIZED, FOR SOLUTION INTRAVENOUS at 11:24

## 2021-10-28 ENCOUNTER — APPOINTMENT (OUTPATIENT)
Dept: LAB | Facility: HOSPITAL | Age: 26
End: 2021-10-28
Payer: COMMERCIAL

## 2021-10-28 DIAGNOSIS — C50.211 MALIGNANT NEOPLASM OF UPPER-INNER QUADRANT OF RIGHT BREAST IN FEMALE, ESTROGEN RECEPTOR POSITIVE (HCC): ICD-10-CM

## 2021-10-28 DIAGNOSIS — Z17.0 MALIGNANT NEOPLASM OF UPPER-INNER QUADRANT OF RIGHT BREAST IN FEMALE, ESTROGEN RECEPTOR POSITIVE (HCC): ICD-10-CM

## 2021-10-28 LAB
ALBUMIN SERPL BCP-MCNC: 3.9 G/DL (ref 3.5–5)
ALP SERPL-CCNC: 65 U/L (ref 46–116)
ALT SERPL W P-5'-P-CCNC: 23 U/L (ref 12–78)
ANION GAP SERPL CALCULATED.3IONS-SCNC: 7 MMOL/L (ref 4–13)
AST SERPL W P-5'-P-CCNC: 14 U/L (ref 5–45)
BASOPHILS # BLD AUTO: 0.02 THOUSANDS/ΜL (ref 0–0.1)
BASOPHILS NFR BLD AUTO: 0 % (ref 0–1)
BILIRUB SERPL-MCNC: 0.56 MG/DL (ref 0.2–1)
BUN SERPL-MCNC: 5 MG/DL (ref 5–25)
CALCIUM SERPL-MCNC: 8.6 MG/DL (ref 8.3–10.1)
CHLORIDE SERPL-SCNC: 107 MMOL/L (ref 100–108)
CO2 SERPL-SCNC: 28 MMOL/L (ref 21–32)
CREAT SERPL-MCNC: 0.72 MG/DL (ref 0.6–1.3)
EOSINOPHIL # BLD AUTO: 0.09 THOUSAND/ΜL (ref 0–0.61)
EOSINOPHIL NFR BLD AUTO: 2 % (ref 0–6)
ERYTHROCYTE [DISTWIDTH] IN BLOOD BY AUTOMATED COUNT: 13.7 % (ref 11.6–15.1)
GFR SERPL CREATININE-BSD FRML MDRD: 116 ML/MIN/1.73SQ M
GLUCOSE P FAST SERPL-MCNC: 87 MG/DL (ref 65–99)
HCT VFR BLD AUTO: 35.3 % (ref 34.8–46.1)
HGB BLD-MCNC: 11.5 G/DL (ref 11.5–15.4)
IMM GRANULOCYTES # BLD AUTO: 0.02 THOUSAND/UL (ref 0–0.2)
IMM GRANULOCYTES NFR BLD AUTO: 0 % (ref 0–2)
LYMPHOCYTES # BLD AUTO: 2.04 THOUSANDS/ΜL (ref 0.6–4.47)
LYMPHOCYTES NFR BLD AUTO: 44 % (ref 14–44)
MCH RBC QN AUTO: 29 PG (ref 26.8–34.3)
MCHC RBC AUTO-ENTMCNC: 32.6 G/DL (ref 31.4–37.4)
MCV RBC AUTO: 89 FL (ref 82–98)
MONOCYTES # BLD AUTO: 0.3 THOUSAND/ΜL (ref 0.17–1.22)
MONOCYTES NFR BLD AUTO: 6 % (ref 4–12)
NEUTROPHILS # BLD AUTO: 2.19 THOUSANDS/ΜL (ref 1.85–7.62)
NEUTS SEG NFR BLD AUTO: 48 % (ref 43–75)
NRBC BLD AUTO-RTO: 0 /100 WBCS
PLATELET # BLD AUTO: 303 THOUSANDS/UL (ref 149–390)
PMV BLD AUTO: 10.1 FL (ref 8.9–12.7)
POTASSIUM SERPL-SCNC: 4.2 MMOL/L (ref 3.5–5.3)
PROT SERPL-MCNC: 7 G/DL (ref 6.4–8.2)
RBC # BLD AUTO: 3.97 MILLION/UL (ref 3.81–5.12)
SODIUM SERPL-SCNC: 142 MMOL/L (ref 136–145)
WBC # BLD AUTO: 4.66 THOUSAND/UL (ref 4.31–10.16)

## 2021-10-28 PROCEDURE — 36415 COLL VENOUS BLD VENIPUNCTURE: CPT

## 2021-10-28 PROCEDURE — 85025 COMPLETE CBC W/AUTO DIFF WBC: CPT

## 2021-10-28 PROCEDURE — 80053 COMPREHEN METABOLIC PANEL: CPT

## 2021-11-01 ENCOUNTER — HOSPITAL ENCOUNTER (OUTPATIENT)
Dept: INFUSION CENTER | Facility: HOSPITAL | Age: 26
Discharge: HOME | End: 2021-11-01
Attending: INTERNAL MEDICINE
Payer: COMMERCIAL

## 2021-11-01 VITALS
HEART RATE: 85 BPM | DIASTOLIC BLOOD PRESSURE: 67 MMHG | TEMPERATURE: 97.3 F | OXYGEN SATURATION: 98 % | HEIGHT: 65 IN | SYSTOLIC BLOOD PRESSURE: 138 MMHG | RESPIRATION RATE: 14 BRPM | WEIGHT: 162.92 LBS | BODY MASS INDEX: 27.14 KG/M2

## 2021-11-01 DIAGNOSIS — Z17.0 MALIGNANT NEOPLASM OF UPPER-INNER QUADRANT OF RIGHT BREAST IN FEMALE, ESTROGEN RECEPTOR POSITIVE (HCC): Primary | ICD-10-CM

## 2021-11-01 DIAGNOSIS — C50.211 MALIGNANT NEOPLASM OF UPPER-INNER QUADRANT OF RIGHT BREAST IN FEMALE, ESTROGEN RECEPTOR POSITIVE (HCC): Primary | ICD-10-CM

## 2021-11-01 PROCEDURE — 96417 CHEMO IV INFUS EACH ADDL SEQ: CPT

## 2021-11-01 PROCEDURE — 96367 TX/PROPH/DG ADDL SEQ IV INF: CPT

## 2021-11-01 PROCEDURE — 96413 CHEMO IV INFUSION 1 HR: CPT

## 2021-11-01 RX ORDER — SODIUM CHLORIDE 9 MG/ML
20 INJECTION, SOLUTION INTRAVENOUS ONCE
Status: CANCELLED | OUTPATIENT
Start: 2021-11-29

## 2021-11-01 RX ORDER — SODIUM CHLORIDE 9 MG/ML
20 INJECTION, SOLUTION INTRAVENOUS ONCE
Status: COMPLETED | OUTPATIENT
Start: 2021-11-01 | End: 2021-11-01

## 2021-11-01 RX ORDER — SODIUM CHLORIDE 9 MG/ML
20 INJECTION, SOLUTION INTRAVENOUS ONCE
Status: CANCELLED | OUTPATIENT
Start: 2021-11-08

## 2021-11-01 RX ORDER — SODIUM CHLORIDE 9 MG/ML
20 INJECTION, SOLUTION INTRAVENOUS ONCE
Status: CANCELLED | OUTPATIENT
Start: 2021-11-15

## 2021-11-01 RX ORDER — SODIUM CHLORIDE 9 MG/ML
20 INJECTION, SOLUTION INTRAVENOUS ONCE
Status: CANCELLED | OUTPATIENT
Start: 2021-11-22

## 2021-11-01 RX ADMIN — PACLITAXEL 143.4 MG: 6 INJECTION, SOLUTION INTRAVENOUS at 10:46

## 2021-11-01 RX ADMIN — TRASTUZUMAB 150 MG: 150 INJECTION, POWDER, LYOPHILIZED, FOR SOLUTION INTRAVENOUS at 11:50

## 2021-11-01 RX ADMIN — DIPHENHYDRAMINE HYDROCHLORIDE 25 MG: 50 INJECTION, SOLUTION INTRAMUSCULAR; INTRAVENOUS at 09:43

## 2021-11-01 RX ADMIN — DEXAMETHASONE SODIUM PHOSPHATE 10 MG: 10 INJECTION, SOLUTION INTRAMUSCULAR; INTRAVENOUS at 09:18

## 2021-11-01 RX ADMIN — SODIUM CHLORIDE 20 ML/HR: 0.9 INJECTION, SOLUTION INTRAVENOUS at 09:17

## 2021-11-01 RX ADMIN — FAMOTIDINE 20 MG: 10 INJECTION INTRAVENOUS at 10:14

## 2021-11-04 ENCOUNTER — APPOINTMENT (OUTPATIENT)
Dept: LAB | Facility: HOSPITAL | Age: 26
End: 2021-11-04
Payer: COMMERCIAL

## 2021-11-04 DIAGNOSIS — Z17.0 MALIGNANT NEOPLASM OF UPPER-INNER QUADRANT OF RIGHT BREAST IN FEMALE, ESTROGEN RECEPTOR POSITIVE (HCC): ICD-10-CM

## 2021-11-04 DIAGNOSIS — C50.211 MALIGNANT NEOPLASM OF UPPER-INNER QUADRANT OF RIGHT BREAST IN FEMALE, ESTROGEN RECEPTOR POSITIVE (HCC): ICD-10-CM

## 2021-11-04 LAB
BASOPHILS # BLD AUTO: 0.02 THOUSANDS/ΜL (ref 0–0.1)
BASOPHILS NFR BLD AUTO: 0 % (ref 0–1)
EOSINOPHIL # BLD AUTO: 0.06 THOUSAND/ΜL (ref 0–0.61)
EOSINOPHIL NFR BLD AUTO: 1 % (ref 0–6)
ERYTHROCYTE [DISTWIDTH] IN BLOOD BY AUTOMATED COUNT: 13.7 % (ref 11.6–15.1)
HCT VFR BLD AUTO: 36.6 % (ref 34.8–46.1)
HGB BLD-MCNC: 12.1 G/DL (ref 11.5–15.4)
IMM GRANULOCYTES # BLD AUTO: 0.01 THOUSAND/UL (ref 0–0.2)
IMM GRANULOCYTES NFR BLD AUTO: 0 % (ref 0–2)
LYMPHOCYTES # BLD AUTO: 1.44 THOUSANDS/ΜL (ref 0.6–4.47)
LYMPHOCYTES NFR BLD AUTO: 26 % (ref 14–44)
MCH RBC QN AUTO: 30 PG (ref 26.8–34.3)
MCHC RBC AUTO-ENTMCNC: 33.1 G/DL (ref 31.4–37.4)
MCV RBC AUTO: 91 FL (ref 82–98)
MONOCYTES # BLD AUTO: 0.24 THOUSAND/ΜL (ref 0.17–1.22)
MONOCYTES NFR BLD AUTO: 4 % (ref 4–12)
NEUTROPHILS # BLD AUTO: 3.76 THOUSANDS/ΜL (ref 1.85–7.62)
NEUTS SEG NFR BLD AUTO: 69 % (ref 43–75)
NRBC BLD AUTO-RTO: 0 /100 WBCS
PLATELET # BLD AUTO: 280 THOUSANDS/UL (ref 149–390)
PMV BLD AUTO: 10 FL (ref 8.9–12.7)
RBC # BLD AUTO: 4.03 MILLION/UL (ref 3.81–5.12)
WBC # BLD AUTO: 5.53 THOUSAND/UL (ref 4.31–10.16)

## 2021-11-04 PROCEDURE — 36415 COLL VENOUS BLD VENIPUNCTURE: CPT

## 2021-11-04 PROCEDURE — 85025 COMPLETE CBC W/AUTO DIFF WBC: CPT

## 2021-11-05 ENCOUNTER — OFFICE VISIT (OUTPATIENT)
Dept: HEMATOLOGY ONCOLOGY | Facility: CLINIC | Age: 26
End: 2021-11-05
Payer: COMMERCIAL

## 2021-11-05 VITALS
OXYGEN SATURATION: 98 % | BODY MASS INDEX: 26.99 KG/M2 | WEIGHT: 162 LBS | TEMPERATURE: 98.1 F | HEIGHT: 65 IN | HEART RATE: 83 BPM | SYSTOLIC BLOOD PRESSURE: 112 MMHG | DIASTOLIC BLOOD PRESSURE: 84 MMHG | RESPIRATION RATE: 16 BRPM

## 2021-11-05 DIAGNOSIS — Z17.0 MALIGNANT NEOPLASM OF UPPER-INNER QUADRANT OF RIGHT BREAST IN FEMALE, ESTROGEN RECEPTOR POSITIVE (HCC): Primary | ICD-10-CM

## 2021-11-05 DIAGNOSIS — C50.211 MALIGNANT NEOPLASM OF UPPER-INNER QUADRANT OF RIGHT BREAST IN FEMALE, ESTROGEN RECEPTOR POSITIVE (HCC): Primary | ICD-10-CM

## 2021-11-05 PROCEDURE — 99214 OFFICE O/P EST MOD 30 MIN: CPT | Performed by: INTERNAL MEDICINE

## 2021-11-05 RX ORDER — VITAMIN B COMPLEX
1 CAPSULE ORAL DAILY
COMMUNITY

## 2021-11-08 ENCOUNTER — PATIENT OUTREACH (OUTPATIENT)
Dept: HEMATOLOGY ONCOLOGY | Facility: CLINIC | Age: 26
End: 2021-11-08

## 2021-11-08 ENCOUNTER — HOSPITAL ENCOUNTER (OUTPATIENT)
Dept: INFUSION CENTER | Facility: HOSPITAL | Age: 26
Discharge: HOME | End: 2021-11-08
Attending: INTERNAL MEDICINE
Payer: COMMERCIAL

## 2021-11-08 VITALS
DIASTOLIC BLOOD PRESSURE: 61 MMHG | OXYGEN SATURATION: 98 % | BODY MASS INDEX: 27.18 KG/M2 | SYSTOLIC BLOOD PRESSURE: 113 MMHG | HEART RATE: 68 BPM | TEMPERATURE: 96.8 F | WEIGHT: 163.14 LBS | RESPIRATION RATE: 14 BRPM | HEIGHT: 65 IN

## 2021-11-08 DIAGNOSIS — C50.211 MALIGNANT NEOPLASM OF UPPER-INNER QUADRANT OF RIGHT BREAST IN FEMALE, ESTROGEN RECEPTOR POSITIVE (HCC): Primary | ICD-10-CM

## 2021-11-08 DIAGNOSIS — Z17.0 MALIGNANT NEOPLASM OF UPPER-INNER QUADRANT OF RIGHT BREAST IN FEMALE, ESTROGEN RECEPTOR POSITIVE (HCC): Primary | ICD-10-CM

## 2021-11-08 PROCEDURE — 96417 CHEMO IV INFUS EACH ADDL SEQ: CPT

## 2021-11-08 PROCEDURE — 96413 CHEMO IV INFUSION 1 HR: CPT

## 2021-11-08 PROCEDURE — 96367 TX/PROPH/DG ADDL SEQ IV INF: CPT

## 2021-11-08 RX ORDER — SODIUM CHLORIDE 9 MG/ML
20 INJECTION, SOLUTION INTRAVENOUS ONCE
Status: COMPLETED | OUTPATIENT
Start: 2021-11-08 | End: 2021-11-08

## 2021-11-08 RX ADMIN — DIPHENHYDRAMINE HYDROCHLORIDE 25 MG: 50 INJECTION, SOLUTION INTRAMUSCULAR; INTRAVENOUS at 09:41

## 2021-11-08 RX ADMIN — TRASTUZUMAB 150 MG: 150 INJECTION, POWDER, LYOPHILIZED, FOR SOLUTION INTRAVENOUS at 12:23

## 2021-11-08 RX ADMIN — PACLITAXEL 143.4 MG: 6 INJECTION, SOLUTION INTRAVENOUS at 11:11

## 2021-11-08 RX ADMIN — FAMOTIDINE 20 MG: 10 INJECTION INTRAVENOUS at 10:08

## 2021-11-08 RX ADMIN — SODIUM CHLORIDE 20 ML/HR: 0.9 INJECTION, SOLUTION INTRAVENOUS at 09:13

## 2021-11-08 RX ADMIN — DEXAMETHASONE SODIUM PHOSPHATE 10 MG: 10 INJECTION, SOLUTION INTRAMUSCULAR; INTRAVENOUS at 09:21

## 2021-11-11 ENCOUNTER — APPOINTMENT (OUTPATIENT)
Dept: LAB | Facility: HOSPITAL | Age: 26
End: 2021-11-11
Payer: COMMERCIAL

## 2021-11-11 DIAGNOSIS — Z17.0 MALIGNANT NEOPLASM OF UPPER-INNER QUADRANT OF RIGHT BREAST IN FEMALE, ESTROGEN RECEPTOR POSITIVE (HCC): ICD-10-CM

## 2021-11-11 DIAGNOSIS — C50.211 MALIGNANT NEOPLASM OF UPPER-INNER QUADRANT OF RIGHT BREAST IN FEMALE, ESTROGEN RECEPTOR POSITIVE (HCC): ICD-10-CM

## 2021-11-11 LAB
ALBUMIN SERPL BCP-MCNC: 4.1 G/DL (ref 3.5–5)
ALP SERPL-CCNC: 70 U/L (ref 46–116)
ALT SERPL W P-5'-P-CCNC: 29 U/L (ref 12–78)
ANION GAP SERPL CALCULATED.3IONS-SCNC: 8 MMOL/L (ref 4–13)
AST SERPL W P-5'-P-CCNC: 15 U/L (ref 5–45)
BASOPHILS # BLD AUTO: 0.02 THOUSANDS/ΜL (ref 0–0.1)
BASOPHILS NFR BLD AUTO: 0 % (ref 0–1)
BILIRUB SERPL-MCNC: 0.54 MG/DL (ref 0.2–1)
BUN SERPL-MCNC: 9 MG/DL (ref 5–25)
CALCIUM SERPL-MCNC: 8.7 MG/DL (ref 8.3–10.1)
CHLORIDE SERPL-SCNC: 102 MMOL/L (ref 100–108)
CO2 SERPL-SCNC: 29 MMOL/L (ref 21–32)
CREAT SERPL-MCNC: 0.69 MG/DL (ref 0.6–1.3)
EOSINOPHIL # BLD AUTO: 0.06 THOUSAND/ΜL (ref 0–0.61)
EOSINOPHIL NFR BLD AUTO: 1 % (ref 0–6)
ERYTHROCYTE [DISTWIDTH] IN BLOOD BY AUTOMATED COUNT: 13.9 % (ref 11.6–15.1)
GFR SERPL CREATININE-BSD FRML MDRD: 120 ML/MIN/1.73SQ M
GLUCOSE P FAST SERPL-MCNC: 95 MG/DL (ref 65–99)
HCT VFR BLD AUTO: 39.3 % (ref 34.8–46.1)
HGB BLD-MCNC: 12.6 G/DL (ref 11.5–15.4)
IMM GRANULOCYTES # BLD AUTO: 0.02 THOUSAND/UL (ref 0–0.2)
IMM GRANULOCYTES NFR BLD AUTO: 0 % (ref 0–2)
LYMPHOCYTES # BLD AUTO: 1.72 THOUSANDS/ΜL (ref 0.6–4.47)
LYMPHOCYTES NFR BLD AUTO: 37 % (ref 14–44)
MCH RBC QN AUTO: 29 PG (ref 26.8–34.3)
MCHC RBC AUTO-ENTMCNC: 32.1 G/DL (ref 31.4–37.4)
MCV RBC AUTO: 90 FL (ref 82–98)
MONOCYTES # BLD AUTO: 0.28 THOUSAND/ΜL (ref 0.17–1.22)
MONOCYTES NFR BLD AUTO: 6 % (ref 4–12)
NEUTROPHILS # BLD AUTO: 2.56 THOUSANDS/ΜL (ref 1.85–7.62)
NEUTS SEG NFR BLD AUTO: 56 % (ref 43–75)
NRBC BLD AUTO-RTO: 0 /100 WBCS
PLATELET # BLD AUTO: 333 THOUSANDS/UL (ref 149–390)
PMV BLD AUTO: 10.5 FL (ref 8.9–12.7)
POTASSIUM SERPL-SCNC: 4 MMOL/L (ref 3.5–5.3)
PROT SERPL-MCNC: 7.5 G/DL (ref 6.4–8.2)
RBC # BLD AUTO: 4.35 MILLION/UL (ref 3.81–5.12)
SODIUM SERPL-SCNC: 139 MMOL/L (ref 136–145)
WBC # BLD AUTO: 4.66 THOUSAND/UL (ref 4.31–10.16)

## 2021-11-11 PROCEDURE — 85025 COMPLETE CBC W/AUTO DIFF WBC: CPT

## 2021-11-11 PROCEDURE — 80053 COMPREHEN METABOLIC PANEL: CPT

## 2021-11-11 PROCEDURE — 36415 COLL VENOUS BLD VENIPUNCTURE: CPT

## 2021-11-15 ENCOUNTER — HOSPITAL ENCOUNTER (OUTPATIENT)
Dept: INFUSION CENTER | Facility: HOSPITAL | Age: 26
Discharge: HOME | End: 2021-11-15
Attending: INTERNAL MEDICINE
Payer: COMMERCIAL

## 2021-11-15 ENCOUNTER — PATIENT OUTREACH (OUTPATIENT)
Dept: HEMATOLOGY ONCOLOGY | Facility: CLINIC | Age: 26
End: 2021-11-15

## 2021-11-15 VITALS
HEART RATE: 73 BPM | SYSTOLIC BLOOD PRESSURE: 122 MMHG | RESPIRATION RATE: 14 BRPM | OXYGEN SATURATION: 99 % | DIASTOLIC BLOOD PRESSURE: 63 MMHG | HEIGHT: 65 IN | WEIGHT: 163.14 LBS | TEMPERATURE: 97.1 F | BODY MASS INDEX: 27.18 KG/M2

## 2021-11-15 DIAGNOSIS — C50.211 MALIGNANT NEOPLASM OF UPPER-INNER QUADRANT OF RIGHT BREAST IN FEMALE, ESTROGEN RECEPTOR POSITIVE (HCC): Primary | ICD-10-CM

## 2021-11-15 DIAGNOSIS — Z17.0 MALIGNANT NEOPLASM OF UPPER-INNER QUADRANT OF RIGHT BREAST IN FEMALE, ESTROGEN RECEPTOR POSITIVE (HCC): Primary | ICD-10-CM

## 2021-11-15 PROCEDURE — 96367 TX/PROPH/DG ADDL SEQ IV INF: CPT

## 2021-11-15 PROCEDURE — 96417 CHEMO IV INFUS EACH ADDL SEQ: CPT

## 2021-11-15 PROCEDURE — 96413 CHEMO IV INFUSION 1 HR: CPT

## 2021-11-15 RX ORDER — SODIUM CHLORIDE 9 MG/ML
20 INJECTION, SOLUTION INTRAVENOUS ONCE
Status: COMPLETED | OUTPATIENT
Start: 2021-11-15 | End: 2021-11-15

## 2021-11-15 RX ADMIN — PACLITAXEL 143.4 MG: 6 INJECTION, SOLUTION INTRAVENOUS at 10:39

## 2021-11-15 RX ADMIN — TRASTUZUMAB 150 MG: 150 INJECTION, POWDER, LYOPHILIZED, FOR SOLUTION INTRAVENOUS at 11:43

## 2021-11-15 RX ADMIN — DIPHENHYDRAMINE HYDROCHLORIDE 25 MG: 50 INJECTION, SOLUTION INTRAMUSCULAR; INTRAVENOUS at 09:36

## 2021-11-15 RX ADMIN — DEXAMETHASONE SODIUM PHOSPHATE 10 MG: 10 INJECTION, SOLUTION INTRAMUSCULAR; INTRAVENOUS at 09:10

## 2021-11-15 RX ADMIN — FAMOTIDINE 20 MG: 10 INJECTION INTRAVENOUS at 10:02

## 2021-11-15 RX ADMIN — SODIUM CHLORIDE 20 ML/HR: 0.9 INJECTION, SOLUTION INTRAVENOUS at 09:09

## 2021-11-16 ENCOUNTER — OFFICE VISIT (OUTPATIENT)
Dept: INTERNAL MEDICINE CLINIC | Facility: CLINIC | Age: 26
End: 2021-11-16
Payer: COMMERCIAL

## 2021-11-16 VITALS
RESPIRATION RATE: 18 BRPM | OXYGEN SATURATION: 100 % | TEMPERATURE: 98.6 F | BODY MASS INDEX: 28 KG/M2 | DIASTOLIC BLOOD PRESSURE: 80 MMHG | WEIGHT: 164 LBS | HEART RATE: 96 BPM | HEIGHT: 64 IN | SYSTOLIC BLOOD PRESSURE: 110 MMHG

## 2021-11-16 DIAGNOSIS — Z17.0 MALIGNANT NEOPLASM OF UPPER-INNER QUADRANT OF RIGHT BREAST IN FEMALE, ESTROGEN RECEPTOR POSITIVE (HCC): Primary | ICD-10-CM

## 2021-11-16 DIAGNOSIS — Z11.4 ENCOUNTER FOR SCREENING FOR HIV: ICD-10-CM

## 2021-11-16 DIAGNOSIS — Z13.220 ENCOUNTER FOR LIPID SCREENING FOR CARDIOVASCULAR DISEASE: ICD-10-CM

## 2021-11-16 DIAGNOSIS — C50.211 MALIGNANT NEOPLASM OF UPPER-INNER QUADRANT OF RIGHT BREAST IN FEMALE, ESTROGEN RECEPTOR POSITIVE (HCC): Primary | ICD-10-CM

## 2021-11-16 DIAGNOSIS — Z11.59 ENCOUNTER FOR HEPATITIS C SCREENING TEST FOR LOW RISK PATIENT: ICD-10-CM

## 2021-11-16 DIAGNOSIS — Z13.6 ENCOUNTER FOR LIPID SCREENING FOR CARDIOVASCULAR DISEASE: ICD-10-CM

## 2021-11-16 PROCEDURE — 3725F SCREEN DEPRESSION PERFORMED: CPT | Performed by: INTERNAL MEDICINE

## 2021-11-16 PROCEDURE — 1036F TOBACCO NON-USER: CPT | Performed by: INTERNAL MEDICINE

## 2021-11-16 PROCEDURE — 3008F BODY MASS INDEX DOCD: CPT | Performed by: INTERNAL MEDICINE

## 2021-11-16 PROCEDURE — 99203 OFFICE O/P NEW LOW 30 MIN: CPT | Performed by: INTERNAL MEDICINE

## 2021-11-18 ENCOUNTER — APPOINTMENT (OUTPATIENT)
Dept: LAB | Facility: HOSPITAL | Age: 26
End: 2021-11-18
Payer: COMMERCIAL

## 2021-11-18 DIAGNOSIS — Z11.4 ENCOUNTER FOR SCREENING FOR HIV: ICD-10-CM

## 2021-11-18 DIAGNOSIS — Z11.59 ENCOUNTER FOR HEPATITIS C SCREENING TEST FOR LOW RISK PATIENT: ICD-10-CM

## 2021-11-18 DIAGNOSIS — C50.211 MALIGNANT NEOPLASM OF UPPER-INNER QUADRANT OF RIGHT BREAST IN FEMALE, ESTROGEN RECEPTOR POSITIVE (HCC): ICD-10-CM

## 2021-11-18 DIAGNOSIS — Z17.0 MALIGNANT NEOPLASM OF UPPER-INNER QUADRANT OF RIGHT BREAST IN FEMALE, ESTROGEN RECEPTOR POSITIVE (HCC): ICD-10-CM

## 2021-11-18 DIAGNOSIS — Z13.6 ENCOUNTER FOR LIPID SCREENING FOR CARDIOVASCULAR DISEASE: ICD-10-CM

## 2021-11-18 DIAGNOSIS — Z13.220 ENCOUNTER FOR LIPID SCREENING FOR CARDIOVASCULAR DISEASE: ICD-10-CM

## 2021-11-18 LAB
BASOPHILS # BLD AUTO: 0.01 THOUSANDS/ΜL (ref 0–0.1)
BASOPHILS NFR BLD AUTO: 0 % (ref 0–1)
EOSINOPHIL # BLD AUTO: 0.06 THOUSAND/ΜL (ref 0–0.61)
EOSINOPHIL NFR BLD AUTO: 1 % (ref 0–6)
ERYTHROCYTE [DISTWIDTH] IN BLOOD BY AUTOMATED COUNT: 14 % (ref 11.6–15.1)
HCT VFR BLD AUTO: 35.7 % (ref 34.8–46.1)
HGB BLD-MCNC: 11.6 G/DL (ref 11.5–15.4)
IMM GRANULOCYTES # BLD AUTO: 0.02 THOUSAND/UL (ref 0–0.2)
IMM GRANULOCYTES NFR BLD AUTO: 1 % (ref 0–2)
LYMPHOCYTES # BLD AUTO: 1.26 THOUSANDS/ΜL (ref 0.6–4.47)
LYMPHOCYTES NFR BLD AUTO: 30 % (ref 14–44)
MCH RBC QN AUTO: 29.7 PG (ref 26.8–34.3)
MCHC RBC AUTO-ENTMCNC: 32.5 G/DL (ref 31.4–37.4)
MCV RBC AUTO: 91 FL (ref 82–98)
MONOCYTES # BLD AUTO: 0.25 THOUSAND/ΜL (ref 0.17–1.22)
MONOCYTES NFR BLD AUTO: 6 % (ref 4–12)
NEUTROPHILS # BLD AUTO: 2.65 THOUSANDS/ΜL (ref 1.85–7.62)
NEUTS SEG NFR BLD AUTO: 62 % (ref 43–75)
NRBC BLD AUTO-RTO: 0 /100 WBCS
PLATELET # BLD AUTO: 317 THOUSANDS/UL (ref 149–390)
PMV BLD AUTO: 10.6 FL (ref 8.9–12.7)
RBC # BLD AUTO: 3.91 MILLION/UL (ref 3.81–5.12)
WBC # BLD AUTO: 4.25 THOUSAND/UL (ref 4.31–10.16)

## 2021-11-18 PROCEDURE — 36415 COLL VENOUS BLD VENIPUNCTURE: CPT

## 2021-11-18 PROCEDURE — 85025 COMPLETE CBC W/AUTO DIFF WBC: CPT

## 2021-11-22 ENCOUNTER — HOSPITAL ENCOUNTER (OUTPATIENT)
Dept: INFUSION CENTER | Facility: HOSPITAL | Age: 26
Discharge: HOME | End: 2021-11-22
Attending: INTERNAL MEDICINE
Payer: COMMERCIAL

## 2021-11-22 VITALS
WEIGHT: 162.48 LBS | RESPIRATION RATE: 16 BRPM | HEIGHT: 65 IN | OXYGEN SATURATION: 100 % | DIASTOLIC BLOOD PRESSURE: 57 MMHG | HEART RATE: 78 BPM | TEMPERATURE: 96.5 F | SYSTOLIC BLOOD PRESSURE: 115 MMHG | BODY MASS INDEX: 27.07 KG/M2

## 2021-11-22 DIAGNOSIS — C50.211 MALIGNANT NEOPLASM OF UPPER-INNER QUADRANT OF RIGHT BREAST IN FEMALE, ESTROGEN RECEPTOR POSITIVE (HCC): Primary | ICD-10-CM

## 2021-11-22 DIAGNOSIS — Z17.0 MALIGNANT NEOPLASM OF UPPER-INNER QUADRANT OF RIGHT BREAST IN FEMALE, ESTROGEN RECEPTOR POSITIVE (HCC): Primary | ICD-10-CM

## 2021-11-22 PROCEDURE — 96417 CHEMO IV INFUS EACH ADDL SEQ: CPT

## 2021-11-22 PROCEDURE — 96413 CHEMO IV INFUSION 1 HR: CPT

## 2021-11-22 PROCEDURE — 96367 TX/PROPH/DG ADDL SEQ IV INF: CPT

## 2021-11-22 RX ORDER — SODIUM CHLORIDE 9 MG/ML
20 INJECTION, SOLUTION INTRAVENOUS ONCE
Status: COMPLETED | OUTPATIENT
Start: 2021-11-22 | End: 2021-11-22

## 2021-11-22 RX ADMIN — DIPHENHYDRAMINE HYDROCHLORIDE 25 MG: 50 INJECTION, SOLUTION INTRAMUSCULAR; INTRAVENOUS at 09:30

## 2021-11-22 RX ADMIN — SODIUM CHLORIDE 20 ML/HR: 0.9 INJECTION, SOLUTION INTRAVENOUS at 09:07

## 2021-11-22 RX ADMIN — FAMOTIDINE 20 MG: 10 INJECTION INTRAVENOUS at 09:54

## 2021-11-22 RX ADMIN — DEXAMETHASONE SODIUM PHOSPHATE 10 MG: 10 INJECTION, SOLUTION INTRAMUSCULAR; INTRAVENOUS at 09:06

## 2021-11-22 RX ADMIN — PACLITAXEL 143.4 MG: 6 INJECTION, SOLUTION INTRAVENOUS at 10:21

## 2021-11-22 RX ADMIN — TRASTUZUMAB 150 MG: 150 INJECTION, POWDER, LYOPHILIZED, FOR SOLUTION INTRAVENOUS at 11:25

## 2021-11-26 ENCOUNTER — APPOINTMENT (OUTPATIENT)
Dept: LAB | Facility: HOSPITAL | Age: 26
End: 2021-11-26
Payer: COMMERCIAL

## 2021-11-26 DIAGNOSIS — Z17.0 MALIGNANT NEOPLASM OF UPPER-INNER QUADRANT OF RIGHT BREAST IN FEMALE, ESTROGEN RECEPTOR POSITIVE (HCC): ICD-10-CM

## 2021-11-26 DIAGNOSIS — C50.211 MALIGNANT NEOPLASM OF UPPER-INNER QUADRANT OF RIGHT BREAST IN FEMALE, ESTROGEN RECEPTOR POSITIVE (HCC): ICD-10-CM

## 2021-11-26 LAB
BASOPHILS # BLD AUTO: 0.01 THOUSANDS/ΜL (ref 0–0.1)
BASOPHILS NFR BLD AUTO: 0 % (ref 0–1)
EOSINOPHIL # BLD AUTO: 0.09 THOUSAND/ΜL (ref 0–0.61)
EOSINOPHIL NFR BLD AUTO: 2 % (ref 0–6)
ERYTHROCYTE [DISTWIDTH] IN BLOOD BY AUTOMATED COUNT: 14.1 % (ref 11.6–15.1)
HCT VFR BLD AUTO: 37.8 % (ref 34.8–46.1)
HGB BLD-MCNC: 12.1 G/DL (ref 11.5–15.4)
IMM GRANULOCYTES # BLD AUTO: 0.02 THOUSAND/UL (ref 0–0.2)
IMM GRANULOCYTES NFR BLD AUTO: 1 % (ref 0–2)
LYMPHOCYTES # BLD AUTO: 1.8 THOUSANDS/ΜL (ref 0.6–4.47)
LYMPHOCYTES NFR BLD AUTO: 44 % (ref 14–44)
MCH RBC QN AUTO: 28.8 PG (ref 26.8–34.3)
MCHC RBC AUTO-ENTMCNC: 32 G/DL (ref 31.4–37.4)
MCV RBC AUTO: 90 FL (ref 82–98)
MONOCYTES # BLD AUTO: 0.29 THOUSAND/ΜL (ref 0.17–1.22)
MONOCYTES NFR BLD AUTO: 7 % (ref 4–12)
NEUTROPHILS # BLD AUTO: 1.86 THOUSANDS/ΜL (ref 1.85–7.62)
NEUTS SEG NFR BLD AUTO: 46 % (ref 43–75)
NRBC BLD AUTO-RTO: 0 /100 WBCS
PLATELET # BLD AUTO: 335 THOUSANDS/UL (ref 149–390)
PMV BLD AUTO: 10.2 FL (ref 8.9–12.7)
RBC # BLD AUTO: 4.2 MILLION/UL (ref 3.81–5.12)
WBC # BLD AUTO: 4.07 THOUSAND/UL (ref 4.31–10.16)

## 2021-11-26 PROCEDURE — 85025 COMPLETE CBC W/AUTO DIFF WBC: CPT

## 2021-11-26 PROCEDURE — 36415 COLL VENOUS BLD VENIPUNCTURE: CPT

## 2021-11-29 ENCOUNTER — HOSPITAL ENCOUNTER (OUTPATIENT)
Dept: INFUSION CENTER | Facility: HOSPITAL | Age: 26
Discharge: HOME | End: 2021-11-29
Attending: INTERNAL MEDICINE
Payer: COMMERCIAL

## 2021-11-29 ENCOUNTER — PATIENT OUTREACH (OUTPATIENT)
Dept: HEMATOLOGY ONCOLOGY | Facility: CLINIC | Age: 26
End: 2021-11-29

## 2021-11-29 VITALS
DIASTOLIC BLOOD PRESSURE: 64 MMHG | HEART RATE: 76 BPM | OXYGEN SATURATION: 100 % | SYSTOLIC BLOOD PRESSURE: 125 MMHG | HEIGHT: 65 IN | BODY MASS INDEX: 27.25 KG/M2 | WEIGHT: 163.58 LBS | TEMPERATURE: 96.9 F | RESPIRATION RATE: 14 BRPM

## 2021-11-29 DIAGNOSIS — C50.211 MALIGNANT NEOPLASM OF UPPER-INNER QUADRANT OF RIGHT BREAST IN FEMALE, ESTROGEN RECEPTOR POSITIVE (HCC): Primary | ICD-10-CM

## 2021-11-29 DIAGNOSIS — Z17.0 MALIGNANT NEOPLASM OF UPPER-INNER QUADRANT OF RIGHT BREAST IN FEMALE, ESTROGEN RECEPTOR POSITIVE (HCC): Primary | ICD-10-CM

## 2021-11-29 PROCEDURE — 96367 TX/PROPH/DG ADDL SEQ IV INF: CPT

## 2021-11-29 PROCEDURE — 96417 CHEMO IV INFUS EACH ADDL SEQ: CPT

## 2021-11-29 PROCEDURE — 96413 CHEMO IV INFUSION 1 HR: CPT

## 2021-11-29 RX ORDER — SODIUM CHLORIDE 9 MG/ML
20 INJECTION, SOLUTION INTRAVENOUS ONCE
Status: CANCELLED | OUTPATIENT
Start: 2022-01-17

## 2021-11-29 RX ORDER — SODIUM CHLORIDE 9 MG/ML
20 INJECTION, SOLUTION INTRAVENOUS ONCE
Status: CANCELLED | OUTPATIENT
Start: 2021-12-27

## 2021-11-29 RX ORDER — SODIUM CHLORIDE 9 MG/ML
20 INJECTION, SOLUTION INTRAVENOUS ONCE
Status: COMPLETED | OUTPATIENT
Start: 2021-11-29 | End: 2021-11-29

## 2021-11-29 RX ORDER — SODIUM CHLORIDE 9 MG/ML
20 INJECTION, SOLUTION INTRAVENOUS ONCE
Status: CANCELLED | OUTPATIENT
Start: 2021-12-06

## 2021-11-29 RX ADMIN — SODIUM CHLORIDE 20 ML/HR: 0.9 INJECTION, SOLUTION INTRAVENOUS at 09:49

## 2021-11-29 RX ADMIN — DEXAMETHASONE SODIUM PHOSPHATE 10 MG: 10 INJECTION, SOLUTION INTRAMUSCULAR; INTRAVENOUS at 09:51

## 2021-11-29 RX ADMIN — PACLITAXEL 143.4 MG: 6 INJECTION, SOLUTION INTRAVENOUS at 11:13

## 2021-11-29 RX ADMIN — TRASTUZUMAB 150 MG: 150 INJECTION, POWDER, LYOPHILIZED, FOR SOLUTION INTRAVENOUS at 12:10

## 2021-11-29 RX ADMIN — FAMOTIDINE 20 MG: 10 INJECTION INTRAVENOUS at 10:40

## 2021-11-29 RX ADMIN — DIPHENHYDRAMINE HYDROCHLORIDE 25 MG: 50 INJECTION, SOLUTION INTRAMUSCULAR; INTRAVENOUS at 10:16

## 2021-11-30 ENCOUNTER — TELEPHONE (OUTPATIENT)
Dept: HEMATOLOGY ONCOLOGY | Facility: CLINIC | Age: 26
End: 2021-11-30

## 2021-12-02 ENCOUNTER — APPOINTMENT (OUTPATIENT)
Dept: LAB | Facility: HOSPITAL | Age: 26
End: 2021-12-02
Payer: COMMERCIAL

## 2021-12-02 ENCOUNTER — TELEPHONE (OUTPATIENT)
Dept: GYNECOLOGIC ONCOLOGY | Facility: CLINIC | Age: 26
End: 2021-12-02

## 2021-12-02 DIAGNOSIS — C50.211 MALIGNANT NEOPLASM OF UPPER-INNER QUADRANT OF RIGHT BREAST IN FEMALE, ESTROGEN RECEPTOR POSITIVE (HCC): ICD-10-CM

## 2021-12-02 DIAGNOSIS — Z17.0 MALIGNANT NEOPLASM OF UPPER-INNER QUADRANT OF RIGHT BREAST IN FEMALE, ESTROGEN RECEPTOR POSITIVE (HCC): ICD-10-CM

## 2021-12-02 LAB
BASOPHILS # BLD AUTO: 0.02 THOUSANDS/ΜL (ref 0–0.1)
BASOPHILS NFR BLD AUTO: 0 % (ref 0–1)
EOSINOPHIL # BLD AUTO: 0.04 THOUSAND/ΜL (ref 0–0.61)
EOSINOPHIL NFR BLD AUTO: 1 % (ref 0–6)
ERYTHROCYTE [DISTWIDTH] IN BLOOD BY AUTOMATED COUNT: 14.1 % (ref 11.6–15.1)
HCT VFR BLD AUTO: 36.8 % (ref 34.8–46.1)
HGB BLD-MCNC: 11.8 G/DL (ref 11.5–15.4)
IMM GRANULOCYTES # BLD AUTO: 0.02 THOUSAND/UL (ref 0–0.2)
IMM GRANULOCYTES NFR BLD AUTO: 0 % (ref 0–2)
LYMPHOCYTES # BLD AUTO: 1.35 THOUSANDS/ΜL (ref 0.6–4.47)
LYMPHOCYTES NFR BLD AUTO: 27 % (ref 14–44)
MCH RBC QN AUTO: 29.1 PG (ref 26.8–34.3)
MCHC RBC AUTO-ENTMCNC: 32.1 G/DL (ref 31.4–37.4)
MCV RBC AUTO: 91 FL (ref 82–98)
MONOCYTES # BLD AUTO: 0.25 THOUSAND/ΜL (ref 0.17–1.22)
MONOCYTES NFR BLD AUTO: 5 % (ref 4–12)
NEUTROPHILS # BLD AUTO: 3.24 THOUSANDS/ΜL (ref 1.85–7.62)
NEUTS SEG NFR BLD AUTO: 67 % (ref 43–75)
NRBC BLD AUTO-RTO: 0 /100 WBCS
PLATELET # BLD AUTO: 300 THOUSANDS/UL (ref 149–390)
PMV BLD AUTO: 10.3 FL (ref 8.9–12.7)
RBC # BLD AUTO: 4.05 MILLION/UL (ref 3.81–5.12)
WBC # BLD AUTO: 4.92 THOUSAND/UL (ref 4.31–10.16)

## 2021-12-02 PROCEDURE — 85025 COMPLETE CBC W/AUTO DIFF WBC: CPT

## 2021-12-02 PROCEDURE — 36415 COLL VENOUS BLD VENIPUNCTURE: CPT

## 2021-12-06 ENCOUNTER — HOSPITAL ENCOUNTER (OUTPATIENT)
Dept: INFUSION CENTER | Facility: CLINIC | Age: 26
Discharge: HOME | End: 2021-12-06
Payer: COMMERCIAL

## 2021-12-06 ENCOUNTER — TELEPHONE (OUTPATIENT)
Dept: GYNECOLOGIC ONCOLOGY | Facility: CLINIC | Age: 26
End: 2021-12-06

## 2021-12-06 ENCOUNTER — PATIENT OUTREACH (OUTPATIENT)
Dept: CASE MANAGEMENT | Facility: HOSPITAL | Age: 26
End: 2021-12-06

## 2021-12-06 VITALS
BODY MASS INDEX: 27.25 KG/M2 | WEIGHT: 163.58 LBS | HEART RATE: 78 BPM | TEMPERATURE: 97.5 F | SYSTOLIC BLOOD PRESSURE: 120 MMHG | DIASTOLIC BLOOD PRESSURE: 71 MMHG | RESPIRATION RATE: 16 BRPM | HEIGHT: 65 IN

## 2021-12-06 DIAGNOSIS — C50.211 MALIGNANT NEOPLASM OF UPPER-INNER QUADRANT OF RIGHT BREAST IN FEMALE, ESTROGEN RECEPTOR POSITIVE (HCC): Primary | ICD-10-CM

## 2021-12-06 DIAGNOSIS — Z17.0 MALIGNANT NEOPLASM OF UPPER-INNER QUADRANT OF RIGHT BREAST IN FEMALE, ESTROGEN RECEPTOR POSITIVE (HCC): Primary | ICD-10-CM

## 2021-12-06 PROCEDURE — 96413 CHEMO IV INFUSION 1 HR: CPT

## 2021-12-06 RX ORDER — SODIUM CHLORIDE 9 MG/ML
20 INJECTION, SOLUTION INTRAVENOUS ONCE
Status: COMPLETED | OUTPATIENT
Start: 2021-12-06 | End: 2021-12-06

## 2021-12-06 RX ADMIN — SODIUM CHLORIDE 20 ML/HR: 0.9 INJECTION, SOLUTION INTRAVENOUS at 09:00

## 2021-12-06 RX ADMIN — TRASTUZUMAB 450 MG: 150 INJECTION, POWDER, LYOPHILIZED, FOR SOLUTION INTRAVENOUS at 09:45

## 2021-12-14 ENCOUNTER — APPOINTMENT (OUTPATIENT)
Dept: RADIATION ONCOLOGY | Facility: CLINIC | Age: 26
End: 2021-12-14
Attending: RADIOLOGY
Payer: COMMERCIAL

## 2021-12-14 ENCOUNTER — APPOINTMENT (OUTPATIENT)
Dept: LAB | Facility: HOSPITAL | Age: 26
End: 2021-12-14
Payer: COMMERCIAL

## 2021-12-14 VITALS
OXYGEN SATURATION: 94 % | TEMPERATURE: 97.6 F | BODY MASS INDEX: 27.49 KG/M2 | WEIGHT: 165 LBS | SYSTOLIC BLOOD PRESSURE: 116 MMHG | HEART RATE: 78 BPM | DIASTOLIC BLOOD PRESSURE: 70 MMHG | RESPIRATION RATE: 16 BRPM

## 2021-12-14 DIAGNOSIS — C50.211 MALIGNANT NEOPLASM OF UPPER-INNER QUADRANT OF RIGHT BREAST IN FEMALE, ESTROGEN RECEPTOR POSITIVE (HCC): Primary | ICD-10-CM

## 2021-12-14 DIAGNOSIS — Z17.0 MALIGNANT NEOPLASM OF UPPER-INNER QUADRANT OF RIGHT BREAST IN FEMALE, ESTROGEN RECEPTOR POSITIVE (HCC): Primary | ICD-10-CM

## 2021-12-14 LAB — HCG SERPL QL: NEGATIVE

## 2021-12-14 PROCEDURE — 77280 THER RAD SIMULAJ FIELD SMPL: CPT | Performed by: RADIOLOGY

## 2021-12-14 PROCEDURE — 77332 RADIATION TREATMENT AID(S): CPT | Performed by: RADIOLOGY

## 2021-12-14 PROCEDURE — 84703 CHORIONIC GONADOTROPIN ASSAY: CPT

## 2021-12-14 PROCEDURE — 99212 OFFICE O/P EST SF 10 MIN: CPT | Performed by: RADIOLOGY

## 2021-12-14 PROCEDURE — 36415 COLL VENOUS BLD VENIPUNCTURE: CPT

## 2021-12-17 ENCOUNTER — HOSPITAL ENCOUNTER (OUTPATIENT)
Dept: NON INVASIVE DIAGNOSTICS | Facility: CLINIC | Age: 26
Discharge: HOME | End: 2021-12-17
Payer: COMMERCIAL

## 2021-12-17 ENCOUNTER — TELEPHONE (OUTPATIENT)
Dept: INTERNAL MEDICINE CLINIC | Facility: CLINIC | Age: 26
End: 2021-12-17

## 2021-12-17 VITALS
BODY MASS INDEX: 27.49 KG/M2 | HEART RATE: 92 BPM | SYSTOLIC BLOOD PRESSURE: 116 MMHG | HEIGHT: 65 IN | WEIGHT: 165 LBS | DIASTOLIC BLOOD PRESSURE: 70 MMHG

## 2021-12-17 DIAGNOSIS — R35.0 URINE FREQUENCY: Primary | ICD-10-CM

## 2021-12-17 DIAGNOSIS — C50.211 MALIGNANT NEOPLASM OF UPPER-INNER QUADRANT OF RIGHT BREAST IN FEMALE, ESTROGEN RECEPTOR POSITIVE (HCC): ICD-10-CM

## 2021-12-17 DIAGNOSIS — Z17.0 MALIGNANT NEOPLASM OF UPPER-INNER QUADRANT OF RIGHT BREAST IN FEMALE, ESTROGEN RECEPTOR POSITIVE (HCC): ICD-10-CM

## 2021-12-17 LAB
APICAL FOUR CHAMBER EJECTION FRACTION: 66 %
FRACTIONAL SHORTENING: 33 % (ref 28–44)
INTERVENTRICULAR SEPTUM IN DIASTOLE (PARASTERNAL SHORT AXIS VIEW): 0.5 CM
LEFT INTERNAL DIMENSION IN SYSTOLE: 3.1 CM (ref 2.1–4)
LEFT VENTRICLE DIASTOLIC VOLUME (MOD BIPLANE): 89 ML
LEFT VENTRICLE SYSTOLIC VOLUME (MOD BIPLANE): 28 ML
LEFT VENTRICULAR INTERNAL DIMENSION IN DIASTOLE: 4.6 CM (ref 4.27–6.36)
LEFT VENTRICULAR POSTERIOR WALL IN END DIASTOLE: 0.8 CM
LEFT VENTRICULAR STROKE VOLUME: 58 ML
LV EF: 68 %
SL CV LV EF: 60
SL CV PED ECHO LEFT VENTRICLE DIASTOLIC VOLUME (MOD BIPLANE) 2D: 96 ML
SL CV PED ECHO LEFT VENTRICLE SYSTOLIC VOLUME (MOD BIPLANE) 2D: 38 ML
TRICUSPID VALVE S': 46 CM/S
Z-SCORE OF LEFT VENTRICULAR DIMENSION IN END SYSTOLE: -1.25

## 2021-12-17 PROCEDURE — 93325 DOPPLER ECHO COLOR FLOW MAPG: CPT | Performed by: INTERNAL MEDICINE

## 2021-12-17 PROCEDURE — 93308 TTE F-UP OR LMTD: CPT | Performed by: INTERNAL MEDICINE

## 2021-12-17 PROCEDURE — 93308 TTE F-UP OR LMTD: CPT

## 2021-12-17 PROCEDURE — 93321 DOPPLER ECHO F-UP/LMTD STD: CPT | Performed by: INTERNAL MEDICINE

## 2021-12-18 ENCOUNTER — APPOINTMENT (OUTPATIENT)
Dept: LAB | Facility: HOSPITAL | Age: 26
End: 2021-12-18
Payer: COMMERCIAL

## 2021-12-18 DIAGNOSIS — R35.0 URINE FREQUENCY: ICD-10-CM

## 2021-12-18 PROCEDURE — 87077 CULTURE AEROBIC IDENTIFY: CPT

## 2021-12-18 PROCEDURE — 87186 SC STD MICRODIL/AGAR DIL: CPT

## 2021-12-18 PROCEDURE — 87086 URINE CULTURE/COLONY COUNT: CPT

## 2021-12-20 ENCOUNTER — OFFICE VISIT (OUTPATIENT)
Dept: HEMATOLOGY ONCOLOGY | Facility: CLINIC | Age: 26
End: 2021-12-20
Payer: COMMERCIAL

## 2021-12-20 VITALS
TEMPERATURE: 97.3 F | HEART RATE: 88 BPM | BODY MASS INDEX: 27.41 KG/M2 | OXYGEN SATURATION: 98 % | HEIGHT: 65 IN | WEIGHT: 164.5 LBS | DIASTOLIC BLOOD PRESSURE: 76 MMHG | RESPIRATION RATE: 12 BRPM | SYSTOLIC BLOOD PRESSURE: 124 MMHG

## 2021-12-20 DIAGNOSIS — Z17.0 MALIGNANT NEOPLASM OF UPPER-INNER QUADRANT OF RIGHT BREAST IN FEMALE, ESTROGEN RECEPTOR POSITIVE (HCC): ICD-10-CM

## 2021-12-20 DIAGNOSIS — C50.211 MALIGNANT NEOPLASM OF UPPER-INNER QUADRANT OF RIGHT BREAST IN FEMALE, ESTROGEN RECEPTOR POSITIVE (HCC): Primary | ICD-10-CM

## 2021-12-20 DIAGNOSIS — Z17.0 MALIGNANT NEOPLASM OF UPPER-INNER QUADRANT OF RIGHT BREAST IN FEMALE, ESTROGEN RECEPTOR POSITIVE (HCC): Primary | ICD-10-CM

## 2021-12-20 DIAGNOSIS — C50.211 MALIGNANT NEOPLASM OF UPPER-INNER QUADRANT OF RIGHT BREAST IN FEMALE, ESTROGEN RECEPTOR POSITIVE (HCC): ICD-10-CM

## 2021-12-20 DIAGNOSIS — C43.59 MALIGNANT MELANOMA OF SKIN OF ABDOMEN (HCC): Primary | ICD-10-CM

## 2021-12-20 PROCEDURE — 3008F BODY MASS INDEX DOCD: CPT | Performed by: INTERNAL MEDICINE

## 2021-12-20 PROCEDURE — 99214 OFFICE O/P EST MOD 30 MIN: CPT | Performed by: INTERNAL MEDICINE

## 2021-12-20 PROCEDURE — 1036F TOBACCO NON-USER: CPT | Performed by: INTERNAL MEDICINE

## 2021-12-20 RX ORDER — PHENAZOPYRIDINE HYDROCHLORIDE 95 MG/1
95 TABLET ORAL 3 TIMES DAILY PRN
COMMUNITY
End: 2022-06-23

## 2021-12-20 RX ORDER — NITROFURANTOIN 25; 75 MG/1; MG/1
100 CAPSULE ORAL 2 TIMES DAILY
COMMUNITY
Start: 2021-12-19 | End: 2021-12-24

## 2021-12-20 RX ORDER — TAMOXIFEN CITRATE 20 MG/1
20 TABLET ORAL DAILY
Qty: 30 TABLET | Refills: 2 | Status: SHIPPED | OUTPATIENT
Start: 2021-12-20 | End: 2022-03-21

## 2021-12-21 LAB — BACTERIA UR CULT: ABNORMAL

## 2021-12-21 PROCEDURE — 77300 RADIATION THERAPY DOSE PLAN: CPT | Performed by: RADIOLOGY

## 2021-12-21 PROCEDURE — 77295 3-D RADIOTHERAPY PLAN: CPT | Performed by: RADIOLOGY

## 2021-12-21 PROCEDURE — 77334 RADIATION TREATMENT AID(S): CPT | Performed by: RADIOLOGY

## 2021-12-23 ENCOUNTER — APPOINTMENT (OUTPATIENT)
Dept: LAB | Facility: HOSPITAL | Age: 26
End: 2021-12-23
Payer: COMMERCIAL

## 2021-12-23 DIAGNOSIS — C43.59 MALIGNANT MELANOMA OF SKIN OF ABDOMEN (HCC): ICD-10-CM

## 2021-12-23 DIAGNOSIS — Z17.0 MALIGNANT NEOPLASM OF UPPER-INNER QUADRANT OF RIGHT BREAST IN FEMALE, ESTROGEN RECEPTOR POSITIVE (HCC): ICD-10-CM

## 2021-12-23 DIAGNOSIS — C50.211 MALIGNANT NEOPLASM OF UPPER-INNER QUADRANT OF RIGHT BREAST IN FEMALE, ESTROGEN RECEPTOR POSITIVE (HCC): ICD-10-CM

## 2021-12-23 LAB
ALBUMIN SERPL BCP-MCNC: 3.7 G/DL (ref 3.5–5)
ALP SERPL-CCNC: 69 U/L (ref 46–116)
ALT SERPL W P-5'-P-CCNC: 22 U/L (ref 12–78)
ANION GAP SERPL CALCULATED.3IONS-SCNC: 9 MMOL/L (ref 4–13)
AST SERPL W P-5'-P-CCNC: 11 U/L (ref 5–45)
BASOPHILS # BLD AUTO: 0.02 THOUSANDS/ΜL (ref 0–0.1)
BASOPHILS NFR BLD AUTO: 0 % (ref 0–1)
BILIRUB SERPL-MCNC: 0.59 MG/DL (ref 0.2–1)
BUN SERPL-MCNC: 12 MG/DL (ref 5–25)
CALCIUM SERPL-MCNC: 8.5 MG/DL (ref 8.3–10.1)
CHLORIDE SERPL-SCNC: 106 MMOL/L (ref 100–108)
CO2 SERPL-SCNC: 29 MMOL/L (ref 21–32)
CREAT SERPL-MCNC: 0.73 MG/DL (ref 0.6–1.3)
EOSINOPHIL # BLD AUTO: 0.11 THOUSAND/ΜL (ref 0–0.61)
EOSINOPHIL NFR BLD AUTO: 1 % (ref 0–6)
ERYTHROCYTE [DISTWIDTH] IN BLOOD BY AUTOMATED COUNT: 13.8 % (ref 11.6–15.1)
GFR SERPL CREATININE-BSD FRML MDRD: 113 ML/MIN/1.73SQ M
GLUCOSE P FAST SERPL-MCNC: 90 MG/DL (ref 65–99)
HCT VFR BLD AUTO: 35.5 % (ref 34.8–46.1)
HGB BLD-MCNC: 11.1 G/DL (ref 11.5–15.4)
IMM GRANULOCYTES # BLD AUTO: 0.02 THOUSAND/UL (ref 0–0.2)
IMM GRANULOCYTES NFR BLD AUTO: 0 % (ref 0–2)
LYMPHOCYTES # BLD AUTO: 1.8 THOUSANDS/ΜL (ref 0.6–4.47)
LYMPHOCYTES NFR BLD AUTO: 23 % (ref 14–44)
MCH RBC QN AUTO: 28.5 PG (ref 26.8–34.3)
MCHC RBC AUTO-ENTMCNC: 31.3 G/DL (ref 31.4–37.4)
MCV RBC AUTO: 91 FL (ref 82–98)
MONOCYTES # BLD AUTO: 0.82 THOUSAND/ΜL (ref 0.17–1.22)
MONOCYTES NFR BLD AUTO: 10 % (ref 4–12)
NEUTROPHILS # BLD AUTO: 5.09 THOUSANDS/ΜL (ref 1.85–7.62)
NEUTS SEG NFR BLD AUTO: 66 % (ref 43–75)
NRBC BLD AUTO-RTO: 0 /100 WBCS
PLATELET # BLD AUTO: 289 THOUSANDS/UL (ref 149–390)
PMV BLD AUTO: 10.5 FL (ref 8.9–12.7)
POTASSIUM SERPL-SCNC: 3.6 MMOL/L (ref 3.5–5.3)
PROT SERPL-MCNC: 6.8 G/DL (ref 6.4–8.2)
RBC # BLD AUTO: 3.9 MILLION/UL (ref 3.81–5.12)
SODIUM SERPL-SCNC: 144 MMOL/L (ref 136–145)
WBC # BLD AUTO: 7.86 THOUSAND/UL (ref 4.31–10.16)

## 2021-12-23 PROCEDURE — 85025 COMPLETE CBC W/AUTO DIFF WBC: CPT

## 2021-12-23 PROCEDURE — 36415 COLL VENOUS BLD VENIPUNCTURE: CPT

## 2021-12-23 PROCEDURE — 80053 COMPREHEN METABOLIC PANEL: CPT

## 2021-12-27 ENCOUNTER — HOSPITAL ENCOUNTER (OUTPATIENT)
Dept: INFUSION CENTER | Facility: CLINIC | Age: 26
Discharge: HOME | End: 2021-12-27
Payer: COMMERCIAL

## 2021-12-27 VITALS
BODY MASS INDEX: 27.22 KG/M2 | RESPIRATION RATE: 16 BRPM | DIASTOLIC BLOOD PRESSURE: 68 MMHG | WEIGHT: 163.6 LBS | TEMPERATURE: 98.1 F | HEART RATE: 80 BPM | SYSTOLIC BLOOD PRESSURE: 131 MMHG

## 2021-12-27 DIAGNOSIS — C50.211 MALIGNANT NEOPLASM OF UPPER-INNER QUADRANT OF RIGHT BREAST IN FEMALE, ESTROGEN RECEPTOR POSITIVE (HCC): Primary | ICD-10-CM

## 2021-12-27 DIAGNOSIS — Z17.0 MALIGNANT NEOPLASM OF UPPER-INNER QUADRANT OF RIGHT BREAST IN FEMALE, ESTROGEN RECEPTOR POSITIVE (HCC): Primary | ICD-10-CM

## 2021-12-27 PROCEDURE — 96413 CHEMO IV INFUSION 1 HR: CPT

## 2021-12-27 RX ORDER — SODIUM CHLORIDE 9 MG/ML
20 INJECTION, SOLUTION INTRAVENOUS ONCE
Status: COMPLETED | OUTPATIENT
Start: 2021-12-27 | End: 2021-12-27

## 2021-12-27 RX ADMIN — TRASTUZUMAB 450 MG: 150 INJECTION, POWDER, LYOPHILIZED, FOR SOLUTION INTRAVENOUS at 14:09

## 2021-12-27 RX ADMIN — SODIUM CHLORIDE 20 ML/HR: 0.9 INJECTION, SOLUTION INTRAVENOUS at 14:09

## 2021-12-30 ENCOUNTER — APPOINTMENT (OUTPATIENT)
Dept: RADIATION ONCOLOGY | Facility: CLINIC | Age: 26
End: 2021-12-30
Attending: RADIOLOGY
Payer: COMMERCIAL

## 2021-12-30 PROCEDURE — 77280 THER RAD SIMULAJ FIELD SMPL: CPT | Performed by: RADIOLOGY

## 2022-01-03 ENCOUNTER — APPOINTMENT (OUTPATIENT)
Dept: RADIATION ONCOLOGY | Facility: CLINIC | Age: 27
End: 2022-01-03
Attending: RADIOLOGY
Payer: COMMERCIAL

## 2022-01-03 PROCEDURE — 77427 RADIATION TX MANAGEMENT X5: CPT | Performed by: RADIOLOGY

## 2022-01-03 PROCEDURE — 77412 RADIATION TX DELIVERY LVL 3: CPT | Performed by: RADIOLOGY

## 2022-01-04 ENCOUNTER — APPOINTMENT (OUTPATIENT)
Dept: RADIATION ONCOLOGY | Facility: CLINIC | Age: 27
End: 2022-01-04
Attending: RADIOLOGY
Payer: COMMERCIAL

## 2022-01-04 PROCEDURE — 77412 RADIATION TX DELIVERY LVL 3: CPT | Performed by: RADIOLOGY

## 2022-01-05 ENCOUNTER — PATIENT OUTREACH (OUTPATIENT)
Dept: CASE MANAGEMENT | Facility: HOSPITAL | Age: 27
End: 2022-01-05

## 2022-01-05 ENCOUNTER — APPOINTMENT (OUTPATIENT)
Dept: RADIATION ONCOLOGY | Facility: CLINIC | Age: 27
End: 2022-01-05
Attending: RADIOLOGY
Payer: COMMERCIAL

## 2022-01-05 PROCEDURE — 77412 RADIATION TX DELIVERY LVL 3: CPT | Performed by: RADIOLOGY

## 2022-01-05 NOTE — PROGRESS NOTES
YANICK s/w pt by phone today, she has started radiation tx this week and says that so far it is going well  She agrees that it is taxing to be there everyday, however she says that the treatment itself has been quick and simple so far  She just recently got engaged over the Christmas holiday, which she is very excited about  She denies any needs at this time, and I encouraged her to reach out if her needs change  She has my direct number and my email address should she need anything moving forward  No other concerns at this time

## 2022-01-06 ENCOUNTER — APPOINTMENT (OUTPATIENT)
Dept: RADIATION ONCOLOGY | Facility: CLINIC | Age: 27
End: 2022-01-06
Attending: RADIOLOGY
Payer: COMMERCIAL

## 2022-01-06 PROCEDURE — 77412 RADIATION TX DELIVERY LVL 3: CPT | Performed by: RADIOLOGY

## 2022-01-07 ENCOUNTER — APPOINTMENT (OUTPATIENT)
Dept: RADIATION ONCOLOGY | Facility: CLINIC | Age: 27
End: 2022-01-07
Attending: RADIOLOGY
Payer: COMMERCIAL

## 2022-01-07 PROCEDURE — 77412 RADIATION TX DELIVERY LVL 3: CPT | Performed by: RADIOLOGY

## 2022-01-07 PROCEDURE — 77336 RADIATION PHYSICS CONSULT: CPT | Performed by: RADIOLOGY

## 2022-01-10 ENCOUNTER — APPOINTMENT (OUTPATIENT)
Dept: RADIATION ONCOLOGY | Facility: CLINIC | Age: 27
End: 2022-01-10
Attending: RADIOLOGY
Payer: COMMERCIAL

## 2022-01-10 PROCEDURE — 77412 RADIATION TX DELIVERY LVL 3: CPT | Performed by: RADIOLOGY

## 2022-01-10 PROCEDURE — 77427 RADIATION TX MANAGEMENT X5: CPT | Performed by: RADIOLOGY

## 2022-01-11 ENCOUNTER — APPOINTMENT (OUTPATIENT)
Dept: RADIATION ONCOLOGY | Facility: CLINIC | Age: 27
End: 2022-01-11
Attending: RADIOLOGY
Payer: COMMERCIAL

## 2022-01-11 PROCEDURE — 77412 RADIATION TX DELIVERY LVL 3: CPT | Performed by: RADIOLOGY

## 2022-01-12 ENCOUNTER — APPOINTMENT (OUTPATIENT)
Dept: RADIATION ONCOLOGY | Facility: CLINIC | Age: 27
End: 2022-01-12
Attending: RADIOLOGY
Payer: COMMERCIAL

## 2022-01-12 PROCEDURE — 77412 RADIATION TX DELIVERY LVL 3: CPT | Performed by: RADIOLOGY

## 2022-01-13 ENCOUNTER — APPOINTMENT (OUTPATIENT)
Dept: LAB | Facility: HOSPITAL | Age: 27
End: 2022-01-13
Payer: COMMERCIAL

## 2022-01-13 ENCOUNTER — APPOINTMENT (OUTPATIENT)
Dept: RADIATION ONCOLOGY | Facility: CLINIC | Age: 27
End: 2022-01-13
Attending: RADIOLOGY
Payer: COMMERCIAL

## 2022-01-13 DIAGNOSIS — Z17.0 MALIGNANT NEOPLASM OF UPPER-INNER QUADRANT OF RIGHT BREAST IN FEMALE, ESTROGEN RECEPTOR POSITIVE (HCC): Primary | ICD-10-CM

## 2022-01-13 DIAGNOSIS — Z17.0 MALIGNANT NEOPLASM OF UPPER-INNER QUADRANT OF RIGHT BREAST IN FEMALE, ESTROGEN RECEPTOR POSITIVE (HCC): ICD-10-CM

## 2022-01-13 DIAGNOSIS — C50.211 MALIGNANT NEOPLASM OF UPPER-INNER QUADRANT OF RIGHT BREAST IN FEMALE, ESTROGEN RECEPTOR POSITIVE (HCC): Primary | ICD-10-CM

## 2022-01-13 DIAGNOSIS — C50.211 MALIGNANT NEOPLASM OF UPPER-INNER QUADRANT OF RIGHT BREAST IN FEMALE, ESTROGEN RECEPTOR POSITIVE (HCC): ICD-10-CM

## 2022-01-13 LAB
ALBUMIN SERPL BCP-MCNC: 3.7 G/DL (ref 3.5–5)
ALP SERPL-CCNC: 69 U/L (ref 46–116)
ALT SERPL W P-5'-P-CCNC: 24 U/L (ref 12–78)
ANION GAP SERPL CALCULATED.3IONS-SCNC: 8 MMOL/L (ref 4–13)
AST SERPL W P-5'-P-CCNC: 14 U/L (ref 5–45)
BASOPHILS # BLD AUTO: 0.03 THOUSANDS/ΜL (ref 0–0.1)
BASOPHILS NFR BLD AUTO: 1 % (ref 0–1)
BILIRUB SERPL-MCNC: 0.63 MG/DL (ref 0.2–1)
BUN SERPL-MCNC: 9 MG/DL (ref 5–25)
CALCIUM SERPL-MCNC: 8.9 MG/DL (ref 8.3–10.1)
CHLORIDE SERPL-SCNC: 106 MMOL/L (ref 100–108)
CO2 SERPL-SCNC: 29 MMOL/L (ref 21–32)
CREAT SERPL-MCNC: 0.74 MG/DL (ref 0.6–1.3)
EOSINOPHIL # BLD AUTO: 0.13 THOUSAND/ΜL (ref 0–0.61)
EOSINOPHIL NFR BLD AUTO: 2 % (ref 0–6)
ERYTHROCYTE [DISTWIDTH] IN BLOOD BY AUTOMATED COUNT: 13.2 % (ref 11.6–15.1)
GFR SERPL CREATININE-BSD FRML MDRD: 111 ML/MIN/1.73SQ M
GLUCOSE P FAST SERPL-MCNC: 91 MG/DL (ref 65–99)
HCT VFR BLD AUTO: 37.9 % (ref 34.8–46.1)
HGB BLD-MCNC: 12.2 G/DL (ref 11.5–15.4)
IMM GRANULOCYTES # BLD AUTO: 0.02 THOUSAND/UL (ref 0–0.2)
IMM GRANULOCYTES NFR BLD AUTO: 0 % (ref 0–2)
LYMPHOCYTES # BLD AUTO: 1.42 THOUSANDS/ΜL (ref 0.6–4.47)
LYMPHOCYTES NFR BLD AUTO: 27 % (ref 14–44)
MCH RBC QN AUTO: 29.5 PG (ref 26.8–34.3)
MCHC RBC AUTO-ENTMCNC: 32.2 G/DL (ref 31.4–37.4)
MCV RBC AUTO: 92 FL (ref 82–98)
MONOCYTES # BLD AUTO: 0.57 THOUSAND/ΜL (ref 0.17–1.22)
MONOCYTES NFR BLD AUTO: 11 % (ref 4–12)
NEUTROPHILS # BLD AUTO: 3.19 THOUSANDS/ΜL (ref 1.85–7.62)
NEUTS SEG NFR BLD AUTO: 59 % (ref 43–75)
NRBC BLD AUTO-RTO: 0 /100 WBCS
PLATELET # BLD AUTO: 278 THOUSANDS/UL (ref 149–390)
PMV BLD AUTO: 10.2 FL (ref 8.9–12.7)
POTASSIUM SERPL-SCNC: 3.8 MMOL/L (ref 3.5–5.3)
PROT SERPL-MCNC: 7 G/DL (ref 6.4–8.2)
RBC # BLD AUTO: 4.14 MILLION/UL (ref 3.81–5.12)
SODIUM SERPL-SCNC: 143 MMOL/L (ref 136–145)
WBC # BLD AUTO: 5.36 THOUSAND/UL (ref 4.31–10.16)

## 2022-01-13 PROCEDURE — 85025 COMPLETE CBC W/AUTO DIFF WBC: CPT

## 2022-01-13 PROCEDURE — 80053 COMPREHEN METABOLIC PANEL: CPT

## 2022-01-13 PROCEDURE — 36415 COLL VENOUS BLD VENIPUNCTURE: CPT

## 2022-01-13 PROCEDURE — 77412 RADIATION TX DELIVERY LVL 3: CPT | Performed by: RADIOLOGY

## 2022-01-14 ENCOUNTER — APPOINTMENT (OUTPATIENT)
Dept: RADIATION ONCOLOGY | Facility: CLINIC | Age: 27
End: 2022-01-14
Attending: RADIOLOGY
Payer: COMMERCIAL

## 2022-01-14 PROCEDURE — 77336 RADIATION PHYSICS CONSULT: CPT | Performed by: RADIOLOGY

## 2022-01-14 PROCEDURE — 77412 RADIATION TX DELIVERY LVL 3: CPT | Performed by: RADIOLOGY

## 2022-01-14 PROCEDURE — 77417 THER RADIOLOGY PORT IMAGE(S): CPT | Performed by: RADIOLOGY

## 2022-01-17 ENCOUNTER — HOSPITAL ENCOUNTER (OUTPATIENT)
Dept: INFUSION CENTER | Facility: CLINIC | Age: 27
Discharge: HOME | End: 2022-01-17
Payer: COMMERCIAL

## 2022-01-17 ENCOUNTER — APPOINTMENT (OUTPATIENT)
Dept: RADIATION ONCOLOGY | Facility: CLINIC | Age: 27
End: 2022-01-17
Attending: RADIOLOGY
Payer: COMMERCIAL

## 2022-01-17 VITALS
BODY MASS INDEX: 26.96 KG/M2 | HEART RATE: 69 BPM | SYSTOLIC BLOOD PRESSURE: 109 MMHG | RESPIRATION RATE: 16 BRPM | DIASTOLIC BLOOD PRESSURE: 55 MMHG | TEMPERATURE: 97.2 F | WEIGHT: 162 LBS

## 2022-01-17 DIAGNOSIS — Z17.0 MALIGNANT NEOPLASM OF UPPER-INNER QUADRANT OF RIGHT BREAST IN FEMALE, ESTROGEN RECEPTOR POSITIVE (HCC): Primary | ICD-10-CM

## 2022-01-17 DIAGNOSIS — C50.211 MALIGNANT NEOPLASM OF UPPER-INNER QUADRANT OF RIGHT BREAST IN FEMALE, ESTROGEN RECEPTOR POSITIVE (HCC): Primary | ICD-10-CM

## 2022-01-17 PROCEDURE — 96413 CHEMO IV INFUSION 1 HR: CPT

## 2022-01-17 PROCEDURE — 77427 RADIATION TX MANAGEMENT X5: CPT | Performed by: RADIOLOGY

## 2022-01-17 PROCEDURE — 77412 RADIATION TX DELIVERY LVL 3: CPT | Performed by: RADIOLOGY

## 2022-01-17 RX ORDER — SODIUM CHLORIDE 9 MG/ML
20 INJECTION, SOLUTION INTRAVENOUS ONCE
Status: COMPLETED | OUTPATIENT
Start: 2022-01-17 | End: 2022-01-17

## 2022-01-17 RX ADMIN — TRASTUZUMAB 450 MG: 150 INJECTION, POWDER, LYOPHILIZED, FOR SOLUTION INTRAVENOUS at 11:21

## 2022-01-17 RX ADMIN — SODIUM CHLORIDE 20 ML/HR: 0.9 INJECTION, SOLUTION INTRAVENOUS at 11:21

## 2022-01-17 NOTE — PROGRESS NOTES
Pt presents for Herceptin Infusion offering no compliants  Pt tolerated treatment without incident  PIV Removed  AVS declined  Next appointment reviewed

## 2022-01-18 ENCOUNTER — APPOINTMENT (OUTPATIENT)
Dept: RADIATION ONCOLOGY | Facility: CLINIC | Age: 27
End: 2022-01-18
Attending: RADIOLOGY
Payer: COMMERCIAL

## 2022-01-18 PROCEDURE — 77412 RADIATION TX DELIVERY LVL 3: CPT | Performed by: RADIOLOGY

## 2022-01-19 ENCOUNTER — APPOINTMENT (OUTPATIENT)
Dept: RADIATION ONCOLOGY | Facility: CLINIC | Age: 27
End: 2022-01-19
Attending: RADIOLOGY
Payer: COMMERCIAL

## 2022-01-19 PROCEDURE — 77412 RADIATION TX DELIVERY LVL 3: CPT | Performed by: RADIOLOGY

## 2022-01-20 ENCOUNTER — APPOINTMENT (OUTPATIENT)
Dept: RADIATION ONCOLOGY | Facility: CLINIC | Age: 27
End: 2022-01-20
Attending: RADIOLOGY
Payer: COMMERCIAL

## 2022-01-20 PROCEDURE — 77412 RADIATION TX DELIVERY LVL 3: CPT | Performed by: RADIOLOGY

## 2022-01-21 ENCOUNTER — APPOINTMENT (OUTPATIENT)
Dept: RADIATION ONCOLOGY | Facility: CLINIC | Age: 27
End: 2022-01-21
Attending: RADIOLOGY
Payer: COMMERCIAL

## 2022-01-21 PROCEDURE — 77412 RADIATION TX DELIVERY LVL 3: CPT | Performed by: RADIOLOGY

## 2022-01-21 PROCEDURE — 77336 RADIATION PHYSICS CONSULT: CPT | Performed by: RADIOLOGY

## 2022-01-24 ENCOUNTER — TELEPHONE (OUTPATIENT)
Dept: HEMATOLOGY ONCOLOGY | Facility: CLINIC | Age: 27
End: 2022-01-24

## 2022-01-24 ENCOUNTER — APPOINTMENT (OUTPATIENT)
Dept: RADIATION ONCOLOGY | Facility: CLINIC | Age: 27
End: 2022-01-24
Attending: RADIOLOGY
Payer: COMMERCIAL

## 2022-01-24 DIAGNOSIS — N39.0 URINARY TRACT INFECTION WITHOUT HEMATURIA, SITE UNSPECIFIED: Primary | ICD-10-CM

## 2022-01-24 RX ORDER — CIPROFLOXACIN 500 MG/1
500 TABLET, FILM COATED ORAL EVERY 24 HOURS
Qty: 5 TABLET | Refills: 0 | Status: SHIPPED | OUTPATIENT
Start: 2022-01-24 | End: 2022-01-29

## 2022-01-24 NOTE — TELEPHONE ENCOUNTER
Pt states she has a UTI, symptoms were present at last visit  She was on macrobid from another provider and it did not help  Would like Dr Mandy Astudillo to prescribe something  As per Dr Mandy Astudillo, will send in rx for Cipro 500mg daily x 5 days  Patient is aware and appreciative

## 2022-01-25 ENCOUNTER — TELEPHONE (OUTPATIENT)
Dept: HEMATOLOGY ONCOLOGY | Facility: CLINIC | Age: 27
End: 2022-01-25

## 2022-01-25 ENCOUNTER — APPOINTMENT (OUTPATIENT)
Dept: RADIATION ONCOLOGY | Facility: CLINIC | Age: 27
End: 2022-01-25
Attending: RADIOLOGY
Payer: COMMERCIAL

## 2022-01-25 PROCEDURE — 77280 THER RAD SIMULAJ FIELD SMPL: CPT | Performed by: RADIOLOGY

## 2022-01-25 PROCEDURE — 77412 RADIATION TX DELIVERY LVL 3: CPT | Performed by: RADIOLOGY

## 2022-01-25 PROCEDURE — 77427 RADIATION TX MANAGEMENT X5: CPT | Performed by: RADIOLOGY

## 2022-01-25 NOTE — TELEPHONE ENCOUNTER
----- Message from Pallavi Gay sent at 1/25/2022 12:09 PM EST -----  Regarding: FW: Prescription     ----- Message -----  From: Doris Harman  Sent: 1/24/2022   5:28 PM EST  To: Hematology Oncology [facility]  Subject: Prescription                                     Dr Claudeen Cobble was supposed to call in a prescription for me for a UTI and never did   This needs to be addressed ASAP

## 2022-01-25 NOTE — TELEPHONE ENCOUNTER
I phoned the patient regarding her concern about her medications for the UTI and she notes that she was able to pick them up yesterday at her pharmacy

## 2022-01-26 ENCOUNTER — APPOINTMENT (OUTPATIENT)
Dept: RADIATION ONCOLOGY | Facility: CLINIC | Age: 27
End: 2022-01-26
Attending: RADIOLOGY
Payer: COMMERCIAL

## 2022-01-26 PROCEDURE — 77412 RADIATION TX DELIVERY LVL 3: CPT | Performed by: RADIOLOGY

## 2022-01-27 ENCOUNTER — APPOINTMENT (OUTPATIENT)
Dept: RADIATION ONCOLOGY | Facility: CLINIC | Age: 27
End: 2022-01-27
Attending: RADIOLOGY
Payer: COMMERCIAL

## 2022-01-27 PROCEDURE — 77412 RADIATION TX DELIVERY LVL 3: CPT | Performed by: RADIOLOGY

## 2022-01-28 ENCOUNTER — APPOINTMENT (OUTPATIENT)
Dept: RADIATION ONCOLOGY | Facility: CLINIC | Age: 27
End: 2022-01-28
Attending: RADIOLOGY
Payer: COMMERCIAL

## 2022-01-28 PROCEDURE — 77412 RADIATION TX DELIVERY LVL 3: CPT | Performed by: RADIOLOGY

## 2022-01-31 ENCOUNTER — APPOINTMENT (OUTPATIENT)
Dept: RADIATION ONCOLOGY | Facility: CLINIC | Age: 27
End: 2022-01-31
Attending: RADIOLOGY
Payer: COMMERCIAL

## 2022-01-31 PROCEDURE — 77412 RADIATION TX DELIVERY LVL 3: CPT | Performed by: RADIOLOGY

## 2022-01-31 PROCEDURE — 77336 RADIATION PHYSICS CONSULT: CPT | Performed by: RADIOLOGY

## 2022-01-31 RX ORDER — SODIUM CHLORIDE 9 MG/ML
20 INJECTION, SOLUTION INTRAVENOUS ONCE
Status: CANCELLED | OUTPATIENT
Start: 2022-02-07

## 2022-01-31 RX ORDER — SODIUM CHLORIDE 9 MG/ML
20 INJECTION, SOLUTION INTRAVENOUS ONCE
Status: CANCELLED | OUTPATIENT
Start: 2022-02-28

## 2022-02-04 ENCOUNTER — APPOINTMENT (OUTPATIENT)
Dept: LAB | Facility: HOSPITAL | Age: 27
End: 2022-02-04
Payer: COMMERCIAL

## 2022-02-04 LAB
ALBUMIN SERPL BCP-MCNC: 4.1 G/DL (ref 3.5–5)
ALP SERPL-CCNC: 76 U/L (ref 46–116)
ALT SERPL W P-5'-P-CCNC: 25 U/L (ref 12–78)
ANION GAP SERPL CALCULATED.3IONS-SCNC: 6 MMOL/L (ref 4–13)
AST SERPL W P-5'-P-CCNC: 18 U/L (ref 5–45)
BASOPHILS # BLD AUTO: 0.02 THOUSANDS/ΜL (ref 0–0.1)
BASOPHILS NFR BLD AUTO: 0 % (ref 0–1)
BILIRUB SERPL-MCNC: 0.43 MG/DL (ref 0.2–1)
BUN SERPL-MCNC: 15 MG/DL (ref 5–25)
CALCIUM SERPL-MCNC: 9 MG/DL (ref 8.3–10.1)
CHLORIDE SERPL-SCNC: 104 MMOL/L (ref 100–108)
CO2 SERPL-SCNC: 27 MMOL/L (ref 21–32)
CREAT SERPL-MCNC: 0.7 MG/DL (ref 0.6–1.3)
EOSINOPHIL # BLD AUTO: 0.08 THOUSAND/ΜL (ref 0–0.61)
EOSINOPHIL NFR BLD AUTO: 1 % (ref 0–6)
ERYTHROCYTE [DISTWIDTH] IN BLOOD BY AUTOMATED COUNT: 12.8 % (ref 11.6–15.1)
GFR SERPL CREATININE-BSD FRML MDRD: 119 ML/MIN/1.73SQ M
GLUCOSE SERPL-MCNC: 90 MG/DL (ref 65–140)
HCT VFR BLD AUTO: 40.1 % (ref 34.8–46.1)
HGB BLD-MCNC: 13.1 G/DL (ref 11.5–15.4)
IMM GRANULOCYTES # BLD AUTO: 0.01 THOUSAND/UL (ref 0–0.2)
IMM GRANULOCYTES NFR BLD AUTO: 0 % (ref 0–2)
LYMPHOCYTES # BLD AUTO: 0.98 THOUSANDS/ΜL (ref 0.6–4.47)
LYMPHOCYTES NFR BLD AUTO: 17 % (ref 14–44)
MCH RBC QN AUTO: 29 PG (ref 26.8–34.3)
MCHC RBC AUTO-ENTMCNC: 32.7 G/DL (ref 31.4–37.4)
MCV RBC AUTO: 89 FL (ref 82–98)
MONOCYTES # BLD AUTO: 0.52 THOUSAND/ΜL (ref 0.17–1.22)
MONOCYTES NFR BLD AUTO: 9 % (ref 4–12)
NEUTROPHILS # BLD AUTO: 4.02 THOUSANDS/ΜL (ref 1.85–7.62)
NEUTS SEG NFR BLD AUTO: 73 % (ref 43–75)
NRBC BLD AUTO-RTO: 0 /100 WBCS
PLATELET # BLD AUTO: 239 THOUSANDS/UL (ref 149–390)
PMV BLD AUTO: 10.4 FL (ref 8.9–12.7)
POTASSIUM SERPL-SCNC: 4.1 MMOL/L (ref 3.5–5.3)
PROT SERPL-MCNC: 7.5 G/DL (ref 6.4–8.2)
RBC # BLD AUTO: 4.52 MILLION/UL (ref 3.81–5.12)
SODIUM SERPL-SCNC: 137 MMOL/L (ref 136–145)
WBC # BLD AUTO: 5.63 THOUSAND/UL (ref 4.31–10.16)

## 2022-02-04 PROCEDURE — 85025 COMPLETE CBC W/AUTO DIFF WBC: CPT

## 2022-02-04 PROCEDURE — 36415 COLL VENOUS BLD VENIPUNCTURE: CPT

## 2022-02-04 PROCEDURE — 80053 COMPREHEN METABOLIC PANEL: CPT

## 2022-02-07 ENCOUNTER — PATIENT OUTREACH (OUTPATIENT)
Dept: CASE MANAGEMENT | Facility: HOSPITAL | Age: 27
End: 2022-02-07

## 2022-02-07 ENCOUNTER — HOSPITAL ENCOUNTER (OUTPATIENT)
Dept: INFUSION CENTER | Facility: CLINIC | Age: 27
Discharge: HOME | End: 2022-02-07
Payer: COMMERCIAL

## 2022-02-07 VITALS
DIASTOLIC BLOOD PRESSURE: 62 MMHG | SYSTOLIC BLOOD PRESSURE: 131 MMHG | HEART RATE: 85 BPM | BODY MASS INDEX: 26.19 KG/M2 | WEIGHT: 157.4 LBS | RESPIRATION RATE: 18 BRPM

## 2022-02-07 DIAGNOSIS — Z17.0 MALIGNANT NEOPLASM OF UPPER-INNER QUADRANT OF RIGHT BREAST IN FEMALE, ESTROGEN RECEPTOR POSITIVE (HCC): Primary | ICD-10-CM

## 2022-02-07 DIAGNOSIS — C50.211 MALIGNANT NEOPLASM OF UPPER-INNER QUADRANT OF RIGHT BREAST IN FEMALE, ESTROGEN RECEPTOR POSITIVE (HCC): Primary | ICD-10-CM

## 2022-02-07 PROCEDURE — 96413 CHEMO IV INFUSION 1 HR: CPT

## 2022-02-07 RX ORDER — SODIUM CHLORIDE 9 MG/ML
20 INJECTION, SOLUTION INTRAVENOUS ONCE
Status: COMPLETED | OUTPATIENT
Start: 2022-02-07 | End: 2022-02-07

## 2022-02-07 RX ADMIN — TRASTUZUMAB 450 MG: 150 INJECTION, POWDER, LYOPHILIZED, FOR SOLUTION INTRAVENOUS at 14:13

## 2022-02-07 RX ADMIN — SODIUM CHLORIDE 20 ML/HR: 0.9 INJECTION, SOLUTION INTRAVENOUS at 14:14

## 2022-02-07 NOTE — PROGRESS NOTES
Pt presented for chemotherapy, offering no complaints  PIV placed with good blood return through out treatment  Pt tolerated entire treatment without incident  PIV removed, pt given AVS and discharged in stable condition

## 2022-02-07 NOTE — PLAN OF CARE
Problem: Potential for Falls  Goal: Patient will remain free of falls  Description: INTERVENTIONS:  - Educate patient/family on patient safety including physical limitations  - Instruct patient to call for assistance with activity   - Consult OT/PT to assist with strengthening/mobility   - Keep Call bell within reach  - Consider moving patient to room near nurses station  Outcome: Progressing

## 2022-02-07 NOTE — PROGRESS NOTES
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

## 2022-02-15 ENCOUNTER — TELEPHONE (OUTPATIENT)
Dept: CARDIAC SURGERY | Facility: CLINIC | Age: 27
End: 2022-02-15

## 2022-02-15 PROBLEM — Z79.810 USE OF TAMOXIFEN (NOLVADEX): Status: ACTIVE | Noted: 2022-02-15

## 2022-02-15 NOTE — TELEPHONE ENCOUNTER
Called pt and left message to confirm appt location for 2/16/22 with Dr Bernard Barrera at [address] [place] [address]

## 2022-02-16 ENCOUNTER — OFFICE VISIT (OUTPATIENT)
Dept: SURGICAL ONCOLOGY | Facility: CLINIC | Age: 27
End: 2022-02-16
Payer: COMMERCIAL

## 2022-02-16 VITALS
OXYGEN SATURATION: 98 % | SYSTOLIC BLOOD PRESSURE: 124 MMHG | BODY MASS INDEX: 28.35 KG/M2 | HEIGHT: 63 IN | WEIGHT: 160 LBS | RESPIRATION RATE: 14 BRPM | TEMPERATURE: 97.6 F | DIASTOLIC BLOOD PRESSURE: 80 MMHG | HEART RATE: 84 BPM

## 2022-02-16 DIAGNOSIS — C50.211 MALIGNANT NEOPLASM OF UPPER-INNER QUADRANT OF RIGHT BREAST IN FEMALE, ESTROGEN RECEPTOR POSITIVE (HCC): Primary | ICD-10-CM

## 2022-02-16 DIAGNOSIS — Z79.810 USE OF TAMOXIFEN (NOLVADEX): ICD-10-CM

## 2022-02-16 DIAGNOSIS — Z17.0 MALIGNANT NEOPLASM OF UPPER-INNER QUADRANT OF RIGHT BREAST IN FEMALE, ESTROGEN RECEPTOR POSITIVE (HCC): Primary | ICD-10-CM

## 2022-02-16 PROCEDURE — 3008F BODY MASS INDEX DOCD: CPT | Performed by: SURGERY

## 2022-02-16 PROCEDURE — 1036F TOBACCO NON-USER: CPT | Performed by: SURGERY

## 2022-02-16 PROCEDURE — 99214 OFFICE O/P EST MOD 30 MIN: CPT | Performed by: SURGERY

## 2022-02-16 NOTE — PROGRESS NOTES
Surgical Oncology Follow Up  [facility]  [facility] [place]  [address] [place] [address] Extension  Michelle PA [address] [facility]  1995  [id number]      Chief Complaint   Patient presents with    Follow-up     6 month         Assessment & Plan: This is a 65-year-old female with status post right breast conservation surgery with triple positive breast cancer is status post breast conservation surgery  She has completed her radiation  She is on Herceptin and on tamoxifen  She is doing overall well  Today's physical examination there are no suspicious mass or masses  She has no evidence of lymphedema  We will see her in 6 months time  We will we will also order her mammogram     Cancer History:     Oncology History   Malignant neoplasm of upper-inner quadrant of right breast in female, estrogen receptor positive ([facility] [address] )   6/24/2021 Biopsy    US guided Right breast core biopsy:  1 o'clock 13-14 cm from the nipple  Right invasive ductal carcinoma  Grade 3  ER 90  MN 70   HER2 3+  Lymphovascular invasion: not identified    Results are malignant and concordant  Recommend surgical consultation  Additionally, given patient's age and density of breast tissue, recommend enhanced breast MRI for further evaluation       Done at [facility]     6/30/2021 Genetic Testing    The following genes were evaluated: RADHA, BRCA1, BRCA2, CDH1, CHEK2, PALB2, PTEN, STK11, TP53  Additional genes tested for a total of 36 genes  VUS NF1   [employer]     7/1/2021 Observation    Bilateral breast MRI with and without contrast: B6     Biopsy confirmed malignancy  01:00 o'clock location right breast   No axillary lymphadenopathy, extension to the chest wall, or concurrent lesions in either breast        7/19/2021 Surgery    Right breast LYNDSAY  directed lumpectomy with sentinel lymph node biopsy:  Invasive mammary carcinoma of no special type; ductal  Grade 3  ER 65  MN 90  HER2 3+  18 mm  Margins negative  0/5 Lymph nodes  Stage IA      8/23/2021 Genomic Testing    MammaPrint: High Risk  HER2 Type     9/8/2021 -  Cancer Staged    Staging form: Breast, AJCC 8th Edition  - Clinical: Stage IA (cT1c, cN0, cM0, G3, ER+, MO+, HER2+) - Signed by Young Peres MD on 9/8/2021  Histologic grading system: 3 grade system       9/13/2021 - 11/29/2021 Chemotherapy    fosaprepitant (EMEND) IVPB, 150 mg, Intravenous, Once, 0 of 6 cycles  pertuzumab (PERJETA) IVPB, 840 mg, Intravenous, Once, 0 of 18 cycles  CARBOplatin (PARAPLATIN) IVPB (GOG AUC DOSING), 760 8 mg, Intravenous, Once, 0 of 6 cycles  DOCEtaxel (TAXOTERE) chemo infusion, 75 mg/m2 = 132 8 mg, Intravenous, Once, 0 of 6 cycles  trastuzumab (HERCEPTIN) chemo infusion, 8 mg/kg = 610 mg, Intravenous, Once, 0 of 18 cycles       9/13/2021 -  Chemotherapy    PACLItaxel (TAXOL) chemo IVPB, 80 mg/m2 = 143 4 mg, Intravenous, Once, 12 of 12 cycles  Administration: 143 4 mg (9/13/2021), 143 4 mg (9/20/2021), 143 4 mg (10/4/2021), 143 4 mg (10/18/2021), 143 4 mg (10/25/2021), 143 4 mg (11/1/2021), 143 4 mg (11/15/2021), 143 4 mg (11/22/2021), 143 4 mg (9/27/2021), 143 4 mg (10/11/2021), 143 4 mg (11/8/2021), 143 4 mg (11/29/2021)  trastuzumab (HERCEPTIN) chemo infusion, 304 mg, Intravenous, Once, 16 of 26 cycles  Administration: 300 mg (9/13/2021), 150 mg (9/20/2021), 150 mg (10/4/2021), 150 mg (10/18/2021), 150 mg (10/25/2021), 150 mg (11/1/2021), 150 mg (11/15/2021), 150 mg (11/22/2021), 150 mg (9/27/2021), 150 mg (10/11/2021), 450 mg (12/6/2021), 150 mg (11/8/2021), 150 mg (11/29/2021), 450 mg (12/27/2021), 450 mg (1/17/2022), 450 mg (2/7/2022)           Interval History:   She is here for follow-up with right breast cancer status post breast conservation surgery followed by Taxol and  herceptin  She is tolerating well tamoxifen  Review of Systems:   Review of Systems   Constitutional: Negative for chills and fever  HENT: Negative for ear pain and sore throat      Eyes: Negative for pain and visual disturbance  Respiratory: Negative for cough and shortness of breath  Cardiovascular: Negative for chest pain and palpitations  Gastrointestinal: Negative for abdominal pain, nausea, rectal pain and vomiting  Endocrine: Negative for cold intolerance and heat intolerance  Genitourinary: Negative for dysuria, flank pain, hematuria, pelvic pain and vaginal bleeding  Musculoskeletal: Negative for arthralgias, back pain, gait problem, joint swelling and neck pain  Skin: Negative for color change and rash  Neurological: Negative for dizziness, tremors, seizures, syncope, facial asymmetry, weakness, light-headedness, numbness and headaches  Hematological: Negative for adenopathy  Psychiatric/Behavioral: Negative for agitation, behavioral problems, confusion, decreased concentration, dysphoric mood and hallucinations  The patient is not nervous/anxious and is not hyperactive  All other systems reviewed and are negative        Past Medical History     Patient Active Problem List   Diagnosis    Nevus    Malignant melanoma of skin of abdomen ([facility] [address] )    Neck pain    Multiple benign melanocytic nevi    Iron deficiency anemia due to chronic blood loss    Warts    Surveillance for birth control, oral contraceptives    Discomfort of left ear    Benign paroxysmal positional vertigo due to bilateral vestibular disorder    Spitz nevus of forearm, left    Enlarged thyroid    Malignant neoplasm of upper-inner quadrant of right breast in female, estrogen receptor positive (HCC)    Breast lump or mass    Use of tamoxifen (Nolvadex)     Past Medical History:   Diagnosis Date    [facility] ) 07/19/2021    Cancer ([facility] )     Melanoma ([facility] )      Past Surgical History:   Procedure Laterality Date    BREAST BIOPSY      BREAST LUMPECTOMY Right 7/19/2021    Procedure: LYNDSAY  DIRECTED LUMPECTOMY;  Surgeon: Qasim Valdovinos MD;  Location: [facility];  Service: Surgical Oncology    LYMPH NODE BIOPSY Right 7/19/2021    Procedure: LYMPHATIC MAPPING WITH BLUE AND RADIOACTIVE DYES, SENTINEL LYMPH NODE BIOPSY, injection at 1030;  Surgeon: Molly Galarza MD;  Location: [facility];  Service: Surgical Oncology    SKIN CANCER EXCISION      TONSILLECTOMY AND ADENOIDECTOMY      WISDOM TOOTH EXTRACTION       Family History   Problem Relation Age of Onset    Prostate cancer Father     Kidney cancer Maternal Grandfather     Breast cancer Maternal Aunt      Social History     Socioeconomic History    Marital status: Single     Spouse name: Not on file    Number of children: Not on file    Years of education: Not on file    Highest education level: Not on file   Occupational History    Not on file   Tobacco Use    Smoking status: Never Smoker    Smokeless tobacco: Never Used   Vaping Use    Vaping Use: Never used   Substance and Sexual Activity    Alcohol use: Yes     Comment: Socially    Drug use: No    Sexual activity: Yes   Other Topics Concern    Not on file   Social History Narrative    Not on file     Social Determinants of Health     Financial Resource Strain: Not on file   Food Insecurity: Not on file   Transportation Needs: Not on file   Physical Activity: Not on file   Stress: Not on file   Social Connections: Not on file   Intimate Partner Violence: Not on file   Housing Stability: Not on file       Current Outpatient Medications:     b complex vitamins capsule, Take 1 capsule by mouth daily, Disp: , Rfl:     Calcium Carbonate-Vit D-Min (CALCIUM 1200 PO), Take by mouth, Disp: , Rfl:     cholecalciferol (VITAMIN D3) 1,000 units tablet, Take 1,000 Units by mouth daily 4,000 IU daily, Disp: , Rfl:     tamoxifen (NOLVADEX) 20 mg tablet, Take 1 tablet (20 mg total) by mouth daily, Disp: 30 tablet, Rfl: 2    phenazopyridine (PYRIDIUM) 95 MG tablet, Take 95 mg by mouth 3 (three) times a day as needed for bladder spasms (Patient not taking: Reported on 2/16/2022 ), Disp: , Rfl:     vitamin B-12 (VITAMIN B-12) 1,000 mcg tablet, Take 1,000 mcg by mouth daily   (Patient not taking: Reported on 12/20/2021 ), Disp: , Rfl:   No Known Allergies    Physical Exam:     Vitals:    02/16/22 0933   BP: 124/80   Pulse: 84   Resp: 14   Temp: 97 6 °F (36 4 °C)   SpO2: 98%     Physical Exam  Constitutional:       Appearance: Normal appearance  HENT:      Head: Normocephalic and atraumatic  Nose: Nose normal  No congestion  Mouth/Throat:      Mouth: Mucous membranes are moist    Eyes:      General: No scleral icterus  Pupils: Pupils are equal, round, and reactive to light  Cardiovascular:      Rate and Rhythm: Normal rate and regular rhythm  Pulses: Normal pulses  Heart sounds: Normal heart sounds  Pulmonary:      Effort: Pulmonary effort is normal  No respiratory distress  Breath sounds: Normal breath sounds  No wheezing, rhonchi or rales  Chest:          Comments: The bilateral Breast(s) were examined  There was not any sign of an inverted nipple, mass, nipple discharge, skin changes or tenderness  The bilateral  breast(s) were examined in the sitting and supine position  There are not any worrisome skin changes, tenderness, nipple changes, swelling ,bleeding or evidence of mass/s in all four quadrants  Deni survey demonstrated that there is not any evidence of any clinically suspicious axillary, pectoral or supraclavicular lymph nodes  Well-healed right breast and  right axillary incision  Abdominal:      General: Bowel sounds are normal  There is no distension  Palpations: Abdomen is soft  There is no mass  Tenderness: There is no abdominal tenderness  There is no right CVA tenderness, left CVA tenderness or guarding  Hernia: No hernia is present  Musculoskeletal:         General: No swelling, tenderness or deformity  Normal range of motion  Cervical back: Normal range of motion and neck supple  Right lower leg: No edema  Left lower leg: No edema  Skin:     General: Skin is warm  Coloration: Skin is not jaundiced or pale  Findings: No bruising  Neurological:      General: No focal deficit present  Mental Status: She is alert and oriented to person, place, and time  Cranial Nerves: No cranial nerve deficit  Sensory: No sensory deficit  Motor: No weakness  Gait: Gait normal    Psychiatric:         Mood and Affect: Mood normal          Behavior: Behavior normal          Thought Content: Thought content normal          Judgment: Judgment normal            Results & Discussion:   I did review further follow-up  We also discussed in detail about further studies such as mammogram and need for additional staging workup  She and her mom had several questions I answered all of them  Overall she is doing very really well she is tolerating her tamoxifen without major any side effects she understands and  agrees   All patient questions were answered  Advance Care Planning/Advance Directives: Ysabel Calhoun MD discussed the disease status with Doris Harman  today 02/16/22  treatment plans and follow-up with the patient

## 2022-02-25 ENCOUNTER — APPOINTMENT (OUTPATIENT)
Dept: LAB | Facility: HOSPITAL | Age: 27
End: 2022-02-25
Payer: COMMERCIAL

## 2022-02-28 ENCOUNTER — HOSPITAL ENCOUNTER (OUTPATIENT)
Dept: INFUSION CENTER | Facility: CLINIC | Age: 27
Discharge: HOME | End: 2022-02-28
Payer: COMMERCIAL

## 2022-02-28 VITALS
WEIGHT: 158.4 LBS | SYSTOLIC BLOOD PRESSURE: 118 MMHG | RESPIRATION RATE: 18 BRPM | HEIGHT: 63 IN | BODY MASS INDEX: 28.07 KG/M2 | DIASTOLIC BLOOD PRESSURE: 69 MMHG | HEART RATE: 69 BPM | TEMPERATURE: 97.9 F

## 2022-02-28 DIAGNOSIS — C50.211 MALIGNANT NEOPLASM OF UPPER-INNER QUADRANT OF RIGHT BREAST IN FEMALE, ESTROGEN RECEPTOR POSITIVE (HCC): Primary | ICD-10-CM

## 2022-02-28 DIAGNOSIS — Z17.0 MALIGNANT NEOPLASM OF UPPER-INNER QUADRANT OF RIGHT BREAST IN FEMALE, ESTROGEN RECEPTOR POSITIVE (HCC): Primary | ICD-10-CM

## 2022-02-28 PROCEDURE — 96413 CHEMO IV INFUSION 1 HR: CPT

## 2022-02-28 RX ORDER — SODIUM CHLORIDE 9 MG/ML
20 INJECTION, SOLUTION INTRAVENOUS ONCE
Status: COMPLETED | OUTPATIENT
Start: 2022-02-28 | End: 2022-02-28

## 2022-02-28 RX ADMIN — SODIUM CHLORIDE 20 ML/HR: 9 INJECTION, SOLUTION INTRAVENOUS at 14:24

## 2022-02-28 RX ADMIN — TRASTUZUMAB 450 MG: 150 INJECTION, POWDER, LYOPHILIZED, FOR SOLUTION INTRAVENOUS at 14:23

## 2022-03-04 ENCOUNTER — RADIATION ONCOLOGY FOLLOW-UP (OUTPATIENT)
Dept: RADIATION ONCOLOGY | Facility: CLINIC | Age: 27
End: 2022-03-04
Attending: RADIOLOGY
Payer: COMMERCIAL

## 2022-03-04 VITALS
WEIGHT: 162 LBS | RESPIRATION RATE: 16 BRPM | OXYGEN SATURATION: 98 % | BODY MASS INDEX: 28.7 KG/M2 | SYSTOLIC BLOOD PRESSURE: 112 MMHG | HEART RATE: 74 BPM | DIASTOLIC BLOOD PRESSURE: 64 MMHG | TEMPERATURE: 98.1 F

## 2022-03-04 DIAGNOSIS — C50.211 MALIGNANT NEOPLASM OF UPPER-INNER QUADRANT OF RIGHT BREAST IN FEMALE, ESTROGEN RECEPTOR POSITIVE (HCC): Primary | ICD-10-CM

## 2022-03-04 DIAGNOSIS — Z17.0 MALIGNANT NEOPLASM OF UPPER-INNER QUADRANT OF RIGHT BREAST IN FEMALE, ESTROGEN RECEPTOR POSITIVE (HCC): Primary | ICD-10-CM

## 2022-03-04 PROCEDURE — G0463 HOSPITAL OUTPT CLINIC VISIT: HCPCS | Performed by: RADIOLOGY

## 2022-03-04 PROCEDURE — 99211 OFF/OP EST MAY X REQ PHY/QHP: CPT | Performed by: RADIOLOGY

## 2022-03-04 PROCEDURE — 99024 POSTOP FOLLOW-UP VISIT: CPT | Performed by: RADIOLOGY

## 2022-03-04 NOTE — PROGRESS NOTES
Lore Rivera 1995 is a 31 y o  female with past medical history of early stage melanoma status post resection from abdomen diagnosed with stage IA, grade 3 invasive ductal carcinoma of the right breast status post lumpectomy and sentinel lymph node biopsy  Tumor was ER/ NE / HER2 positive  She underwent adjuvant chemotherapy with TH x 3 followed by Herceptin monotherapy  She is taking tamoxifen  She recently completed radiation on 1/31/22  She presents today for one month EOT follow up     2/16/22 Dr Delvin Dobbs, Surgical Oncology  on Herceptin and tamoxifen  Follow up in 6 months  Mammogram ordered    Upcoming:  3/25/22 Dr Song Rojas  8/2/22 Mammogram  8/11/22 Dr Delvin Dobbs, Surgical Oncology      Follow up visit     Oncology History   Malignant neoplasm of upper-inner quadrant of right breast in female, estrogen receptor positive ([facility] [address] )   6/24/2021 Biopsy    US guided Right breast core biopsy:  1 o'clock 13-14 cm from the nipple  Right invasive ductal carcinoma  Grade 3  ER 90  NE 70   HER2 3+  Lymphovascular invasion: not identified    Results are malignant and concordant  Recommend surgical consultation  Additionally, given patient's age and density of breast tissue, recommend enhanced breast MRI for further evaluation       Done at [facility]     6/30/2021 Genetic Testing    The following genes were evaluated: RADHA, BRCA1, BRCA2, CDH1, CHEK2, PALB2, PTEN, STK11, TP53  Additional genes tested for a total of 36 genes  VUS NF1   [employer]     7/1/2021 Observation    Bilateral breast MRI with and without contrast: B6     Biopsy confirmed malignancy  01:00 o'clock location right breast   No axillary lymphadenopathy, extension to the chest wall, or concurrent lesions in either breast        7/19/2021 Surgery    Right breast LYNDSAY  directed lumpectomy with sentinel lymph node biopsy:  Invasive mammary carcinoma of no special type; ductal  Grade 3  ER 65  NE 90  HER2 3+  18 mm  Margins negative  0/5 Lymph nodes  Stage IA      8/23/2021 Genomic Testing    MammaPrint: High Risk  HER2 Type     9/8/2021 -  Cancer Staged    Staging form: Breast, AJCC 8th Edition  - Clinical: Stage IA (cT1c, cN0, cM0, G3, ER+, FL+, HER2+) - Signed by Ryanne Mcdonald MD on 9/8/2021  Histologic grading system: 3 grade system       9/13/2021 - 11/29/2021 Chemotherapy    fosaprepitant (EMEND) IVPB, 150 mg, Intravenous, Once, 0 of 6 cycles  pertuzumab (PERJETA) IVPB, 840 mg, Intravenous, Once, 0 of 18 cycles  CARBOplatin (PARAPLATIN) IVPB (GOG AUC DOSING), 760 8 mg, Intravenous, Once, 0 of 6 cycles  DOCEtaxel (TAXOTERE) chemo infusion, 75 mg/m2 = 132 8 mg, Intravenous, Once, 0 of 6 cycles  trastuzumab (HERCEPTIN) chemo infusion, 8 mg/kg = 610 mg, Intravenous, Once, 0 of 18 cycles       9/13/2021 -  Chemotherapy    PACLItaxel (TAXOL) chemo IVPB, 80 mg/m2 = 143 4 mg, Intravenous, Once, 12 of 12 cycles  Administration: 143 4 mg (9/13/2021), 143 4 mg (9/20/2021), 143 4 mg (10/4/2021), 143 4 mg (10/18/2021), 143 4 mg (10/25/2021), 143 4 mg (11/1/2021), 143 4 mg (11/15/2021), 143 4 mg (11/22/2021), 143 4 mg (9/27/2021), 143 4 mg (10/11/2021), 143 4 mg (11/8/2021), 143 4 mg (11/29/2021)  trastuzumab (HERCEPTIN) chemo infusion, 304 mg, Intravenous, Once, 17 of 26 cycles  Administration: 300 mg (9/13/2021), 150 mg (9/20/2021), 150 mg (10/4/2021), 150 mg (10/18/2021), 150 mg (10/25/2021), 150 mg (11/1/2021), 150 mg (11/15/2021), 150 mg (11/22/2021), 150 mg (9/27/2021), 150 mg (10/11/2021), 450 mg (12/6/2021), 150 mg (11/8/2021), 150 mg (11/29/2021), 450 mg (12/27/2021), 450 mg (1/17/2022), 450 mg (2/7/2022), 450 mg (2/28/2022)     1/3/2022 - 1/31/2022 Radiation    Treatments:  Course: C1  Plan ID Energy Fractions Dose per Fraction (cGy) Total Dose Delivered (cGy) Elapsed Days   Hypo R Bolus 6X 7 / 7 267 1,869 16   Hypo R Brst 6X 8 / 8 267 2,136 18   R Brst e 9E 5 / 5 200 1,000 6    Treatment Dates:  1/3/2022 - 1/31/2022            Review of Systems:  Review of Systems Constitutional: Negative  HENT: Negative  Negative for nosebleeds (x2 in last 2 weeks)  Eyes: Negative  Respiratory: Negative  Cardiovascular: Negative  Gastrointestinal: Negative  Endocrine: Negative  Genitourinary: Negative  Musculoskeletal: Negative  Skin: Negative  Peeling skin under right breast  Hyperpigmentation  under right breast   Allergic/Immunologic: Negative  Neurological: Negative  Hematological: Negative  Psychiatric/Behavioral: Negative          Clinical Trial: no        Covid Vaccine Status yes    Health Maintenance   Topic Date Due    Hepatitis C Screening  Never done    HIV Screening  Never done    Annual Physical  Never done    Cervical Cancer Screening  Never done    COVID-19 Vaccine (2 - Booster for Laura Pleasure series) 10/10/2021    BMI: Followup Plan  11/16/2022    Depression Screening  12/14/2022    BMI: Adult  02/28/2023    DTaP,Tdap,and Td Vaccines (5 - Td or Tdap) 07/22/2023    Hepatitis B Vaccine  Completed    IPV Vaccine  Completed    Meningococcal ACWY Vaccine  Completed    Influenza Vaccine  Completed    HPV Vaccine  Completed    Pneumococcal Vaccine: Pediatrics (0 to 5 Years) and At-Risk Patients (6 to 59 Years)  Aged Out    HIB Vaccine  Aged Out    Hepatitis A Vaccine  Aged Out     Patient Active Problem List   Diagnosis    Nevus    Malignant melanoma of skin of abdomen ([facility] [address] [facility]    Neck pain    Multiple benign melanocytic nevi    Iron deficiency anemia due to chronic blood loss    Warts    Surveillance for birth control, oral contraceptives    Discomfort of left ear    Benign paroxysmal positional vertigo due to bilateral vestibular disorder    Spitz nevus of forearm, left    Enlarged thyroid    Malignant neoplasm of upper-inner quadrant of right breast in female, estrogen receptor positive ([address] )    Breast lump or mass    Use of tamoxifen (Nolvadex)     Past Medical History:   Diagnosis Date    Breast cancer ([facility] [address] ) 07/19/2021    Cancer [facility] [address]     Melanoma ([facility] [address] )      Past Surgical History:   Procedure Laterality Date    BREAST BIOPSY      BREAST LUMPECTOMY Right 7/19/2021    Procedure: LYNDSAY  DIRECTED LUMPECTOMY;  Surgeon: Lui Jeong MD;  Location: [facility];  Service: Surgical Oncology    LYMPH NODE BIOPSY Right 7/19/2021    Procedure: LYMPHATIC MAPPING WITH BLUE AND RADIOACTIVE DYES, SENTINEL LYMPH NODE BIOPSY, injection at 56;  Surgeon: Lui Jeong MD;  Location: [facility];  Service: Surgical Oncology    SKIN CANCER EXCISION      TONSILLECTOMY AND ADENOIDECTOMY      WISDOM TOOTH EXTRACTION       Family History   Problem Relation Age of Onset    Prostate cancer Father     Kidney cancer Maternal Grandfather     Breast cancer Maternal Aunt      Social History     Socioeconomic History    Marital status: Single     Spouse name: Not on file    Number of children: Not on file    Years of education: Not on file    Highest education level: Not on file   Occupational History    Not on file   Tobacco Use    Smoking status: Never Smoker    Smokeless tobacco: Never Used   Vaping Use    Vaping Use: Never used   Substance and Sexual Activity    Alcohol use: Yes     Comment: Socially    Drug use: No    Sexual activity: Yes   Other Topics Concern    Not on file   Social History Narrative    Not on file     Social Determinants of Health     Financial Resource Strain: Not on file   Food Insecurity: Not on file   Transportation Needs: Not on file   Physical Activity: Not on file   Stress: Not on file   Social Connections: Not on file   Intimate Partner Violence: Not on file   Housing Stability: Not on file       Current Outpatient Medications:     b complex vitamins capsule, Take 1 capsule by mouth daily, Disp: , Rfl:     Calcium Carbonate-Vit D-Min (CALCIUM 1200 PO), Take by mouth, Disp: , Rfl:     cholecalciferol (VITAMIN D3) 1,000 units tablet, Take 1,000 Units by mouth daily 4,000 IU daily, Disp: , Rfl:     phenazopyridine (PYRIDIUM) 95 MG tablet, Take 95 mg by mouth 3 (three) times a day as needed for bladder spasms (Patient not taking: Reported on 2/16/2022 ), Disp: , Rfl:     tamoxifen (NOLVADEX) 20 mg tablet, Take 1 tablet (20 mg total) by mouth daily, Disp: 30 tablet, Rfl: 2    vitamin B-12 (VITAMIN B-12) 1,000 mcg tablet, Take 1,000 mcg by mouth daily   (Patient not taking: Reported on 12/20/2021 ), Disp: , Rfl:   No Known Allergies  There were no vitals filed for this visit

## 2022-03-14 RX ORDER — SODIUM CHLORIDE 9 MG/ML
20 INJECTION, SOLUTION INTRAVENOUS ONCE
Status: CANCELLED | OUTPATIENT
Start: 2022-03-21

## 2022-03-17 ENCOUNTER — APPOINTMENT (OUTPATIENT)
Dept: LAB | Facility: HOSPITAL | Age: 27
End: 2022-03-17
Payer: COMMERCIAL

## 2022-03-18 ENCOUNTER — HOSPITAL ENCOUNTER (OUTPATIENT)
Dept: NON INVASIVE DIAGNOSTICS | Facility: HOSPITAL | Age: 27
Discharge: HOME | End: 2022-03-18
Attending: INTERNAL MEDICINE
Payer: COMMERCIAL

## 2022-03-18 VITALS
DIASTOLIC BLOOD PRESSURE: 80 MMHG | BODY MASS INDEX: 29.81 KG/M2 | WEIGHT: 162 LBS | HEIGHT: 62 IN | SYSTOLIC BLOOD PRESSURE: 124 MMHG | HEART RATE: 68 BPM

## 2022-03-18 DIAGNOSIS — Z17.0 MALIGNANT NEOPLASM OF UPPER-INNER QUADRANT OF RIGHT BREAST IN FEMALE, ESTROGEN RECEPTOR POSITIVE (HCC): ICD-10-CM

## 2022-03-18 DIAGNOSIS — C50.211 MALIGNANT NEOPLASM OF UPPER-INNER QUADRANT OF RIGHT BREAST IN FEMALE, ESTROGEN RECEPTOR POSITIVE (HCC): ICD-10-CM

## 2022-03-18 DIAGNOSIS — C43.59 MALIGNANT MELANOMA OF SKIN OF ABDOMEN (HCC): ICD-10-CM

## 2022-03-18 PROCEDURE — 93321 DOPPLER ECHO F-UP/LMTD STD: CPT

## 2022-03-18 PROCEDURE — 93308 TTE F-UP OR LMTD: CPT

## 2022-03-18 PROCEDURE — 93325 DOPPLER ECHO COLOR FLOW MAPG: CPT | Performed by: INTERNAL MEDICINE

## 2022-03-18 PROCEDURE — 93321 DOPPLER ECHO F-UP/LMTD STD: CPT | Performed by: INTERNAL MEDICINE

## 2022-03-18 PROCEDURE — 93308 TTE F-UP OR LMTD: CPT | Performed by: INTERNAL MEDICINE

## 2022-03-18 PROCEDURE — 93325 DOPPLER ECHO COLOR FLOW MAPG: CPT

## 2022-03-18 PROCEDURE — 93356 MYOCRD STRAIN IMG SPCKL TRCK: CPT | Performed by: INTERNAL MEDICINE

## 2022-03-21 ENCOUNTER — APPOINTMENT (OUTPATIENT)
Dept: LAB | Facility: HOSPITAL | Age: 27
End: 2022-03-21
Payer: COMMERCIAL

## 2022-03-21 ENCOUNTER — HOSPITAL ENCOUNTER (OUTPATIENT)
Dept: INFUSION CENTER | Facility: CLINIC | Age: 27
Discharge: HOME | End: 2022-03-21
Payer: COMMERCIAL

## 2022-03-21 VITALS
WEIGHT: 156.4 LBS | BODY MASS INDEX: 28.61 KG/M2 | TEMPERATURE: 98.2 F | RESPIRATION RATE: 18 BRPM | HEART RATE: 97 BPM | SYSTOLIC BLOOD PRESSURE: 139 MMHG | DIASTOLIC BLOOD PRESSURE: 70 MMHG

## 2022-03-21 DIAGNOSIS — Z17.0 MALIGNANT NEOPLASM OF UPPER-INNER QUADRANT OF RIGHT BREAST IN FEMALE, ESTROGEN RECEPTOR POSITIVE (HCC): ICD-10-CM

## 2022-03-21 DIAGNOSIS — C50.211 MALIGNANT NEOPLASM OF UPPER-INNER QUADRANT OF RIGHT BREAST IN FEMALE, ESTROGEN RECEPTOR POSITIVE (HCC): ICD-10-CM

## 2022-03-21 DIAGNOSIS — N39.0 URINARY TRACT INFECTION WITHOUT HEMATURIA, SITE UNSPECIFIED: Primary | ICD-10-CM

## 2022-03-21 DIAGNOSIS — Z17.0 MALIGNANT NEOPLASM OF UPPER-INNER QUADRANT OF RIGHT BREAST IN FEMALE, ESTROGEN RECEPTOR POSITIVE (HCC): Primary | ICD-10-CM

## 2022-03-21 DIAGNOSIS — C50.211 MALIGNANT NEOPLASM OF UPPER-INNER QUADRANT OF RIGHT BREAST IN FEMALE, ESTROGEN RECEPTOR POSITIVE (HCC): Primary | ICD-10-CM

## 2022-03-21 LAB
BILIRUB UR QL STRIP: NEGATIVE
CLARITY UR: CLEAR
COLOR UR: YELLOW
GLUCOSE UR STRIP-MCNC: NEGATIVE MG/DL
HGB UR QL STRIP.AUTO: NEGATIVE
KETONES UR STRIP-MCNC: NEGATIVE MG/DL
LEUKOCYTE ESTERASE UR QL STRIP: NEGATIVE
NITRITE UR QL STRIP: NEGATIVE
PH UR STRIP.AUTO: 8.5 [PH]
PROT UR STRIP-MCNC: NEGATIVE MG/DL
SP GR UR STRIP.AUTO: 1.01 (ref 1–1.03)
UROBILINOGEN UR QL STRIP.AUTO: 1 E.U./DL

## 2022-03-21 PROCEDURE — 96413 CHEMO IV INFUSION 1 HR: CPT

## 2022-03-21 PROCEDURE — 81003 URINALYSIS AUTO W/O SCOPE: CPT

## 2022-03-21 RX ORDER — SODIUM CHLORIDE 9 MG/ML
20 INJECTION, SOLUTION INTRAVENOUS ONCE
Status: COMPLETED | OUTPATIENT
Start: 2022-03-21 | End: 2022-03-21

## 2022-03-21 RX ORDER — TAMOXIFEN CITRATE 20 MG/1
20 TABLET ORAL DAILY
Qty: 30 TABLET | Refills: 3 | Status: SHIPPED | OUTPATIENT
Start: 2022-03-21 | End: 2022-06-03 | Stop reason: ALTCHOICE

## 2022-03-21 RX ADMIN — TRASTUZUMAB 450 MG: 150 INJECTION, POWDER, LYOPHILIZED, FOR SOLUTION INTRAVENOUS at 14:22

## 2022-03-21 RX ADMIN — SODIUM CHLORIDE 20 ML/HR: 0.9 INJECTION, SOLUTION INTRAVENOUS at 13:50

## 2022-03-21 NOTE — PROGRESS NOTES
Pt Mychart message asking for additional or different antibiotics for possible UTI  See MyChart message   Reviewed with Hesham Beltrán PA-C

## 2022-03-21 NOTE — PROGRESS NOTES
Pt presents for Herceptin infusion offering no compliants  Pt tolerated treatment without incident  AVS declined  Next appointment reviewed

## 2022-03-25 ENCOUNTER — TELEPHONE (OUTPATIENT)
Dept: HEMATOLOGY ONCOLOGY | Facility: CLINIC | Age: 27
End: 2022-03-25

## 2022-03-25 ENCOUNTER — OFFICE VISIT (OUTPATIENT)
Dept: HEMATOLOGY ONCOLOGY | Facility: CLINIC | Age: 27
End: 2022-03-25
Payer: COMMERCIAL

## 2022-03-25 VITALS
TEMPERATURE: 98.6 F | SYSTOLIC BLOOD PRESSURE: 110 MMHG | HEART RATE: 86 BPM | RESPIRATION RATE: 16 BRPM | OXYGEN SATURATION: 98 % | WEIGHT: 161 LBS | BODY MASS INDEX: 29.63 KG/M2 | HEIGHT: 62 IN | DIASTOLIC BLOOD PRESSURE: 68 MMHG

## 2022-03-25 DIAGNOSIS — C50.919 BREAST CANCER (HCC): Primary | ICD-10-CM

## 2022-03-25 DIAGNOSIS — Z17.0 MALIGNANT NEOPLASM OF UPPER-INNER QUADRANT OF RIGHT BREAST IN FEMALE, ESTROGEN RECEPTOR POSITIVE (HCC): ICD-10-CM

## 2022-03-25 DIAGNOSIS — Z17.0 MALIGNANT NEOPLASM OF UPPER-INNER QUADRANT OF RIGHT BREAST IN FEMALE, ESTROGEN RECEPTOR POSITIVE (HCC): Primary | ICD-10-CM

## 2022-03-25 DIAGNOSIS — J02.9 SORE THROAT: ICD-10-CM

## 2022-03-25 DIAGNOSIS — C50.211 MALIGNANT NEOPLASM OF UPPER-INNER QUADRANT OF RIGHT BREAST IN FEMALE, ESTROGEN RECEPTOR POSITIVE (HCC): ICD-10-CM

## 2022-03-25 DIAGNOSIS — C50.211 MALIGNANT NEOPLASM OF UPPER-INNER QUADRANT OF RIGHT BREAST IN FEMALE, ESTROGEN RECEPTOR POSITIVE (HCC): Primary | ICD-10-CM

## 2022-03-25 PROCEDURE — 3008F BODY MASS INDEX DOCD: CPT | Performed by: SURGERY

## 2022-03-25 PROCEDURE — 99214 OFFICE O/P EST MOD 30 MIN: CPT | Performed by: INTERNAL MEDICINE

## 2022-03-25 RX ORDER — BENZOCAINE/MENTHOL 6 MG-10 MG
1 LOZENGE MUCOUS MEMBRANE EVERY 2 HOUR PRN
Qty: 100 TABLET | Refills: 0 | Status: SHIPPED | OUTPATIENT
Start: 2022-03-25 | End: 2022-06-23

## 2022-03-25 RX ORDER — SODIUM CHLORIDE 9 MG/ML
20 INJECTION, SOLUTION INTRAVENOUS ONCE
Status: CANCELLED | OUTPATIENT
Start: 2022-04-11

## 2022-03-25 NOTE — TELEPHONE ENCOUNTER
Left message for patient that her schedule has been updated  She can view all updates on her MyChart  Advised patient to call back if she has any questions

## 2022-03-25 NOTE — TELEPHONE ENCOUNTER
Please schedule out patient's treatment  Please schedule cycle 21 on 5/26  All other treatments can be scheduled as ordered  Patient prefers early afternoon appts  Thank you!

## 2022-03-25 NOTE — TELEPHONE ENCOUNTER
Maribel please schedule kranthi tx out    She's going away on 5/23/22 and will not be able to do her tx until 5/26/22

## 2022-03-25 NOTE — PROGRESS NOTES
Hematology/Oncology Progress Note    Date of Service: 3/25/2022    [facility]  [address] [address]    Hem/Onc Problem List:   1  Right breast invasive ductal carcinoma, triple positive, grade 3 ,  pT1c pN0, stage IA    Chief Complaint:         Routine follow-up for management of breast cancer    Assessment/Plan:     1  Right breast invasive ductal carcinoma, grade 3, ER 90%, ME 70%, HER2 positive by IHC  Genetic testing negative  Status post right breast lumpectomy with sentinel lymph node biopsy on  July 19, 2021  Pathology showed invasive mammary carcinoma of no special type  The tumor measures 1 8 cm  Grade 3  All margins negative  All lymph nodes negative  Grade 2 DCIS present  No evidence of lymphovascular invasion  Final pathologic stage [id number]  Overall, the patient tolerated the procedure very well  ECHO showed EF 65%  Patient had a 2nd opinion from [facility]  Dr Rigo Temple recommended adjuvant TH weekly for 3 months followed by total of 1 year of maintenance Herceptin target therapy  Chemotherapy TH was given on September 13, 2021 through November 29, 2021  Patient used cold cap to preserve her hair in our [employer]  The patient also had oocytes cryopreservation done on September 3, 2020  Patient consulted Dr Pablo Washington  Adjuvant radiation therapy startedb in January 2021 through November 29, 2021  Maintenance Herceptin started on December 6, 2021  Last echo study on December 17, 2021 showed ejection fraction 68%  Echo study in March 18, 2022 showed ejection fraction 55%  Patient will continue Herceptin for total of 1 year  Patient note, ejection fraction decreased from 68% to 55% with normal left ventricular cavity size, wall thickness, systolic function without wall motion abnormality  I do not know why we have this discordant    I will contact cardiologist for clarification  I ordered MUGA scan  Addendum:  I spoke to cardiologist Dr Marlis Olszewski  Jm Chowchilla reviewed the echo study result  He clarified that patient's ejection fraction 55-60%  It was 60% in December 17, 2021  Please note: The EF number is accurate and reliable in the interpretation summary as per cardiologist  Dr Cory Parra will put an addendum to the echo report  This information was shared with patient by phone  Patient will continue current management plan  2    Genetic predisposition:   [employer] breast cancer panel negative     3    History of palpitation  Echo study showed normal LV function, LVEF 55 %  Repeat echo study every 3 months  4    Fertility  Preservation:  Patient consulted Reproductive Medicine  Oocytes cryopreservation with 24 eggs saved on September 3, 2021     5   History of melanoma  Status post wide local excision  No evidence of local recurrence  6  Sore throat:  I will give patient "sore throat lozenges"  7   Follow-up: Return to clinic in 3 months  Disclaimer: This document was prepared using [employer] Direct technology  If a word or phrase is confusing, or does not make sense, this is likely due to recognition error which was not discovered during the providers review  If you believe an error has occurred, please Contact me through [employer] service for toya? cation  Pain Score and Plan:     0    Hematology/Oncology History:   · History of skin malignant lymphoma, status post wide local excision  · May 2021, patient palpated a lump in the right breast at upper inner quadrant  · June 23, 2021 , Mammogram and ultrasound in [facility] showed an irregular complex cystic mass with microlobulated margins measuring 1 5 x 1 3 x 1 4 cm  No discrete nodes are found in the right axilla    · June 24, 2021, patient underwent ultrasound guided core biopsy of the right breast mass: Pathology showed Grade 3 invasive ductal carcinoma, positive for carcinoma in-situ component, positive perineural remission  Negative for lymphovascular invasion  ER 90%, MI 70%, HER2 positive by IHC  · 06/30/2021, patient consulted Dr Daja Jones  Staging CT scan and bone scan ordered  Patient was refered to genetic counseling  · July 1, 2021 CT scan chest/abdomen/ pelvis with contrast for staging showed bilateral ovarian cysts  with left adnexal cyst measures 4 2 x 4 4 x 3 3 cm, and right adnexal cyst measures 4 3 x 3 0 x 3 5 cm  No evidence of metastatic disease in C/ A/P   · MRI of bilateral breast in July 1, 2021  redemonstrated the 1:00 O'Clock  biopsy-proven breast cancer  No axillary adenopathy  No extension to the chest wall  No concurrent lesions in either breast   · July 2, 2021, bone scan showed no evidence of metastatic disease  · This case was discussed in our multidisciplinary breast cancer tumor board with recommendation of surgery followed by adjuvant chemotherapy  · July 9, 2021, echo study showed normal left ventricle ejection fraction 65%  · July 12, 2021, genetic test: [employer] breast cancer panel negative  · July 19, 2021, patient underwent right breast lumpectomy with sentinel lymph node biopsy  Pathology showed invasive mammary carcinoma of no special type( ductal NST)  The tumor measures 18 mm, grade 3  All margins negative with closest margin 8 mm from tumor involving the posterior margin  5 lymph nodes negative for metastatic disease  Grade 2 DCIS present  No evidence of lymphovascular invasion  Final pathologic stage [id number],   · August 17, 2021, patient consulted Dr Sarita Lares from [facility] with recommendation of [facility] [place]  · August 19 2021, patient consulted Reproductive Medicine and started oocytes cryopreservation,  completed on September 3, 2021 with 24  Eggs saved  · September 13, 2021, patient started adjuvant chemotherapy with TH, completed on November 29, 2021     · December 6, 2021 maintenance Herceptin started  · December 17, 2021 echo study showed ejection 68%  · March 18, 2022 echo study showed ejection fraction 55%    History of Present Illiness:   Dakota Hull is a 31 y o  female with the above-noted HemOnc history who is here for routine follow-up  Patient has a history of triple positive right breast cancer, status post lumpectomy on July 19, 2021  [id number]  Patient had 24 Oocytes  crypreservation on September 3, 2021  Patient started adjuvant chemotherapy with TH on September 13, 2021, completed 3 months of treatment on November 29, 2021  She also used cold cap to preserve her hair  Patient did fairly well  Patient currently on maintenance Herceptin started on December 6, 2021  Echo study in December 17, 21 showed ejection fraction 68%  Repeat echo study in March 18, 2022 with ejection fraction 55%  Patient consulted Dr Andrew Obregon  Adjuvant radiation therapy on September 13, 2021 through November 29, 2021  Patient feels good  She tolerated the chemotherapy very well  She has had sore throat in the past couple days  Patient denies fever or chills  No chest pain or shortness breast   No cough or phlegm  No  symptoms  Appetite good  No significant weight loss   or weight gain  ROS: A 12-point of review of systems is obtained and other than the above is noncontributory  Objective:   VITALS:   /68   Pulse 86   Temp 98 6 °F (37 °C)   Resp 16   Ht 5' 2" (1 575 m)   Wt 73 kg (161 lb)   SpO2 98%   BMI 29 45 kg/m²     Physical EXAM:  General:  Alert, cooperative, no distress  Head:  Normocephalic, without obvious abnormality  Eyes:  Conjunctivae/corneas clear  No evidence of conjunctivitis     Throat:  No mouth or nose bleeding  Neck: Supple, symmetrical, trachea midline, no adenopathy    Lungs:   Clear to auscultation bilaterally  Respiratory effort easy, nonlabored    Heart:  Regular rate and rhythm, S1, S2 normal, no murmur     Abdomen:   Soft, non-tender,nondistended  Bowel sounds normal  No masses,  No organomegaly  Extremities: Extremities normal, atraumatic, no edema  No axillary or inguinal adenopathy   Skin: No rashes or lesions    Neurologic: A&Ox4   No focal neuro deficits       No Known Allergies    Past Medical History:   Diagnosis Date    Breast cancer ([facility] ) 07/19/2021    Cancer ([facility] )     Melanoma ([facility] )        Past Surgical History:   Procedure Laterality Date    BREAST BIOPSY      BREAST LUMPECTOMY Right 7/19/2021    Procedure: LYNDSAY  DIRECTED LUMPECTOMY;  Surgeon: Umang Swani MD;  Location: [facility];  Service: Surgical Oncology    LYMPH NODE BIOPSY Right 7/19/2021    Procedure: LYMPHATIC MAPPING WITH BLUE AND RADIOACTIVE DYES, SENTINEL LYMPH NODE BIOPSY, injection at 21 [phone];  Surgeon: Umang Swain MD;  Location: [facility];  Service: Surgical Oncology    SKIN CANCER EXCISION      TONSILLECTOMY AND ADENOIDECTOMY      WISDOM TOOTH EXTRACTION         Family History   Problem Relation Age of Onset    Prostate cancer Father     Kidney cancer Maternal Grandfather     Breast cancer Maternal Aunt        Social History     Socioeconomic History    Marital status: Single     Spouse name: Not on file    Number of children: Not on file    Years of education: Not on file    Highest education level: Not on file   Occupational History    Not on file   Tobacco Use    Smoking status: Never Smoker    Smokeless tobacco: Never Used   Vaping Use    Vaping Use: Never used   Substance and Sexual Activity    Alcohol use: Yes     Comment: Socially    Drug use: No    Sexual activity: Yes   Other Topics Concern    Not on file   Social History Narrative    Not on file     Social Determinants of Health     Financial Resource Strain: Not on file   Food Insecurity: Not on file   Transportation Needs: Not on file   Physical Activity: Not on file   Stress: Not on file   Social Connections: Not on file   Intimate Partner Violence: Not on file   Housing Stability: Not on file       Current Outpatient Medications   Medication Sig Dispense Refill    b complex vitamins capsule Take 1 capsule by mouth daily      Calcium Carbonate-Vit D-Min (CALCIUM 1200 PO) Take by mouth      cholecalciferol (VITAMIN D3) 1,000 units tablet Take 1,000 Units by mouth daily 4,000 IU daily      tamoxifen (NOLVADEX) 20 mg tablet Take 1 tablet (20 mg total) by mouth daily 30 tablet 3    phenazopyridine (PYRIDIUM) 95 MG tablet Take 95 mg by mouth 3 (three) times a day as needed for bladder spasms (Patient not taking: Reported on 2/16/2022 )      vitamin B-12 (VITAMIN B-12) 1,000 mcg tablet Take 1,000 mcg by mouth daily   (Patient not taking: Reported on 12/20/2021 )       No current facility-administered medications for this visit  (Not in a hospital admission)      DATA REVIEW:    Pathology Result:    Final Diagnosis   Date Value Ref Range Status   07/19/2021   Final    A  Right breast (lumpectomy):     - Invasive mammary carcinoma of no special type (ductal NST)  - Tumor size: 18 mm  Tumor thgthrthathdtheth:th th4th of 3       - Closest surgical margin: posterior (8 mm from tumor)  Comment:  ER / DE / Her-2 pending  B  [place] lymph node #1, right axilla (excision):      - One (1) lymph node, negative for carcinoma  C  Adipose tissue, right axilla (excision):     - Four (4) lymph nodes, negative for carcinoma  D  Superior margin, right breast (excision):     - Benign fibroadipose tissue        - Superior margin negative for carcinoma  E   Medial margin, right breast (excision):      - Benign fibroadipose tissue        - Medial margin negative for carcinoma  F  Inferior margin, right breast (excision):      - Benign fibroadipose tissue and scant benign breast tissue  - Inferior margin negative for carcinoma  G  Lateral margin, right breast (excision):     - Benign fibroadipose tissue        - Lateral margin negative for carcinoma         Comment: This is an appended report  These results have been appended to a previously preliminary verified report  07/01/2020   Final    A  Skin, right lower back, shave biopsy:    COMPOUND NEVUS; extending to the tissue edges  02/05/2019   Final    A  Skin, Left Forearm, excision:  - Early scar and residual Spitz nevus, peripheral and deep margins uninvolved  See Note  Interpretation performed at [facility], [address] [place] [id number]      01/14/2019   Final    A  Skin, Arm, Left, shave biopsy:  - Spitz's nevus; see note  Note:  Sections show a fairly circumscribed and mostly nested melanocytic proliferation along the basal layer  The cells are spindled and epithelioid, with fairly uniform cytology  Occasional Kamino bodies are also seen  P16 immunostain is positive in the lesional cells  On section planes studied, lesional cells are close to bilateral tissue edges  If this is a portion of a larger clinical lesion, complete removal to preclude recurrence would be prudent  Clinical correlation is recommended  Interpretation performed at [facility], [address] [place] [address], [place]       10/01/2018   Final    A  Skin, abdomen, excision:  - Prior biopsy site reaction, no residual melanoma seen  - Inked margins with no tumor seen  Interpretation performed at [facility], [address] [place] [address], [address]           09/06/2018   Final    A  Skin Abdominal, abdomen, shave biopsy:  - Malignant melanoma in situ with small focus suspicious for early invasive melanoma, 0 4 mm thickness,     extending to peripheral margins  MELANOMA OF SKIN TUMOR STAGING SUMMARY  1  Specimen identification:     - Procedure: Shave biopsy       - Laterality: Not specified  2  Tumor     - Tumor Site: Abdomen      - Macroscopic satellite nodule(s) (invasive tumor only): Not identified        - Histologic type:       -- Invasive Melanoma:  Superficial spreading melanoma  -- Melanoma in-situ (anatomic level I): N/A      - Maximum tumor (Breslow) thickness (pT1a)(invasive tumor only): 0 4 mm       - Anatomic (Sukhdeep) level (invasive tumor only): II       - Ulceration (invasive tumor only): Not identified  3  Margins:     - Peripheral margins: Involved by in-situ melanoma  - Deep margins (invasive tumor only): Uninvolved by invasive melanoma by 1 3 mm     4  Mitotic Rate (specify number / mm2)(invasive tumor only): 0/mm2    5  Microsatellite(s) (invasive tumor only): Not identified  6  Lymphovascular invasion (invasive tumor only): Not identified  7  Neurotropism (invasive tumor only): Not identified  8  Tumor-infiltrating lymphocytes (invasive tumor only): Present, brisk  9  Tumor regression (excision, if present < or more than 75%): Not identified  10  Growth Phase: Radial growth phase  11: Ancillary Studies:  None  -- Best representative tumor block: N/A (one block)  12  Additional pathologic findings:       -- Associated Nevus:  Cannot exclude dermal nevus  -- Other (specify): Epithelioid cell type  13  AJCC 8th Ed  Pathologic Stage Classification:  at least Stage  IA- pT1a, pNX  Interpretation performed at [facility], [address] [place] [address]  Image Results: They are reviewed and documented in Hematology/Oncology history    Echo follow up/limited w/ contrast if indicated    Left Ventricle: Left ventricular cavity size is normal  Wall thickness   is normal  Systolic function is normal   Estimated LVEF  55% Grossly no   wall motion abnormalities noted    Aortic Valve: The aortic valve is probably trileaflet    Mitral Valve: There is trace regurgitation    Tricuspid Valve: There is trace regurgitation      IVC/SVC: Not well visualized but appears normal in size    Global longitudinal strain = -17 46% which is low normal        LABS:  Lab data are reviewed and documented in HemOnc history  No results found for this or any previous visit (from the past 48 hour(s))            Sarah Ernst MD  3/25/2022, 1:31 PM

## 2022-04-08 ENCOUNTER — APPOINTMENT (OUTPATIENT)
Dept: LAB | Facility: HOSPITAL | Age: 27
End: 2022-04-08
Payer: COMMERCIAL

## 2022-04-11 ENCOUNTER — HOSPITAL ENCOUNTER (OUTPATIENT)
Dept: INFUSION CENTER | Facility: CLINIC | Age: 27
Discharge: HOME | End: 2022-04-11
Payer: COMMERCIAL

## 2022-04-11 VITALS
DIASTOLIC BLOOD PRESSURE: 61 MMHG | HEIGHT: 62 IN | HEART RATE: 70 BPM | WEIGHT: 158.4 LBS | TEMPERATURE: 98.2 F | RESPIRATION RATE: 16 BRPM | SYSTOLIC BLOOD PRESSURE: 114 MMHG | BODY MASS INDEX: 29.15 KG/M2

## 2022-04-11 DIAGNOSIS — C50.211 MALIGNANT NEOPLASM OF UPPER-INNER QUADRANT OF RIGHT BREAST IN FEMALE, ESTROGEN RECEPTOR POSITIVE (HCC): Primary | ICD-10-CM

## 2022-04-11 DIAGNOSIS — Z17.0 MALIGNANT NEOPLASM OF UPPER-INNER QUADRANT OF RIGHT BREAST IN FEMALE, ESTROGEN RECEPTOR POSITIVE (HCC): Primary | ICD-10-CM

## 2022-04-11 PROCEDURE — 96413 CHEMO IV INFUSION 1 HR: CPT

## 2022-04-11 RX ORDER — SODIUM CHLORIDE 9 MG/ML
20 INJECTION, SOLUTION INTRAVENOUS ONCE
Status: COMPLETED | OUTPATIENT
Start: 2022-04-11 | End: 2022-04-11

## 2022-04-11 RX ADMIN — TRASTUZUMAB 450 MG: 150 INJECTION, POWDER, LYOPHILIZED, FOR SOLUTION INTRAVENOUS at 14:30

## 2022-04-11 RX ADMIN — SODIUM CHLORIDE 20 ML/HR: 0.9 INJECTION, SOLUTION INTRAVENOUS at 14:30

## 2022-04-11 NOTE — PROGRESS NOTES
Pt presents for Chemotherapy  Offers no complaints  Pt tolerated tx with out incident  AVS declined  Aware of next appt

## 2022-04-15 ENCOUNTER — TELEPHONE (OUTPATIENT)
Dept: OTHER | Facility: OTHER | Age: 27
End: 2022-04-15

## 2022-04-15 NOTE — TELEPHONE ENCOUNTER
Patient calling to make an appointment with Dr Bita Rivera, please call for scheduling   Last seen 07/01/21

## 2022-04-18 NOTE — TELEPHONE ENCOUNTER
SPOKE  TO PT    SHE STATED SHE DID NOT NEED AN APPT     AT THIS TIME AND WOULD CALL AS NEEDED  4/18/22  LT

## 2022-04-23 NOTE — PROGRESS NOTES
Follow-up - Radiation Oncology   Claudia Gupta 1995 31 y o  female [id number]      History of Present Illness   Cancer Staging  Malignant neoplasm of upper-inner quadrant of right breast in female, estrogen receptor positive ([facility] [address] )  Staging form: Breast, AJCC 8th Edition  - Clinical: Stage IA (cT1c, cN0, cM0, G3, ER+, WV+, HER2+) - Signed by Yoly Dill MD on 9/8/2021  Histologic grading system: 3 grade system      Claudia Gupta is a 31y o  year old female with past medical history of early stage melanoma status post resection from abdomen diagnosed with stage IA, grade 3 invasive ductal carcinoma of the right breast status post lumpectomy and sentinel lymph node biopsy  Tumor was ER/WV/HER2 positive  She underwent adjuvant chemotherapy with TH x 3 followed by Herceptin monotherapy  She is maintained on Herceptin and tamoxifen  She recently completed radiation on 1/31/22  She presents today for one month EOT follow up      The patient notes hyperpigmentation of the skin  She notes some desquamation post radiation, but this has resolved and her skin has healed well  She denies pain or tenderness of the chest or breasts bilaterally  The patient denies new palpable nodules, suspicious skin changes, and nipple discharge of the breasts bilaterally  She denies upper extremity edema or shoulder restriction bilaterally  2/16/22 Dr Molly Erazo, Surgical Oncology visit was unremarkable  Follow up in 6 months  Mammogram ordered      Upcoming:  3/25/22 Dr Na Ba  8/2/22 Mammogram  8/11/22 Dr Molly Erazo, Surgical Oncology      Historical Information   Oncology History   Malignant neoplasm of upper-inner quadrant of right breast in female, estrogen receptor positive ([facility] [address] )   6/24/2021 Biopsy    US guided Right breast core biopsy:  1 o'clock 13-14 cm from the nipple  Right invasive ductal carcinoma  Grade 3  ER 90  WV 70   HER2 3+  Lymphovascular invasion: not identified    Results are malignant and concordant  Recommend surgical consultation  Additionally, given patient's age and density of breast tissue, recommend enhanced breast MRI for further evaluation       Done at [facility]     6/30/2021 Genetic Testing    The following genes were evaluated: RADHA, BRCA1, BRCA2, CDH1, CHEK2, PALB2, PTEN, STK11, TP53  Additional genes tested for a total of 36 genes  VUS NF1   [employer]     7/1/2021 Observation    Bilateral breast MRI with and without contrast: B6     Biopsy confirmed malignancy  01:00 o'clock location right breast   No axillary lymphadenopathy, extension to the chest wall, or concurrent lesions in either breast        7/19/2021 Surgery    Right breast LYNDSAY  directed lumpectomy with sentinel lymph node biopsy:  Invasive mammary carcinoma of no special type; ductal  Grade 3  ER 65  UT 90  HER2 3+  18 mm  Margins negative  0/5 Lymph nodes  Stage IA      8/23/2021 Genomic Testing    MammaPrint: High Risk  HER2 Type     9/8/2021 -  Cancer Staged    Staging form: Breast, AJCC 8th Edition  - Clinical: Stage IA (cT1c, cN0, cM0, G3, ER+, UT+, HER2+) - Signed by Gerhardt Laughter, MD on 9/8/2021  Histologic grading system: 3 grade system       9/13/2021 - 11/29/2021 Chemotherapy    fosaprepitant (EMEND) IVPB, 150 mg, Intravenous, Once, 0 of 6 cycles  pertuzumab (PERJETA) IVPB, 840 mg, Intravenous, Once, 0 of 18 cycles  CARBOplatin (PARAPLATIN) IVPB (GOG AUC DOSING), 760 8 mg, Intravenous, Once, 0 of 6 cycles  DOCEtaxel (TAXOTERE) chemo infusion, 75 mg/m2 = 132 8 mg, Intravenous, Once, 0 of 6 cycles  trastuzumab (HERCEPTIN) chemo infusion, 8 mg/kg = 610 mg, Intravenous, Once, 0 of 18 cycles       9/13/2021 -  Chemotherapy    PACLItaxel (TAXOL) chemo IVPB, 80 mg/m2 = 143 4 mg, Intravenous, Once, 12 of 12 cycles  Administration: 143 4 mg (9/13/2021), 143 4 mg (9/20/2021), 143 4 mg (10/4/2021), 143 4 mg (10/18/2021), 143 4 mg (10/25/2021), 143 4 mg (11/1/2021), 143 4 mg (11/15/2021), 143 4 mg (11/22/2021), 143 4 mg (9/27/2021), 143 4 mg (10/11/2021), 143 4 mg (11/8/2021), 143 4 mg (11/29/2021)  trastuzumab (HERCEPTIN) chemo infusion, 304 mg, Intravenous, Once, 19 of 26 cycles  Administration: 300 mg (9/13/2021), 150 mg (9/20/2021), 150 mg (10/4/2021), 150 mg (10/18/2021), 150 mg (10/25/2021), 150 mg (11/1/2021), 150 mg (11/15/2021), 150 mg (11/22/2021), 150 mg (9/27/2021), 150 mg (10/11/2021), 450 mg (12/6/2021), 150 mg (11/8/2021), 150 mg (11/29/2021), 450 mg (12/27/2021), 450 mg (1/17/2022), 450 mg (2/7/2022), 450 mg (2/28/2022), 450 mg (3/21/2022)     1/3/2022 - 1/31/2022 Radiation    Treatments:  Course: C1  Plan ID Energy Fractions Dose per Fraction (cGy) Total Dose Delivered (cGy) Elapsed Days   Hypo R Bolus 6X 7 / 7 267 1,869 16   Hypo R Brst 6X 8 / 8 267 2,136 18   R Brst e 9E 5 / 5 200 1,000 6    Treatment Dates:  1/3/2022 - 1/31/2022            Past Medical History:   Diagnosis Date    [facility] 07/19/2021    [facility]     Melanoma ([facility] )      Past Surgical History:   Procedure Laterality Date    BREAST BIOPSY      BREAST LUMPECTOMY Right 7/19/2021    Procedure: LYNDSAY  DIRECTED LUMPECTOMY;  Surgeon: Parker Resendez MD;  Location: [facility];  Service: Surgical Oncology    LYMPH NODE BIOPSY Right 7/19/2021    Procedure: LYMPHATIC MAPPING WITH BLUE AND RADIOACTIVE DYES, SENTINEL LYMPH NODE BIOPSY, injection at 2627;  Surgeon: Parker Resendez MD;  Location: [facility];  Service: Surgical Oncology    SKIN CANCER EXCISION      TONSILLECTOMY AND ADENOIDECTOMY      WISDOM TOOTH EXTRACTION         Social History   Social History     Substance and Sexual Activity   Alcohol Use Yes    Comment: Socially     Social History     Substance and Sexual Activity   Drug Use No     Social History     Tobacco Use   Smoking Status Never Smoker   Smokeless Tobacco Never Used         Meds/Allergies     Current Outpatient Medications:     b complex vitamins capsule, Take 1 capsule by mouth daily, Disp: , Rfl:     Calcium Carbonate-Vit D-Min (CALCIUM 1200 PO), Take by mouth, Disp: , Rfl:     cholecalciferol (VITAMIN D3) 1,000 units tablet, Take 1,000 Units by mouth daily 4,000 IU daily, Disp: , Rfl:     Benzocaine-Menthol (Sore Throat Lozenges) 6-10 MG lozenge, Take 1 lozenge by mouth every 2 (two) hours as needed for sore throat, Disp: 100 tablet, Rfl: 0    phenazopyridine (PYRIDIUM) 95 MG tablet, Take 95 mg by mouth 3 (three) times a day as needed for bladder spasms (Patient not taking: Reported on 2/16/2022 ), Disp: , Rfl:     tamoxifen (NOLVADEX) 20 mg tablet, Take 1 tablet (20 mg total) by mouth daily, Disp: 30 tablet, Rfl: 3    vitamin B-12 (VITAMIN B-12) 1,000 mcg tablet, Take 1,000 mcg by mouth daily   (Patient not taking: Reported on 12/20/2021 ), Disp: , Rfl:   No Known Allergies      Review of Systems   Constitutional: Negative  HENT: Negative  Negative for nosebleeds (x2 in last 2 weeks)  Eyes: Negative  Respiratory: Negative  Cardiovascular: Negative  Gastrointestinal: Negative  Endocrine: Negative  Genitourinary: Negative  Musculoskeletal: Negative  Skin: Negative  Peeling skin under right breast  Hyperpigmentation  under right breast   Allergic/Immunologic: Negative  Neurological: Negative  Hematological: Negative  Psychiatric/Behavioral: Negative             OBJECTIVE:   /64   Pulse 74   Temp 98 1 °F (36 7 °C)   Resp 16   Wt 73 5 kg (162 lb)   SpO2 98%   BMI 28 70 kg/m²   Karnofsky: 90 - Able to carry on normal activity; minor signs or symptoms of disease     Physical Exam  Vitals and nursing note reviewed  Constitutional:       General: She is not in acute distress  Appearance: She is well-developed  Eyes:      General: No scleral icterus  Cardiovascular:      Rate and Rhythm: Normal rate and regular rhythm  Pulmonary:      Breath sounds: No wheezing, rhonchi or rales  Chest:   Breasts:      Right: No axillary adenopathy or supraclavicular adenopathy  Left: No axillary adenopathy or supraclavicular adenopathy  Comments: Breast examination demonstrates the patient to be symmetric in contour and size  There is a well-healed lumpectomy scar in the inner upper quadrant of the right breast with a second well healed axillary scar  MIld hyperpigmentation and diffuse fibrosis of the right breast   There are no palpable masses or suspicious skin changes of the breasts bilaterally  Abdominal:      General: There is no distension  Palpations: Abdomen is soft  Tenderness: There is no abdominal tenderness  Musculoskeletal:      Right lower leg: No edema  Left lower leg: No edema  Comments: No upper extremity edema bilaterally  Full range of motion upper extremities bilaterally  Lymphadenopathy:      Cervical: No cervical adenopathy  Upper Body:      Right upper body: No supraclavicular or axillary adenopathy  Left upper body: No supraclavicular or axillary adenopathy  Neurological:      Mental Status: She is alert and oriented to person, place, and time  Gait: Gait normal          Assessment/Plan:  Wynell Kawasaki is a 31y o  year old female with a history of stage IA, grade 3 invasive ductal carcinoma of the right breast status post lumpectomy and sentinel lymph node biopsy  Tumor was ER/ NV / HER2 Precious positive  She underwent adjuvant chemotherapy with TH x 3 and Herceptin followed by adjuvant radiation to the right breast   She is maintained on tamoxifen and Herceptin  She has recovered well from radiation and is currently clinically LUCINA  I recommended daily emollient use and light breast massage to promote continued tissue healing  I instructed her to practice sun protection to the irradiated skin    She will follow-up in 6 months after her next mammogram   We will see her sooner should then need arise      Gerhardt Laughter, MD  0/60/8822,3:60 PM    Portions of the record may have been created with voice recognition software   Occasional wrong word or "sound a like" substitutions may have occurred due to the inherent limitations of voice recognition software   Read the chart carefully and recognize, using context, where substitutions have occurred

## 2022-04-25 RX ORDER — SODIUM CHLORIDE 9 MG/ML
20 INJECTION, SOLUTION INTRAVENOUS ONCE
Status: CANCELLED | OUTPATIENT
Start: 2022-05-26

## 2022-04-25 RX ORDER — SODIUM CHLORIDE 9 MG/ML
20 INJECTION, SOLUTION INTRAVENOUS ONCE
Status: CANCELLED | OUTPATIENT
Start: 2022-05-02

## 2022-04-29 ENCOUNTER — APPOINTMENT (OUTPATIENT)
Dept: LAB | Facility: HOSPITAL | Age: 27
End: 2022-04-29
Payer: COMMERCIAL

## 2022-05-02 ENCOUNTER — HOSPITAL ENCOUNTER (OUTPATIENT)
Dept: INFUSION CENTER | Facility: CLINIC | Age: 27
Discharge: HOME | End: 2022-05-02
Payer: COMMERCIAL

## 2022-05-02 VITALS
HEART RATE: 73 BPM | SYSTOLIC BLOOD PRESSURE: 119 MMHG | BODY MASS INDEX: 28.56 KG/M2 | WEIGHT: 158 LBS | RESPIRATION RATE: 18 BRPM | TEMPERATURE: 97.8 F | DIASTOLIC BLOOD PRESSURE: 57 MMHG

## 2022-05-02 DIAGNOSIS — Z17.0 MALIGNANT NEOPLASM OF UPPER-INNER QUADRANT OF RIGHT BREAST IN FEMALE, ESTROGEN RECEPTOR POSITIVE (HCC): Primary | ICD-10-CM

## 2022-05-02 DIAGNOSIS — C50.211 MALIGNANT NEOPLASM OF UPPER-INNER QUADRANT OF RIGHT BREAST IN FEMALE, ESTROGEN RECEPTOR POSITIVE (HCC): Primary | ICD-10-CM

## 2022-05-02 LAB
AORTIC ROOT: 2.3 CM
AORTIC VALVE MEAN VELOCITY: 9.8 M/S
APICAL FOUR CHAMBER EJECTION FRACTION: 75 %
AV LVOT MEAN GRADIENT: 3 MMHG
AV LVOT PEAK GRADIENT: 5 MMHG
AV MEAN GRADIENT: 4 MMHG
AV PEAK GRADIENT: 7 MMHG
AV VELOCITY RATIO: 0.87
DOP CALC AO PEAK VEL: 1.32 M/S
DOP CALC AO VTI: 27.47 CM
DOP CALC LVOT PEAK VEL VTI: 24.14 CM
DOP CALC LVOT PEAK VEL: 1.15 M/S
FRACTIONAL SHORTENING: 39 % (ref 28–44)
INTERVENTRICULAR SEPTUM IN DIASTOLE (PARASTERNAL SHORT AXIS VIEW): 0.7 CM
INTERVENTRICULAR SEPTUM: 0.7 CM (ref 0.51–0.96)
LAAS-AP4: 12.6 CM2
LEFT ATRIUM AREA SYSTOLE SINGLE PLANE A4C: 11.8 CM2
LEFT ATRIUM SIZE: 2.7 CM
LEFT INTERNAL DIMENSION IN SYSTOLE: 2.7 CM (ref 2.53–3.83)
LEFT VENTRICULAR INTERNAL DIMENSION IN DIASTOLE: 4.4 CM (ref 4.13–6.15)
LEFT VENTRICULAR POSTERIOR WALL IN END DIASTOLE: 0.7 CM (ref 0.5–0.95)
LEFT VENTRICULAR STROKE VOLUME: 61 ML
LVSV (TEICH): 61 ML
RIGHT ATRIUM AREA SYSTOLE A4C: 10.6 CM2
RIGHT VENTRICLE ID DIMENSION: 3.5 CM
SL CV PED ECHO LEFT VENTRICLE DIASTOLIC VOLUME (MOD BIPLANE) 2D: 88 ML
SL CV PED ECHO LEFT VENTRICLE SYSTOLIC VOLUME (MOD BIPLANE) 2D: 27 ML
TR MAX PG: 16 MMHG
TR PEAK VELOCITY: 2 M/S
TRICUSPID VALVE PEAK REGURGITATION VELOCITY: 2 M/S
Z-SCORE OF INTERVENTRICULAR SEPTUM IN END DIASTOLE: -0.32
Z-SCORE OF LEFT VENTRICULAR DIMENSION IN END DIASTOLE: -1.36
Z-SCORE OF LEFT VENTRICULAR DIMENSION IN END SYSTOLE: -1.12
Z-SCORE OF LEFT VENTRICULAR POSTERIOR WALL IN END DIASTOLE: -0.21

## 2022-05-02 PROCEDURE — 96413 CHEMO IV INFUSION 1 HR: CPT

## 2022-05-02 RX ORDER — SODIUM CHLORIDE 9 MG/ML
20 INJECTION, SOLUTION INTRAVENOUS ONCE
Status: COMPLETED | OUTPATIENT
Start: 2022-05-02 | End: 2022-05-02

## 2022-05-02 RX ADMIN — TRASTUZUMAB 450 MG: 150 INJECTION, POWDER, LYOPHILIZED, FOR SOLUTION INTRAVENOUS at 14:36

## 2022-05-02 RX ADMIN — SODIUM CHLORIDE 20 ML/HR: 9 INJECTION, SOLUTION INTRAVENOUS at 14:36

## 2022-05-02 NOTE — PROGRESS NOTES
Pt presents for Herceptin offering no complaints  Pt tolerated treatment without incident  AVS declined  Next appointment reviewed

## 2022-05-12 ENCOUNTER — TELEPHONE (OUTPATIENT)
Dept: HEMATOLOGY ONCOLOGY | Facility: CLINIC | Age: 27
End: 2022-05-12

## 2022-05-12 NOTE — TELEPHONE ENCOUNTER
LVM to the patient in regards to her appointment scheduled on 6/3/22 at 2:40pm  I informed the patient that Dr Jailene Hsu will be rounding in the afternoon so I will need to schedule her for the morning  I informed the patient that I have scheduled her for 6/3/22 at 10:20am and if that appointment does not work for her she can give the office a call back to reschedule at

## 2022-05-20 ENCOUNTER — APPOINTMENT (OUTPATIENT)
Dept: LAB | Facility: HOSPITAL | Age: 27
End: 2022-05-20
Payer: COMMERCIAL

## 2022-05-20 DIAGNOSIS — Z17.0 MALIGNANT NEOPLASM OF UPPER-INNER QUADRANT OF RIGHT BREAST IN FEMALE, ESTROGEN RECEPTOR POSITIVE (HCC): Primary | ICD-10-CM

## 2022-05-20 DIAGNOSIS — C50.211 MALIGNANT NEOPLASM OF UPPER-INNER QUADRANT OF RIGHT BREAST IN FEMALE, ESTROGEN RECEPTOR POSITIVE (HCC): ICD-10-CM

## 2022-05-20 DIAGNOSIS — Z17.0 MALIGNANT NEOPLASM OF UPPER-INNER QUADRANT OF RIGHT BREAST IN FEMALE, ESTROGEN RECEPTOR POSITIVE (HCC): ICD-10-CM

## 2022-05-20 DIAGNOSIS — C50.211 MALIGNANT NEOPLASM OF UPPER-INNER QUADRANT OF RIGHT BREAST IN FEMALE, ESTROGEN RECEPTOR POSITIVE (HCC): Primary | ICD-10-CM

## 2022-05-20 LAB
ALBUMIN SERPL BCP-MCNC: 3.6 G/DL (ref 3.5–5)
ALP SERPL-CCNC: 54 U/L (ref 46–116)
ALT SERPL W P-5'-P-CCNC: 20 U/L (ref 12–78)
ANION GAP SERPL CALCULATED.3IONS-SCNC: 7 MMOL/L (ref 4–13)
AST SERPL W P-5'-P-CCNC: 15 U/L (ref 5–45)
BASOPHILS # BLD AUTO: 0.01 THOUSANDS/ΜL (ref 0–0.1)
BASOPHILS NFR BLD AUTO: 0 % (ref 0–1)
BILIRUB SERPL-MCNC: 0.42 MG/DL (ref 0.2–1)
BUN SERPL-MCNC: 11 MG/DL (ref 5–25)
CALCIUM SERPL-MCNC: 8.6 MG/DL (ref 8.3–10.1)
CHLORIDE SERPL-SCNC: 106 MMOL/L (ref 100–108)
CO2 SERPL-SCNC: 28 MMOL/L (ref 21–32)
CREAT SERPL-MCNC: 0.77 MG/DL (ref 0.6–1.3)
EOSINOPHIL # BLD AUTO: 0.06 THOUSAND/ΜL (ref 0–0.61)
EOSINOPHIL NFR BLD AUTO: 1 % (ref 0–6)
ERYTHROCYTE [DISTWIDTH] IN BLOOD BY AUTOMATED COUNT: 13.4 % (ref 11.6–15.1)
GFR SERPL CREATININE-BSD FRML MDRD: 106 ML/MIN/1.73SQ M
GLUCOSE P FAST SERPL-MCNC: 70 MG/DL (ref 65–99)
HCT VFR BLD AUTO: 37.9 % (ref 34.8–46.1)
HGB BLD-MCNC: 12.3 G/DL (ref 11.5–15.4)
IMM GRANULOCYTES # BLD AUTO: 0.01 THOUSAND/UL (ref 0–0.2)
IMM GRANULOCYTES NFR BLD AUTO: 0 % (ref 0–2)
LYMPHOCYTES # BLD AUTO: 1.34 THOUSANDS/ΜL (ref 0.6–4.47)
LYMPHOCYTES NFR BLD AUTO: 27 % (ref 14–44)
MCH RBC QN AUTO: 29.9 PG (ref 26.8–34.3)
MCHC RBC AUTO-ENTMCNC: 32.5 G/DL (ref 31.4–37.4)
MCV RBC AUTO: 92 FL (ref 82–98)
MONOCYTES # BLD AUTO: 0.48 THOUSAND/ΜL (ref 0.17–1.22)
MONOCYTES NFR BLD AUTO: 10 % (ref 4–12)
NEUTROPHILS # BLD AUTO: 3.04 THOUSANDS/ΜL (ref 1.85–7.62)
NEUTS SEG NFR BLD AUTO: 62 % (ref 43–75)
NRBC BLD AUTO-RTO: 0 /100 WBCS
PLATELET # BLD AUTO: 234 THOUSANDS/UL (ref 149–390)
PMV BLD AUTO: 9.9 FL (ref 8.9–12.7)
POTASSIUM SERPL-SCNC: 4.1 MMOL/L (ref 3.5–5.3)
PROT SERPL-MCNC: 7.2 G/DL (ref 6.4–8.2)
RBC # BLD AUTO: 4.11 MILLION/UL (ref 3.81–5.12)
SODIUM SERPL-SCNC: 141 MMOL/L (ref 136–145)
WBC # BLD AUTO: 4.94 THOUSAND/UL (ref 4.31–10.16)

## 2022-05-20 PROCEDURE — 80053 COMPREHEN METABOLIC PANEL: CPT

## 2022-05-20 PROCEDURE — 36415 COLL VENOUS BLD VENIPUNCTURE: CPT

## 2022-05-20 PROCEDURE — 85025 COMPLETE CBC W/AUTO DIFF WBC: CPT

## 2022-05-20 RX ORDER — SODIUM CHLORIDE 9 MG/ML
20 INJECTION, SOLUTION INTRAVENOUS ONCE
Status: CANCELLED | OUTPATIENT
Start: 2022-06-13

## 2022-05-20 NOTE — PROGRESS NOTES
Received notification from MA in regards to patient having difficultly obtaining labs, reviewed plan and no lab orders linked in plan for current cycle and future cycles  Lab orders placed in plan  Awaiting provider signature

## 2022-05-26 ENCOUNTER — HOSPITAL ENCOUNTER (OUTPATIENT)
Dept: INFUSION CENTER | Facility: CLINIC | Age: 27
Discharge: HOME | End: 2022-05-26
Payer: COMMERCIAL

## 2022-05-26 VITALS
SYSTOLIC BLOOD PRESSURE: 140 MMHG | RESPIRATION RATE: 18 BRPM | TEMPERATURE: 97.8 F | BODY MASS INDEX: 28.96 KG/M2 | HEART RATE: 81 BPM | DIASTOLIC BLOOD PRESSURE: 60 MMHG | WEIGHT: 160.2 LBS

## 2022-05-26 DIAGNOSIS — C50.211 MALIGNANT NEOPLASM OF UPPER-INNER QUADRANT OF RIGHT BREAST IN FEMALE, ESTROGEN RECEPTOR POSITIVE (HCC): Primary | ICD-10-CM

## 2022-05-26 DIAGNOSIS — Z17.0 MALIGNANT NEOPLASM OF UPPER-INNER QUADRANT OF RIGHT BREAST IN FEMALE, ESTROGEN RECEPTOR POSITIVE (HCC): Primary | ICD-10-CM

## 2022-05-26 PROCEDURE — 96413 CHEMO IV INFUSION 1 HR: CPT

## 2022-05-26 RX ORDER — SODIUM CHLORIDE 9 MG/ML
20 INJECTION, SOLUTION INTRAVENOUS ONCE
Status: COMPLETED | OUTPATIENT
Start: 2022-05-26 | End: 2022-05-26

## 2022-05-26 RX ADMIN — SODIUM CHLORIDE 20 ML/HR: 0.9 INJECTION, SOLUTION INTRAVENOUS at 14:41

## 2022-05-26 RX ADMIN — TRASTUZUMAB 430 MG: 150 INJECTION, POWDER, LYOPHILIZED, FOR SOLUTION INTRAVENOUS at 14:44

## 2022-05-26 NOTE — PROGRESS NOTES
Pt offers no complaints  Tolerated chemotherapy infusion well without incident  Discharged in stable condition and pt aware of next infusion appointment  AVS provided

## 2022-06-03 ENCOUNTER — OFFICE VISIT (OUTPATIENT)
Dept: HEMATOLOGY ONCOLOGY | Facility: CLINIC | Age: 27
End: 2022-06-03
Payer: COMMERCIAL

## 2022-06-03 ENCOUNTER — TELEPHONE (OUTPATIENT)
Dept: SURGICAL ONCOLOGY | Facility: CLINIC | Age: 27
End: 2022-06-03

## 2022-06-03 VITALS
HEIGHT: 62 IN | RESPIRATION RATE: 16 BRPM | OXYGEN SATURATION: 99 % | DIASTOLIC BLOOD PRESSURE: 86 MMHG | SYSTOLIC BLOOD PRESSURE: 134 MMHG | BODY MASS INDEX: 29.72 KG/M2 | WEIGHT: 161.5 LBS | HEART RATE: 84 BPM | TEMPERATURE: 98.2 F

## 2022-06-03 DIAGNOSIS — C43.59 MALIGNANT MELANOMA OF SKIN OF ABDOMEN (HCC): ICD-10-CM

## 2022-06-03 DIAGNOSIS — C50.211 MALIGNANT NEOPLASM OF UPPER-INNER QUADRANT OF RIGHT BREAST IN FEMALE, ESTROGEN RECEPTOR POSITIVE (HCC): Primary | ICD-10-CM

## 2022-06-03 DIAGNOSIS — Z17.0 MALIGNANT NEOPLASM OF UPPER-INNER QUADRANT OF RIGHT BREAST IN FEMALE, ESTROGEN RECEPTOR POSITIVE (HCC): Primary | ICD-10-CM

## 2022-06-03 PROCEDURE — 99214 OFFICE O/P EST MOD 30 MIN: CPT | Performed by: INTERNAL MEDICINE

## 2022-06-03 RX ORDER — SODIUM CHLORIDE 9 MG/ML
20 INJECTION, SOLUTION INTRAVENOUS ONCE
Status: CANCELLED | OUTPATIENT
Start: 2022-09-07 | Stop reason: HOSPADM

## 2022-06-03 RX ORDER — SODIUM CHLORIDE 9 MG/ML
20 INJECTION, SOLUTION INTRAVENOUS ONCE
Status: CANCELLED | OUTPATIENT
Start: 2022-07-27 | Stop reason: HOSPADM

## 2022-06-03 RX ORDER — TAMOXIFEN CITRATE 20 MG/1
20 TABLET ORAL DAILY
Qty: 90 TABLET | Refills: 3 | Status: SHIPPED | OUTPATIENT
Start: 2022-06-03 | End: 2022-09-01

## 2022-06-03 RX ORDER — SODIUM CHLORIDE 9 MG/ML
20 INJECTION, SOLUTION INTRAVENOUS ONCE
Status: CANCELLED | OUTPATIENT
Start: 2022-07-06

## 2022-06-03 RX ORDER — SODIUM CHLORIDE 9 MG/ML
20 INJECTION, SOLUTION INTRAVENOUS ONCE
Status: CANCELLED | OUTPATIENT
Start: 2022-08-17 | Stop reason: HOSPADM

## 2022-06-03 NOTE — TELEPHONE ENCOUNTER
Spoke to patient to go over these changes with her  Patient aware that we are not able to move the next 2 upcoming treatments  All treatments after that have been moved to Mondays  Pt will review changes in her MyChart  Normal rate, regular rhythm.  Heart sounds S1, S2.  No murmurs, rubs or gallops.

## 2022-06-03 NOTE — PROGRESS NOTES
Hematology/Oncology Progress Note    Date of Service: 6/3/2022    [facility]  [address] [address]    Hem/Onc Problem List:   1  Right breast invasive ductal carcinoma, triple positive, grade 3 ,  [id number], stage IA    Chief Complaint:         Routine follow-up for management of breast cancer    Assessment/Plan:     1  Right breast invasive ductal carcinoma, grade 3, ER 90%, PA 70%, HER2 positive by IHC  Genetic testing negative  Status post right breast lumpectomy with sentinel lymph node biopsy on  July 19, 2021  Pathology showed invasive mammary carcinoma of no special type  The tumor measures 1 8 cm  Grade 3  All margins negative  All lymph nodes negative  Grade 2 DCIS present  No evidence of lymphovascular invasion  Final pathologic stage [id number]  Overall, the patient tolerated the procedure very well  ECHO showed EF 65%  Patient had a 2nd opinion from [facility]  Dr Vignesh Olvera recommended adjuvant TH weekly for 3 months followed by total of 1 year of maintenance Herceptin target therapy  Chemotherapy TH was given on September 13, 2021 through November 29, 2021  Patient used cold cap to preserve her hair in our [employer]  The patient also had oocytes cryopreservation done on September 3, 2020  Patient consulted Dr Sharyle Schlatter  Adjuvant radiation therapy started in January 2021 through November 29, 2021  Maintenance Herceptin started on December 6, 2021  Last echo study on December 17, 2021 showed ejection fraction 68%  Echo study in March 18, 2022 showed ejection fraction 60%  Patient will continue Herceptin for total of 1 year  Patient started tamoxifen 20 mg daily on December 20, 2021  Patient tolerated treatment very well  Repeat lab and echo study in 4 weeks  2    Genetic predisposition:   [employer]ae breast cancer panel negative     3    History of palpitation      Echo study showed normal LV function, LVEF 60 %  Repeat echo study every 3 months, next echo study is due in 4 weeks  4    Fertility  Preservation:  Patient consulted Reproductive Medicine  Oocytes cryopreservation with 24 eggs saved on September 3, 2021     5   History of melanoma  Status post wide local excision  No evidence of local recurrence  6  Skin lesions, status post wide local excision  Pathology still pending  Follow-up with Dermatology  7  Follow-up: Return to clinic in 2-3 months with MD   Patient is aware that I will leave this practice in 1 months  Her care will be transferred to new physician  Disclaimer: This document was prepared using [employer] Direct technology  If a word or phrase is confusing, or does not make sense, this is likely due to recognition error which was not discovered during the providers review  If you believe an error has occurred, please Contact me through [employer] service for toya? cation  Pain Score and Plan:     0    Hematology/Oncology History:   · History of skin malignant lymphoma, status post wide local excision  · May 2021, patient palpated a lump in the right breast at upper inner quadrant  · June 23, 2021 , Mammogram and ultrasound in [facility] showed an irregular complex cystic mass with microlobulated margins measuring 1 5 x 1 3 x 1 4 cm  No discrete nodes are found in the right axilla  · June 24, 2021, patient underwent ultrasound guided core biopsy of the right breast mass: Pathology showed Grade 3 invasive ductal carcinoma, positive for carcinoma in-situ component, positive perineural remission  Negative for lymphovascular invasion  ER 90%, [place] 70%, HER2 positive by IHC  · 06/30/2021, patient consulted Dr An Zheng  Staging CT scan and bone scan ordered  Patient was refered to genetic counseling  · July 1, 2021 CT scan chest/abdomen/ pelvis with contrast for staging showed bilateral ovarian cysts    with left adnexal cyst measures 4 2 x 4 4 x 3 3 cm, and right adnexal cyst measures 4 3 x 3 0 x 3 5 cm  No evidence of metastatic disease in C/ A/P   · MRI of bilateral breast in July 1, 2021  redemonstrated the 1:00 O'Clock  biopsy-proven breast cancer  No axillary adenopathy  No extension to the chest wall  No concurrent lesions in either breast   · July 2, 2021, bone scan showed no evidence of metastatic disease  · This case was discussed in our multidisciplinary breast cancer tumor board with recommendation of surgery followed by adjuvant chemotherapy  · July 9, 2021, echo study showed normal left ventricle ejection fraction 65%  · July 12, 2021, genetic test: [employer] breast cancer panel negative  · July 19, 2021, patient underwent right breast lumpectomy with sentinel lymph node biopsy  Pathology showed invasive mammary carcinoma of no special type( ductal NST)  The tumor measures 18 mm, grade 3  All margins negative with closest margin 8 mm from tumor involving the posterior margin  5 lymph nodes negative for metastatic disease  Grade 2 DCIS present  No evidence of lymphovascular invasion  Final pathologic stage [id number],   · August 17, 2021, patient consulted Dr Harry Vaca from [facility] with recommendation of [facility] [place]  · August 19 2021, patient consulted Reproductive Medicine and started oocytes cryopreservation,  completed on September 3, 2021 with 24  Eggs saved  · September 13, 2021, patient started adjuvant chemotherapy with TH, completed on November 29, 2021  · December 6, 2021 maintenance Herceptin started  · December 17, 2021 echo study showed ejection 68%  · March 18, 2022 echo study showed ejection fraction 60%    History of Present Illiness:   Bard Dash is a 31 y o  female with the above-noted HemOnc history who is here for routine follow-up  Patient has a history of triple positive right breast cancer, status post lumpectomy on July 19, 2021  [id number]   Patient had 24 Oocytes  crypreservation on September 3, 2021  Patient started adjuvant chemotherapy with TH on September 13, 2021, completed 3 months of treatment on November 29, 2021  She also used cold cap to preserve her hair  Patient did fairly well  Patient currently on maintenance Herceptin started on December 6, 2021  Echo study in December 17, 21 showed ejection fraction 68%  Repeat echo study in March 18, 2022 with ejection fraction 55%  Patient consulted Dr Kay Bran  Adjuvant radiation therapy on September 13, 2021 through November 29, 2021  Patient also started tamoxifen 20 mg once daily on December 20, 2021  She did very well without significant side effect     Patient feels good  She tolerated the treatment very well  Patient denies fever or chills  No chest pain or shortness breast   No cough or phlegm  No  symptoms  Appetite good  No significant weight loss   or weight gain  ROS: A 12-point of review of systems is obtained and other than the above is noncontributory  Objective:   VITALS:   /86 (BP Location: Left arm, Cuff Size: Standard)   Pulse 84   Temp 98 2 °F (36 8 °C)   Resp 16   Ht 5' 2 36" (1 584 m)   Wt 73 3 kg (161 lb 8 oz)   SpO2 99%   BMI 29 20 kg/m²     Physical EXAM:  General:  Alert, cooperative, no distress  Head:  Normocephalic, without obvious abnormality  Eyes:  Conjunctivae/corneas clear  No evidence of conjunctivitis     Throat:  No mouth or nose bleeding  Neck: Supple, symmetrical, no adenopathy    Lungs:   Clear to auscultation bilaterally  Respiratory effort easy, nonlabored    Heart:  Regular rate and rhythm, S1, S2 normal, no murmur  Abdomen:   Soft, non-tender,nondistended  Bowel sounds normal  No masses,  No organomegaly  Extremities: Extremities normal, atraumatic, no edema  No axillary or inguinal adenopathy   Skin: No rashes or lesions    Neurologic: A&Ox4   No focal neuro deficits       No Known Allergies    Past Medical History: Diagnosis Date    Breast cancer ([facility] ) 07/19/2021    Cancer ([facility] )     Melanoma ([facility] )        Past Surgical History:   Procedure Laterality Date    BREAST BIOPSY      BREAST LUMPECTOMY Right 7/19/2021    Procedure: LYNDSAY  DIRECTED LUMPECTOMY;  Surgeon: Anoop Duarte MD;  Location: [facility];  Service: Surgical Oncology    LYMPH NODE BIOPSY Right 7/19/2021    Procedure: LYMPHATIC MAPPING WITH BLUE AND RADIOACTIVE DYES, SENTINEL LYMPH NODE BIOPSY, injection at 21 ;  Surgeon: Anoop Duarte MD;  Location: [facility];  Service: Surgical Oncology    SKIN CANCER EXCISION      TONSILLECTOMY AND ADENOIDECTOMY      WISDOM TOOTH EXTRACTION         Family History   Problem Relation Age of Onset    Prostate cancer Father     Kidney cancer Maternal Grandfather     Breast cancer Maternal Aunt        Social History     Socioeconomic History    Marital status: Single     Spouse name: Not on file    Number of children: Not on file    Years of education: Not on file    Highest education level: Not on file   Occupational History    Not on file   Tobacco Use    Smoking status: Never Smoker    Smokeless tobacco: Never Used   Vaping Use    Vaping Use: Never used   Substance and Sexual Activity    Alcohol use: Yes     Comment: Socially    Drug use: No    Sexual activity: Yes   Other Topics Concern    Not on file   Social History Narrative    Not on file     Social Determinants of Health     Financial Resource Strain: Not on file   Food Insecurity: Not on file   Transportation Needs: Not on file   Physical Activity: Not on file   Stress: Not on file   Social Connections: Not on file   Intimate Partner Violence: Not on file   Housing Stability: Not on file       Current Outpatient Medications   Medication Sig Dispense Refill    b complex vitamins capsule Take 1 capsule by mouth daily      Benzocaine-Menthol (Sore Throat Lozenges) 6-10 MG lozenge Take 1 lozenge by mouth every 2 (two) hours as needed for sore throat 100 tablet 0    Calcium Carbonate-Vit D-Min (CALCIUM 1200 PO) Take by mouth      cholecalciferol (VITAMIN D3) 1,000 units tablet Take 1,000 Units by mouth daily 4,000 IU daily      tamoxifen (NOLVADEX) 20 mg tablet Take 1 tablet (20 mg total) by mouth daily 90 tablet 3    phenazopyridine (PYRIDIUM) 95 MG tablet Take 95 mg by mouth 3 (three) times a day as needed for bladder spasms (Patient not taking: No sig reported)      vitamin B-12 (VITAMIN B-12) 1,000 mcg tablet Take 1,000 mcg by mouth daily   (Patient not taking: No sig reported)       No current facility-administered medications for this visit  (Not in a hospital admission)      DATA REVIEW:    Pathology Result:    Final Diagnosis   Date Value Ref Range Status   07/19/2021   Final    A  Right breast (lumpectomy):     - Invasive mammary carcinoma of no special type (ductal NST)  - Tumor size: 18 mm  Tumor thgthrthathdtheth:th th4th of 3       - Closest surgical margin: posterior (8 mm from tumor)  Comment:  ER / [place] / Her-2 pending  B  [place] lymph node #1, right axilla (excision):      - One (1) lymph node, negative for carcinoma  C  Adipose tissue, right axilla (excision):     - Four (4) lymph nodes, negative for carcinoma  D  Superior margin, right breast (excision):     - Benign fibroadipose tissue        - Superior margin negative for carcinoma  E   Medial margin, right breast (excision):      - Benign fibroadipose tissue        - Medial margin negative for carcinoma  F  Inferior margin, right breast (excision):      - Benign fibroadipose tissue and scant benign breast tissue  - Inferior margin negative for carcinoma  G  Lateral margin, right breast (excision):     - Benign fibroadipose tissue        - Lateral margin negative for carcinoma  Comment: This is an appended report  These results have been appended to a previously preliminary verified report     07/01/2020   Final    A  Skin, right lower back, shave biopsy:    COMPOUND NEVUS; extending to the tissue edges  02/05/2019   Final    A  Skin, Left Forearm, excision:  - Early scar and residual Spitz nevus, peripheral and deep margins uninvolved  See Note  Interpretation performed at [facility], [address] [place] [address]      01/14/2019   Final    A  Skin, Arm, Left, shave biopsy:  - Spitz's nevus; see note  Note:  Sections show a fairly circumscribed and mostly nested melanocytic proliferation along the basal layer  The cells are spindled and epithelioid, with fairly uniform cytology  Occasional Kamino bodies are also seen  P16 immunostain is positive in the lesional cells  On section planes studied, lesional cells are close to bilateral tissue edges  If this is a portion of a larger clinical lesion, complete removal to preclude recurrence would be prudent  Clinical correlation is recommended  Interpretation performed at [facility], [address] [place] [address] [address]       10/01/2018   Final    A  Skin, abdomen, excision:  - Prior biopsy site reaction, no residual melanoma seen  - Inked margins with no tumor seen  Interpretation performed at [facility], [address] [place] [address] [address]           09/06/2018   Final    A  Skin Abdominal, abdomen, shave biopsy:  - Malignant melanoma in situ with small focus suspicious for early invasive melanoma, 0 4 mm thickness,     extending to peripheral margins  MELANOMA OF SKIN TUMOR STAGING SUMMARY  1  Specimen identification:     - Procedure: Shave biopsy       - Laterality: Not specified  2  Tumor     - Tumor Site: Abdomen      - Macroscopic satellite nodule(s) (invasive tumor only): Not identified  - Histologic type:       -- Invasive Melanoma:  Superficial spreading melanoma          -- Melanoma in-situ (anatomic level I): N/A      - Maximum tumor (Breslow) thickness (pT1a)(invasive tumor only): 0 4 mm       - Anatomic (Sukhdeep) level (invasive tumor only): II       - Ulceration (invasive tumor only): Not identified  3  Margins:     - Peripheral margins: Involved by in-situ melanoma  - Deep margins (invasive tumor only): Uninvolved by invasive melanoma by 1 3 mm     4  Mitotic Rate (specify number / mm2)(invasive tumor only): 0/mm2    5  Microsatellite(s) (invasive tumor only): Not identified  6  Lymphovascular invasion (invasive tumor only): Not identified  7  Neurotropism (invasive tumor only): Not identified  8  Tumor-infiltrating lymphocytes (invasive tumor only): Present, brisk  9  Tumor regression (excision, if present < or more than 75%): Not identified  10  Growth Phase: Radial growth phase  11: Ancillary Studies:  None  -- Best representative tumor block: N/A (one block)  12  Additional pathologic findings:       -- Associated Nevus:  Cannot exclude dermal nevus  -- Other (specify): Epithelioid cell type  13  AJCC 8th Ed  Pathologic Stage Classification:  at least Stage  IA- pT1a, pNX  Interpretation performed at [place] [id number]  Image Results: They are reviewed and documented in Hematology/Oncology history    Echo follow up/limited w/ contrast if indicated  Addendum:   Left Ventricle: Left ventricular cavity size is normal  Wall thickness  is normal  Systolic function is normal   Estimated LVEF  55-60% Grossly no  wall motion abnormalities noted     Aortic Valve: The aortic valve is probably trileaflet    Mitral Valve: There is trace regurgitation    Tricuspid Valve: There is trace regurgitation    IVC/SVC: Not well visualized but appears normal in size     Global longitudinal strain = -17 46% which is low normal     I was called by oncologist to reviewed the echo and they wanted to know  whether there is decrease in EF   I reviewed echo again   Estimated LVEF 55-60%   Oncologist informed  Narrative:  Left Ventricle: Left ventricular cavity size is normal  Wall thickness   is normal  Systolic function is normal   Estimated LVEF  55% Grossly no   wall motion abnormalities noted    Aortic Valve: The aortic valve is probably trileaflet    Mitral Valve: There is trace regurgitation    Tricuspid Valve: There is trace regurgitation    IVC/SVC: Not well visualized but appears normal in size    Global longitudinal strain = -17 46% which is low normal        LABS:  Lab data are reviewed and documented in HemOnc history  No results found for this or any previous visit (from the past 48 hour(s))            Leonila Bello MD  6/3/2022, 3:46 PM

## 2022-06-03 NOTE — TELEPHONE ENCOUNTER
We were able to move Day 1 Cycle 24 to 7/25 & Day 1 Cycle 25 to 8/15  Unfortunately we do not have any openings on 6/13 & the other 3 Mondays are on holidays

## 2022-06-03 NOTE — TELEPHONE ENCOUNTER
Patient would like for her appts to be moved to Mondays  If this is possible, please let me know of date changes  Thank you!

## 2022-06-03 NOTE — TELEPHONE ENCOUNTER
This patient would like her infusions to be switched to Mondays  Please call patient after the schedule is changed to let her know

## 2022-06-13 ENCOUNTER — APPOINTMENT (OUTPATIENT)
Dept: LAB | Facility: HOSPITAL | Age: 27
End: 2022-06-13
Payer: COMMERCIAL

## 2022-06-13 ENCOUNTER — HOSPITAL ENCOUNTER (OUTPATIENT)
Dept: INFUSION CENTER | Facility: CLINIC | Age: 27
Discharge: HOME | End: 2022-06-13
Payer: COMMERCIAL

## 2022-06-13 VITALS
RESPIRATION RATE: 16 BRPM | HEIGHT: 62 IN | TEMPERATURE: 97.6 F | BODY MASS INDEX: 28.89 KG/M2 | DIASTOLIC BLOOD PRESSURE: 65 MMHG | SYSTOLIC BLOOD PRESSURE: 134 MMHG | WEIGHT: 156.97 LBS | HEART RATE: 72 BPM

## 2022-06-13 DIAGNOSIS — Z17.0 MALIGNANT NEOPLASM OF UPPER-INNER QUADRANT OF RIGHT BREAST IN FEMALE, ESTROGEN RECEPTOR POSITIVE (HCC): ICD-10-CM

## 2022-06-13 DIAGNOSIS — C50.211 MALIGNANT NEOPLASM OF UPPER-INNER QUADRANT OF RIGHT BREAST IN FEMALE, ESTROGEN RECEPTOR POSITIVE (HCC): Primary | ICD-10-CM

## 2022-06-13 DIAGNOSIS — C50.211 MALIGNANT NEOPLASM OF UPPER-INNER QUADRANT OF RIGHT BREAST IN FEMALE, ESTROGEN RECEPTOR POSITIVE (HCC): ICD-10-CM

## 2022-06-13 DIAGNOSIS — Z17.0 MALIGNANT NEOPLASM OF UPPER-INNER QUADRANT OF RIGHT BREAST IN FEMALE, ESTROGEN RECEPTOR POSITIVE (HCC): Primary | ICD-10-CM

## 2022-06-13 LAB
ALBUMIN SERPL BCP-MCNC: 3.6 G/DL (ref 3.5–5)
ALP SERPL-CCNC: 61 U/L (ref 46–116)
ALT SERPL W P-5'-P-CCNC: 21 U/L (ref 12–78)
ANION GAP SERPL CALCULATED.3IONS-SCNC: 9 MMOL/L (ref 4–13)
AST SERPL W P-5'-P-CCNC: 16 U/L (ref 5–45)
BASOPHILS # BLD AUTO: 0.02 THOUSANDS/ΜL (ref 0–0.1)
BASOPHILS NFR BLD AUTO: 0 % (ref 0–1)
BILIRUB SERPL-MCNC: 0.52 MG/DL (ref 0.2–1)
BUN SERPL-MCNC: 11 MG/DL (ref 5–25)
CALCIUM SERPL-MCNC: 8.9 MG/DL (ref 8.3–10.1)
CHLORIDE SERPL-SCNC: 106 MMOL/L (ref 100–108)
CO2 SERPL-SCNC: 27 MMOL/L (ref 21–32)
CREAT SERPL-MCNC: 0.76 MG/DL (ref 0.6–1.3)
EOSINOPHIL # BLD AUTO: 0.11 THOUSAND/ΜL (ref 0–0.61)
EOSINOPHIL NFR BLD AUTO: 2 % (ref 0–6)
ERYTHROCYTE [DISTWIDTH] IN BLOOD BY AUTOMATED COUNT: 12.6 % (ref 11.6–15.1)
GFR SERPL CREATININE-BSD FRML MDRD: 107 ML/MIN/1.73SQ M
GLUCOSE P FAST SERPL-MCNC: 87 MG/DL (ref 65–99)
HCT VFR BLD AUTO: 36.8 % (ref 34.8–46.1)
HGB BLD-MCNC: 12 G/DL (ref 11.5–15.4)
IMM GRANULOCYTES # BLD AUTO: 0.02 THOUSAND/UL (ref 0–0.2)
IMM GRANULOCYTES NFR BLD AUTO: 0 % (ref 0–2)
LYMPHOCYTES # BLD AUTO: 1.47 THOUSANDS/ΜL (ref 0.6–4.47)
LYMPHOCYTES NFR BLD AUTO: 25 % (ref 14–44)
MCH RBC QN AUTO: 29.7 PG (ref 26.8–34.3)
MCHC RBC AUTO-ENTMCNC: 32.6 G/DL (ref 31.4–37.4)
MCV RBC AUTO: 91 FL (ref 82–98)
MONOCYTES # BLD AUTO: 0.64 THOUSAND/ΜL (ref 0.17–1.22)
MONOCYTES NFR BLD AUTO: 11 % (ref 4–12)
NEUTROPHILS # BLD AUTO: 3.64 THOUSANDS/ΜL (ref 1.85–7.62)
NEUTS SEG NFR BLD AUTO: 62 % (ref 43–75)
NRBC BLD AUTO-RTO: 0 /100 WBCS
PLATELET # BLD AUTO: 239 THOUSANDS/UL (ref 149–390)
PMV BLD AUTO: 10.2 FL (ref 8.9–12.7)
POTASSIUM SERPL-SCNC: 4.2 MMOL/L (ref 3.5–5.3)
PROT SERPL-MCNC: 7.1 G/DL (ref 6.4–8.2)
RBC # BLD AUTO: 4.04 MILLION/UL (ref 3.81–5.12)
SODIUM SERPL-SCNC: 142 MMOL/L (ref 136–145)
WBC # BLD AUTO: 5.9 THOUSAND/UL (ref 4.31–10.16)

## 2022-06-13 PROCEDURE — 85025 COMPLETE CBC W/AUTO DIFF WBC: CPT

## 2022-06-13 PROCEDURE — 36415 COLL VENOUS BLD VENIPUNCTURE: CPT

## 2022-06-13 PROCEDURE — 80053 COMPREHEN METABOLIC PANEL: CPT

## 2022-06-13 PROCEDURE — 96413 CHEMO IV INFUSION 1 HR: CPT

## 2022-06-13 RX ORDER — SODIUM CHLORIDE 9 MG/ML
20 INJECTION, SOLUTION INTRAVENOUS ONCE
Status: COMPLETED | OUTPATIENT
Start: 2022-06-13 | End: 2022-06-13

## 2022-06-13 RX ADMIN — SODIUM CHLORIDE 20 ML/HR: 0.9 INJECTION, SOLUTION INTRAVENOUS at 14:11

## 2022-06-13 RX ADMIN — TRASTUZUMAB 440 MG: 150 INJECTION, POWDER, LYOPHILIZED, FOR SOLUTION INTRAVENOUS at 14:51

## 2022-06-13 NOTE — PROGRESS NOTES
Most recent labs and echo reviewed  Pt tolerated todays herceptin without incident  peripheral iv discontinued jelco intact   Discharged ambulatory with avs

## 2022-06-15 ENCOUNTER — DOCUMENTATION (OUTPATIENT)
Dept: HEMATOLOGY ONCOLOGY | Facility: CLINIC | Age: 27
End: 2022-06-15

## 2022-06-16 ENCOUNTER — TELEPHONE (OUTPATIENT)
Dept: HEMATOLOGY ONCOLOGY | Facility: CLINIC | Age: 27
End: 2022-06-16

## 2022-06-16 NOTE — TELEPHONE ENCOUNTER
Received call from patient adamant about speaking with Dr Emelina Chaudhry in regards to congestion and possible ear infection/ earache  I had reviewed with patient that unfortunately Dr Emelina Chaudhry is not available and Mary Ann Hardy PA-C does not feel that this is due to her treatment  Reviewed that Oncology/Hematology offices usually do not prescribe medications for such s/s  Reviewed recommendations of going to a walk in for further evaluation in person due to patient being in [place] and unable to prescribe any type of medication without seeing patient in person  Reviewed that Family provider might be able to assist further or to seek evaluation at a walk in facility in [place]  Patient stated, " I am not going on a plane like this and I am not going anywhere while I am here " Reviewed recommendations again, patient adamant about speaking to provider  Message sent to provider to call patient at earliest convenience

## 2022-06-16 NOTE — TELEPHONE ENCOUNTER
Call received from patient in regards to ear pressure and discomfort after flight to [facility] for vacation  Patient stated that patient feels that it might be an ear infection  Reviewed that Dr Darius Bush does not prescribe such medications and reviewed that Mally Dominguez PA-C does not believe that it is from an infusion that patient received a couple of days ago prior to travel  Reviewed that recommendations would be to go to a walk in care now facility and or call family provider for guidance and recommendations  Phone calls (2) dropped both times  My Chart message to patient in regards to recommendations with apology  Encouraged patient to call us with further questions and concerns

## 2022-06-16 NOTE — TELEPHONE ENCOUNTER
I returned patient's call  She did not answer the phone  I left a voice message stating that she needs to see an urgent care facility in [place] to obtain treatments  If she has any additional questions/concerns I informed her to call our office at [phone]

## 2022-06-22 ENCOUNTER — NURSE TRIAGE (OUTPATIENT)
Dept: OTHER | Facility: OTHER | Age: 27
End: 2022-06-22

## 2022-06-23 ENCOUNTER — OFFICE VISIT (OUTPATIENT)
Dept: INTERNAL MEDICINE CLINIC | Facility: CLINIC | Age: 27
End: 2022-06-23
Payer: COMMERCIAL

## 2022-06-23 VITALS
OXYGEN SATURATION: 95 % | RESPIRATION RATE: 12 BRPM | BODY MASS INDEX: 29.08 KG/M2 | HEIGHT: 62 IN | SYSTOLIC BLOOD PRESSURE: 106 MMHG | TEMPERATURE: 98.1 F | WEIGHT: 158 LBS | HEART RATE: 87 BPM | DIASTOLIC BLOOD PRESSURE: 70 MMHG

## 2022-06-23 DIAGNOSIS — H66.92 ACUTE OTITIS MEDIA OF LEFT EAR WITH PERFORATION: Primary | ICD-10-CM

## 2022-06-23 DIAGNOSIS — H72.92 ACUTE OTITIS MEDIA OF LEFT EAR WITH PERFORATION: Primary | ICD-10-CM

## 2022-06-23 PROCEDURE — 3008F BODY MASS INDEX DOCD: CPT | Performed by: INTERNAL MEDICINE

## 2022-06-23 PROCEDURE — 99213 OFFICE O/P EST LOW 20 MIN: CPT | Performed by: INTERNAL MEDICINE

## 2022-06-23 PROCEDURE — 1036F TOBACCO NON-USER: CPT | Performed by: INTERNAL MEDICINE

## 2022-06-23 RX ORDER — METHYLPREDNISOLONE 4 MG/1
TABLET ORAL
Qty: 21 EACH | Refills: 0 | Status: SHIPPED | OUTPATIENT
Start: 2022-06-23

## 2022-06-23 RX ORDER — AMOXICILLIN AND CLAVULANATE POTASSIUM 875; 125 MG/1; MG/1
1 TABLET, FILM COATED ORAL EVERY 12 HOURS SCHEDULED
Qty: 20 TABLET | Refills: 0 | Status: SHIPPED | OUTPATIENT
Start: 2022-06-23 | End: 2022-07-03

## 2022-06-23 NOTE — PROGRESS NOTES
Assessment/Plan:     Stephani Fuentes is here with c/o b/l ear pain, discharge, popping, loss of balance for the past 1 week  Had started course of amoxicillin with no relief  No fever  + congestion  Will send in Augmentin and steroids  Doing well with her breast cancer; 4 infusions left  On tamoxifen therapy  Quality Measures:     BMI Counseling: Body mass index is 28 57 kg/m²  The BMI is above normal  Nutrition recommendations include decreasing portion sizes and encouraging healthy choices of fruits and vegetables  Exercise recommendations include moderate physical activity 150 minutes/week  Rationale for BMI follow-up plan is due to patient being overweight or obese  Return for Next scheduled follow up  No problem-specific Assessment & Plan notes found for this encounter  Diagnoses and all orders for this visit:    Acute otitis media of left ear with perforation  -     amoxicillin-clavulanate (Augmentin) 875-125 mg per tablet; Take 1 tablet by mouth every 12 (twelve) hours for 10 days  -     methylPREDNISolone 4 MG tablet therapy pack; Use as directed on package          Subjective:      Patient ID: Alexandrea Sabillon is a 31 y o  female  Patient is here with sick complaints        ALLERGIES:  No Known Allergies    CURRENT MEDICATIONS:    Current Outpatient Medications:     amoxicillin-clavulanate (Augmentin) 875-125 mg per tablet, Take 1 tablet by mouth every 12 (twelve) hours for 10 days, Disp: 20 tablet, Rfl: 0    b complex vitamins capsule, Take 1 capsule by mouth daily, Disp: , Rfl:     Calcium Carbonate-Vit D-Min (CALCIUM 1200 PO), Take by mouth, Disp: , Rfl:     cholecalciferol (VITAMIN D3) 1,000 units tablet, Take 1,000 Units by mouth daily 4,000 IU daily, Disp: , Rfl:     methylPREDNISolone 4 MG tablet therapy pack, Use as directed on package, Disp: 21 each, Rfl: 0    tamoxifen (NOLVADEX) 20 mg tablet, Take 1 tablet (20 mg total) by mouth daily, Disp: 90 tablet, Rfl: 3   Benzocaine-Menthol (Sore Throat Lozenges) 6-10 MG lozenge, Take 1 lozenge by mouth every 2 (two) hours as needed for sore throat (Patient not taking: Reported on 6/23/2022), Disp: 100 tablet, Rfl: 0    phenazopyridine (PYRIDIUM) 95 MG tablet, Take 95 mg by mouth 3 (three) times a day as needed for bladder spasms (Patient not taking: No sig reported), Disp: , Rfl:     vitamin B-12 (VITAMIN B-12) 1,000 mcg tablet, Take 1,000 mcg by mouth daily   (Patient not taking: No sig reported), Disp: , Rfl:     ACTIVE PROBLEM LIST:  Patient Active Problem List   Diagnosis    Nevus    Malignant melanoma of skin of abdomen (HCC)    Neck pain    Multiple benign melanocytic nevi    Iron deficiency anemia due to chronic blood loss    Warts    Surveillance for birth control, oral contraceptives    Discomfort of left ear    Benign paroxysmal positional vertigo due to bilateral vestibular disorder    Spitz nevus of forearm, left    Enlarged thyroid    Malignant neoplasm of upper-inner quadrant of right breast in female, estrogen receptor positive (HCC)    Breast lump or mass    Use of tamoxifen (Nolvadex)       PAST MEDICAL HISTORY:  Past Medical History:   Diagnosis Date    Breast cancer ([facility] [address] ) 07/19/2021    Cancer ([facility] [address] )     Melanoma ([facility] [address] )        PAST SURGICAL HISTORY:  Past Surgical History:   Procedure Laterality Date    BREAST BIOPSY      BREAST LUMPECTOMY Right 7/19/2021    Procedure: LYNDSAY  DIRECTED LUMPECTOMY;  Surgeon: Sonu Johnson MD;  Location[facility];  Service: Surgical Oncology    LYMPH NODE BIOPSY Right 7/19/2021    Procedure: LYMPHATIC MAPPING WITH BLUE AND RADIOACTIVE DYES, SENTINEL LYMPH NODE BIOPSY, injection at 56;  Surgeon: Sonu Johnson MD;  [facility];  Service: Surgical Oncology    SKIN CANCER EXCISION      TONSILLECTOMY AND ADENOIDECTOMY      WISDOM TOOTH EXTRACTION         FAMILY HISTORY:  Family History   Problem Relation Age of Onset    Prostate cancer Father  Kidney cancer Maternal Grandfather     Breast cancer Maternal Aunt        SOCIAL HISTORY:  Social History     Socioeconomic History    Marital status: Single     Spouse name: Not on file    Number of children: Not on file    Years of education: Not on file    Highest education level: Not on file   Occupational History    Not on file   Tobacco Use    Smoking status: Never Smoker    Smokeless tobacco: Never Used   Vaping Use    Vaping Use: Never used   Substance and Sexual Activity    Alcohol use: Yes     Comment: Socially    Drug use: No    Sexual activity: Yes   Other Topics Concern    Not on file   Social History Narrative    Not on file     Social Determinants of Health     Financial Resource Strain: Not on file   Food Insecurity: Not on file   Transportation Needs: Not on file   Physical Activity: Not on file   Stress: Not on file   Social Connections: Not on file   Intimate Partner Violence: Not on file   Housing Stability: Not on file       Review of Systems   HENT: Positive for congestion, ear discharge, ear pain and hearing loss  All other systems reviewed and are negative  Objective:  Vitals:    06/23/22 1114   BP: 106/70   BP Location: Left arm   Patient Position: Sitting   Pulse: 87   Resp: 12   Temp: 98 1 °F (36 7 °C)   TempSrc: Tympanic   SpO2: 95%   Weight: 71 7 kg (158 lb)   Height: 5' 2 36" (1 584 m)     Body mass index is 28 57 kg/m²  Physical Exam  Vitals and nursing note reviewed  Constitutional:       Appearance: Normal appearance  HENT:      Head: Normocephalic and atraumatic  Right Ear: Tympanic membrane is injected  Left Ear: Tympanic membrane is perforated  Nose: Nose normal    Cardiovascular:      Rate and Rhythm: Normal rate  Pulmonary:      Effort: Pulmonary effort is normal    Abdominal:      Palpations: Abdomen is soft  Musculoskeletal:         General: Normal range of motion  Cervical back: Normal range of motion  Lymphadenopathy:      Cervical: No cervical adenopathy  Skin:     General: Skin is warm  Neurological:      General: No focal deficit present  Mental Status: She is alert and oriented to person, place, and time  Mental status is at baseline     Psychiatric:         Mood and Affect: Mood normal          Behavior: Behavior normal            RESULTS:    Recent Results (from the past 1008 hour(s))   CBC and differential    Collection Time: 05/20/22 12:21 PM   Result Value Ref Range    WBC 4 94 4 31 - 10 16 Thousand/uL    RBC 4 11 3 81 - 5 12 Million/uL    Hemoglobin 12 3 11 5 - 15 4 g/dL    Hematocrit 37 9 34 8 - 46 1 %    MCV 92 82 - 98 fL    MCH 29 9 26 8 - 34 3 pg    MCHC 32 5 31 4 - 37 4 g/dL    RDW 13 4 11 6 - 15 1 %    MPV 9 9 8 9 - 12 7 fL    Platelets 499 547 - 213 Thousands/uL    nRBC 0 /100 WBCs    Neutrophils Relative 62 43 - 75 %    Immat GRANS % 0 0 - 2 %    Lymphocytes Relative 27 14 - 44 %    Monocytes Relative 10 4 - 12 %    Eosinophils Relative 1 0 - 6 %    Basophils Relative 0 0 - 1 %    Neutrophils Absolute 3 04 1 85 - 7 62 Thousands/µL    Immature Grans Absolute 0 01 0 00 - 0 20 Thousand/uL    Lymphocytes Absolute 1 34 0 60 - 4 47 Thousands/µL    Monocytes Absolute 0 48 0 17 - 1 22 Thousand/µL    Eosinophils Absolute 0 06 0 00 - 0 61 Thousand/µL    Basophils Absolute 0 01 0 00 - 0 10 Thousands/µL   Comprehensive metabolic panel    Collection Time: 05/20/22 12:21 PM   Result Value Ref Range    Sodium 141 136 - 145 mmol/L    Potassium 4 1 3 5 - 5 3 mmol/L    Chloride 106 100 - 108 mmol/L    CO2 28 21 - 32 mmol/L    ANION GAP 7 4 - 13 mmol/L    BUN 11 5 - 25 mg/dL    Creatinine 0 77 0 60 - 1 30 mg/dL    Glucose, Fasting 70 65 - 99 mg/dL    Calcium 8 6 8 3 - 10 1 mg/dL    AST 15 5 - 45 U/L    ALT 20 12 - 78 U/L    Alkaline Phosphatase 54 46 - 116 U/L    Total Protein 7 2 6 4 - 8 2 g/dL    Albumin 3 6 3 5 - 5 0 g/dL    Total Bilirubin 0 42 0 20 - 1 00 mg/dL    eGFR 106 ml/min/1 73sq m   CBC and differential    Collection Time: 06/13/22 11:01 AM   Result Value Ref Range    WBC 5 90 4 31 - 10 16 Thousand/uL    RBC 4 04 3 81 - 5 12 Million/uL    Hemoglobin 12 0 11 5 - 15 4 g/dL    Hematocrit 36 8 34 8 - 46 1 %    MCV 91 82 - 98 fL    MCH 29 7 26 8 - 34 3 pg    MCHC 32 6 31 4 - 37 4 g/dL    RDW 12 6 11 6 - 15 1 %    MPV 10 2 8 9 - 12 7 fL    Platelets 417 965 - 500 Thousands/uL    nRBC 0 /100 WBCs    Neutrophils Relative 62 43 - 75 %    Immat GRANS % 0 0 - 2 %    Lymphocytes Relative 25 14 - 44 %    Monocytes Relative 11 4 - 12 %    Eosinophils Relative 2 0 - 6 %    Basophils Relative 0 0 - 1 %    Neutrophils Absolute 3 64 1 85 - 7 62 Thousands/µL    Immature Grans Absolute 0 02 0 00 - 0 20 Thousand/uL    Lymphocytes Absolute 1 47 0 60 - 4 47 Thousands/µL    Monocytes Absolute 0 64 0 17 - 1 22 Thousand/µL    Eosinophils Absolute 0 11 0 00 - 0 61 Thousand/µL    Basophils Absolute 0 02 0 00 - 0 10 Thousands/µL   Comprehensive metabolic panel    Collection Time: 06/13/22 11:01 AM   Result Value Ref Range    Sodium 142 136 - 145 mmol/L    Potassium 4 2 3 5 - 5 3 mmol/L    Chloride 106 100 - 108 mmol/L    CO2 27 21 - 32 mmol/L    ANION GAP 9 4 - 13 mmol/L    BUN 11 5 - 25 mg/dL    Creatinine 0 76 0 60 - 1 30 mg/dL    Glucose, Fasting 87 65 - 99 mg/dL    Calcium 8 9 8 3 - 10 1 mg/dL    AST 16 5 - 45 U/L    ALT 21 12 - 78 U/L    Alkaline Phosphatase 61 46 - 116 U/L    Total Protein 7 1 6 4 - 8 2 g/dL    Albumin 3 6 3 5 - 5 0 g/dL    Total Bilirubin 0 52 0 20 - 1 00 mg/dL    eGFR 107 ml/min/1 73sq m       This note was created with voice recognition software  Phonic, grammatical and spelling errors may be present within the note as a result

## 2022-06-23 NOTE — TELEPHONE ENCOUNTER
TC on call provider due to patient being immunocompromised; on call said ok to schedule patient for OV tomorrow to be seen  Scheduled patient for appointment; advised patient if symptoms worsened to call back tonight  Patient verbalized understanding

## 2022-06-23 NOTE — TELEPHONE ENCOUNTER
Regarding: ear infection   ----- Message from Clement Torres sent at 6/22/2022  8:12 PM EDT -----  Pt called, " I have an infection but it is not getting better  I was prescribed antibiotics   It's been a week and I feel my equilibrium is off, and infection has not gotten better "

## 2022-06-23 NOTE — TELEPHONE ENCOUNTER
Reason for Disposition   Diabetes mellitus or weak immune system (e g , HIV positive, cancer chemo, splenectomy, organ transplant, chronic steroids)    Answer Assessment - Initial Assessment Questions  1  LOCATION: "Which ear is involved?"      Pain in both ears/congestion  2  ONSET: "When did the ear start hurting"       Last Wednesday-about a week  3  SEVERITY: "How bad is the pain?"  (Scale 1-10; mild, moderate or severe)    - MILD (1-3): doesn't interfere with normal activities     - MODERATE (4-7): interferes with normal activities or awakens from sleep     - SEVERE (8-10): excruciating pain, unable to do any normal activities       Hasn't gotten better, pain is mildly better-will pop when patient swallows, feels almost dizzy, pain moderate  4  URI SYMPTOMS: "Do you have a runny nose or cough?"      Cough and runny nose  5  FEVER: "Do you have a fever?" If Yes, ask: "What is your temperature, how was it measured, and when did it start?"      Denies  6  CAUSE: "Have you been swimming recently?", "How often do you use Q-TIPS?", "Have you had any recent air travel or scuba diving?"      Denies  7  OTHER SYMPTOMS: "Do you have any other symptoms?" (e g , headache, stiff neck, dizziness, vomiting, runny nose, decreased hearing)      Cough started on Friday  8  PREGNANCY: "Is there any chance you are pregnant?" "When was your last menstrual period?"  N/A    Patient stated she has a history of getting a lot of ear infections      Patient took her own leftover Amoxicillin since she was on vacation  Patient took 3 Covid tests which were negative      Protocols used: EARACHE-ADULT-AH

## 2022-07-02 ENCOUNTER — APPOINTMENT (OUTPATIENT)
Dept: LAB | Facility: HOSPITAL | Age: 27
End: 2022-07-02
Payer: COMMERCIAL

## 2022-07-02 DIAGNOSIS — Z17.0 MALIGNANT NEOPLASM OF UPPER-INNER QUADRANT OF RIGHT BREAST IN FEMALE, ESTROGEN RECEPTOR POSITIVE (HCC): ICD-10-CM

## 2022-07-02 DIAGNOSIS — C50.211 MALIGNANT NEOPLASM OF UPPER-INNER QUADRANT OF RIGHT BREAST IN FEMALE, ESTROGEN RECEPTOR POSITIVE (HCC): ICD-10-CM

## 2022-07-02 LAB
ALBUMIN SERPL BCP-MCNC: 3.6 G/DL (ref 3.5–5)
ALP SERPL-CCNC: 56 U/L (ref 46–116)
ALT SERPL W P-5'-P-CCNC: 26 U/L (ref 12–78)
ANION GAP SERPL CALCULATED.3IONS-SCNC: 10 MMOL/L (ref 4–13)
AST SERPL W P-5'-P-CCNC: 16 U/L (ref 5–45)
BASOPHILS # BLD AUTO: 0.02 THOUSANDS/ΜL (ref 0–0.1)
BASOPHILS NFR BLD AUTO: 0 % (ref 0–1)
BILIRUB SERPL-MCNC: 0.21 MG/DL (ref 0.2–1)
BUN SERPL-MCNC: 11 MG/DL (ref 5–25)
CALCIUM SERPL-MCNC: 8.5 MG/DL (ref 8.3–10.1)
CHLORIDE SERPL-SCNC: 108 MMOL/L (ref 100–108)
CO2 SERPL-SCNC: 27 MMOL/L (ref 21–32)
CREAT SERPL-MCNC: 0.76 MG/DL (ref 0.6–1.3)
EOSINOPHIL # BLD AUTO: 0.06 THOUSAND/ΜL (ref 0–0.61)
EOSINOPHIL NFR BLD AUTO: 1 % (ref 0–6)
ERYTHROCYTE [DISTWIDTH] IN BLOOD BY AUTOMATED COUNT: 12.9 % (ref 11.6–15.1)
GFR SERPL CREATININE-BSD FRML MDRD: 107 ML/MIN/1.73SQ M
GLUCOSE P FAST SERPL-MCNC: 89 MG/DL (ref 65–99)
HCT VFR BLD AUTO: 38.4 % (ref 34.8–46.1)
HGB BLD-MCNC: 12.1 G/DL (ref 11.5–15.4)
IMM GRANULOCYTES # BLD AUTO: 0.03 THOUSAND/UL (ref 0–0.2)
IMM GRANULOCYTES NFR BLD AUTO: 0 % (ref 0–2)
LYMPHOCYTES # BLD AUTO: 1.39 THOUSANDS/ΜL (ref 0.6–4.47)
LYMPHOCYTES NFR BLD AUTO: 20 % (ref 14–44)
MCH RBC QN AUTO: 29 PG (ref 26.8–34.3)
MCHC RBC AUTO-ENTMCNC: 31.5 G/DL (ref 31.4–37.4)
MCV RBC AUTO: 92 FL (ref 82–98)
MONOCYTES # BLD AUTO: 0.63 THOUSAND/ΜL (ref 0.17–1.22)
MONOCYTES NFR BLD AUTO: 9 % (ref 4–12)
NEUTROPHILS # BLD AUTO: 4.96 THOUSANDS/ΜL (ref 1.85–7.62)
NEUTS SEG NFR BLD AUTO: 70 % (ref 43–75)
NRBC BLD AUTO-RTO: 0 /100 WBCS
PLATELET # BLD AUTO: 260 THOUSANDS/UL (ref 149–390)
PMV BLD AUTO: 9.9 FL (ref 8.9–12.7)
POTASSIUM SERPL-SCNC: 4.3 MMOL/L (ref 3.5–5.3)
PROT SERPL-MCNC: 7.4 G/DL (ref 6.4–8.2)
RBC # BLD AUTO: 4.17 MILLION/UL (ref 3.81–5.12)
SODIUM SERPL-SCNC: 145 MMOL/L (ref 136–145)
WBC # BLD AUTO: 7.09 THOUSAND/UL (ref 4.31–10.16)

## 2022-07-02 PROCEDURE — 80053 COMPREHEN METABOLIC PANEL: CPT

## 2022-07-02 PROCEDURE — 85025 COMPLETE CBC W/AUTO DIFF WBC: CPT

## 2022-07-02 PROCEDURE — 36415 COLL VENOUS BLD VENIPUNCTURE: CPT

## 2022-07-06 ENCOUNTER — HOSPITAL ENCOUNTER (OUTPATIENT)
Dept: INFUSION CENTER | Facility: CLINIC | Age: 27
Discharge: HOME | End: 2022-07-06
Payer: COMMERCIAL

## 2022-07-06 VITALS
WEIGHT: 163.4 LBS | DIASTOLIC BLOOD PRESSURE: 61 MMHG | RESPIRATION RATE: 16 BRPM | TEMPERATURE: 97.8 F | SYSTOLIC BLOOD PRESSURE: 137 MMHG | HEART RATE: 88 BPM | BODY MASS INDEX: 29.54 KG/M2

## 2022-07-06 DIAGNOSIS — C50.211 MALIGNANT NEOPLASM OF UPPER-INNER QUADRANT OF RIGHT BREAST IN FEMALE, ESTROGEN RECEPTOR POSITIVE (HCC): Primary | ICD-10-CM

## 2022-07-06 DIAGNOSIS — Z17.0 MALIGNANT NEOPLASM OF UPPER-INNER QUADRANT OF RIGHT BREAST IN FEMALE, ESTROGEN RECEPTOR POSITIVE (HCC): Primary | ICD-10-CM

## 2022-07-06 PROCEDURE — 96413 CHEMO IV INFUSION 1 HR: CPT

## 2022-07-06 RX ORDER — SODIUM CHLORIDE 9 MG/ML
20 INJECTION, SOLUTION INTRAVENOUS ONCE
Status: COMPLETED | OUTPATIENT
Start: 2022-07-06 | End: 2022-07-06

## 2022-07-06 RX ADMIN — SODIUM CHLORIDE 20 ML/HR: 0.9 INJECTION, SOLUTION INTRAVENOUS at 14:27

## 2022-07-06 RX ADMIN — TRASTUZUMAB 440 MG: 150 INJECTION, POWDER, LYOPHILIZED, FOR SOLUTION INTRAVENOUS at 14:26

## 2022-07-06 NOTE — PROGRESS NOTES
Patient presents for chemotherapy offering no complaints, patient tolerated treatment without incident  AVS declined  Next appointment reviewed

## 2022-07-12 ENCOUNTER — PATIENT OUTREACH (OUTPATIENT)
Dept: CASE MANAGEMENT | Facility: HOSPITAL | Age: 27
End: 2022-07-12

## 2022-07-12 NOTE — PROGRESS NOTES
Chart review completed today, no contact with pt since Feb 2022, OncSW episode will be closed as of today  MSW will remain available to pt as needed moving forward

## 2022-07-13 ENCOUNTER — TELEPHONE (OUTPATIENT)
Dept: HEMATOLOGY ONCOLOGY | Facility: CLINIC | Age: 27
End: 2022-07-13

## 2022-07-13 RX ORDER — SODIUM CHLORIDE 9 MG/ML
20 INJECTION, SOLUTION INTRAVENOUS ONCE
Status: CANCELLED | OUTPATIENT
Start: 2022-07-25

## 2022-07-18 ENCOUNTER — TELEPHONE (OUTPATIENT)
Dept: HEMATOLOGY ONCOLOGY | Facility: CLINIC | Age: 27
End: 2022-07-18

## 2022-07-18 NOTE — TELEPHONE ENCOUNTER
Called patient and LVM informing her of an echo I scheduled for her on 8/5/22 at 1pm arrival time at 12:45    I left our telephone number if date and time did not work for her

## 2022-07-22 ENCOUNTER — APPOINTMENT (OUTPATIENT)
Dept: LAB | Facility: HOSPITAL | Age: 27
End: 2022-07-22
Payer: COMMERCIAL

## 2022-07-22 DIAGNOSIS — C50.211 MALIGNANT NEOPLASM OF UPPER-INNER QUADRANT OF RIGHT BREAST IN FEMALE, ESTROGEN RECEPTOR POSITIVE (HCC): ICD-10-CM

## 2022-07-22 DIAGNOSIS — Z17.0 MALIGNANT NEOPLASM OF UPPER-INNER QUADRANT OF RIGHT BREAST IN FEMALE, ESTROGEN RECEPTOR POSITIVE (HCC): ICD-10-CM

## 2022-07-22 LAB
ALBUMIN SERPL BCP-MCNC: 3.7 G/DL (ref 3.5–5)
ALP SERPL-CCNC: 64 U/L (ref 46–116)
ALT SERPL W P-5'-P-CCNC: 21 U/L (ref 12–78)
ANION GAP SERPL CALCULATED.3IONS-SCNC: 9 MMOL/L (ref 4–13)
AST SERPL W P-5'-P-CCNC: 13 U/L (ref 5–45)
BASOPHILS # BLD AUTO: 0.02 THOUSANDS/ΜL (ref 0–0.1)
BASOPHILS NFR BLD AUTO: 0 % (ref 0–1)
BILIRUB SERPL-MCNC: 0.47 MG/DL (ref 0.2–1)
BUN SERPL-MCNC: 12 MG/DL (ref 5–25)
CALCIUM SERPL-MCNC: 8.9 MG/DL (ref 8.3–10.1)
CHLORIDE SERPL-SCNC: 104 MMOL/L (ref 96–108)
CO2 SERPL-SCNC: 27 MMOL/L (ref 21–32)
CREAT SERPL-MCNC: 0.85 MG/DL (ref 0.6–1.3)
EOSINOPHIL # BLD AUTO: 0.1 THOUSAND/ΜL (ref 0–0.61)
EOSINOPHIL NFR BLD AUTO: 1 % (ref 0–6)
ERYTHROCYTE [DISTWIDTH] IN BLOOD BY AUTOMATED COUNT: 13.2 % (ref 11.6–15.1)
GFR SERPL CREATININE-BSD FRML MDRD: 94 ML/MIN/1.73SQ M
GLUCOSE SERPL-MCNC: 109 MG/DL (ref 65–140)
HCT VFR BLD AUTO: 37.7 % (ref 34.8–46.1)
HGB BLD-MCNC: 12.3 G/DL (ref 11.5–15.4)
IMM GRANULOCYTES # BLD AUTO: 0.02 THOUSAND/UL (ref 0–0.2)
IMM GRANULOCYTES NFR BLD AUTO: 0 % (ref 0–2)
LYMPHOCYTES # BLD AUTO: 1.38 THOUSANDS/ΜL (ref 0.6–4.47)
LYMPHOCYTES NFR BLD AUTO: 20 % (ref 14–44)
MCH RBC QN AUTO: 29.9 PG (ref 26.8–34.3)
MCHC RBC AUTO-ENTMCNC: 32.6 G/DL (ref 31.4–37.4)
MCV RBC AUTO: 92 FL (ref 82–98)
MONOCYTES # BLD AUTO: 0.65 THOUSAND/ΜL (ref 0.17–1.22)
MONOCYTES NFR BLD AUTO: 9 % (ref 4–12)
NEUTROPHILS # BLD AUTO: 4.73 THOUSANDS/ΜL (ref 1.85–7.62)
NEUTS SEG NFR BLD AUTO: 70 % (ref 43–75)
NRBC BLD AUTO-RTO: 0 /100 WBCS
PLATELET # BLD AUTO: 231 THOUSANDS/UL (ref 149–390)
PMV BLD AUTO: 9.8 FL (ref 8.9–12.7)
POTASSIUM SERPL-SCNC: 3.9 MMOL/L (ref 3.5–5.3)
PROT SERPL-MCNC: 7.6 G/DL (ref 6.4–8.4)
RBC # BLD AUTO: 4.11 MILLION/UL (ref 3.81–5.12)
SODIUM SERPL-SCNC: 140 MMOL/L (ref 135–147)
WBC # BLD AUTO: 6.9 THOUSAND/UL (ref 4.31–10.16)

## 2022-07-22 PROCEDURE — 36415 COLL VENOUS BLD VENIPUNCTURE: CPT

## 2022-07-22 PROCEDURE — 80053 COMPREHEN METABOLIC PANEL: CPT

## 2022-07-22 PROCEDURE — 85025 COMPLETE CBC W/AUTO DIFF WBC: CPT

## 2022-07-25 ENCOUNTER — HOSPITAL ENCOUNTER (OUTPATIENT)
Dept: INFUSION CENTER | Facility: CLINIC | Age: 27
Discharge: HOME | End: 2022-07-25
Payer: COMMERCIAL

## 2022-07-25 VITALS
WEIGHT: 160.5 LBS | RESPIRATION RATE: 16 BRPM | HEIGHT: 62 IN | SYSTOLIC BLOOD PRESSURE: 126 MMHG | BODY MASS INDEX: 29.53 KG/M2 | HEART RATE: 70 BPM | TEMPERATURE: 97.7 F | DIASTOLIC BLOOD PRESSURE: 58 MMHG

## 2022-07-25 DIAGNOSIS — Z17.0 MALIGNANT NEOPLASM OF UPPER-INNER QUADRANT OF RIGHT BREAST IN FEMALE, ESTROGEN RECEPTOR POSITIVE (HCC): Primary | ICD-10-CM

## 2022-07-25 DIAGNOSIS — C50.211 MALIGNANT NEOPLASM OF UPPER-INNER QUADRANT OF RIGHT BREAST IN FEMALE, ESTROGEN RECEPTOR POSITIVE (HCC): Primary | ICD-10-CM

## 2022-07-25 PROCEDURE — 96413 CHEMO IV INFUSION 1 HR: CPT

## 2022-07-25 RX ORDER — SODIUM CHLORIDE 9 MG/ML
20 INJECTION, SOLUTION INTRAVENOUS ONCE
Status: COMPLETED | OUTPATIENT
Start: 2022-07-25 | End: 2022-07-25

## 2022-07-25 RX ADMIN — SODIUM CHLORIDE 20 ML/HR: 0.9 INJECTION, SOLUTION INTRAVENOUS at 13:32

## 2022-07-25 RX ADMIN — TRASTUZUMAB 440 MG: 150 INJECTION, POWDER, LYOPHILIZED, FOR SOLUTION INTRAVENOUS at 13:35

## 2022-07-25 NOTE — PROGRESS NOTES
Pt here for chemotherapy, offering no complaints  PIV placed positive blood return noted throughout treatment  Tolerated infusion without incident  PIV removed  AVS declined    Walked out in stable condition

## 2022-08-02 ENCOUNTER — HOSPITAL ENCOUNTER (OUTPATIENT)
Dept: MAMMOGRAPHY | Facility: CLINIC | Age: 27
Discharge: HOME | End: 2022-08-02
Payer: COMMERCIAL

## 2022-08-02 VITALS — WEIGHT: 160 LBS | HEIGHT: 63 IN | BODY MASS INDEX: 28.35 KG/M2

## 2022-08-02 DIAGNOSIS — Z17.0 MALIGNANT NEOPLASM OF UPPER-INNER QUADRANT OF RIGHT BREAST IN FEMALE, ESTROGEN RECEPTOR POSITIVE (HCC): ICD-10-CM

## 2022-08-02 DIAGNOSIS — C50.211 MALIGNANT NEOPLASM OF UPPER-INNER QUADRANT OF RIGHT BREAST IN FEMALE, ESTROGEN RECEPTOR POSITIVE (HCC): ICD-10-CM

## 2022-08-02 PROCEDURE — 77066 DX MAMMO INCL CAD BI: CPT

## 2022-08-02 PROCEDURE — G0279 TOMOSYNTHESIS, MAMMO: HCPCS

## 2022-08-10 RX ORDER — SODIUM CHLORIDE 9 MG/ML
20 INJECTION, SOLUTION INTRAVENOUS ONCE
Status: CANCELLED | OUTPATIENT
Start: 2022-08-15

## 2022-08-11 ENCOUNTER — APPOINTMENT (OUTPATIENT)
Dept: LAB | Facility: HOSPITAL | Age: 27
End: 2022-08-11
Payer: COMMERCIAL

## 2022-08-11 ENCOUNTER — OFFICE VISIT (OUTPATIENT)
Dept: SURGICAL ONCOLOGY | Facility: CLINIC | Age: 27
End: 2022-08-11
Payer: COMMERCIAL

## 2022-08-11 VITALS
SYSTOLIC BLOOD PRESSURE: 124 MMHG | BODY MASS INDEX: 28.43 KG/M2 | OXYGEN SATURATION: 99 % | RESPIRATION RATE: 16 BRPM | WEIGHT: 160.5 LBS | DIASTOLIC BLOOD PRESSURE: 86 MMHG | HEART RATE: 72 BPM | TEMPERATURE: 98.1 F

## 2022-08-11 DIAGNOSIS — Z17.0 MALIGNANT NEOPLASM OF UPPER-INNER QUADRANT OF RIGHT BREAST IN FEMALE, ESTROGEN RECEPTOR POSITIVE (HCC): Primary | ICD-10-CM

## 2022-08-11 DIAGNOSIS — Z79.810 USE OF TAMOXIFEN (NOLVADEX): ICD-10-CM

## 2022-08-11 DIAGNOSIS — Z17.0 MALIGNANT NEOPLASM OF UPPER-INNER QUADRANT OF RIGHT BREAST IN FEMALE, ESTROGEN RECEPTOR POSITIVE (HCC): ICD-10-CM

## 2022-08-11 DIAGNOSIS — C50.211 MALIGNANT NEOPLASM OF UPPER-INNER QUADRANT OF RIGHT BREAST IN FEMALE, ESTROGEN RECEPTOR POSITIVE (HCC): Primary | ICD-10-CM

## 2022-08-11 DIAGNOSIS — C50.211 MALIGNANT NEOPLASM OF UPPER-INNER QUADRANT OF RIGHT BREAST IN FEMALE, ESTROGEN RECEPTOR POSITIVE (HCC): ICD-10-CM

## 2022-08-11 LAB
ALBUMIN SERPL BCP-MCNC: 3.8 G/DL (ref 3.5–5)
ALP SERPL-CCNC: 66 U/L (ref 46–116)
ALT SERPL W P-5'-P-CCNC: 28 U/L (ref 12–78)
ANION GAP SERPL CALCULATED.3IONS-SCNC: 9 MMOL/L (ref 4–13)
AST SERPL W P-5'-P-CCNC: 19 U/L (ref 5–45)
BASOPHILS # BLD AUTO: 0.02 THOUSANDS/ΜL (ref 0–0.1)
BASOPHILS NFR BLD AUTO: 0 % (ref 0–1)
BILIRUB SERPL-MCNC: 0.54 MG/DL (ref 0.2–1)
BUN SERPL-MCNC: 11 MG/DL (ref 5–25)
CALCIUM SERPL-MCNC: 9 MG/DL (ref 8.3–10.1)
CHLORIDE SERPL-SCNC: 106 MMOL/L (ref 96–108)
CO2 SERPL-SCNC: 27 MMOL/L (ref 21–32)
CREAT SERPL-MCNC: 0.86 MG/DL (ref 0.6–1.3)
EOSINOPHIL # BLD AUTO: 0.09 THOUSAND/ΜL (ref 0–0.61)
EOSINOPHIL NFR BLD AUTO: 2 % (ref 0–6)
ERYTHROCYTE [DISTWIDTH] IN BLOOD BY AUTOMATED COUNT: 13.3 % (ref 11.6–15.1)
GFR SERPL CREATININE-BSD FRML MDRD: 92 ML/MIN/1.73SQ M
GLUCOSE P FAST SERPL-MCNC: 97 MG/DL (ref 65–99)
HCT VFR BLD AUTO: 38.6 % (ref 34.8–46.1)
HGB BLD-MCNC: 12.4 G/DL (ref 11.5–15.4)
IMM GRANULOCYTES # BLD AUTO: 0.01 THOUSAND/UL (ref 0–0.2)
IMM GRANULOCYTES NFR BLD AUTO: 0 % (ref 0–2)
LYMPHOCYTES # BLD AUTO: 1.04 THOUSANDS/ΜL (ref 0.6–4.47)
LYMPHOCYTES NFR BLD AUTO: 17 % (ref 14–44)
MCH RBC QN AUTO: 29.6 PG (ref 26.8–34.3)
MCHC RBC AUTO-ENTMCNC: 32.1 G/DL (ref 31.4–37.4)
MCV RBC AUTO: 92 FL (ref 82–98)
MONOCYTES # BLD AUTO: 0.49 THOUSAND/ΜL (ref 0.17–1.22)
MONOCYTES NFR BLD AUTO: 8 % (ref 4–12)
NEUTROPHILS # BLD AUTO: 4.5 THOUSANDS/ΜL (ref 1.85–7.62)
NEUTS SEG NFR BLD AUTO: 73 % (ref 43–75)
NRBC BLD AUTO-RTO: 0 /100 WBCS
PLATELET # BLD AUTO: 245 THOUSANDS/UL (ref 149–390)
PMV BLD AUTO: 9.7 FL (ref 8.9–12.7)
POTASSIUM SERPL-SCNC: 4 MMOL/L (ref 3.5–5.3)
PROT SERPL-MCNC: 7.7 G/DL (ref 6.4–8.4)
RBC # BLD AUTO: 4.19 MILLION/UL (ref 3.81–5.12)
SODIUM SERPL-SCNC: 142 MMOL/L (ref 135–147)
WBC # BLD AUTO: 6.15 THOUSAND/UL (ref 4.31–10.16)

## 2022-08-11 PROCEDURE — 80053 COMPREHEN METABOLIC PANEL: CPT

## 2022-08-11 PROCEDURE — 85025 COMPLETE CBC W/AUTO DIFF WBC: CPT

## 2022-08-11 PROCEDURE — 36415 COLL VENOUS BLD VENIPUNCTURE: CPT

## 2022-08-11 PROCEDURE — 99214 OFFICE O/P EST MOD 30 MIN: CPT | Performed by: SURGERY

## 2022-08-11 NOTE — PROGRESS NOTES
Surgical Oncology Follow Up  [facility]  [facility]  [address]  [facility] [address]  1995  [id number]      Chief Complaint   Patient presents with    Follow-up        Assessment & Plan:   She is here for follow-up with right breast cancer status post breast conservation surgery and radiation  She is doing really well  She had her mammogram which personally reviewed  She is on tamoxifen and tolerating fairly well  Today's physical examination well-healed right breast and right axillary incision  Bilateral breast no suspicious palpable mass or masses  No skin changes other than surgical scars  No nipple discharge charge or retraction  Bilateral axillary and supraclavicular examination no palpable adenopathy  Cancer History:     Oncology History   Malignant neoplasm of upper-inner quadrant of right breast in female, estrogen receptor positive ([facility] )   6/24/2021 Biopsy    US guided Right breast core biopsy:  1 o'clock 13-14 cm from the nipple  Right invasive ductal carcinoma  Grade 3  ER 90  WV 70   HER2 3+  Lymphovascular invasion: not identified    Results are malignant and concordant  Recommend surgical consultation  Additionally, given patient's age and density of breast tissue, recommend enhanced breast MRI for further evaluation       Done at [facility]     6/30/2021 Genetic Testing    The following genes were evaluated: RADHA, BRCA1, BRCA2, CDH1, CHEK2, PALB2, PTEN, STK11, TP53  Additional genes tested for a total of 36 genes  VUS NF1   [employer]     7/1/2021 Observation    Bilateral breast MRI with and without contrast: B6     Biopsy confirmed malignancy  01:00 o'clock location right breast   No axillary lymphadenopathy, extension to the chest wall, or concurrent lesions in either breast        7/19/2021 Surgery    Right breast LYNDSAY  directed lumpectomy with sentinel lymph node biopsy:  Invasive mammary carcinoma of no special type; ductal  Grade 3  ER 65  ID 90  HER2 3+  18 mm  Margins negative  0/5 Lymph nodes  Stage IA      8/23/2021 Genomic Testing    MammaPrint: High Risk  HER2 Type     9/8/2021 -  Cancer Staged    Staging form: Breast, AJCC 8th Edition  - Clinical: Stage IA (cT1c, cN0, cM0, G3, ER+, ID+, HER2+) - Signed by Boogie Do MD on 9/8/2021  Histologic grading system: 3 grade system       9/13/2021 - 11/29/2021 Chemotherapy    fosaprepitant (EMEND) IVPB, 150 mg, Intravenous, Once, 0 of 6 cycles  pertuzumab (PERJETA) IVPB, 840 mg, Intravenous, Once, 0 of 18 cycles  CARBOplatin (PARAPLATIN) IVPB (GOG AUC DOSING), 760 8 mg, Intravenous, Once, 0 of 6 cycles  DOCEtaxel (TAXOTERE) chemo infusion, 75 mg/m2 = 132 8 mg, Intravenous, Once, 0 of 6 cycles  trastuzumab (HERCEPTIN) chemo infusion, 8 mg/kg = 610 mg, Intravenous, Once, 0 of 18 cycles       9/13/2021 -  Chemotherapy    PACLItaxel (TAXOL) chemo IVPB, 80 mg/m2 = 143 4 mg, Intravenous, Once, 12 of 12 cycles  Administration: 143 4 mg (9/13/2021), 143 4 mg (9/20/2021), 143 4 mg (10/4/2021), 143 4 mg (10/18/2021), 143 4 mg (10/25/2021), 143 4 mg (11/1/2021), 143 4 mg (11/15/2021), 143 4 mg (11/22/2021), 143 4 mg (9/27/2021), 143 4 mg (10/11/2021), 143 4 mg (11/8/2021), 143 4 mg (11/29/2021)  trastuzumab (HERCEPTIN) chemo infusion, 304 mg, Intravenous, Once, 24 of 29 cycles  Administration: 300 mg (9/13/2021), 150 mg (9/20/2021), 150 mg (10/4/2021), 150 mg (10/18/2021), 150 mg (10/25/2021), 150 mg (11/1/2021), 150 mg (11/15/2021), 150 mg (11/22/2021), 150 mg (9/27/2021), 150 mg (10/11/2021), 450 mg (12/6/2021), 150 mg (11/8/2021), 150 mg (11/29/2021), 450 mg (12/27/2021), 450 mg (1/17/2022), 450 mg (2/7/2022), 450 mg (2/28/2022), 450 mg (3/21/2022), 450 mg (4/11/2022), 450 mg (5/2/2022), 430 mg (5/26/2022), 440 mg (6/13/2022), 440 mg (7/6/2022), 440 mg (7/25/2022)     1/3/2022 - 1/31/2022 Radiation    Treatments:  Course: C1  Plan ID Energy Fractions Dose per Fraction (cGy) Total Dose Delivered (cGy) Elapsed Days   Hypo R Bolus 6X 7 / 7 267 1,869 16   Hypo R Brst 6X 8 / 8 267 2,136 18   R Brst e 9E 5 / 5 200 1,000 6    Treatment Dates:  1/3/2022 - 1/31/2022  Interval History:   Follow-up with right breast cancer    Review of Systems:   Review of Systems   Constitutional: Negative for chills and fever  HENT: Negative for ear pain and sore throat  Eyes: Negative for pain and visual disturbance  Respiratory: Negative for cough and shortness of breath  Cardiovascular: Negative for chest pain and palpitations  Gastrointestinal: Negative for abdominal pain and vomiting  Genitourinary: Negative for dysuria and hematuria  Musculoskeletal: Negative for arthralgias and back pain  Skin: Negative for color change and rash  Neurological: Negative for seizures and syncope  All other systems reviewed and are negative        Past Medical History     Patient Active Problem List   Diagnosis    Nevus    Malignant melanoma of skin of abdomen ([facility] )    Neck pain    Multiple benign melanocytic nevi    Iron deficiency anemia due to chronic blood loss    Warts    Surveillance for birth control, oral contraceptives    Discomfort of left ear    Benign paroxysmal positional vertigo due to bilateral vestibular disorder    Spitz nevus of forearm, left    Enlarged thyroid    Malignant neoplasm of upper-inner quadrant of right breast in female, estrogen receptor positive (HCC)    Breast lump or mass    Use of tamoxifen (Nolvadex)     Past Medical History:   Diagnosis Date    Breast cancer ([facility] ) 07/19/2021    right breast    Cancer ([address] )     History of chemotherapy 2021    right breast cancer    Melanoma ([facility] [facility] ) 2018     Past Surgical History:   Procedure Laterality Date    BREAST BIOPSY      BREAST LUMPECTOMY Right 7/19/2021    Procedure: LYNDSAY  DIRECTED LUMPECTOMY;  Surgeon: Nicolas Myers MD;  [facility]: [facility];  Service: Surgical Oncology    LYMPH NODE BIOPSY Right 7/19/2021    Procedure: LYMPHATIC MAPPING WITH BLUE AND RADIOACTIVE DYES, SENTINEL LYMPH NODE BIOPSY, injection at 1030;  Surgeon: Deandra Price MD;  [facility];  Service: Surgical Oncology    SKIN CANCER EXCISION      TONSILLECTOMY AND ADENOIDECTOMY      WISDOM TOOTH EXTRACTION       Family History   Problem Relation Age of Onset    Prostate cancer Father     Kidney cancer Maternal Grandfather     Breast cancer Maternal Aunt 48     Social History     Socioeconomic History    Marital status: Single     Spouse name: Not on file    Number of children: Not on file    Years of education: Not on file    Highest education level: Not on file   Occupational History    Not on file   Tobacco Use    Smoking status: Never Smoker    Smokeless tobacco: Never Used   Vaping Use    Vaping Use: Never used   Substance and Sexual Activity    Alcohol use: Yes     Comment: Socially    Drug use: No    Sexual activity: Yes   Other Topics Concern    Not on file   Social History Narrative    Not on file     Social Determinants of Health     Financial Resource Strain: Not on file   Food Insecurity: Not on file   Transportation Needs: Not on file   Physical Activity: Not on file   Stress: Not on file   Social Connections: Not on file   Intimate Partner Violence: Not on file   Housing Stability: Not on file       Current Outpatient Medications:     b complex vitamins capsule, Take 1 capsule by mouth daily, Disp: , Rfl:     Calcium Carbonate-Vit D-Min (CALCIUM 1200 PO), Take by mouth, Disp: , Rfl:     cholecalciferol (VITAMIN D3) 1,000 units tablet, Take 1,000 Units by mouth daily 4,000 IU daily, Disp: , Rfl:     methylPREDNISolone 4 MG tablet therapy pack, Use as directed on package, Disp: 21 each, Rfl: 0    tamoxifen (NOLVADEX) 20 mg tablet, Take 1 tablet (20 mg total) by mouth daily, Disp: 90 tablet, Rfl: 3  No Known Allergies    Physical Exam:     Vitals:    08/11/22 1007   BP: 124/86 Pulse: 72   Resp: 16   Temp: 98 1 °F (36 7 °C)   SpO2: 99%     Physical Exam  Constitutional:       Appearance: Normal appearance  HENT:      Head: Normocephalic and atraumatic  Nose: Nose normal       Mouth/Throat:      Mouth: Mucous membranes are moist    Eyes:      Pupils: Pupils are equal, round, and reactive to light  Cardiovascular:      Rate and Rhythm: Normal rate  Pulses: Normal pulses  Heart sounds: Normal heart sounds  Pulmonary:      Effort: Pulmonary effort is normal       Breath sounds: Normal breath sounds  Chest:          Comments: The bilateral Breast(s) were examined  There was not any sign of an inverted nipple, mass, nipple discharge, skin changes or tenderness  The bilateral  breast(s) were examined in the sitting and supine position  There are not any worrisome skin changes, tenderness, nipple changes, swelling ,bleeding or evidence of mass/s in all four quadrants  Deni survey demonstrated that there is not any evidence of any clinically suspicious axillary, pectoral or supraclavicular lymph nodes  Abdominal:      General: Bowel sounds are normal       Palpations: Abdomen is soft  Musculoskeletal:         General: Normal range of motion  Cervical back: Normal range of motion and neck supple  Skin:     General: Skin is warm  Neurological:      General: No focal deficit present  Mental Status: She is alert and oriented to person, place, and time  Psychiatric:         Mood and Affect: Mood normal          Behavior: Behavior normal          Thought Content:  Thought content normal          Judgment: Judgment normal            Results & Discussion:   Bilateral diagnostic mammogram:RIGHT  B) POST-SURGICAL FINDING: There are post-surgical findings from a previous lumpectomy with radiation seen in the upper inner quadrant of the right breast in the posterior depth       Left  There are no suspicious masses, grouped microcalcifications or areas of unexplained architectural distortion  The skin and nipple areolar complex are unremarkable          IMPRESSION:   Therapeutic changes right breast   No evidence for malignancy            ASSESSMENT/BI-RADS CATEGORY:  Left: 1 - Negative  Right: 2 - Benign  Overall: 2 - Benign     RECOMMENDATION:       - Diagnostic mammogram in 1 year for both breasts      I did review mammogram films personally  Agree with above report  Clinically she is doing really well  She is tolerating her tamoxifen  There is no clinical exam evidence of local or regional  Lymph nodes  she understands and  agrees   We will obtain breast MRI at her next visit  All patient questions were answered  Advance Care Planning/Advance Directives: Debora Shirley MD discussed the disease status with Wagner chavira 08/11/22  treatment plans and follow-up with the patient

## 2022-08-15 ENCOUNTER — HOSPITAL ENCOUNTER (OUTPATIENT)
Dept: INFUSION CENTER | Facility: CLINIC | Age: 27
Discharge: HOME | End: 2022-08-15
Payer: COMMERCIAL

## 2022-08-15 VITALS
RESPIRATION RATE: 16 BRPM | DIASTOLIC BLOOD PRESSURE: 71 MMHG | SYSTOLIC BLOOD PRESSURE: 117 MMHG | HEART RATE: 86 BPM | WEIGHT: 160.6 LBS | BODY MASS INDEX: 28.45 KG/M2 | TEMPERATURE: 97.9 F

## 2022-08-15 DIAGNOSIS — Z17.0 MALIGNANT NEOPLASM OF UPPER-INNER QUADRANT OF RIGHT BREAST IN FEMALE, ESTROGEN RECEPTOR POSITIVE (HCC): Primary | ICD-10-CM

## 2022-08-15 DIAGNOSIS — C50.211 MALIGNANT NEOPLASM OF UPPER-INNER QUADRANT OF RIGHT BREAST IN FEMALE, ESTROGEN RECEPTOR POSITIVE (HCC): Primary | ICD-10-CM

## 2022-08-15 PROCEDURE — 96413 CHEMO IV INFUSION 1 HR: CPT

## 2022-08-15 RX ORDER — SODIUM CHLORIDE 9 MG/ML
20 INJECTION, SOLUTION INTRAVENOUS ONCE
Status: COMPLETED | OUTPATIENT
Start: 2022-08-15 | End: 2022-08-15

## 2022-08-15 RX ADMIN — SODIUM CHLORIDE 20 ML/HR: 0.9 INJECTION, SOLUTION INTRAVENOUS at 13:40

## 2022-08-15 RX ADMIN — TRASTUZUMAB 440 MG: 150 INJECTION, POWDER, LYOPHILIZED, FOR SOLUTION INTRAVENOUS at 13:40

## 2022-08-15 NOTE — PROGRESS NOTES
Pt to clinic for herceptin, offers no complaints today, tolerated infusion without complications, aware of next appointment, port de-accessed, avs declined

## 2022-08-19 ENCOUNTER — TELEPHONE (OUTPATIENT)
Dept: HEMATOLOGY ONCOLOGY | Facility: CLINIC | Age: 27
End: 2022-08-19

## 2022-08-19 ENCOUNTER — HOSPITAL ENCOUNTER (OUTPATIENT)
Dept: NON INVASIVE DIAGNOSTICS | Facility: CLINIC | Age: 27
Discharge: HOME | End: 2022-08-19
Payer: COMMERCIAL

## 2022-08-19 VITALS
HEART RATE: 81 BPM | WEIGHT: 160 LBS | DIASTOLIC BLOOD PRESSURE: 71 MMHG | HEIGHT: 63 IN | BODY MASS INDEX: 28.35 KG/M2 | SYSTOLIC BLOOD PRESSURE: 117 MMHG

## 2022-08-19 DIAGNOSIS — C50.211 MALIGNANT NEOPLASM OF UPPER-INNER QUADRANT OF RIGHT BREAST IN FEMALE, ESTROGEN RECEPTOR POSITIVE (HCC): ICD-10-CM

## 2022-08-19 DIAGNOSIS — Z17.0 MALIGNANT NEOPLASM OF UPPER-INNER QUADRANT OF RIGHT BREAST IN FEMALE, ESTROGEN RECEPTOR POSITIVE (HCC): ICD-10-CM

## 2022-08-19 LAB
AORTIC ROOT: 2.4 CM
APICAL FOUR CHAMBER EJECTION FRACTION: 67 %
E WAVE DECELERATION TIME: 210 MS
FRACTIONAL SHORTENING: 35 % (ref 28–44)
GLOBAL LONGITUIDAL STRAIN: -20 %
INTERVENTRICULAR SEPTUM IN DIASTOLE (PARASTERNAL SHORT AXIS VIEW): 0.5 CM
INTERVENTRICULAR SEPTUM: 0.5 CM (ref 0.6–1.1)
LEFT ATRIUM SIZE: 2.9 CM
LEFT INTERNAL DIMENSION IN SYSTOLE: 2.8 CM (ref 2.1–4)
LEFT VENTRICLE DIASTOLIC VOLUME (MOD BIPLANE): 95 ML
LEFT VENTRICLE SYSTOLIC VOLUME (MOD BIPLANE): 34 ML
LEFT VENTRICULAR INTERNAL DIMENSION IN DIASTOLE: 4.3 CM (ref 3.5–6)
LEFT VENTRICULAR POSTERIOR WALL IN END DIASTOLE: 0.7 CM
LEFT VENTRICULAR STROKE VOLUME: 55 ML
LV EF: 65 %
LVSV (TEICH): 55 ML
MV E'TISSUE VEL-LAT: 16 CM/S
MV PEAK A VEL: 0.49 M/S
MV PEAK E VEL: 112 CM/S
MV STENOSIS PRESSURE HALF TIME: 61 MS
MV VALVE AREA P 1/2 METHOD: 3.61 CM2
SL CV LV EF: 60
SL CV PED ECHO LEFT VENTRICLE DIASTOLIC VOLUME (MOD BIPLANE) 2D: 83 ML
SL CV PED ECHO LEFT VENTRICLE SYSTOLIC VOLUME (MOD BIPLANE) 2D: 28 ML

## 2022-08-19 PROCEDURE — 93321 DOPPLER ECHO F-UP/LMTD STD: CPT | Performed by: INTERNAL MEDICINE

## 2022-08-19 PROCEDURE — 93308 TTE F-UP OR LMTD: CPT | Performed by: INTERNAL MEDICINE

## 2022-08-19 PROCEDURE — 93325 DOPPLER ECHO COLOR FLOW MAPG: CPT | Performed by: INTERNAL MEDICINE

## 2022-08-19 PROCEDURE — 93356 MYOCRD STRAIN IMG SPCKL TRCK: CPT

## 2022-08-19 PROCEDURE — 93308 TTE F-UP OR LMTD: CPT

## 2022-08-19 PROCEDURE — 93356 MYOCRD STRAIN IMG SPCKL TRCK: CPT | Performed by: INTERNAL MEDICINE

## 2022-08-19 NOTE — TELEPHONE ENCOUNTER
Spoke to patient, she is aware her paperwork is upfront and ready to be picked up  Advised she will be in to  Monday

## 2022-08-30 RX ORDER — SODIUM CHLORIDE 9 MG/ML
20 INJECTION, SOLUTION INTRAVENOUS ONCE
Status: CANCELLED | OUTPATIENT
Start: 2022-09-06

## 2022-08-31 ENCOUNTER — TELEPHONE (OUTPATIENT)
Dept: HEMATOLOGY ONCOLOGY | Facility: CLINIC | Age: 27
End: 2022-08-31

## 2022-08-31 DIAGNOSIS — Z17.0 MALIGNANT NEOPLASM OF UPPER-INNER QUADRANT OF RIGHT BREAST IN FEMALE, ESTROGEN RECEPTOR POSITIVE (HCC): Primary | ICD-10-CM

## 2022-08-31 DIAGNOSIS — C50.211 MALIGNANT NEOPLASM OF UPPER-INNER QUADRANT OF RIGHT BREAST IN FEMALE, ESTROGEN RECEPTOR POSITIVE (HCC): Primary | ICD-10-CM

## 2022-08-31 RX ORDER — SODIUM CHLORIDE 9 MG/ML
20 INJECTION, SOLUTION INTRAVENOUS ONCE
Status: CANCELLED | OUTPATIENT
Start: 2022-09-26

## 2022-08-31 NOTE — TELEPHONE ENCOUNTER
Reviewed treatment plan with Dr Nhi Rea for patient to ring bell on 9/6/22 as scheduled  Call out to [facility] and reviewed above and to cancel further treatment dates       Call out to patient verbalized understanding and appreciative of call

## 2022-08-31 NOTE — PROGRESS NOTES
Hematology/Oncology Progress Note    Date of Service: 9/8/2022    [facility]  [address] [address]    Hem/Onc Problem List:   1  Right breast invasive ductal carcinoma, triple positive, grade 3 ,  [id number], stage IA    Chief Complaint:         Routine follow-up for management of breast cancer    Assessment/Plan:     1  Right breast invasive ductal carcinoma, grade 3, ER 90%, NM 70%, HER2 positive by IHC  Genetic testing negative  Status post right breast lumpectomy with sentinel lymph node biopsy on  July 19, 2021  Pathology showed invasive mammary carcinoma of no special type  The tumor measures 1 8 cm  Grade 3  All margins negative  All lymph nodes negative  Grade 2 DCIS present  No evidence of lymphovascular invasion  Final pathologic stage [id number]  Overall, the patient tolerated the procedure very well  ECHO showed EF 65%  Patient had a 2nd opinion from [facility]  Dr Zachary Stevens recommended adjuvant TH weekly for 3 months followed by total of 1 year of maintenance Herceptin target  which she has completed  Chemotherapy TH was given on September 13, 2021 through November 29, 2021  Patient used cold cap to preserve her hair in our [employer]  The patient also had oocytes cryopreservation done on September 3, 2021  Patient consulted Dr Valentine Martinez  Adjuvant radiation therapy started in January 2022 through end of that month  Maintenance Herceptin started on December 6, 2021  Last echo study on December 17, 2021 showed ejection fraction 68%  Echo study in March 18, 2022 showed ejection fraction 60%  Patient will continue Herceptin for total of 1 year to end 11/7/2022  Patient started tamoxifen 20 mg daily on December 20, 2021  Patient tolerated treatment very well  Repeat lab and echo study in 4 weeks  2    Genetic predisposition:   Inv[employer]ae breast cancer panel negative     3  History of palpitation  Echo study showed normal LV function, LVEF 60 %  Repeat echo study every 3 months, next echo study is due in 4 weeks  4    Fertility  Preservation:  Patient consulted Reproductive Medicine  Oocytes cryopreservation with 48 eggs saved on September 3, 2021     5   History of melanoma in 2018  Status post wide local excision  No evidence of local recurrence  Sees a Dermatologist 1-2x a year  6  Skin lesions, status post wide local excision  Pathology still pending  Follow-up with Dermatology  Discussion of decision making    I personally reviewed the following lab results, the image studies, pathology, other specialty/physicians consult notes and recommendations, and outside medical records from [facility]  I had a lengthy discussion with the patient and shared the work-up findings  We discussed the diagnosis and management plan as below  I spent 32 minutes reviewing the records (labs, clinician notes, outside records, medical history, ordering medicine/tests/procedures, interpreting the imaging/labs previously done) and coordination of care as well as direct time with the patient today, of which greater than 50% of the time was spent in counseling and coordination of care with the patient/family        · Plan/Labs  · Follow up with Surg-Onc, screening mammograms, MRI breast (2/2023)  · She is complete with her Herceptin   · Cardiology-oncology referral with Dr Shane Aleman for long term surveillance from a cardiac perspective  · Cont Tamoxifen (refilled today)  · She has been counseled to not get pregnant in the first 2 years since her cancer diagnosis and treatment and ideally at least 5 years while on Tamoxifen until 12/2026  · We discussed importance of being on protection so that she does not inadvertently get pregnant  · DEXA scan for baseline study while on Tamoxifen      Follow Up: 6 months with Jeri to cont surveillance    All questions were answered to the patient's satisfaction during this encounter  The patient knows the contact information for our office and knows to reach out for any relevant concerns related to this encounter  They are to call for any temperature 100 4 or higher, new symptoms including but not restricted to shaking chills, decreased appetite, nausea, vomiting, diarrhea, increased fatigue, shortness of breath or chest pain, confusion, and not feeling the strength to come to the clinic  For all other listed problems and medical diagnosis in their chart - they are managed by PCP and/or other specialists, which the patient acknowledges  Thank you very much for your consultation and making us a part of this patient's care  We are continuing to follow closely with you  Please do not hesitate to reach out to me with any additional questions or concerns  Aba William MD  Hematology & Medical Oncology Staff Physician    Disclaimer: This document was prepared using "[employer] Fluency Direct technology  If a word or phrase is confusing, or does not make sense, this is likely due to recognition error which was not discovered during the providers review  If you believe an error has occurred, please Contact me through [employer] service for toya? cation  Hematology/Oncology History:   · History of skin malignant lymphoma, status post wide local excision  · May 2021, patient palpated a lump in the right breast at upper inner quadrant  · June 23, 2021 , Mammogram and ultrasound in [address] [place] [address] showed an irregular complex cystic mass with microlobulated margins measuring 1 5 x 1 3 x 1 4 cm  No discrete nodes are found in the right axilla  · June 24, 2021, patient underwent ultrasound guided core biopsy of the right breast mass: Pathology showed Grade 3 invasive ductal carcinoma, positive for carcinoma in-situ component, positive perineural remission  Negative for lymphovascular invasion    ER 90%, NC 70%, HER2 positive by IHC   · 06/30/2021, patient consulted Dr Jessie Tan  Staging CT scan and bone scan ordered  Patient was refered to genetic counseling  · July 1, 2021 CT scan chest/abdomen/ pelvis with contrast for staging showed bilateral ovarian cysts  with left adnexal cyst measures 4 2 x 4 4 x 3 3 cm, and right adnexal cyst measures 4 3 x 3 0 x 3 5 cm  No evidence of metastatic disease in C/ A/P   · MRI of bilateral breast in July 1, 2021  redemonstrated the 1:00 O'Clock  biopsy-proven breast cancer  No axillary adenopathy  No extension to the chest wall  No concurrent lesions in either breast   · July 2, 2021, bone scan showed no evidence of metastatic disease  · This case was discussed in our multidisciplinary breast cancer tumor board with recommendation of surgery followed by adjuvant chemotherapy  · July 9, 2021, echo study showed normal left ventricle ejection fraction 65%  · July 12, 2021, genetic test: [employer] breast cancer panel negative  · July 19, 2021, patient underwent right breast lumpectomy with sentinel lymph node biopsy  Pathology showed invasive mammary carcinoma of no special type( ductal NST)  The tumor measures 18 mm, grade 3  All margins negative with closest margin 8 mm from tumor involving the posterior margin  5 lymph nodes negative for metastatic disease  Grade 2 DCIS present  No evidence of lymphovascular invasion  Final pathologic stage [id number],   · August 17, 2021, patient consulted Dr Sami Rene from [facility] with recommendation of [facility]  · August 19 2021, patient consulted Reproductive Medicine and started oocytes cryopreservation,  completed on September 3, 2021 with 48 Eggs saved  · September 13, 2021, patient started adjuvant chemotherapy with TH, completed on November 29, 2021  · December 6, 2021 maintenance Herceptin started  · December 17, 2021 echo study showed ejection 68%    · March 18, 2022 echo study showed ejection fraction 60%  · 8/2/2022 mammogram: There are post-surgical findings from a previous lumpectomy with radiation seen in the upper inner quadrant of the right breast in the posterior depth  There are no suspicious masses, grouped microcalcifications or areas of unexplained architectural distortion  The skin and nipple areolar complex are unremarkable  · 8/19/2022: TTE LVEF 60%  · 9/6/2022: Last dose of Trastuzumab to carrol 1 year total therapy    History of Present Illiness:   Norman Hagan is a 31 y o  female with the above-noted HemOnc history who is here for routine follow-up  Patient has a history of triple positive right breast cancer, status post lumpectomy on July 19, 2021  [id number]  Patient had 24 Oocytes  crypreservation on September 3, 2021  Patient started adjuvant chemotherapy with TH on September 13, 2021, completed 3 months of treatment on November 29, 2021  She also used cold cap to preserve her hair  Patient did well  Patient currently on maintenance Herceptin started on December 6, 2021  Echo study in December 17, 21 showed ejection fraction 68%  Repeat echo study in March 18, 2022 with ejection fraction 55%  Patient consulted Dr Zena Petit  Adjuvant radiation therapy on September 13, 2021 through November 29, 2021  Patient also started tamoxifen 20 mg once daily on December 20, 2021  She did very well without significant side effect     Interval events: She feels well  No F/C, N/V, SOB, CP, LH, HA, rash, itching  She has been having her menstrual periods even while on Tamoxifen  ROS: A 10-point of review of systems is obtained and other than the above is noncontributory  Objective:   VITALS:   /80   Pulse 67   Temp 98 1 °F (36 7 °C) (Temporal)   Resp 16   Ht 5' 2 36" (1 584 m)   Wt 76 4 kg (168 lb 8 oz)   SpO2 99%   BMI 30 46 kg/m²     Weigh today: 168 lbs    Physical EXAM:  General:  Alert, cooperative, no distress  Head:  Normocephalic, without obvious abnormality     Eyes:  Conjunctivae/corneas clear  No evidence of conjunctivitis     Throat:  No mouth or nose bleeding  Neck: Supple, symmetrical, no adenopathy    Lungs:   Clear to auscultation bilaterally  Respiratory effort easy, nonlabored    Heart:  Regular rate and rhythm, +S1, S2   Abdomen:   Soft, non-tender,nondistended  +Bowel sounds        Skin: No rashes or lesions    Neurologic: AAO   No focal neuro deficits noted b/l       No Known Allergies    Past Medical History:   Diagnosis Date    Breast cancer ([facility] ) 07/19/2021    right breast    Cancer ([facility] )     History of chemotherapy 2021    right breast cancer    Melanoma ([place] [address] ) 2018       Past Surgical History:   Procedure Laterality Date    BREAST BIOPSY      BREAST LUMPECTOMY Right 7/19/2021    Procedure: LYNDSAY  DIRECTED LUMPECTOMY;  Surgeon: Vaibhav Diop MD;  Location: [facility];  Service: Surgical Oncology    LYMPH NODE BIOPSY Right 7/19/2021    Procedure: LYMPHATIC MAPPING WITH BLUE AND RADIOACTIVE DYES, SENTINEL LYMPH NODE BIOPSY, injection at 56;  Surgeon: Vaibhav Diop MD;  Location: [facility];  Service: Surgical Oncology    SKIN CANCER EXCISION      TONSILLECTOMY AND ADENOIDECTOMY      WISDOM TOOTH EXTRACTION         Family History   Problem Relation Age of Onset    Prostate cancer Father     Kidney cancer Maternal Grandfather     Breast cancer Maternal Aunt 48       Social History     Socioeconomic History    Marital status: Single     Spouse name: Not on file    Number of children: Not on file    Years of education: Not on file    Highest education level: Not on file   Occupational History    Not on file   Tobacco Use    Smoking status: Never Smoker    Smokeless tobacco: Never Used   Vaping Use    Vaping Use: Never used   Substance and Sexual Activity    Alcohol use: Yes     Comment: Socially    Drug use: No    Sexual activity: Yes   Other Topics Concern    Not on file   Social History Narrative    Not on file     Social Determinants of Health Financial Resource Strain: Not on file   Food Insecurity: Not on file   Transportation Needs: Not on file   Physical Activity: Not on file   Stress: Not on file   Social Connections: Not on file   Intimate Partner Violence: Not on file   Housing Stability: Not on file       Current Outpatient Medications   Medication Sig Dispense Refill    b complex vitamins capsule Take 1 capsule by mouth daily      Calcium Carbonate-Vit D-Min (CALCIUM 1200 PO) Take by mouth      cholecalciferol (VITAMIN D3) 1,000 units tablet Take 1,000 Units by mouth daily 4,000 IU daily      methylPREDNISolone 4 MG tablet therapy pack Use as directed on package 21 each 0    tamoxifen (NOLVADEX) 20 mg tablet Take 1 tablet (20 mg total) by mouth daily 90 tablet 3     No current facility-administered medications for this visit  (Not in a hospital admission)      DATA REVIEW:    Pathology Result:    Final Diagnosis   Date Value Ref Range Status   07/19/2021   Final    A  Right breast (lumpectomy):     - Invasive mammary carcinoma of no special type (ductal NST)  - Tumor size: 18 mm  Tumor ndgndrndanddndend:nd nd2nd of 3       - Closest surgical margin: posterior (8 mm from tumor)  Comment:  ER / ND / Her-2 pending  B  [place] lymph node #1, right axilla (excision):      - One (1) lymph node, negative for carcinoma  C  Adipose tissue, right axilla (excision):     - Four (4) lymph nodes, negative for carcinoma  D  Superior margin, right breast (excision):     - Benign fibroadipose tissue        - Superior margin negative for carcinoma  E   Medial margin, right breast (excision):      - Benign fibroadipose tissue        - Medial margin negative for carcinoma  F  Inferior margin, right breast (excision):      - Benign fibroadipose tissue and scant benign breast tissue  - Inferior margin negative for carcinoma      G  Lateral margin, right breast (excision):     - Benign fibroadipose tissue        - Lateral margin negative for carcinoma  Comment: This is an appended report  These results have been appended to a previously preliminary verified report  07/01/2020   Final    A  Skin, right lower back, shave biopsy:    COMPOUND NEVUS; extending to the tissue edges  02/05/2019   Final    A  Skin, Left Forearm, excision:  - Early scar and residual Spitz nevus, peripheral and deep margins uninvolved  See Note  Interpretation performed at [facility], [address] [place] [id number]      01/14/2019   Final    A  Skin, Arm, Left, shave biopsy:  - Spitz's nevus; see note  Note:  Sections show a fairly circumscribed and mostly nested melanocytic proliferation along the basal layer  The cells are spindled and epithelioid, with fairly uniform cytology  Occasional Kamino bodies are also seen  P16 immunostain is positive in the lesional cells  On section planes studied, lesional cells are close to bilateral tissue edges  If this is a portion of a larger clinical lesion, complete removal to preclude recurrence would be prudent  Clinical correlation is recommended  Interpretation performed at [facility], [address] [place] [address], [facility] [address]       10/01/2018   Final    A  Skin, abdomen, excision:  - Prior biopsy site reaction, no residual melanoma seen  - Inked margins with no tumor seen  Interpretation performed at [facility], [address] [place] [address], [facility] [address]           09/06/2018   Final    A  Skin Abdominal, abdomen, shave biopsy:  - Malignant melanoma in situ with small focus suspicious for early invasive melanoma, 0 4 mm thickness,     extending to peripheral margins  MELANOMA OF SKIN TUMOR STAGING SUMMARY  1  Specimen identification:     - Procedure: Shave biopsy       - Laterality: Not specified  2  Tumor     - Tumor Site: Abdomen      - Macroscopic satellite nodule(s) (invasive tumor only): Not identified        - Histologic type:       -- Invasive Melanoma:  Superficial spreading melanoma  -- Melanoma in-situ (anatomic level I): N/A      - Maximum tumor (Breslow) thickness (pT1a)(invasive tumor only): 0 4 mm       - Anatomic (Sukhdeep) level (invasive tumor only): II       - Ulceration (invasive tumor only): Not identified  3  Margins:     - Peripheral margins: Involved by in-situ melanoma  - Deep margins (invasive tumor only): Uninvolved by invasive melanoma by 1 3 mm     4  Mitotic Rate (specify number / mm2)(invasive tumor only): 0/mm2    5  Microsatellite(s) (invasive tumor only): Not identified  6  Lymphovascular invasion (invasive tumor only): Not identified  7  Neurotropism (invasive tumor only): Not identified  8  Tumor-infiltrating lymphocytes (invasive tumor only): Present, brisk  9  Tumor regression (excision, if present < or more than 75%): Not identified  10  Growth Phase: Radial growth phase  11: Ancillary Studies:  None  -- Best representative tumor block: N/A (one block)  12  Additional pathologic findings:       -- Associated Nevus:  Cannot exclude dermal nevus  -- Other (specify): Epithelioid cell type  13  AJCC 8th Ed  Pathologic Stage Classification:  at least Stage  IA- pT1a, pNX  Interpretation performed at [facility], [address] [place] [address]  Image Results: They are reviewed and documented in Hematology/Oncology history    Echo follow up/limited w/ contrast if indicated    Left Ventricle: Left ventricular cavity size is normal  Wall thickness   is normal  The left ventricular ejection fraction is 60%  Systolic   function is normal  Global longitudinal strain is normal at -20%  Wall   motion is normal  Diastolic function is normal     This is a limited echocardiogram        LABS:  Lab data are reviewed and documented in HemOnc history       Recent Results (from the past 48 hour(s))   CBC and differential    Collection Time: 09/06/22  9:56 AM   Result Value Ref Range    WBC 6 09 4 31 - 10 16 Thousand/uL    RBC 3 86 3 81 - 5 12 Million/uL    Hemoglobin 11 4 (L) 11 5 - 15 4 g/dL    Hematocrit 35 7 34 8 - 46 1 %    MCV 93 82 - 98 fL    MCH 29 5 26 8 - 34 3 pg    MCHC 31 9 31 4 - 37 4 g/dL    RDW 13 4 11 6 - 15 1 %    MPV 10 2 8 9 - 12 7 fL    Platelets 553 398 - 379 Thousands/uL    nRBC 0 /100 WBCs    Neutrophils Relative 63 43 - 75 %    Immat GRANS % 0 0 - 2 %    Lymphocytes Relative 25 14 - 44 %    Monocytes Relative 9 4 - 12 %    Eosinophils Relative 3 0 - 6 %    Basophils Relative 0 0 - 1 %    Neutrophils Absolute 3 82 1 85 - 7 62 Thousands/µL    Immature Grans Absolute 0 02 0 00 - 0 20 Thousand/uL    Lymphocytes Absolute 1 52 0 60 - 4 47 Thousands/µL    Monocytes Absolute 0 56 0 17 - 1 22 Thousand/µL    Eosinophils Absolute 0 16 0 00 - 0 61 Thousand/µL    Basophils Absolute 0 01 0 00 - 0 10 Thousands/µL   Comprehensive metabolic panel    Collection Time: 09/06/22  9:56 AM   Result Value Ref Range    Sodium 139 135 - 147 mmol/L    Potassium 4 0 3 5 - 5 3 mmol/L    Chloride 104 96 - 108 mmol/L    CO2 26 21 - 32 mmol/L    ANION GAP 9 4 - 13 mmol/L    BUN 15 5 - 25 mg/dL    Creatinine 0 79 0 60 - 1 30 mg/dL    Glucose, Fasting 95 65 - 99 mg/dL    Calcium 8 5 8 3 - 10 1 mg/dL    AST 20 5 - 45 U/L    ALT 24 12 - 78 U/L    Alkaline Phosphatase 65 46 - 116 U/L    Total Protein 7 0 6 4 - 8 4 g/dL    Albumin 3 5 3 5 - 5 0 g/dL    Total Bilirubin 0 26 0 20 - 1 00 mg/dL    eGFR 102 ml/min/1 73sq m             Isabelle Felton MD  9/8/2022, 9:31 AM

## 2022-08-31 NOTE — PROGRESS NOTES
Received notification from infusion RN in regards to patient ringing the bell  Patient has treatment until 11/7/22  Infusion RN notified

## 2022-08-31 NOTE — TELEPHONE ENCOUNTER
Spoke with patient to go over updated infusion appts  Patient wants to know why she has been scheduled out because her last tx is supposed to be 9/6  Yony Mallory, please call patient to explain - Thank you!

## 2022-08-31 NOTE — TELEPHONE ENCOUNTER
Attempted call back to patient in regards to treatment  Reviewed patient has treatment for a total of a year, which would be November 2022   Left vm and call back number for further discussion

## 2022-08-31 NOTE — TELEPHONE ENCOUNTER
Received notification from infusion RN in regards to patient ringing the bell  Patient has treatment until 11/7/22  Infusion RN notified       Please schedule patient out for treatment

## 2022-09-06 ENCOUNTER — HOSPITAL ENCOUNTER (OUTPATIENT)
Dept: INFUSION CENTER | Facility: CLINIC | Age: 27
Discharge: HOME | End: 2022-09-06
Payer: COMMERCIAL

## 2022-09-06 ENCOUNTER — APPOINTMENT (OUTPATIENT)
Dept: LAB | Facility: HOSPITAL | Age: 27
End: 2022-09-06
Payer: COMMERCIAL

## 2022-09-06 VITALS
WEIGHT: 168.8 LBS | BODY MASS INDEX: 31.06 KG/M2 | RESPIRATION RATE: 16 BRPM | HEART RATE: 72 BPM | SYSTOLIC BLOOD PRESSURE: 125 MMHG | DIASTOLIC BLOOD PRESSURE: 65 MMHG | HEIGHT: 62 IN | TEMPERATURE: 98.1 F

## 2022-09-06 DIAGNOSIS — Z17.0 MALIGNANT NEOPLASM OF UPPER-INNER QUADRANT OF RIGHT BREAST IN FEMALE, ESTROGEN RECEPTOR POSITIVE (HCC): ICD-10-CM

## 2022-09-06 DIAGNOSIS — C50.211 MALIGNANT NEOPLASM OF UPPER-INNER QUADRANT OF RIGHT BREAST IN FEMALE, ESTROGEN RECEPTOR POSITIVE (HCC): Primary | ICD-10-CM

## 2022-09-06 DIAGNOSIS — C50.211 MALIGNANT NEOPLASM OF UPPER-INNER QUADRANT OF RIGHT BREAST IN FEMALE, ESTROGEN RECEPTOR POSITIVE (HCC): ICD-10-CM

## 2022-09-06 DIAGNOSIS — Z17.0 MALIGNANT NEOPLASM OF UPPER-INNER QUADRANT OF RIGHT BREAST IN FEMALE, ESTROGEN RECEPTOR POSITIVE (HCC): Primary | ICD-10-CM

## 2022-09-06 LAB
ALBUMIN SERPL BCP-MCNC: 3.5 G/DL (ref 3.5–5)
ALP SERPL-CCNC: 65 U/L (ref 46–116)
ALT SERPL W P-5'-P-CCNC: 24 U/L (ref 12–78)
ANION GAP SERPL CALCULATED.3IONS-SCNC: 9 MMOL/L (ref 4–13)
AST SERPL W P-5'-P-CCNC: 20 U/L (ref 5–45)
BASOPHILS # BLD AUTO: 0.01 THOUSANDS/ΜL (ref 0–0.1)
BASOPHILS NFR BLD AUTO: 0 % (ref 0–1)
BILIRUB SERPL-MCNC: 0.26 MG/DL (ref 0.2–1)
BUN SERPL-MCNC: 15 MG/DL (ref 5–25)
CALCIUM SERPL-MCNC: 8.5 MG/DL (ref 8.3–10.1)
CHLORIDE SERPL-SCNC: 104 MMOL/L (ref 96–108)
CO2 SERPL-SCNC: 26 MMOL/L (ref 21–32)
CREAT SERPL-MCNC: 0.79 MG/DL (ref 0.6–1.3)
EOSINOPHIL # BLD AUTO: 0.16 THOUSAND/ΜL (ref 0–0.61)
EOSINOPHIL NFR BLD AUTO: 3 % (ref 0–6)
ERYTHROCYTE [DISTWIDTH] IN BLOOD BY AUTOMATED COUNT: 13.4 % (ref 11.6–15.1)
GFR SERPL CREATININE-BSD FRML MDRD: 102 ML/MIN/1.73SQ M
GLUCOSE P FAST SERPL-MCNC: 95 MG/DL (ref 65–99)
HCT VFR BLD AUTO: 35.7 % (ref 34.8–46.1)
HGB BLD-MCNC: 11.4 G/DL (ref 11.5–15.4)
IMM GRANULOCYTES # BLD AUTO: 0.02 THOUSAND/UL (ref 0–0.2)
IMM GRANULOCYTES NFR BLD AUTO: 0 % (ref 0–2)
LYMPHOCYTES # BLD AUTO: 1.52 THOUSANDS/ΜL (ref 0.6–4.47)
LYMPHOCYTES NFR BLD AUTO: 25 % (ref 14–44)
MCH RBC QN AUTO: 29.5 PG (ref 26.8–34.3)
MCHC RBC AUTO-ENTMCNC: 31.9 G/DL (ref 31.4–37.4)
MCV RBC AUTO: 93 FL (ref 82–98)
MONOCYTES # BLD AUTO: 0.56 THOUSAND/ΜL (ref 0.17–1.22)
MONOCYTES NFR BLD AUTO: 9 % (ref 4–12)
NEUTROPHILS # BLD AUTO: 3.82 THOUSANDS/ΜL (ref 1.85–7.62)
NEUTS SEG NFR BLD AUTO: 63 % (ref 43–75)
NRBC BLD AUTO-RTO: 0 /100 WBCS
PLATELET # BLD AUTO: 242 THOUSANDS/UL (ref 149–390)
PMV BLD AUTO: 10.2 FL (ref 8.9–12.7)
POTASSIUM SERPL-SCNC: 4 MMOL/L (ref 3.5–5.3)
PROT SERPL-MCNC: 7 G/DL (ref 6.4–8.4)
RBC # BLD AUTO: 3.86 MILLION/UL (ref 3.81–5.12)
SODIUM SERPL-SCNC: 139 MMOL/L (ref 135–147)
WBC # BLD AUTO: 6.09 THOUSAND/UL (ref 4.31–10.16)

## 2022-09-06 PROCEDURE — 96413 CHEMO IV INFUSION 1 HR: CPT

## 2022-09-06 PROCEDURE — 36415 COLL VENOUS BLD VENIPUNCTURE: CPT

## 2022-09-06 PROCEDURE — 80053 COMPREHEN METABOLIC PANEL: CPT

## 2022-09-06 PROCEDURE — 85025 COMPLETE CBC W/AUTO DIFF WBC: CPT

## 2022-09-06 RX ORDER — SODIUM CHLORIDE 9 MG/ML
20 INJECTION, SOLUTION INTRAVENOUS ONCE
Status: COMPLETED | OUTPATIENT
Start: 2022-09-06 | End: 2022-09-06

## 2022-09-06 RX ADMIN — TRASTUZUMAB 440 MG: 150 INJECTION, POWDER, LYOPHILIZED, FOR SOLUTION INTRAVENOUS at 14:39

## 2022-09-06 RX ADMIN — SODIUM CHLORIDE 20 ML/HR: 0.9 INJECTION, SOLUTION INTRAVENOUS at 14:00

## 2022-09-06 NOTE — PROGRESS NOTES
Pt presents to clinic for herceptin infusion, offers no complaints, tolerated infusion well, d/c from unit stable

## 2022-09-08 ENCOUNTER — OFFICE VISIT (OUTPATIENT)
Dept: HEMATOLOGY ONCOLOGY | Facility: CLINIC | Age: 27
End: 2022-09-08
Payer: COMMERCIAL

## 2022-09-08 VITALS
WEIGHT: 168.5 LBS | TEMPERATURE: 98.1 F | HEIGHT: 62 IN | RESPIRATION RATE: 16 BRPM | DIASTOLIC BLOOD PRESSURE: 80 MMHG | HEART RATE: 67 BPM | BODY MASS INDEX: 31.01 KG/M2 | OXYGEN SATURATION: 99 % | SYSTOLIC BLOOD PRESSURE: 124 MMHG

## 2022-09-08 DIAGNOSIS — Z17.0 MALIGNANT NEOPLASM OF UPPER-INNER QUADRANT OF RIGHT BREAST IN FEMALE, ESTROGEN RECEPTOR POSITIVE (HCC): Primary | ICD-10-CM

## 2022-09-08 DIAGNOSIS — C50.211 MALIGNANT NEOPLASM OF UPPER-INNER QUADRANT OF RIGHT BREAST IN FEMALE, ESTROGEN RECEPTOR POSITIVE (HCC): Primary | ICD-10-CM

## 2022-09-08 PROCEDURE — 99214 OFFICE O/P EST MOD 30 MIN: CPT | Performed by: INTERNAL MEDICINE

## 2022-09-08 RX ORDER — TAMOXIFEN CITRATE 20 MG/1
20 TABLET ORAL DAILY
Qty: 90 TABLET | Refills: 3 | Status: SHIPPED | OUTPATIENT
Start: 2022-09-08 | End: 2022-12-07

## 2022-09-09 ENCOUNTER — HOSPITAL ENCOUNTER (OUTPATIENT)
Dept: BONE DENSITY | Facility: CLINIC | Age: 27
Discharge: HOME | End: 2022-09-09
Payer: COMMERCIAL

## 2022-09-09 DIAGNOSIS — Z17.0 MALIGNANT NEOPLASM OF UPPER-INNER QUADRANT OF RIGHT BREAST IN FEMALE, ESTROGEN RECEPTOR POSITIVE (HCC): ICD-10-CM

## 2022-09-09 DIAGNOSIS — C50.211 MALIGNANT NEOPLASM OF UPPER-INNER QUADRANT OF RIGHT BREAST IN FEMALE, ESTROGEN RECEPTOR POSITIVE (HCC): ICD-10-CM

## 2022-09-09 PROCEDURE — 77080 DXA BONE DENSITY AXIAL: CPT

## 2022-10-17 ENCOUNTER — RADIATION ONCOLOGY FOLLOW-UP (OUTPATIENT)
Dept: RADIATION ONCOLOGY | Facility: CLINIC | Age: 27
End: 2022-10-17
Attending: RADIOLOGY
Payer: COMMERCIAL

## 2022-10-17 ENCOUNTER — CLINICAL SUPPORT (OUTPATIENT)
Dept: RADIATION ONCOLOGY | Facility: CLINIC | Age: 27
End: 2022-10-17
Payer: COMMERCIAL

## 2022-10-17 VITALS
DIASTOLIC BLOOD PRESSURE: 68 MMHG | SYSTOLIC BLOOD PRESSURE: 122 MMHG | OXYGEN SATURATION: 98 % | RESPIRATION RATE: 16 BRPM | TEMPERATURE: 97.7 F | BODY MASS INDEX: 30.37 KG/M2 | HEART RATE: 93 BPM | WEIGHT: 168 LBS

## 2022-10-17 DIAGNOSIS — C50.211 MALIGNANT NEOPLASM OF UPPER-INNER QUADRANT OF RIGHT BREAST IN FEMALE, ESTROGEN RECEPTOR POSITIVE (HCC): Primary | ICD-10-CM

## 2022-10-17 DIAGNOSIS — Z17.0 MALIGNANT NEOPLASM OF UPPER-INNER QUADRANT OF RIGHT BREAST IN FEMALE, ESTROGEN RECEPTOR POSITIVE (HCC): Primary | ICD-10-CM

## 2022-10-17 PROCEDURE — 99211 OFF/OP EST MAY X REQ PHY/QHP: CPT | Performed by: RADIOLOGY

## 2022-10-17 PROCEDURE — 99213 OFFICE O/P EST LOW 20 MIN: CPT | Performed by: RADIOLOGY

## 2022-10-17 PROCEDURE — G0463 HOSPITAL OUTPT CLINIC VISIT: HCPCS | Performed by: RADIOLOGY

## 2022-10-17 NOTE — PROGRESS NOTES
Gisell Brantley 1995 is a 31 y o  female with past medical history of early stage melanoma status post resection from abdomen diagnosed with stage IA, grade 3 invasive ductal carcinoma of the right breast status post lumpectomy and sentinel lymph node biopsy  Tumor was ER/PA/HER2 positive  She underwent adjuvant chemotherapy with TH x 3 followed by Herceptin monotherapy  She is maintained on Herceptin and tamoxifen  She recently completed radiation on 22  She presents today for follow up  22 B/L diagnostic mammogram  RIGHT  B) POST-SURGICAL FINDING: There are post-surgical findings from a previous lumpectomy with radiation seen in the upper inner quadrant of the right breast in the posterior depth  Left  There are no suspicious masses, grouped microcalcifications or areas of unexplained architectural distortion  The skin and nipple areolar complex are unremarkable  RECOMMENDATION:       - Diagnostic mammogram in 1 year for both breasts  22 Dr Octavia Tapia  Bilateral axillary and supraclavicular examination no palpable adenopathy  22 Dr Radha Gaviria  completed Herceptin   started tamoxifen 20 mg daily on 2021  5 years while on Tamoxifen until 2026    Upcomin23 MRI breasts  2/15/23 Dr Octavia Tapia  3/8/23 Medical Oncology      Follow up visit     Oncology History   Malignant neoplasm of upper-inner quadrant of right breast in female, estrogen receptor positive ([facility] [address] )   2021 Biopsy    US guided Right breast core biopsy:  1 o'clock 13-14 cm from the nipple  Right invasive ductal carcinoma  Grade 3  ER 90  PA 70   HER2 3+  Lymphovascular invasion: not identified    Results are malignant and concordant  Recommend surgical consultation  Additionally, given patient's age and density of breast tissue, recommend enhanced breast MRI for further evaluation       Done [facility]     2021 Genetic Testing    The following genes were evaluated: RADHA, BRCA1, BRCA2, CDH1, CHEK2, PALB2, PTEN, STK11, TP53  Additional genes tested for a total of 36 genes  VUS NF1   [employer]     7/1/2021 Observation    Bilateral breast MRI with and without contrast: B6     Biopsy confirmed malignancy  01:00 o'clock location right breast   No axillary lymphadenopathy, extension to the chest wall, or concurrent lesions in either breast        7/19/2021 Surgery    Right breast LYNDSAY  directed lumpectomy with sentinel lymph node biopsy:  Invasive mammary carcinoma of no special type; ductal  Grade 3  ER 65  NE 90  HER2 3+  18 mm  Margins negative  0/5 Lymph nodes  Stage IA      8/23/2021 Genomic Testing    MammaPrint: High Risk  HER2 Type     9/8/2021 -  Cancer Staged    Staging form: Breast, AJCC 8th Edition  - Clinical: Stage IA (cT1c, cN0, cM0, G3, ER+, NE+, HER2+) - Signed by Rao Mao MD on 9/8/2021  Histologic grading system: 3 grade system       9/13/2021 - 11/29/2021 Chemotherapy    fosaprepitant (EMEND) IVPB, 150 mg, Intravenous, Once, 0 of 6 cycles  pertuzumab (PERJETA) IVPB, 840 mg, Intravenous, Once, 0 of 18 cycles  CARBOplatin (PARAPLATIN) IVPB (GOG AUC DOSING), 760 8 mg, Intravenous, Once, 0 of 6 cycles  DOCEtaxel (TAXOTERE) chemo infusion, 75 mg/m2 = 132 8 mg, Intravenous, Once, 0 of 6 cycles  trastuzumab (HERCEPTIN) chemo infusion, 8 mg/kg = 610 mg, Intravenous, Once, 0 of 18 cycles       9/13/2021 - 9/6/2022 Chemotherapy    PACLItaxel (TAXOL) chemo IVPB, 80 mg/m2 = 143 4 mg, Intravenous, Once, 12 of 12 cycles  Administration: 143 4 mg (9/13/2021), 143 4 mg (9/20/2021), 143 4 mg (10/4/2021), 143 4 mg (10/18/2021), 143 4 mg (10/25/2021), 143 4 mg (11/1/2021), 143 4 mg (11/15/2021), 143 4 mg (11/22/2021), 143 4 mg (9/27/2021), 143 4 mg (10/11/2021), 143 4 mg (11/8/2021), 143 4 mg (11/29/2021)  trastuzumab (HERCEPTIN) chemo infusion, 304 mg, Intravenous, Once, 26 of 26 cycles  Administration: 300 mg (9/13/2021), 150 mg (9/20/2021), 150 mg (10/4/2021), 150 mg (10/18/2021), 150 mg (10/25/2021), 150 mg (11/1/2021), 150 mg (11/15/2021), 150 mg (11/22/2021), 150 mg (9/27/2021), 150 mg (10/11/2021), 450 mg (12/6/2021), 440 mg (8/15/2022), 440 mg (9/6/2022), 150 mg (11/8/2021), 150 mg (11/29/2021), 450 mg (12/27/2021), 450 mg (1/17/2022), 450 mg (2/7/2022), 450 mg (2/28/2022), 450 mg (3/21/2022), 450 mg (4/11/2022), 450 mg (5/2/2022), 430 mg (5/26/2022), 440 mg (6/13/2022), 440 mg (7/6/2022), 440 mg (7/25/2022)     1/3/2022 - 1/31/2022 Radiation    Treatments:  Course: C1  Plan ID Energy Fractions Dose per Fraction (cGy) Total Dose Delivered (cGy) Elapsed Days   Hypo R Bolus 6X 7 / 7 267 1,869 16   Hypo R Brst 6X 8 / 8 267 2,136 18   R Brst e 9E 5 / 5 200 1,000 6    Treatment Dates:  1/3/2022 - 1/31/2022  Review of Systems:  Review of Systems   Constitutional: Negative  HENT: Negative  Negative for nosebleeds (x2 in last 2 weeks)  Eyes: Negative  Respiratory: Negative  Cardiovascular: Negative  Gastrointestinal: Negative  Endocrine: Negative  Genitourinary: Negative  Musculoskeletal: Negative  Skin: Negative  Allergic/Immunologic: Negative  Neurological: Negative  Hematological: Negative  Psychiatric/Behavioral: Negative          Clinical Trial: no        Covid Vaccine Status yes    Health Maintenance   Topic Date Due   • Hepatitis C Screening  Never done   • Pneumococcal Vaccine: Pediatrics (0 to 5 Years) and At-Risk Patients (6 to 59 Years) (1 - PCV) Never done   • HIV Screening  Never done   • Annual Physical  Never done   • Cervical Cancer Screening  Never done   • COVID-19 Vaccine (2 - Anderson risk series) 09/12/2021   • Influenza Vaccine (1) 09/01/2022   • Depression Screening  03/04/2023   • BMI: Followup Plan  06/23/2023   • DTaP,Tdap,and Td Vaccines (5 - Td or Tdap) 07/22/2023   • BMI: Adult  09/08/2023   • Hepatitis B Vaccine  Completed   • IPV Vaccine  Completed   • Meningococcal ACWY Vaccine  Completed   • HPV Vaccine  Completed   • HIB Vaccine  Aged Out   • Hepatitis A Vaccine  Aged Out     Patient Active Problem List   Diagnosis   • Nevus   • Malignant melanoma of skin of abdomen (HCC)   • Neck pain   • Multiple benign melanocytic nevi   • Iron deficiency anemia due to chronic blood loss   • Warts   • Surveillance for birth control, oral contraceptives   • Discomfort of left ear   • Benign paroxysmal positional vertigo due to bilateral vestibular disorder   • Spitz nevus of forearm, left   • Enlarged thyroid   • Malignant neoplasm of upper-inner quadrant of right breast in female, estrogen receptor positive (HCC)   • Breast lump or mass   • Use of tamoxifen (Nolvadex)     Past Medical History:   Diagnosis Date   • Breast cancer ([facility] [address] ) 07/19/2021    right breast   • Cancer [facility])    • History of chemotherapy 2021    right breast cancer   • [facility] 2018     Past Surgical History:   Procedure Laterality Date   • BREAST BIOPSY     • BREAST LUMPECTOMY Right 7/19/2021    Procedure: LYNDSAY  DIRECTED LUMPECTOMY;  Surgeon: Herve Finch MD;  Location: [facility];  Service: Surgical Oncology   • LYMPH NODE BIOPSY Right 7/19/2021    Procedure: LYMPHATIC MAPPING WITH BLUE AND RADIOACTIVE DYES, SENTINEL LYMPH NODE BIOPSY, injection at 56;  Surgeon: Herve Finch MD;  Location: [facility];  Service: Surgical Oncology   • SKIN CANCER EXCISION     • TONSILLECTOMY AND ADENOIDECTOMY     • WISDOM TOOTH EXTRACTION       Family History   Problem Relation Age of Onset   • Prostate cancer Father    • Kidney cancer Maternal Grandfather    • Breast cancer Maternal Aunt 48     Social History     Socioeconomic History   • Marital status: Single     Spouse name: Not on file   • Number of children: Not on file   • Years of education: Not on file   • Highest education level: Not on file   Occupational History   • Not on file   Tobacco Use   • Smoking status: Never Smoker   • Smokeless tobacco: Never Used   Vaping Use   • Vaping Use: Never used   Substance and Sexual Activity   • Alcohol use: Yes     Comment: Socially   • Drug use: No   • Sexual activity: Yes   Other Topics Concern   • Not on file   Social History Narrative   • Not on file     Social Determinants of Health     Financial Resource Strain: Not on file   Food Insecurity: Not on file   Transportation Needs: Not on file   Physical Activity: Not on file   Stress: Not on file   Social Connections: Not on file   Intimate Partner Violence: Not on file   Housing Stability: Not on file       Current Outpatient Medications:   •  b complex vitamins capsule, Take 1 capsule by mouth daily, Disp: , Rfl:   •  Calcium Carbonate-Vit D-Min (CALCIUM 1200 PO), Take by mouth, Disp: , Rfl:   •  cholecalciferol (VITAMIN D3) 1,000 units tablet, Take 1,000 Units by mouth daily 4,000 IU daily, Disp: , Rfl:   •  methylPREDNISolone 4 MG tablet therapy pack, Use as directed on package, Disp: 21 each, Rfl: 0  •  tamoxifen (NOLVADEX) 20 mg tablet, Take 1 tablet (20 mg total) by mouth daily, Disp: 90 tablet, Rfl: 3  No Known Allergies  Vitals:    10/17/22 1300   Weight: 76 2 kg (168 lb)

## 2022-10-17 NOTE — PROGRESS NOTES
Follow-up - Radiation Oncology   Meagan Perea 1995 31 y o  female [id number]      History of Present Illness   Cancer Staging  Malignant neoplasm of upper-inner quadrant of right breast in female, estrogen receptor positive ([facility] [address] )  Staging form: Breast, AJCC 8th Edition  - Clinical: Stage IA (cT1c, cN0, cM0, G3, ER+, SD+, HER2+) - Signed by Ahmet Thorne MD on 2021  Histologic grading system: 3 grade system      Meagan Perea is a 31y o  year old female with past medical history of early stage melanoma status post resection from abdomen diagnosed with stage IA, grade 3 invasive ductal carcinoma of the right breast status post lumpectomy and sentinel lymph node biopsy  Tumor was ER/SD/HER2 positive  She underwent adjuvant chemotherapy with TH x 3 followed by Herceptin monotherapy   She completed Herceptin and is now maintained on tamoxifen   She completed adjuvant radiation on 22  She presents today for follow up      22 Bilateral diagnostic mammogram were benign (BIRADS-2)  She is scheduled for bilateral MRI breasts on 23 Dr Lafe Duane  Bilateral axillary and supraclavicular examination no palpable adenopathy      22 Dr John Morrow  completed Herceptin   started tamoxifen 20 mg daily on 2021  5 years while on Tamoxifen until 2026     The patient notes mild tenderness associated with lumpectomy scar  She denies significant pain or tenderness of breasts bilaterally  She denies new palpable nodules, suspicious skin changes, or nipple discharge of the breasts bilaterally  She denies any upper extremity edema or shoulder restriction      Upcomin/15/23 Dr Lafe Duane  3/8/23 Medical Oncology        Historical Information   Oncology History   Malignant neoplasm of upper-inner quadrant of right breast in female, estrogen receptor positive ([facility] [address] )   2021 Biopsy    US guided Right breast core biopsy:  1 o'clock 13-14 cm from the nipple  Right invasive ductal carcinoma  Grade 3  ER 90  GA 70   HER2 3+  Lymphovascular invasion: not identified    Results are malignant and concordant  Recommend surgical consultation  Additionally, given patient's age and density of breast tissue, recommend enhanced breast MRI for further evaluation       Done at [facility]     6/30/2021 Genetic Testing    The following genes were evaluated: RADHA, BRCA1, BRCA2, CDH1, CHEK2, PALB2, PTEN, STK11, TP53  Additional genes tested for a total of 36 genes  VUS NF1   [employer]     7/1/2021 Observation    Bilateral breast MRI with and without contrast: B6     Biopsy confirmed malignancy  01:00 o'clock location right breast   No axillary lymphadenopathy, extension to the chest wall, or concurrent lesions in either breast        7/19/2021 Surgery    Right breast LYNDSAY  directed lumpectomy with sentinel lymph node biopsy:  Invasive mammary carcinoma of no special type; ductal  Grade 3  ER 65  GA 90  HER2 3+  18 mm  Margins negative  0/5 Lymph nodes  Stage IA      8/23/2021 Genomic Testing    MammaPrint: High Risk  HER2 Type     9/8/2021 -  Cancer Staged    Staging form: Breast, AJCC 8th Edition  - Clinical: Stage IA (cT1c, cN0, cM0, G3, ER+, GA+, HER2+) - Signed by Chen Gusman MD on 9/8/2021  Histologic grading system: 3 grade system       9/13/2021 - 11/29/2021 Chemotherapy    fosaprepitant (EMEND) IVPB, 150 mg, Intravenous, Once, 0 of 6 cycles  pertuzumab (PERJETA) IVPB, 840 mg, Intravenous, Once, 0 of 18 cycles  CARBOplatin (PARAPLATIN) IVPB (GOG AUC DOSING), 760 8 mg, Intravenous, Once, 0 of 6 cycles  DOCEtaxel (TAXOTERE) chemo infusion, 75 mg/m2 = 132 8 mg, Intravenous, Once, 0 of 6 cycles  trastuzumab (HERCEPTIN) chemo infusion, 8 mg/kg = 610 mg, Intravenous, Once, 0 of 18 cycles       9/13/2021 - 9/6/2022 Chemotherapy    PACLItaxel (TAXOL) chemo IVPB, 80 mg/m2 = 143 4 mg, Intravenous, Once, 12 of 12 cycles  Administration: 143 4 mg (9/13/2021), 143 4 mg (9/20/2021), 143 4 mg (10/4/2021), 143 4 mg (10/18/2021), 143 4 mg (10/25/2021), 143 4 mg (11/1/2021), 143 4 mg (11/15/2021), 143 4 mg (11/22/2021), 143 4 mg (9/27/2021), 143 4 mg (10/11/2021), 143 4 mg (11/8/2021), 143 4 mg (11/29/2021)  trastuzumab (HERCEPTIN) chemo infusion, 304 mg, Intravenous, Once, 26 of 26 cycles  Administration: 300 mg (9/13/2021), 150 mg (9/20/2021), 150 mg (10/4/2021), 150 mg (10/18/2021), 150 mg (10/25/2021), 150 mg (11/1/2021), 150 mg (11/15/2021), 150 mg (11/22/2021), 150 mg (9/27/2021), 150 mg (10/11/2021), 450 mg (12/6/2021), 440 mg (8/15/2022), 440 mg (9/6/2022), 150 mg (11/8/2021), 150 mg (11/29/2021), 450 mg (12/27/2021), 450 mg (1/17/2022), 450 mg (2/7/2022), 450 mg (2/28/2022), 450 mg (3/21/2022), 450 mg (4/11/2022), 450 mg (5/2/2022), 430 mg (5/26/2022), 440 mg (6/13/2022), 440 mg (7/6/2022), 440 mg (7/25/2022)     1/3/2022 - 1/31/2022 Radiation    Treatments:  Course: C1  Plan ID Energy Fractions Dose per Fraction (cGy) Total Dose Delivered (cGy) Elapsed Days   Hypo R Bolus 6X 7 / 7 267 1,869 16   Hypo R Brst 6X 8 / 8 267 2,136 18   R Brst e 9E 5 / 5 200 1,000 6    Treatment Dates:  1/3/2022 - 1/31/2022            Past Medical History:   Diagnosis Date   • Breast cancer ([facility] [address] ) 07/19/2021    right breast   • Cancer [facility])    • History of chemotherapy 2021    right breast cancer   • Melanoma ([facility] [address] ) 2018     Past Surgical History:   Procedure Laterality Date   • BREAST BIOPSY     • BREAST LUMPECTOMY Right 7/19/2021    Procedure: LYNDSAY  DIRECTED LUMPECTOMY;  Surgeon: Nicolas Myers MD;  Location: [facility];  Service: Surgical Oncology   • LYMPH NODE BIOPSY Right 7/19/2021    Procedure: LYMPHATIC MAPPING WITH BLUE AND RADIOACTIVE DYES, SENTINEL LYMPH NODE BIOPSY, injection at 5530;  Surgeon: Nicolas Myers MD;  Location: [facility];  Service: Surgical Oncology   • SKIN CANCER EXCISION     • TONSILLECTOMY AND ADENOIDECTOMY     • WISDOM TOOTH EXTRACTION         Social History   Social History     Substance and Sexual Activity   Alcohol Use Yes    Comment: Socially     Social History     Substance and Sexual Activity   Drug Use No     Social History     Tobacco Use   Smoking Status Never Smoker   Smokeless Tobacco Never Used         Meds/Allergies     Current Outpatient Medications:   •  b complex vitamins capsule, Take 1 capsule by mouth daily, Disp: , Rfl:   •  Calcium Carbonate-Vit D-Min (CALCIUM 1200 PO), Take by mouth, Disp: , Rfl:   •  cholecalciferol (VITAMIN D3) 1,000 units tablet, Take 1,000 Units by mouth daily 4,000 IU daily, Disp: , Rfl:   •  tamoxifen (NOLVADEX) 20 mg tablet, Take 1 tablet (20 mg total) by mouth daily, Disp: 90 tablet, Rfl: 3  •  methylPREDNISolone 4 MG tablet therapy pack, Use as directed on package (Patient not taking: Reported on 10/17/2022), Disp: 21 each, Rfl: 0  No Known Allergies      Review of Systems   Constitutional: Negative  HENT: Negative  Negative for nosebleeds (x2 in last 2 weeks)  Eyes: Negative  Respiratory: Negative  Cardiovascular: Negative  Gastrointestinal: Negative  Endocrine: Negative  Genitourinary: Negative  Musculoskeletal: Negative  Skin: Negative  Allergic/Immunologic: Negative  Neurological: Negative  Hematological: Negative  Psychiatric/Behavioral: Negative  OBJECTIVE:   /68   Pulse 93   Temp 97 7 °F (36 5 °C)   Resp 16   Wt 76 2 kg (168 lb)   SpO2 98%   BMI 30 37 kg/m²   Karnofsky: 90 - Able to carry on normal activity; minor signs or symptoms of disease     Physical Exam  Vitals and nursing note reviewed  Constitutional:       General: She is not in acute distress  Appearance: She is well-developed  Eyes:      General: No scleral icterus  Cardiovascular:      Rate and Rhythm: Normal rate and regular rhythm  Pulmonary:      Breath sounds: No wheezing, rhonchi or rales  Chest:   Breasts:      Right: No axillary adenopathy or supraclavicular adenopathy        Left: No axillary adenopathy or supraclavicular adenopathy  Comments: Breast examination demonstrates breasts are symmetric in contour and size  There is a well-healed upper inner right breast lumpectomy scar associated with fibrotic changes  These areas are mildly tender to deep palpation  No erythema or edema  There is mild hyperpigmentation of the right breast more notable near the lumpectomy scar  There is mild diffuse fibrotic changes of the right breast   The are not palpable nodules or suspicious skin changes of the breasts bilaterally  Abdominal:      General: There is no distension  Palpations: Abdomen is soft  Tenderness: There is no abdominal tenderness  Musculoskeletal:      Right lower leg: No edema  Left lower leg: No edema  Comments: No upper extremity edema bilaterally  There is full range of motion of the upper extremities bilaterally  Lymphadenopathy:      Cervical: No cervical adenopathy  Upper Body:      Right upper body: No supraclavicular or axillary adenopathy  Left upper body: No supraclavicular or axillary adenopathy  Neurological:      Mental Status: She is alert and oriented to person, place, and time  Gait: Gait normal             Assessment/Plan:  Kash Hicks is a 31y o  year old female with past medical history of early stage melanoma status post resection from abdomen diagnosed with stage IA, grade 3 invasive ductal carcinoma of the right breast status post lumpectomy and sentinel lymph node biopsy  Tumor was ER/TN/HER2 positive  She underwent adjuvant chemotherapy with TH x 3 followed by Herceptin monotherapy   She completed adjuvant Herceptin and is now maintained on tamoxifen   She completed adjuvant radiation on 1/31/22  She is currently clinically LUCINA  I recommended sun protection to the irradiated skin and use of emollients as needed  We discussed possible massage to area of fibrosis in an effort to help soften scar tissue if she wishes  She will return for follow-up in 6 months  We will follow-up with her sooner should the need arise  She will follow-up with Surgical and Medical Oncology in the interim  Young Peres  10/17/2022,3:00 PM    Portions of the record may have been created with voice recognition software   Occasional wrong word or "sound a like" substitutions may have occurred due to the inherent limitations of voice recognition software   Read the chart carefully and recognize, using context, where substitutions have occurred

## 2022-12-23 ENCOUNTER — TELEPHONE (OUTPATIENT)
Dept: HEMATOLOGY ONCOLOGY | Facility: CLINIC | Age: 27
End: 2022-12-23

## 2022-12-23 NOTE — TELEPHONE ENCOUNTER
CALL RETURN FORM   Reason for patient call? Patient is calling in to inform that she is experiencing her hands and feet falling asleep    Patient's primary oncologist?  Scottie Cardenas   Name of person the patient was calling for?  Jaimie Mills     Any additional information to add, if applicable? n/a   Informed patient that the message will be forwarded to the team and someone will get back to them as soon as possible    Did you relay this information to the patient?  yes , patient can be reached back at [phone]

## 2022-12-27 NOTE — TELEPHONE ENCOUNTER
Called patient to schedule a follow up this week with Meliton Alejandro PA-C, there was an opening slot for tomorrow 12/28 at 2:30PM and patient stated she is on vacation until Thursday  I found an opening on 1/4/23 with RAMANA DUTTA [facility] and I told her if there are any cancellations or if we can get her in sooner to be seen she will receive a call back

## 2023-01-03 ENCOUNTER — TELEPHONE (OUTPATIENT)
Dept: OTHER | Facility: OTHER | Age: 28
End: 2023-01-03

## 2023-01-04 NOTE — TELEPHONE ENCOUNTER
Patient is calling regarding cancelling an appointment      Date/Time:01-04-23 @ 0800    Patient was rescheduled: YES [] NO [x]    Patient requesting call back to reschedule: YES [] NO [x]

## 2023-02-02 ENCOUNTER — HOSPITAL ENCOUNTER (OUTPATIENT)
Dept: MRI IMAGING | Facility: HOSPITAL | Age: 28
End: 2023-02-02
Attending: SURGERY

## 2023-02-02 VITALS — BODY MASS INDEX: 29.08 KG/M2 | WEIGHT: 158 LBS | HEIGHT: 62 IN

## 2023-02-02 DIAGNOSIS — Z17.0 MALIGNANT NEOPLASM OF UPPER-INNER QUADRANT OF RIGHT BREAST IN FEMALE, ESTROGEN RECEPTOR POSITIVE (HCC): Primary | ICD-10-CM

## 2023-02-02 DIAGNOSIS — N63.25 MASS OVERLAPPING MULTIPLE QUADRANTS OF LEFT BREAST: ICD-10-CM

## 2023-02-02 DIAGNOSIS — Z17.0 MALIGNANT NEOPLASM OF UPPER-INNER QUADRANT OF RIGHT BREAST IN FEMALE, ESTROGEN RECEPTOR POSITIVE (HCC): ICD-10-CM

## 2023-02-02 DIAGNOSIS — C50.211 MALIGNANT NEOPLASM OF UPPER-INNER QUADRANT OF RIGHT BREAST IN FEMALE, ESTROGEN RECEPTOR POSITIVE (HCC): ICD-10-CM

## 2023-02-02 DIAGNOSIS — C50.211 MALIGNANT NEOPLASM OF UPPER-INNER QUADRANT OF RIGHT BREAST IN FEMALE, ESTROGEN RECEPTOR POSITIVE (HCC): Primary | ICD-10-CM

## 2023-02-02 RX ADMIN — GADOBUTROL 7 ML: 604.72 INJECTION INTRAVENOUS at 09:05

## 2023-02-08 NOTE — PRE-PROCEDURE INSTRUCTIONS
Signed               Left breast MRI guided biopsy scheduled for 2/10/23 @ 0930 (0900 arrival)  Questions and concerns were addressed at this time  Patient verbalized understanding  Allergies & medications reviewed with pt & verified in Epic   pre-procedure instructions reviewed with pt       Blood thinners:   _x__ NO  ___ YES/medication: ___ (no need to stop per RBC protocol)          -INR ____ (if pt on Coumadin/Warfarin)

## 2023-02-10 ENCOUNTER — HOSPITAL ENCOUNTER (OUTPATIENT)
Dept: RADIOLOGY | Facility: HOSPITAL | Age: 28
Discharge: HOME | End: 2023-02-10
Attending: SURGERY

## 2023-02-10 DIAGNOSIS — Z17.0 MALIGNANT NEOPLASM OF UPPER-INNER QUADRANT OF RIGHT BREAST IN FEMALE, ESTROGEN RECEPTOR POSITIVE (HCC): ICD-10-CM

## 2023-02-10 DIAGNOSIS — C50.211 MALIGNANT NEOPLASM OF UPPER-INNER QUADRANT OF RIGHT BREAST IN FEMALE, ESTROGEN RECEPTOR POSITIVE (HCC): ICD-10-CM

## 2023-02-10 DIAGNOSIS — N63.25 MASS OVERLAPPING MULTIPLE QUADRANTS OF LEFT BREAST: ICD-10-CM

## 2023-02-10 RX ORDER — LIDOCAINE HYDROCHLORIDE AND EPINEPHRINE BITARTRATE 20; .01 MG/ML; MG/ML
20 INJECTION, SOLUTION SUBCUTANEOUS ONCE
Status: COMPLETED | OUTPATIENT
Start: 2023-02-10 | End: 2023-02-10

## 2023-02-10 RX ORDER — LIDOCAINE HYDROCHLORIDE 10 MG/ML
5 INJECTION, SOLUTION EPIDURAL; INFILTRATION; INTRACAUDAL; PERINEURAL ONCE
Status: COMPLETED | OUTPATIENT
Start: 2023-02-10 | End: 2023-02-10

## 2023-02-10 RX ADMIN — LIDOCAINE HYDROCHLORIDE 5 ML: 10 INJECTION, SOLUTION EPIDURAL; INFILTRATION; INTRACAUDAL; PERINEURAL at 09:59

## 2023-02-10 RX ADMIN — GADOBUTROL 7 ML: 604.72 INJECTION INTRAVENOUS at 09:44

## 2023-02-10 RX ADMIN — LIDOCAINE HYDROCHLORIDE,EPINEPHRINE BITARTRATE 20 ML: 20; .01 INJECTION, SOLUTION INFILTRATION; PERINEURAL at 09:58

## 2023-02-10 NOTE — NURSING NOTE
Patient left breast tolerated biopsy well, no complaints verbalized  After holding pressure to site, steri-strips applied and band aide  IV removed from left [facility] site  Post procedure discharge instructions and ice packs given to patient with verbal communication of understanding  Patient left MRI suite ambulating with no complaints

## 2023-02-13 NOTE — PROGRESS NOTES
Post procedure call completed    Bleeding: _____yes __X___no    Pain: _____yes ___X___no    Redness/Swelling: ______yes ___X___no    Band aid removed: __x___yes _____no    Steri-Strips intact: ___X___yes _____no (discussed with patient to remove steri strips on     if they have not come off on their own)    Pt with no questions at this time, adv will call when results available, adv to call with any questions or concerns, has name/# for contact

## 2023-02-21 ENCOUNTER — CONSULT (OUTPATIENT)
Dept: CARDIOLOGY CLINIC | Facility: CLINIC | Age: 28
End: 2023-02-21

## 2023-02-21 VITALS
WEIGHT: 171 LBS | BODY MASS INDEX: 29.19 KG/M2 | HEART RATE: 78 BPM | RESPIRATION RATE: 18 BRPM | DIASTOLIC BLOOD PRESSURE: 64 MMHG | SYSTOLIC BLOOD PRESSURE: 128 MMHG | OXYGEN SATURATION: 100 % | HEIGHT: 64 IN

## 2023-02-21 DIAGNOSIS — C50.211 MALIGNANT NEOPLASM OF UPPER-INNER QUADRANT OF RIGHT BREAST IN FEMALE, ESTROGEN RECEPTOR POSITIVE (HCC): ICD-10-CM

## 2023-02-21 DIAGNOSIS — Z17.0 MALIGNANT NEOPLASM OF UPPER-INNER QUADRANT OF RIGHT BREAST IN FEMALE, ESTROGEN RECEPTOR POSITIVE (HCC): ICD-10-CM

## 2023-02-21 NOTE — PROGRESS NOTES
OP Consultation - Cardiology    Sascha Blount 28 y o  female MRN: [id number]    Physician Requesting Consult: Jayda Hurtado MD    Reason for Consult / Chief Complaint: Tamoxifen therapy    HPI:       19-year-old female with past medical history of breast cancer, who presents for cardiovascular evaluation  Patient has been on tamoxifen  She denies all complaints  Denies chest pain, chest pressure, shortness of breath, dizziness, lightheadedness, presyncope or syncope  Denies orthopnea, PND or lower limb edema  She is very active  Owns a restaurant/bar  He is constantly on her feet  Also exercises daily  Exercise on a treadmill for up to 30 minutes with no symptoms  Social History:  Tobacco: Denies  Alcohol: Social    Family history:  No family history of premature coronary artery disease  Family history of blood clot in father recently    FAMILY HISTORY:  Family History   Problem Relation Age of Onset   • Prostate cancer Father    • Kidney cancer Maternal Grandfather    • Breast cancer Maternal Aunt 48        MEDS & ALLERGIES:  No Known Allergies      Current Outpatient Medications:   •  b complex vitamins capsule, Take 1 capsule by mouth daily, Disp: , Rfl:   •  Calcium Carbonate-Vit D-Min (CALCIUM 1200 PO), Take by mouth, Disp: , Rfl:   •  cholecalciferol (VITAMIN D3) 1,000 units tablet, Take 1,000 Units by mouth daily 4,000 IU daily, Disp: , Rfl:   •  tamoxifen (NOLVADEX) 20 mg tablet, Take 1 tablet (20 mg total) by mouth daily, Disp: 90 tablet, Rfl: 3    Past Medical History:   Diagnosis Date   • Breast cancer ([facility] ) 07/19/2021    right breast   • Cancer [facility])    • History of chemotherapy 2021    right breast cancer   • Melanoma ([facility] ) 2018         Review of Systems:  Review of Systems   Constitutional: Negative  Negative for activity change, appetite change, chills, fatigue and fever  Respiratory: Negative for cough, chest tightness, shortness of breath and wheezing      Cardiovascular: Negative for chest pain, palpitations and leg swelling  Gastrointestinal: Negative for nausea and vomiting  Musculoskeletal: Negative for back pain  Neurological: Negative for dizziness, syncope, weakness and light-headedness  Psychiatric/Behavioral: Negative for agitation  All other systems reviewed and are negative  PHYSICAL EXAM:  Vitals:   Vitals:    02/21/23 0915   BP: 128/64   BP Location: Left arm   Patient Position: Sitting   Cuff Size: Standard   Pulse: 78   Resp: 18   SpO2: 100%   Weight: 77 6 kg (171 lb)   Height: 5' 4" (1 626 m)        Physical Exam:  Physical Exam  Vitals and nursing note reviewed  Constitutional:       General: She is not in acute distress  Appearance: Normal appearance  She is not ill-appearing or diaphoretic  HENT:      Head: Normocephalic and atraumatic  Eyes:      Extraocular Movements: Extraocular movements intact  Cardiovascular:      Rate and Rhythm: Normal rate and regular rhythm  Heart sounds: No murmur heard  No friction rub  No gallop  Pulmonary:      Effort: Pulmonary effort is normal  No respiratory distress  Breath sounds: Normal breath sounds  Abdominal:      General: There is no distension  Palpations: Abdomen is soft  Musculoskeletal:         General: No swelling  Normal range of motion  Cervical back: Normal range of motion  Skin:     General: Skin is warm and dry  Capillary Refill: Capillary refill takes less than 2 seconds  Coloration: Skin is not pale  Neurological:      General: No focal deficit present  Mental Status: She is alert and oriented to person, place, and time  Cranial Nerves: No cranial nerve deficit     Psychiatric:         Mood and Affect: Mood normal          Behavior: Behavior normal          LABORATORY RESULTS:    Lab Results   Component Value Date    WBC 6 09 09/06/2022    HGB 11 4 (L) 09/06/2022    HCT 35 7 09/06/2022    MCV 93 09/06/2022     09/06/2022     Lab Results   Component Value Date    CALCIUM 8 5 09/06/2022    K 4 0 09/06/2022    CO2 26 09/06/2022     09/06/2022    BUN 15 09/06/2022    CREATININE 0 79 09/06/2022       Imaging: I have personally reviewed pertinent reports  No results found  EKG reviewed personally: Normal sinus rhythm    Assessment and Plan:    Sirena Cantrell was seen today for consult  Diagnoses and all orders for this visit:    Malignant neoplasm of upper-inner quadrant of right breast in female, estrogen receptor positive ([facility] )  -     Ambulatory referral to Cardiology         Patient is doing well  She has no complaints  Denies chest pain, chest pressure, lightheadedness, dizziness, shortness of breath  ECG shows normal sinus rhythm  Normal QT interval   Will check echocardiogram for further evaluation  Her father recently had blood clots in lower limbs  Will defer to her hematologist/oncologist whether she needs further screening on tamoxifen  Patient will reach out to hematologist      Recommend patient presents to the emergency room or call our office if he has any new/persistent or progressive symptoms      Return to clinic in 6 months or PRN    Roger Jane MD  5/73/8093,7:12 AM

## 2023-02-28 ENCOUNTER — TELEPHONE (OUTPATIENT)
Dept: HEMATOLOGY ONCOLOGY | Facility: CLINIC | Age: 28
End: 2023-02-28

## 2023-02-28 DIAGNOSIS — Z82.49 FAMILY HISTORY OF BLOOD CLOTS: Primary | ICD-10-CM

## 2023-02-28 PROBLEM — Z98.890 HISTORY OF LUMPECTOMY OF RIGHT BREAST: Status: ACTIVE | Noted: 2023-02-28

## 2023-02-28 NOTE — PROGRESS NOTES
Hematology/Oncology Progress Note    Date of Service: 3/8/2023    128 Levine, Susan. \Hospital [facility] Name and Outlook.\"" [facility]  [address] [address]    Hem/Onc Problem List:   1  Right breast invasive ductal carcinoma, triple positive, grade 3 ,  [id number], stage IA    Chief Complaint:         Routine follow-up for management of breast cancer    Assessment/Plan:     1  Right breast invasive ductal carcinoma, grade 3, ER 90%, GA 70%, HER2 positive by IHC  Genetic testing negative  Status post right breast lumpectomy with sentinel lymph node biopsy on  July 19, 2021  Pathology showed invasive mammary carcinoma of no special type  The tumor measures 1 8 cm  Grade 3  All margins negative  All lymph nodes negative  Grade 2 DCIS present  No evidence of lymphovascular invasion  Final pathologic stage [id number]  Overall, the patient tolerated the procedure very well  ECHO showed EF 65%  Patient had a 2nd opinion from [facility]  Dr Olvin Mcgee recommended adjuvant TH weekly for 3 months followed by total of 1 year of maintenance Herceptin target  which she has completed  Chemotherapy TH was given on September 13, 2021 through November 29, 2021  Patient used cold cap to preserve her hair in our [employer]  The patient also had oocytes cryopreservation done on September 3, 2021  Patient consulted Dr Patti Ramirez  Adjuvant radiation therapy started in January 2022 through end of that month  Maintenance Herceptin started on December 6, 2021  Last echo study on December 17, 2021 showed ejection fraction 68%  Echo study in March 18, 2022 showed ejection fraction 60%  Patient will continue Herceptin for total of 1 year to end 11/7/2022  Patient started tamoxifen 20 mg daily on December 20, 2021  Patient tolerated treatment very well and now on surveillance  Repeat lab and echo study per Cardio-onc  2     Genetic predisposition:   Invitae breast cancer panel negative     3    History of palpitation  Echo study showed normal LV function, LVEF 60 %  Repeat echo study per Cardio-Onc  4  Fertility  Preservation:  Patient consulted Reproductive Medicine  Oocytes cryopreservation with 48 eggs saved on September 3, 2021     5   History of melanoma in 2018  Status post wide local excision  No evidence of local recurrence  Sees a Dermatologist 1-2x a year  6  Skin lesions, status post wide local excision  Follow-up with Dermatology  Factor V Leiden (in process), Prothrombin gene mutation, lupus anticoagulant testing all WNL  Discussion of decision making    I personally reviewed the following lab results, the image studies, pathology, other specialty/physicians consult notes and recommendations, and outside medical records from [facility]  I had a lengthy discussion with the patient and shared the work-up findings  We discussed the diagnosis and management plan as below  I spent 31 minutes reviewing the records (labs, clinician notes, outside records, medical history, ordering medicine/tests/procedures, interpreting the imaging/labs previously done) and coordination of care as well as direct time with the patient today, of which greater than 50% of the time was spent in counseling and coordination of care with the patient/family        · Plan/Labs  · Follow up with Surg-Onc, screening mammograms, MRI breast   · F/u Cardiology-oncology for long term surveillance from a cardiac perspective (she saw one of his colleagues)  · Cont Tamoxifen  · F/u Factor V Leiden that I have ordered  · She has been counseled to not get pregnant in the first 2 years since her cancer diagnosis and treatment and ideally at least 5 years while on Tamoxifen until 12/2026  · We discussed importance of being on protection so that she does not inadvertently get pregnant  · DEXA scan for baseline study while on Tamoxifen      Follow Up: 12 months with Jeri to cont surveillance    All questions were answered to the patient's satisfaction during this encounter  The patient knows the contact information for our office and knows to reach out for any relevant concerns related to this encounter  They are to call for any temperature 100 4 or higher, new symptoms including but not restricted to shaking chills, decreased appetite, nausea, vomiting, diarrhea, increased fatigue, shortness of breath or chest pain, confusion, and not feeling the strength to come to the clinic  For all other listed problems and medical diagnosis in their chart - they are managed by PCP and/or other specialists, which the patient acknowledges  Thank you very much for your consultation and making us a part of this patient's care  We are continuing to follow closely with you  Please do not hesitate to reach out to me with any additional questions or concerns  Aba Haji MD  Hematology & Medical Oncology Staff Physician    Disclaimer: This document was prepared using [employer] technology  If a word or phrase is confusing, or does not make sense, this is likely due to recognition error which was not discovered during the providers review  If you believe an error has occurred, please Contact me through [employer] service for toya? cation  Hematology/Oncology History:   · History of skin malignant lymphoma, status post wide local excision  · May 2021, patient palpated a lump in the right breast at upper inner quadrant  · June 23, 2021 , Mammogram and ultrasound in [facility] showed an irregular complex cystic mass with microlobulated margins measuring 1 5 x 1 3 x 1 4 cm  No discrete nodes are found in the right axilla  · June 24, 2021, patient underwent ultrasound guided core biopsy of the right breast mass: Pathology showed Grade 3 invasive ductal carcinoma, positive for carcinoma in-situ component, positive perineural remission  Negative for lymphovascular invasion    ER 90%, CA 70%, HER2 positive by IHC  · 06/30/2021, patient consulted Dr Lazara Banerjee  Staging CT scan and bone scan ordered  Patient was refered to genetic counseling  · July 1, 2021 CT scan chest/abdomen/ pelvis with contrast for staging showed bilateral ovarian cysts  with left adnexal cyst measures 4 2 x 4 4 x 3 3 cm, and right adnexal cyst measures 4 3 x 3 0 x 3 5 cm  No evidence of metastatic disease in C/ A/P   · MRI of bilateral breast in July 1, 2021  redemonstrated the 1:00 O'Clock  biopsy-proven breast cancer  No axillary adenopathy  No extension to the chest wall  No concurrent lesions in either breast   · July 2, 2021, bone scan showed no evidence of metastatic disease  · This case was discussed in our multidisciplinary breast cancer tumor board with recommendation of surgery followed by adjuvant chemotherapy  · July 9, 2021, echo study showed normal left ventricle ejection fraction 65%  · July 12, 2021, genetic test: Invitae breast cancer panel negative  · July 19, 2021, patient underwent right breast lumpectomy with sentinel lymph node biopsy  Pathology showed invasive mammary carcinoma of no special type( ductal NST)  The tumor measures 18 mm, grade 3  All margins negative with closest margin 8 mm from tumor involving the posterior margin  5 lymph nodes negative for metastatic disease  Grade 2 DCIS present  No evidence of lymphovascular invasion  Final pathologic stage [id number],   · August 17, 2021, patient consulted Dr Carmen Cabral from [id number] with recommendation of [facility] [place]  · August 19 2021, patient consulted Reproductive Medicine and started oocytes cryopreservation,  completed on September 3, 2021 with 48 Eggs saved  · September 13, 2021, patient started adjuvant chemotherapy with TH, completed on November 29, 2021  · December 6, 2021 maintenance Herceptin started  · December 17, 2021 echo study showed ejection 68%    · March 18, 2022 echo study showed ejection fraction 60%  · 8/2/2022 mammogram: There are post-surgical findings from a previous lumpectomy with radiation seen in the upper inner quadrant of the right breast in the posterior depth  There are no suspicious masses, grouped microcalcifications or areas of unexplained architectural distortion  The skin and nipple areolar complex are unremarkable  · 8/19/2022: TTE LVEF 60%  · 9/6/2022: Last dose of Trastuzumab to carrol 1 year total therapy  · 3/2/2023: Lupus Anticoagulant, B2 glycoprotein Ab, Cardiolipin Ab negative  Prothrombin gene WT  History of Present Illiness:   Vero Cintron is a 28 y o  female with the above-noted HemOnc history who is here for routine follow-up  Patient has a history of triple positive right breast cancer, status post lumpectomy on July 19, 2021  [id number]  Patient had 24 Oocytes  crypreservation on September 3, 2021  Patient started adjuvant chemotherapy with TH on September 13, 2021, completed 3 months of treatment on November 29, 2021  She also used cold cap to preserve her hair  Patient did well  Patient currently on maintenance Herceptin started on December 6, 2021  Echo study in December 17, 21 showed ejection fraction 68%  Repeat echo study in March 18, 2022 with ejection fraction 55%  Patient consulted Dr Alejandrina Holley  Adjuvant radiation therapy on September 13, 2021 through November 29, 2021  Patient also started tamoxifen 20 mg once daily on December 20, 2021  She did very well without significant side effect     Interval events: No acute issues aside from skin infection over R hand  She feels well  No F/C, N/V, SOB, CP, LH, HA, rash, itching  She has been having her menstrual periods even while on Tamoxifen  ROS: A 10-point of review of systems is obtained and other than the above is noncontributory       Objective:   VITALS:   /64 (BP Location: Left arm, Patient Position: Sitting, Cuff Size: Standard)   Pulse 73   Temp 97 8 °F (36 6 °C) (Temporal)   Resp 18   Ht 5' 4" (1 626 m)   Wt 74 6 kg (164 lb 8 oz)   LMP 02/22/2023 (Approximate)   SpO2 98%   BMI 28 24 kg/m²     Weigh today: 164 lbs    Physical EXAM:  General:  Alert, cooperative, no distress  Head:  Normocephalic, without obvious abnormality  Eyes:  Conjunctivae/corneas clear  No evidence of conjunctivitis     Throat:  No mouth or nose bleeding  Neck: symmetrical, no adenopathy    Lungs:   Respiratory effort easy, nonlabored    Heart:  Regular rate, +S1, S2   Abdomen:   Soft, nondistended  Skin: Erythema over R hand    Neurologic: AAO   No focal neuro deficits noted b/l       No Known Allergies    Past Medical History:   Diagnosis Date   • Breast cancer ([facility] ) 07/19/2021    right breast   • Cancer [facility])    • History of chemotherapy 2021    right breast cancer   • [facility] 2018       Past Surgical History:   Procedure Laterality Date   • BREAST BIOPSY     • BREAST LUMPECTOMY Right 7/19/2021    Procedure: LYNDSAY  DIRECTED LUMPECTOMY;  Surgeon: Katie Quiroz MD;  Location: [facility];  Service: Surgical Oncology   • LYMPH NODE BIOPSY Right 7/19/2021    Procedure: LYMPHATIC MAPPING WITH BLUE AND RADIOACTIVE DYES, SENTINEL LYMPH NODE BIOPSY, injection at 56;  Surgeon: Katie Quiroz MD;  Location: [facility];  Service: Surgical Oncology   • MRI BREAST BIOPSY LEFT (ALL INCLUSIVE) Left 2/10/2023   • SKIN CANCER EXCISION     • TONSILLECTOMY AND ADENOIDECTOMY     • WISDOM TOOTH EXTRACTION         Family History   Problem Relation Age of Onset   • Prostate cancer Father    • Kidney cancer Maternal Grandfather    • Breast cancer Maternal Aunt 48       Social History     Socioeconomic History   • Marital status: Single     Spouse name: Not on file   • Number of children: Not on file   • Years of education: Not on file   • Highest education level: Not on file   Occupational History   • Not on file   Tobacco Use   • Smoking status: Never   • Smokeless tobacco: Never   Vaping Use   • Vaping Use: Never used   Substance and Sexual Activity   • Alcohol use: Yes     Comment: Socially   • Drug use: No   • Sexual activity: Yes   Other Topics Concern   • Not on file   Social History Narrative   • Not on file     Social Determinants of Health     Financial Resource Strain: Not on file   Food Insecurity: Not on file   Transportation Needs: Not on file   Physical Activity: Not on file   Stress: Not on file   Social Connections: Not on file   Intimate Partner Violence: Not on file   Housing Stability: Not on file       Current Outpatient Medications   Medication Sig Dispense Refill   • b complex vitamins capsule Take 1 capsule by mouth daily     • Calcium Carbonate-Vit D-Min (CALCIUM 1200 PO) Take by mouth     • cholecalciferol (VITAMIN D3) 1,000 units tablet Take 1,000 Units by mouth daily 4,000 IU daily     • tamoxifen (NOLVADEX) 20 mg tablet Take 1 tablet (20 mg total) by mouth daily 90 tablet 3     No current facility-administered medications for this visit  (Not in a hospital admission)      DATA REVIEW:    Pathology Result:    Final Diagnosis   Date Value Ref Range Status   02/10/2023   Final    A  Breast, Left, Left Breast, 6 o'clock:  - Benign breast tissue with dilated duct with foamy macrophages, periductal fibrosis, chronic inflammation and focal columnar cell change  - Negative for atypical hyperplasia, in situ or invasive carcinoma  Comment A: Immunohistochemical stains performed with adequate controls on block A1 demonstrates that p63 and Calponin highlight myoepithelial cells  CK5/6 and CK-AE1/AE3 highlight epithelial cells  07/19/2021   Final    A  Right breast (lumpectomy):     - Invasive mammary carcinoma of no special type (ductal NST)  - Tumor size: 18 mm  Tumor thgthrthathdtheth:th th4th of 3       - Closest surgical margin: posterior (8 mm from tumor)  Comment:  ER / MI / Her-2 pending  B  [place] lymph node #1, right axilla (excision):      - One (1) lymph node, negative for carcinoma  C  Adipose tissue, right axilla (excision):     - Four (4) lymph nodes, negative for carcinoma  D  Superior margin, right breast (excision):     - Benign fibroadipose tissue        - Superior margin negative for carcinoma  E   Medial margin, right breast (excision):      - Benign fibroadipose tissue        - Medial margin negative for carcinoma  F  Inferior margin, right breast (excision):      - Benign fibroadipose tissue and scant benign breast tissue  - Inferior margin negative for carcinoma  G  Lateral margin, right breast (excision):     - Benign fibroadipose tissue        - Lateral margin negative for carcinoma  Comment: This is an appended report  These results have been appended to a previously preliminary verified report  07/01/2020   Final    A  Skin, right lower back, shave biopsy:    COMPOUND NEVUS; extending to the tissue edges  02/05/2019   Final    A  Skin, Left Forearm, excision:  - Early scar and residual Spitz nevus, peripheral and deep margins uninvolved  See Note  Interpretation performed at [facility], [address] [place] [address]      01/14/2019   Final    A  Skin, Arm, Left, shave biopsy:  - Spitz's nevus; see note  Note:  Sections show a fairly circumscribed and mostly nested melanocytic proliferation along the basal layer  The cells are spindled and epithelioid, with fairly uniform cytology  Occasional Kamino bodies are also seen  P16 immunostain is positive in the lesional cells  On section planes studied, lesional cells are close to bilateral tissue edges  If this is a portion of a larger clinical lesion, complete removal to preclude recurrence would be prudent  Clinical correlation is recommended  Interpretation performed at [facility], [address] [place], [place], [address]       10/01/2018   Final    A  Skin, abdomen, excision:  - Prior biopsy site reaction, no residual melanoma seen    - Inked margins with no tumor seen  Interpretation performed at [facility], [address]           09/06/2018   Final    A  Skin Abdominal, abdomen, shave biopsy:  - Malignant melanoma in situ with small focus suspicious for early invasive melanoma, 0 4 mm thickness,     extending to peripheral margins  MELANOMA OF SKIN TUMOR STAGING SUMMARY  1  Specimen identification:     - Procedure: Shave biopsy       - Laterality: Not specified  2  Tumor     - Tumor Site: Abdomen      - Macroscopic satellite nodule(s) (invasive tumor only): Not identified  - Histologic type:       -- Invasive Melanoma:  Superficial spreading melanoma  -- Melanoma in-situ (anatomic level I): N/A      - Maximum tumor (Breslow) thickness (pT1a)(invasive tumor only): 0 4 mm       - Anatomic (Sukhdeep) level (invasive tumor only): II       - Ulceration (invasive tumor only): Not identified  3  Margins:     - Peripheral margins: Involved by in-situ melanoma  - Deep margins (invasive tumor only): Uninvolved by invasive melanoma by 1 3 mm     4  Mitotic Rate (specify number / mm2)(invasive tumor only): 0/mm2    5  Microsatellite(s) (invasive tumor only): Not identified  6  Lymphovascular invasion (invasive tumor only): Not identified  7  Neurotropism (invasive tumor only): Not identified  8  Tumor-infiltrating lymphocytes (invasive tumor only): Present, brisk  9  Tumor regression (excision, if present < or more than 75%): Not identified  10  Growth Phase: Radial growth phase  11: Ancillary Studies:  None  -- Best representative tumor block: N/A (one block)  12  Additional pathologic findings:       -- Associated Nevus:  Cannot exclude dermal nevus  -- Other (specify): Epithelioid cell type  13  AJCC 8th Ed  Pathologic Stage Classification:  at least Stage  IA- pT1a, pNX          Interpretation performed at [facility], [address] [place] [id number]           Image Results: They are reviewed and documented in Hematology/Oncology history    MRI breast biopsy left (all inclusive)  Addendum: ADDENDUM:     PATHOLOGY RESULTS:    C) Non-mass Enhancement (Left; 6 o'clock)   Benign finding: the pathology findings indicate:   - Benign breast tissue with dilated duct with foamy macrophages,  periductal fibrosis, chronic inflammation and focal columnar cell change  - Negative for atypical hyperplasia, in situ or invasive carcinoma  The pathology result is concordant with imaging  RECOMMENDATION:        - Diagnostic Mammogram in 6 months for both breasts  END ADDENDUM  Narrative: DIAGNOSIS: Malignant neoplasm of upper-inner quadrant of right breast in   female, estrogen receptor positive ([address] ); Mass overlapping multiple   quadrants of left breast    MEDICATIONS ADMINISTERED:  Gadobutrol injection (SINGLE-DOSE) SOLN 7 mL, Total Given: 7 mL (1 dose)     INDICATION: Vero Cintron is a 28 y o  female presenting for MRI   guided biopsy left breast 6 o'clock linear non-mass enhancement  FINDINGS:   LEFT  C) NON-MASS ENHANCEMENT:   Procedure:    Prior to the procedure, the risks, benefits and alternatives to the   procedure were discussed in detail the patient  All questions are   answered  Written and verbal informed consent was obtained  The patient was brought into the MRI biopsy suite, and positioned prone on   the MRI table  The left breast was positioned in compression with a grid   on the lateral aspect  MRI of the breast was performed in axial and sagittal planes utilizing a   combination of  sagittal dynamic T1 preand post contrast imaging with fat   suppression in addition to axial postcontrast fat saturated T1  The above   referenced contrast was administered at 2 cc/sec  The lesion of interest was targeted from the control suite  Next, the   skin was cleansed with Betadine solution    The skin and subjacent tissues   were infiltrated with lidocaine solution for local anesthesia  The   introducer and trocar were inserted to the appropriate depth  The trocar   was removed and replaced with the obturator  Additional sagittal and axial T1-weighted images were obtained showing the   obturator tip terminating at the target lesion  The obturator was removed and replaced with a standard [facility] biopsy   needle  6 samples were obtained circumferentially from the biopsy site  The biopsy device was removed  A stoplight clip was deposited at the biopsy site  The obturator was   replaced and post procedure images were obtained showing expected   postbiopsy change and biopsy clip susceptibility artifact near the biopsy   site  The obturator and introducer were removed  Firm pressure was held to the   site to ensure adequate hemostasis  A sterile bandage was applied  The patient left the department in stable condition  Specimens were placed in formalin solution and sent to pathology  Impression: Uncomplicated MRI guided biopsy of left breast 6 o'clock linear non-mass   enhancement, with placement of a stoplight clip  RECOMMENDATION:       - Waiting for pathology for the left breast      Workstation ID: [id number]        LABS:  Lab data are reviewed and documented in HemOn history  No results found for this or any previous visit (from the past 48 hour(s))            Aba Dempsey  3/8/2023, 1:11 PM

## 2023-03-02 ENCOUNTER — OFFICE VISIT (OUTPATIENT)
Dept: SURGICAL ONCOLOGY | Facility: CLINIC | Age: 28
End: 2023-03-02

## 2023-03-02 ENCOUNTER — APPOINTMENT (OUTPATIENT)
Dept: LAB | Facility: HOSPITAL | Age: 28
End: 2023-03-02

## 2023-03-02 VITALS
HEART RATE: 72 BPM | WEIGHT: 170 LBS | TEMPERATURE: 97.8 F | OXYGEN SATURATION: 92 % | HEIGHT: 64 IN | SYSTOLIC BLOOD PRESSURE: 112 MMHG | RESPIRATION RATE: 16 BRPM | BODY MASS INDEX: 29.02 KG/M2 | DIASTOLIC BLOOD PRESSURE: 62 MMHG

## 2023-03-02 DIAGNOSIS — Z98.890 HISTORY OF LUMPECTOMY OF RIGHT BREAST: ICD-10-CM

## 2023-03-02 DIAGNOSIS — C50.211 MALIGNANT NEOPLASM OF UPPER-INNER QUADRANT OF RIGHT BREAST IN FEMALE, ESTROGEN RECEPTOR POSITIVE (HCC): Primary | ICD-10-CM

## 2023-03-02 DIAGNOSIS — Z79.810 USE OF TAMOXIFEN (NOLVADEX): ICD-10-CM

## 2023-03-02 DIAGNOSIS — Z82.49 FAMILY HISTORY OF BLOOD CLOTS: Primary | ICD-10-CM

## 2023-03-02 DIAGNOSIS — Z17.0 MALIGNANT NEOPLASM OF UPPER-INNER QUADRANT OF RIGHT BREAST IN FEMALE, ESTROGEN RECEPTOR POSITIVE (HCC): Primary | ICD-10-CM

## 2023-03-02 NOTE — PROGRESS NOTES
Surgical Oncology Follow Up  [facility]  [facility] [address]  1995  [id number]      No chief complaint on file  Assessment & Plan:   79-year-old female with right breast cancer s/p breast conservation surgery followed by adjuvant chemotherapy and on endocrine therapy she is tolerating tamoxifen without any shortness of breath vaginal spotting or leg swelling no major side effects  She also had a left breast MRI guided biopsy which is negative for atypia, carcinoma in situ or invasive carcinoma  These results also concordant  The study findings were discussed  She denies of any new symptoms in the breast bilaterally bilateral axillary and supraclavicular examination no palpable adenopathy  We will obtain diagnostic mammogram in 6 months and follow her  Cancer History:     Oncology History   Malignant neoplasm of upper-inner quadrant of right breast in female, estrogen receptor positive ([facility] [address] )   6/24/2021 Biopsy    US guided Right breast core biopsy:  1 o'clock 13-14 cm from the nipple  Right invasive ductal carcinoma  Grade 3  ER 90  LA 70   HER2 3+  Lymphovascular invasion: not identified    Results are malignant and concordant  Recommend surgical consultation  Additionally, given patient's age and density of breast tissue, recommend enhanced breast MRI for further evaluation       Done at [facility]     6/30/2021 Genetic Testing    The following genes were evaluated: RADHA, BRCA1, BRCA2, CDH1, CHEK2, PALB2, PTEN, STK11, TP53  Additional genes tested for a total of 36 genes  VUS NF1   [employer]     7/1/2021 Observation    Bilateral breast MRI with and without contrast: B6     Biopsy confirmed malignancy  01:00 o'clock location right breast   No axillary lymphadenopathy, extension to the chest wall, or concurrent lesions in either breast        7/19/2021 Surgery    Right breast LYNDSAY  directed lumpectomy with sentinel lymph node biopsy:  Invasive mammary carcinoma of no special type; ductal  Grade 3  ER 65  PA 90  HER2 3+  18 mm  Margins negative  0/5 Lymph nodes  Stage IA      8/23/2021 Genomic Testing    MammaPrint: High Risk  HER2 Type     9/8/2021 -  Cancer Staged    Staging form: Breast, AJCC 8th Edition  - Clinical: Stage IA (cT1c, cN0, cM0, G3, ER+, PA+, HER2+) - Signed by Mercy Echevarria MD on 9/8/2021  Histologic grading system: 3 grade system       9/13/2021 - 11/29/2021 Chemotherapy    fosaprepitant (EMEND) IVPB, 150 mg, Intravenous, Once, 0 of 6 cycles  pertuzumab (PERJETA) IVPB, 840 mg, Intravenous, Once, 0 of 18 cycles  CARBOplatin (PARAPLATIN) IVPB (GOG AUC DOSING), 760 8 mg, Intravenous, Once, 0 of 6 cycles  DOCEtaxel (TAXOTERE) chemo infusion, 75 mg/m2 = 132 8 mg, Intravenous, Once, 0 of 6 cycles  trastuzumab (HERCEPTIN) chemo infusion, 8 mg/kg = 610 mg, Intravenous, Once, 0 of 18 cycles       9/13/2021 - 9/6/2022 Chemotherapy    PACLItaxel (TAXOL) chemo IVPB, 80 mg/m2 = 143 4 mg, Intravenous, Once, 12 of 12 cycles  Administration: 143 4 mg (9/13/2021), 143 4 mg (9/20/2021), 143 4 mg (10/4/2021), 143 4 mg (10/18/2021), 143 4 mg (10/25/2021), 143 4 mg (11/1/2021), 143 4 mg (11/15/2021), 143 4 mg (11/22/2021), 143 4 mg (9/27/2021), 143 4 mg (10/11/2021), 143 4 mg (11/8/2021), 143 4 mg (11/29/2021)  trastuzumab (HERCEPTIN) chemo infusion, 304 mg, Intravenous, Once, 26 of 26 cycles  Administration: 300 mg (9/13/2021), 150 mg (9/20/2021), 150 mg (10/4/2021), 150 mg (10/18/2021), 150 mg (10/25/2021), 150 mg (11/1/2021), 150 mg (11/15/2021), 150 mg (11/22/2021), 150 mg (9/27/2021), 150 mg (10/11/2021), 450 mg (12/6/2021), 440 mg (8/15/2022), 440 mg (9/6/2022), 150 mg (11/8/2021), 150 mg (11/29/2021), 450 mg (12/27/2021), 450 mg (1/17/2022), 450 mg (2/7/2022), 450 mg (2/28/2022), 450 mg (3/21/2022), 450 mg (4/11/2022), 450 mg (5/2/2022), 430 mg (5/26/2022), 440 mg (6/13/2022), 440 mg (7/6/2022), 440 mg (7/25/2022)     1/3/2022 - 1/31/2022 Radiation    Treatments:  Course: C1  Plan ID Energy Fractions Dose per Fraction (cGy) Total Dose Delivered (cGy) Elapsed Days   Hypo R Bolus 6X 7 / 7 267 1,869 16   Hypo R Brst 6X 8 / 8 267 2,136 18   R Brst e 9E 5 / 5 200 1,000 6    Treatment Dates:  1/3/2022 - 1/31/2022              Interval History:   Follow-up with right breast cancer with new left breast MRI guided biopsy    Review of Systems:   Review of Systems    Past Medical History     Patient Active Problem List   Diagnosis   • Nevus   • Malignant melanoma of skin of abdomen ([facility] [address] )   • Neck pain   • Multiple benign melanocytic nevi   • Iron deficiency anemia due to chronic blood loss   • Warts   • Surveillance for birth control, oral contraceptives   • Discomfort of left ear   • Benign paroxysmal positional vertigo due to bilateral vestibular disorder   • Spitz nevus of forearm, left   • Enlarged thyroid   • Malignant neoplasm of upper-inner quadrant of right breast in female, estrogen receptor positive (HCC)   • Breast lump or mass   • Use of tamoxifen (Nolvadex)   • History of lumpectomy of right breast     Past Medical History:   Diagnosis Date   • Breast cancer ([facility] [address] ) 07/19/2021    right breast   • Cancer [facility])    • History of chemotherapy 2021    right breast cancer   • Melanoma ([facility] [address] ) 2018     Past Surgical History:   Procedure Laterality Date   • BREAST BIOPSY     • BREAST LUMPECTOMY Right 7/19/2021    Procedure: LYNDSAY  DIRECTED LUMPECTOMY;  Surgeon: Maryam Lawson MD;  Location: [facility];  Service: Surgical Oncology   • LYMPH NODE BIOPSY Right 7/19/2021    Procedure: LYMPHATIC MAPPING WITH BLUE AND RADIOACTIVE DYES, SENTINEL LYMPH NODE BIOPSY, injection at 8391;  Surgeon: Maryam Lawson MD;  Location: [facility];  Service: Surgical Oncology   • MRI BREAST BIOPSY LEFT (ALL INCLUSIVE) Left 2/10/2023   • SKIN CANCER EXCISION     • TONSILLECTOMY AND ADENOIDECTOMY     • WISDOM TOOTH EXTRACTION       Family History   Problem Relation Age of Onset   • Prostate cancer Father    • Kidney cancer Maternal Grandfather    • Breast cancer Maternal Aunt 48     Social History     Socioeconomic History   • Marital status: Single     Spouse name: Not on file   • Number of children: Not on file   • Years of education: Not on file   • Highest education level: Not on file   Occupational History   • Not on file   Tobacco Use   • Smoking status: Never   • Smokeless tobacco: Never   Vaping Use   • Vaping Use: Never used   Substance and Sexual Activity   • Alcohol use: Yes     Comment: Socially   • Drug use: No   • Sexual activity: Yes   Other Topics Concern   • Not on file   Social History Narrative   • Not on file     Social Determinants of Health     Financial Resource Strain: Not on file   Food Insecurity: Not on file   Transportation Needs: Not on file   Physical Activity: Not on file   Stress: Not on file   Social Connections: Not on file   Intimate Partner Violence: Not on file   Housing Stability: Not on file       Current Outpatient Medications:   •  b complex vitamins capsule, Take 1 capsule by mouth daily, Disp: , Rfl:   •  Calcium Carbonate-Vit D-Min (CALCIUM 1200 PO), Take by mouth, Disp: , Rfl:   •  cholecalciferol (VITAMIN D3) 1,000 units tablet, Take 1,000 Units by mouth daily 4,000 IU daily, Disp: , Rfl:   •  tamoxifen (NOLVADEX) 20 mg tablet, Take 1 tablet (20 mg total) by mouth daily, Disp: 90 tablet, Rfl: 3  No Known Allergies    Physical Exam:     Vitals:    03/02/23 0957   BP: 112/62   Pulse: 72   Resp: 16   Temp: 97 8 °F (36 6 °C)   SpO2: 92%     Physical Exam      Results & Discussion:   I did review left breast biopsy results in detail I did discussed in detail nature of breast breast cancer and recurrence and follow-up  We also discussed self breast examination, further imaging options  she understands and  agrees   All patient questions were answered  Advance Care Planning/Advance Directives:   Christian Sanchez Alis Dowling MD discussed the disease status with Cam Glover  today 03/02/23  treatment plans and follow-up with the patient

## 2023-03-03 LAB
B2 GLYCOPROT1 IGA SERPL IA-ACNC: 2.4
B2 GLYCOPROT1 IGG SERPL IA-ACNC: 1.8
B2 GLYCOPROT1 IGM SERPL IA-ACNC: <2.4
CARDIOLIPIN IGA SER IA-ACNC: 3.9
CARDIOLIPIN IGG SER IA-ACNC: 1.9
CARDIOLIPIN IGM SER IA-ACNC: 1

## 2023-03-04 LAB
APTT SCREEN TO CONFIRM RATIO: 0.93 RATIO (ref 0–1.34)
CONFIRM APTT/NORMAL: 39.2 SEC (ref 0–47.6)
LA PPP-IMP: NORMAL
SCREEN APTT: 36.5 SEC (ref 0–43.5)
SCREEN DRVVT: 34.7 SEC (ref 0–47)
THROMBIN TIME: 18.4 SEC (ref 0–23)

## 2023-03-07 ENCOUNTER — TELEPHONE (OUTPATIENT)
Dept: HEMATOLOGY ONCOLOGY | Facility: CLINIC | Age: 28
End: 2023-03-07

## 2023-03-07 LAB — F2 GENE MUT ANL BLD/T: NORMAL

## 2023-03-07 NOTE — TELEPHONE ENCOUNTER
CALL RETURN FORM   Reason for patient call? Patient is calling today to request a reschedule for her follow up tomorrow 03/08/2023 for after her Echo that is scheduled on 04/24/2023,    Priscilla Gan would like to know if she should wait for after her echo or just keep her appointment for tomorrow  Patient's primary oncologist? Dr Pancho Alvares    Name of person the patient was calling for? Dr Ruth Alejandro team     Any additional information to add, if applicable? N/A   Informed patient that the message will be forwarded to the team and someone will get back to them as soon as possible    Did you relay this information to the patient?  YES

## 2023-03-08 ENCOUNTER — OFFICE VISIT (OUTPATIENT)
Dept: HEMATOLOGY ONCOLOGY | Facility: CLINIC | Age: 28
End: 2023-03-08

## 2023-03-08 VITALS
HEIGHT: 64 IN | DIASTOLIC BLOOD PRESSURE: 64 MMHG | HEART RATE: 73 BPM | SYSTOLIC BLOOD PRESSURE: 112 MMHG | TEMPERATURE: 97.8 F | BODY MASS INDEX: 28.09 KG/M2 | RESPIRATION RATE: 18 BRPM | OXYGEN SATURATION: 98 % | WEIGHT: 164.5 LBS

## 2023-03-08 DIAGNOSIS — C50.211 MALIGNANT NEOPLASM OF UPPER-INNER QUADRANT OF RIGHT BREAST IN FEMALE, ESTROGEN RECEPTOR POSITIVE (HCC): Primary | ICD-10-CM

## 2023-03-08 DIAGNOSIS — Z17.0 MALIGNANT NEOPLASM OF UPPER-INNER QUADRANT OF RIGHT BREAST IN FEMALE, ESTROGEN RECEPTOR POSITIVE (HCC): Primary | ICD-10-CM

## 2023-03-08 LAB — F5 GENE MUT ANL BLD/T: NORMAL

## 2023-04-05 ENCOUNTER — AMB VIDEO VISIT (OUTPATIENT)
Dept: OTHER | Facility: HOSPITAL | Age: 28
End: 2023-04-05

## 2023-04-05 DIAGNOSIS — L03.011 PARONYCHIA OF FINGER, RIGHT: Primary | ICD-10-CM

## 2023-04-05 PROBLEM — Z30.41 SURVEILLANCE FOR BIRTH CONTROL, ORAL CONTRACEPTIVES: Status: RESOLVED | Noted: 2018-04-24 | Resolved: 2023-04-05

## 2023-04-05 PROBLEM — H81.13 BENIGN PAROXYSMAL POSITIONAL VERTIGO DUE TO BILATERAL VESTIBULAR DISORDER: Status: RESOLVED | Noted: 2019-01-24 | Resolved: 2023-04-05

## 2023-04-05 PROBLEM — H92.02 DISCOMFORT OF LEFT EAR: Status: RESOLVED | Noted: 2019-01-24 | Resolved: 2023-04-05

## 2023-04-05 RX ORDER — CEPHALEXIN 500 MG/1
500 CAPSULE ORAL EVERY 6 HOURS SCHEDULED
Qty: 28 CAPSULE | Refills: 0 | Status: SHIPPED | OUTPATIENT
Start: 2023-04-05 | End: 2023-04-12

## 2023-04-05 NOTE — PROGRESS NOTES
Video Visit - [place] 28 y o  female MRN: [id number]    REQUIRED DOCUMENTATION:         1  This service was provided via [facility]  2  Provider located at [address] [place] [id number] [phone]  3  [facility] provider: Todd Ramirez PA-C   4  Identify all parties in room with patient during [facility] visit:  No one else  5  After connecting through [employer], patient was identified by name and date of birth  Patient was then informed that this was a Telemedicine visit and that the exam was being conducted confidentially over secure lines  My office door was closed  No one else was in the room  Patient acknowledged consent and understanding of privacy and security of the Telemedicine visit  I informed the patient that I have reviewed their record in Epic and presented the opportunity for them to ask any questions regarding the visit today  The patient agreed to participate  VITALS: Heart Rate: 72 BPM, Respiratory Rate: 14 RPM, Temperature Unavailable° F, Blood Pressure Unavailable mmHg, Pulse Ox Unavailable % on RA    HPI  Pt reports 2 months has an infection of R middle finger  It is swollen and cracked  Tried a steroid cream, neosporin, epsom salt, antibacterial ointment without relief  No other fingers infected, never had this before  Has artificial nails x 2 years  No fevers  Physical Exam  Constitutional:       General: She is not in acute distress  Appearance: Normal appearance  She is not toxic-appearing  HENT:      Head: Normocephalic and atraumatic  Nose: No rhinorrhea  Mouth/Throat:      Mouth: Mucous membranes are moist    Eyes:      Conjunctiva/sclera: Conjunctivae normal    Pulmonary:      Effort: Pulmonary effort is normal  No respiratory distress  Breath sounds: No wheezing (no gross audible wheeze through computer)  Musculoskeletal:      Cervical back: Normal range of motion     Skin:     Comments: R dorsal middle finger distal phalanx is erythematous, mildly edematous which she reports is indurated rather than fluctuant, and has some areas of dryness/desquamation   Neurological:      Mental Status: She is alert  Cranial Nerves: No dysarthria or facial asymmetry  Psychiatric:         Mood and Affect: Mood normal          Behavior: Behavior normal          Diagnoses and all orders for this visit:    Paronychia of finger, right  -     cephalexin (KEFLEX) 500 mg capsule; Take 1 capsule (500 mg total) by mouth every 6 (six) hours for 7 days      Patient Instructions   If symptoms not improving after 3-4 days, please schedule f/u with pcp or dermatologist    Paronychia   WHAT YOU NEED TO KNOW:   Stalin File is an infection of your nail fold caused by bacteria or a fungus  The nail fold is the skin around your nail  Paronychia may happen suddenly and last for 6 weeks or longer  You may have paronychia on more than 1 finger or toe  DISCHARGE INSTRUCTIONS:   Return to the emergency department if:   • You have severe nail pain  • The inflammation spreads to your hand or arm  Call your doctor if:   • Your nail becomes loose, deformed, or falls off  • You have a large abscess on your nail  • You have questions or concerns about your condition or care  Medicines:   • Td vaccine  is a booster shot used to help prevent tetanus and diphtheria  The Td booster may be given to adolescents and adults every 10 years or for certain wounds and injuries  • Antibiotics: This medicine will help fight or prevent an infection  It may be given as a pill, cream, or ointment  • Steroids: This medicine will help decrease inflammation  It may be given as a pill, cream, or ointment  • Antifungal medicine: This medicine helps kill fungus that may be causing your infection  It may be given as a cream or ointment  • NSAIDs:  These medicines decrease pain and swelling  NSAIDs are available without a doctor's order   Ask your healthcare provider which medicine is right for you  Ask how much to take and when to take it  Take as directed  NSAIDs can cause stomach bleeding and kidney problems if not taken correctly  • Take your medicine as directed  Contact your healthcare provider if you think your medicine is not helping or if you have side effects  Tell your provider if you are allergic to any medicine  Keep a list of the medicines, vitamins, and herbs you take  Include the amounts, and when and why you take them  Bring the list or the pill bottles to follow-up visits  Carry your medicine list with you in case of an emergency  Self-care:   • Soak your nail:  Soak your nail in a mixture of equal parts vinegar and water 3 or 4 times each day  This will help decrease inflammation  • Apply a warm compress:  Soak a washcloth in warm water and place it on your nail  This will help decrease inflammation  • Elevate:  Raise your nail above the level of your heart as often as you can  This will help decrease swelling and pain  Prop your nail on pillows or blankets to keep it elevated comfortably  • Use lotion:  Apply lotion after you wash your hands  This will prevent your skin from becoming too dry  Prevent paronychia:   • Avoid chemicals and allergens that may harm your skin and nails  This includes soaps, laundry detergents, and nail products  • Keep your nails clean and dry  Avoid soaking your nails in water  Use cotton-lined rubber gloves or wear 2 rubber gloves if you work with food or water  The gloves will help protect your nail folds  • Keep your nails short  Do not bite your nails, pick at your hangnails, suck your fingers, or wear fake nails  Bring your own nail tools when you go to the nail salon  Follow up with your doctor as directed:  Write down your questions so you remember to ask them during your visits    © Copyright [employer] 2022 Information is for End User's use only and may not be sold, redistributed or otherwise used for commercial purposes  The above information is an  only  It is not intended as medical advice for individual conditions or treatments  Talk to your doctor, nurse or pharmacist before following any medical regimen to see if it is safe and effective for you

## 2023-04-05 NOTE — CARE ANYWHERE EVISITS
Visit Summary for Adonis Barone - Gender: Female - Date of Birth: [id number]  Date: [id number] - Duration: 8 minutes  Patient: Adonis Barone  Provider: Maryana Wang PA-C    Patient Contact Information  Address  L[place] Moya [address] [place] [address]  [phone]    Visit Topics  Finger infection [Added By: Self - 2023-04-05]    Triage Questions   What is your current physical address in the event of a medical emergency? Answer []  Are you allergic to any medications? Answer []  Are you now or could you be pregnant? Answer []  Do you have any immune system compromise or chronic lung   disease? Answer []  Do you have any vulnerable family members in the home (infant, pregnant, cancer, elderly)? Answer []     Conversation Transcripts  [0A][0A] [Notification] You are connected with Maryana Wang PA-C, Urgent Care Specialist [0A][Notification] [facility] is located in [place]  [0A][Notification] Patience Locket has shared health history  Demetrice Fraser  [0A]    Diagnosis  Cellulitis of right finger    Procedures  Value: 11974 Code: CPT-4 UNLISTED E&M SERVICE    Medications Prescribed    No prescriptions ordered    Electronically signed by: Jodie Roberson(NPI [id number])

## 2023-04-05 NOTE — PATIENT INSTRUCTIONS
If symptoms not improving after 3-4 days, please schedule f/u with pcp or dermatologist    Paronychia   WHAT YOU NEED TO KNOW:   Fazal Rae is an infection of your nail fold caused by bacteria or a fungus  The nail fold is the skin around your nail  Paronychia may happen suddenly and last for 6 weeks or longer  You may have paronychia on more than 1 finger or toe  DISCHARGE INSTRUCTIONS:   Return to the emergency department if:   You have severe nail pain  The inflammation spreads to your hand or arm  Call your doctor if:   Your nail becomes loose, deformed, or falls off  You have a large abscess on your nail  You have questions or concerns about your condition or care  Medicines:   Td vaccine  is a booster shot used to help prevent tetanus and diphtheria  The Td booster may be given to adolescents and adults every 10 years or for certain wounds and injuries  Antibiotics: This medicine will help fight or prevent an infection  It may be given as a pill, cream, or ointment  Steroids: This medicine will help decrease inflammation  It may be given as a pill, cream, or ointment  Antifungal medicine: This medicine helps kill fungus that may be causing your infection  It may be given as a cream or ointment  NSAIDs:  These medicines decrease pain and swelling  NSAIDs are available without a doctor's order  Ask your healthcare provider which medicine is right for you  Ask how much to take and when to take it  Take as directed  NSAIDs can cause stomach bleeding and kidney problems if not taken correctly  Take your medicine as directed  Contact your healthcare provider if you think your medicine is not helping or if you have side effects  Tell your provider if you are allergic to any medicine  Keep a list of the medicines, vitamins, and herbs you take  Include the amounts, and when and why you take them  Bring the list or the pill bottles to follow-up visits   Carry your medicine list with you in case of an emergency  Self-care:   Soak your nail:  Soak your nail in a mixture of equal parts vinegar and water 3 or 4 times each day  This will help decrease inflammation  Apply a warm compress:  Soak a washcloth in warm water and place it on your nail  This will help decrease inflammation  Elevate:  Raise your nail above the level of your heart as often as you can  This will help decrease swelling and pain  Prop your nail on pillows or blankets to keep it elevated comfortably  Use lotion:  Apply lotion after you wash your hands  This will prevent your skin from becoming too dry  Prevent paronychia:   Avoid chemicals and allergens that may harm your skin and nails  This includes soaps, laundry detergents, and nail products  Keep your nails clean and dry  Avoid soaking your nails in water  Use cotton-lined rubber gloves or wear 2 rubber gloves if you work with food or water  The gloves will help protect your nail folds  Keep your nails short  Do not bite your nails, pick at your hangnails, suck your fingers, or wear fake nails  Bring your own nail tools when you go to the nail salon  Follow up with your doctor as directed:  Write down your questions so you remember to ask them during your visits  © Copyright [employer] 2022 Information is for End User's use only and may not be sold, redistributed or otherwise used for commercial purposes  The above information is an  only  It is not intended as medical advice for individual conditions or treatments  Talk to your doctor, nurse or pharmacist before following any medical regimen to see if it is safe and effective for you

## 2023-04-24 ENCOUNTER — CLINICAL SUPPORT (OUTPATIENT)
Dept: RADIATION ONCOLOGY | Facility: CLINIC | Age: 28
End: 2023-04-24
Attending: RADIOLOGY

## 2023-04-24 ENCOUNTER — HOSPITAL ENCOUNTER (OUTPATIENT)
Dept: NON INVASIVE DIAGNOSTICS | Facility: CLINIC | Age: 28
Discharge: HOME | End: 2023-04-24

## 2023-04-24 ENCOUNTER — RADIATION ONCOLOGY FOLLOW-UP (OUTPATIENT)
Dept: RADIATION ONCOLOGY | Facility: CLINIC | Age: 28
End: 2023-04-24
Attending: RADIOLOGY

## 2023-04-24 VITALS
HEART RATE: 92 BPM | OXYGEN SATURATION: 97 % | WEIGHT: 172 LBS | BODY MASS INDEX: 29.52 KG/M2 | DIASTOLIC BLOOD PRESSURE: 74 MMHG | RESPIRATION RATE: 16 BRPM | SYSTOLIC BLOOD PRESSURE: 120 MMHG | TEMPERATURE: 97.7 F

## 2023-04-24 VITALS
HEART RATE: 77 BPM | BODY MASS INDEX: 28 KG/M2 | WEIGHT: 164 LBS | SYSTOLIC BLOOD PRESSURE: 112 MMHG | DIASTOLIC BLOOD PRESSURE: 64 MMHG | HEIGHT: 64 IN

## 2023-04-24 DIAGNOSIS — Z17.0 MALIGNANT NEOPLASM OF UPPER-INNER QUADRANT OF RIGHT BREAST IN FEMALE, ESTROGEN RECEPTOR POSITIVE (HCC): Primary | ICD-10-CM

## 2023-04-24 DIAGNOSIS — Z17.0 MALIGNANT NEOPLASM OF UPPER-INNER QUADRANT OF RIGHT BREAST IN FEMALE, ESTROGEN RECEPTOR POSITIVE (HCC): ICD-10-CM

## 2023-04-24 DIAGNOSIS — C50.211 MALIGNANT NEOPLASM OF UPPER-INNER QUADRANT OF RIGHT BREAST IN FEMALE, ESTROGEN RECEPTOR POSITIVE (HCC): Primary | ICD-10-CM

## 2023-04-24 DIAGNOSIS — C50.211 MALIGNANT NEOPLASM OF UPPER-INNER QUADRANT OF RIGHT BREAST IN FEMALE, ESTROGEN RECEPTOR POSITIVE (HCC): ICD-10-CM

## 2023-04-24 LAB
APICAL FOUR CHAMBER EJECTION FRACTION: 67 %
E WAVE DECELERATION TIME: 234 MS
E/A RATIO: 2.55
FRACTIONAL SHORTENING: 38 (ref 28–44)
INTERVENTRICULAR SEPTUM IN DIASTOLE (PARASTERNAL SHORT AXIS VIEW): 0.6 CM
INTERVENTRICULAR SEPTUM: 0.6 CM (ref 0.6–1.1)
LEFT INTERNAL DIMENSION IN SYSTOLE: 2.8 CM (ref 2.1–4)
LEFT VENTRICULAR INTERNAL DIMENSION IN DIASTOLE: 4.5 CM (ref 3.5–6)
LEFT VENTRICULAR POSTERIOR WALL IN END DIASTOLE: 0.7 CM
LEFT VENTRICULAR STROKE VOLUME: 61 ML
LVSV (TEICH): 61 ML
MV E'TISSUE VEL-SEP: 17 CM/S
MV PEAK A VEL: 0.42 M/S
MV PEAK E VEL: 107 CM/S
MV STENOSIS PRESSURE HALF TIME: 68 MS
MV VALVE AREA P 1/2 METHOD: 3.2
SL CV LV EF: 65
SL CV PED ECHO LEFT VENTRICLE DIASTOLIC VOLUME (MOD BIPLANE) 2D: 91 ML
SL CV PED ECHO LEFT VENTRICLE SYSTOLIC VOLUME (MOD BIPLANE) 2D: 29 ML

## 2023-04-24 NOTE — PROGRESS NOTES
Spoke to pt  Advised of echo results      Referral placed for cardio oncology per Dr Natarajan Reveal

## 2023-04-24 NOTE — PROGRESS NOTES
Follow-up - Radiation Oncology   Nesha Mancilla 1995 28 y o  female [id number]      History of Present Illness   Cancer Staging   Malignant neoplasm of upper-inner quadrant of right breast in female, estrogen receptor positive ([facility] [address] )  Staging form: Breast, AJCC 8th Edition  - Clinical: Stage IA (cT1c, cN0, cM0, G3, ER+, VT+, HER2+) - Signed by Richie Chauhan MD on 9/8/2021  Histologic grading system: 3 grade system      Nesha Mancilla is a 28 y o   female with past medical history of early stage melanoma status post resection from abdomen, was diagnosed with stage IA grade 3 invasive ductal carcinoma of the right breast status post lumpectomy and sentinel lymph node biopsy  Tumor was ER/VT/HER2 positive  She underwent adjuvant chemotherapy with TH x 3 followed by Herceptin monotherapy   She completed Herceptin and is now maintained on tamoxifen   She completed adjuvant radiation on 1/31/22  She was last seen 10/17/22 and presents today for follow up      2/2/23 MRI B/L breast w wo contrast  RIGHT:  Right breast postsurgical scarring and distortion have expected postoperative appearance  LEFT:  C) Non-mass Enhancement:  In the left breast 6:00, there is linear distribution non-mass enhancement with washout kinetics   Phase 3 imaging suggests some areas of clustered ring internal enhancement pattern   Total extent is approximately 33 mm anterior to posterior   Elsewhere in both breasts, there are no suspicious enhancing masses or suspicious areas of non-mass enhancement   There is no axillary or internal mammary adenopathy on either side    RECOMMENDATION:       - MRI-guided breast biopsy for the left breast        - Diagnostic mammogram in 6 months for the right breast      2/10/23 MRI left breast biopsy  PATHOLOGY RESULTS:   Non-mass Enhancement (Left; 6 o'clock)  Benign finding: the pathology findings indicate:  - Benign breast tissue with dilated duct with foamy macrophages, periductal fibrosis, chronic inflammation and focal columnar cell change  - Negative for atypical hyperplasia, in situ or invasive carcinoma      3/2/23 Dr Karen Estevez  she is tolerating tamoxifen without any shortness of breath vaginal spotting or leg swelling no major side effects  had a left breast MRI guided biopsy which is negative for atypia, carcinoma in situ or invasive carcinoma     will obtain diagnostic mammogram in 6 months      3/8/23 Dr Kendy Fleming  F/u Cardiology-oncology for long term surveillance from a cardiac perspective (she saw one of his colleagues)  Cont Tamoxifen  F/u Factor V Leiden that I have ordered  She has been counseled to not get pregnant in the first 2 years since her cancer diagnosis and treatment and ideally at least 5 years while on Tamoxifen until 2026  We discussed importance of being on protection so that she does not inadvertently get pregnant  DEXA scan for baseline study while on Tamoxifen    The patient has no new breast related complaints  She denies breast tenderness or pain  She has tenderness and significant fibrosis associated with her scar  She denies new palpable nodules, suspicious skin changes, or nipple discharge the breast bilaterally  She denies any upper extremity edema or shoulder restriction  She is compliant with GYN follow-up and denies vaginal bleeding  She is compliant with Medical Oncology and Surgical Oncology follow-up  Surveillance mammogram scheduled 8/3/23      Upcomin23 Dr Karen Estevez  3/13/24 Dr Joanie Smallwood   Oncology History   Malignant neoplasm of upper-inner quadrant of right breast in female, estrogen receptor positive ([facility] [address] )   2021 Biopsy    US guided Right breast core biopsy:  1 o'clock 13-14 cm from the nipple  Right invasive ductal carcinoma  Grade 3  ER 90  AL 70   HER2 3+  Lymphovascular invasion: not identified    Results are malignant and concordant  Recommend surgical consultation   Additionally, given patient's age and density of breast tissue, recommend enhanced breast MRI for further evaluation       Done at [facility]     6/30/2021 Genetic Testing    The following genes were evaluated: RADHA, BRCA1, BRCA2, CDH1, CHEK2, PALB2, PTEN, STK11, TP53  Additional genes tested for a total of 36 genes  VUS NF1   [employer]     7/1/2021 Observation    Bilateral breast MRI with and without contrast: B6     Biopsy confirmed malignancy  01:00 o'clock location right breast   No axillary lymphadenopathy, extension to the chest wall, or concurrent lesions in either breast        7/19/2021 Surgery    Right breast LYNDSAY  directed lumpectomy with sentinel lymph node biopsy:  Invasive mammary carcinoma of no special type; ductal  Grade 3  ER 65  OH 90  HER2 3+  18 mm  Margins negative  0/5 Lymph nodes  Stage IA      8/23/2021 Genomic Testing    MammaPrint: High Risk  HER2 Type     9/8/2021 -  Cancer Staged    Staging form: Breast, AJCC 8th Edition  - Clinical: Stage IA (cT1c, cN0, cM0, G3, ER+, OH+, HER2+) - Signed by Adeline Cardona MD on 9/8/2021  Histologic grading system: 3 grade system       9/13/2021 - 11/29/2021 Chemotherapy    fosaprepitant (EMEND) IVPB, 150 mg, Intravenous, Once, 0 of 6 cycles  pertuzumab (PERJETA) IVPB, 840 mg, Intravenous, Once, 0 of 18 cycles  CARBOplatin (PARAPLATIN) IVPB (GOG AUC DOSING), 760 8 mg, Intravenous, Once, 0 of 6 cycles  DOCEtaxel (TAXOTERE) chemo infusion, 75 mg/m2 = 132 8 mg, Intravenous, Once, 0 of 6 cycles  trastuzumab (HERCEPTIN) chemo infusion, 8 mg/kg = 610 mg, Intravenous, Once, 0 of 18 cycles       9/13/2021 - 9/6/2022 Chemotherapy    PACLItaxel (TAXOL) chemo IVPB, 80 mg/m2 = 143 4 mg, Intravenous, Once, 12 of 12 cycles  Administration: 143 4 mg (9/13/2021), 143 4 mg (9/20/2021), 143 4 mg (10/4/2021), 143 4 mg (10/18/2021), 143 4 mg (10/25/2021), 143 4 mg (11/1/2021), 143 4 mg (11/15/2021), 143 4 mg (11/22/2021), 143 4 mg (9/27/2021), 143 4 mg (10/11/2021), 143 4 mg (11/8/2021), 143 4 mg (11/29/2021)  trastuzumab (HERCEPTIN) chemo infusion, 304 mg, Intravenous, Once, 26 of 26 cycles  Administration: 300 mg (9/13/2021), 150 mg (9/20/2021), 150 mg (10/4/2021), 150 mg (10/18/2021), 150 mg (10/25/2021), 150 mg (11/1/2021), 150 mg (11/15/2021), 150 mg (11/22/2021), 150 mg (9/27/2021), 150 mg (10/11/2021), 450 mg (12/6/2021), 440 mg (8/15/2022), 440 mg (9/6/2022), 150 mg (11/8/2021), 150 mg (11/29/2021), 450 mg (12/27/2021), 450 mg (1/17/2022), 450 mg (2/7/2022), 450 mg (2/28/2022), 450 mg (3/21/2022), 450 mg (4/11/2022), 450 mg (5/2/2022), 430 mg (5/26/2022), 440 mg (6/13/2022), 440 mg (7/6/2022), 440 mg (7/25/2022)     1/3/2022 - 1/31/2022 Radiation    Treatments:  Course: C1  Plan ID Energy Fractions Dose per Fraction (cGy) Total Dose Delivered (cGy) Elapsed Days   Hypo R Bolus 6X 7 / 7 267 1,869 16   Hypo R Brst 6X 8 / 8 267 2,136 18   R Brst e 9E 5 / 5 200 1,000 6    Treatment Dates:  1/3/2022 - 1/31/2022            Past Medical History:   Diagnosis Date   • Benign paroxysmal positional vertigo due to bilateral vestibular disorder 1/24/2019   • Breast cancer ([facility] [address] ) 07/19/2021    right breast   • Cancer [facility])    • History of chemotherapy 2021    right breast cancer   • Iron deficiency anemia due to chronic blood loss 1/15/2014   • Melanoma ([facility] [address] ) 2018   • Neck pain 1/15/2014   • Warts 6/21/2016     Past Surgical History:   Procedure Laterality Date   • BREAST BIOPSY     • BREAST LUMPECTOMY Right 7/19/2021    Procedure: LYNDSAY  DIRECTED LUMPECTOMY;  Surgeon: Jacki Ferrari MD;  Location: [facility];  Service: Surgical Oncology   • LYMPH NODE BIOPSY Right 7/19/2021    Procedure: LYMPHATIC MAPPING WITH BLUE AND RADIOACTIVE DYES, SENTINEL LYMPH NODE BIOPSY, injection at 3671;  Surgeon: Jacki Ferrari MD;  Location: [facility];  Service: Surgical Oncology   • MRI BREAST BIOPSY LEFT (ALL INCLUSIVE) Left 2/10/2023   • SKIN CANCER EXCISION     • TONSILLECTOMY AND ADENOIDECTOMY     • WISDOM TOOTH EXTRACTION Social History   Social History     Substance and Sexual Activity   Alcohol Use Yes    Comment: Socially     Social History     Substance and Sexual Activity   Drug Use No     Social History     Tobacco Use   Smoking Status Never   Smokeless Tobacco Never       Meds/Allergies     Current Outpatient Medications:   •  b complex vitamins capsule, Take 1 capsule by mouth daily, Disp: , Rfl:   •  Calcium Carbonate-Vit D-Min (CALCIUM 1200 PO), Take by mouth, Disp: , Rfl:   •  cholecalciferol (VITAMIN D3) 1,000 units tablet, Take 1,000 Units by mouth daily 4,000 IU daily, Disp: , Rfl:   •  tamoxifen (NOLVADEX) 20 mg tablet, Take 1 tablet (20 mg total) by mouth daily, Disp: 90 tablet, Rfl: 3  No Known Allergies      Review of Systems   Constitutional: Negative  HENT: Negative  Eyes: Negative  Respiratory: Negative  Cardiovascular: Negative  Gastrointestinal: Negative  Endocrine: Negative  Genitourinary: Negative  Musculoskeletal: Negative  Skin: Negative  Allergic/Immunologic: Negative  Neurological: Negative  Hematological: Negative  Psychiatric/Behavioral: Negative  OBJECTIVE:   /74   Pulse 92   Temp 97 7 °F (36 5 °C)   Resp 16   Wt 78 kg (172 lb)   SpO2 97%   BMI 29 52 kg/m²   Karnofsky: 90 - Able to carry on normal activity; minor signs or symptoms of disease     Physical Exam  Vitals and nursing note reviewed  Constitutional:       General: She is not in acute distress  Appearance: She is well-developed  Cardiovascular:      Rate and Rhythm: Normal rate and regular rhythm  Pulmonary:      Breath sounds: No wheezing, rhonchi or rales  Chest:          Comments: Breast examination demonstrates breasts are symmetric in contour and size  There is a well-healed upper inner right breast lumpectomy scar associated with dense fibrotic changes  There is hyperpigmentation in this area    There is mild diffuse fibrotic changes of the right breast   The "are not palpable nodules or suspicious skin changes of the breasts bilaterally  Abdominal:      Palpations: Abdomen is soft  Tenderness: There is no abdominal tenderness  Musculoskeletal:      Right lower leg: No edema  Left lower leg: No edema  Comments: Full range of motion upper extremities bilaterally  No upper extremity edema bilatearlly  Lymphadenopathy:      Cervical: No cervical adenopathy  Upper Body:      Right upper body: No supraclavicular or axillary adenopathy  Left upper body: No supraclavicular or axillary adenopathy  Neurological:      Mental Status: She is alert and oriented to person, place, and time  Gait: Gait normal          Assessment/Plan:  Bennie Love is a 28 y o   female with a history of grade 3 stage IA invasive ductal carcinoma of the right breast status post lumpectomy and sentinel lymph node biopsy  Tumor was ER/IL/HER2 positive  She underwent adjuvant chemotherapy with TH x 3 followed by Herceptin monotherapy   She completed adjuvant Herceptin and is now maintained on tamoxifen, which she tolerates well  She sees GYN per report  She completed adjuvant radiation on 1/31/22  She remains clinically LUCINA     I instructed her to continue sun protection to the irradiated skin and use of emollients as needed  We discussed massage to area of fibrosis, but given time since completion of radiation this will likely have only small effect  I offered follow-up in 6 months, but patient requested that her follow-up be pushed out as she is compliant with Surgical and Medical Oncology follow-up  We will follow-up in 1 year, we will see her sooner should the need arise        Kristal Vyas MD  6/43/4001,2:72 PM    Portions of the record may have been created with voice recognition software   Occasional wrong word or \"sound a like\" substitutions may have occurred due to the inherent limitations of voice recognition software   Read the chart carefully and " recognize, using context, where substitutions have occurred

## 2023-04-24 NOTE — PROGRESS NOTES
Estela Soto 1995 is a 28 y o  female  with past medical history of early stage melanoma status post resection from abdomen, was diagnosed with stage IA, grade 3 invasive ductal carcinoma of the right breast status post lumpectomy and sentinel lymph node biopsy  Tumor was ER/AR/HER2 positive  She underwent adjuvant chemotherapy with TH x 3 followed by Herceptin monotherapy  She completed Herceptin and is now maintained on tamoxifen  She completed adjuvant radiation on 1/31/22  She was last seen 10/17/22 and presents today for follow up  2/2/23 MRI B/L breast w wo contrast  RIGHT:  Right breast postsurgical scarring and distortion have expected postoperative appearance  LEFT:  C) Non-mass Enhancement:  In the left breast 6:00, there is linear distribution non-mass enhancement with washout kinetics  Phase 3 imaging suggests some areas of clustered ring internal enhancement pattern  Total extent is approximately 33 mm anterior to posterior  Series 503 images 180 through 169  Elsewhere in both breasts, there are no suspicious enhancing masses or suspicious areas of non-mass enhancement  There is no axillary or internal mammary adenopathy on either side  RECOMMENDATION:       - MRI-guided breast biopsy for the left breast        - Diagnostic mammogram in 6 months for the right breast     2/10/23 MRI left breast biopsy  PATHOLOGY RESULTS:   C) Non-mass Enhancement (Left; 6 o'clock)  Benign finding: the pathology findings indicate:  - Benign breast tissue with dilated duct with foamy macrophages, periductal fibrosis, chronic inflammation and focal columnar cell change  - Negative for atypical hyperplasia, in situ or invasive carcinoma  3/2/23 Dr Letty Martinez  she is tolerating tamoxifen without any shortness of breath vaginal spotting or leg swelling no major side effects  had a left breast MRI guided biopsy which is negative for atypia, carcinoma in situ or invasive carcinoma      will obtain diagnostic mammogram in 6 months     3/8/23 Dr Darlina Epley  Follow up with Surg-Onc, screening mammograms, MRI breast   F/u Cardiology-oncology for long term surveillance from a cardiac perspective (she saw one of his colleagues)  Cont Tamoxifen  F/u Factor V Leiden that I have ordered  She has been counseled to not get pregnant in the first 2 years since her cancer diagnosis and treatment and ideally at least 5 years while on Tamoxifen until 2026  We discussed importance of being on protection so that she does not inadvertently get pregnant  DEXA scan for baseline study while on Tamoxifen    Upcomin/3/23 Mammogram  23 Dr Tammy Soto  3/13/24 Dr Darlina Epley      Follow up visit     Oncology History   Malignant neoplasm of upper-inner quadrant of right breast in female, estrogen receptor positive ([facility] [address] )   2021 Biopsy    US guided Right breast core biopsy:  1 o'clock 13-14 cm from the nipple  Right invasive ductal carcinoma  Grade 3  ER 90  CO 70   HER2 3+  Lymphovascular invasion: not identified    Results are malignant and concordant  Recommend surgical consultation  Additionally, given patient's age and density of breast tissue, recommend enhanced breast MRI for further evaluation       Done at [facility]     2021 Genetic Testing    The following genes were evaluated: RADHA, BRCA1, BRCA2, CDH1, CHEK2, PALB2, PTEN, STK11, TP53  Additional genes tested for a total of 36 genes  VUS NF1   [employer]     2021 Observation    Bilateral breast MRI with and without contrast: B6     Biopsy confirmed malignancy  01:00 o'clock location right breast   No axillary lymphadenopathy, extension to the chest wall, or concurrent lesions in either breast        2021 Surgery    Right breast LYNDSAY  directed lumpectomy with sentinel lymph node biopsy:  Invasive mammary carcinoma of no special type; ductal  Grade 3  ER 65  CO 90  HER2 3+  18 mm  Margins negative  0/5 Lymph nodes  Stage IA      2021 Genomic Testing MammaPrint: High Risk  HER2 Type     9/8/2021 -  Cancer Staged    Staging form: Breast, AJCC 8th Edition  - Clinical: Stage IA (cT1c, cN0, cM0, G3, ER+, SC+, HER2+) - Signed by Hany Kumar MD on 9/8/2021  Histologic grading system: 3 grade system       9/13/2021 - 11/29/2021 Chemotherapy    fosaprepitant (EMEND) IVPB, 150 mg, Intravenous, Once, 0 of 6 cycles  pertuzumab (PERJETA) IVPB, 840 mg, Intravenous, Once, 0 of 18 cycles  CARBOplatin (PARAPLATIN) IVPB (GOG AUC DOSING), 760 8 mg, Intravenous, Once, 0 of 6 cycles  DOCEtaxel (TAXOTERE) chemo infusion, 75 mg/m2 = 132 8 mg, Intravenous, Once, 0 of 6 cycles  trastuzumab (HERCEPTIN) chemo infusion, 8 mg/kg = 610 mg, Intravenous, Once, 0 of 18 cycles       9/13/2021 - 9/6/2022 Chemotherapy    PACLItaxel (TAXOL) chemo IVPB, 80 mg/m2 = 143 4 mg, Intravenous, Once, 12 of 12 cycles  Administration: 143 4 mg (9/13/2021), 143 4 mg (9/20/2021), 143 4 mg (10/4/2021), 143 4 mg (10/18/2021), 143 4 mg (10/25/2021), 143 4 mg (11/1/2021), 143 4 mg (11/15/2021), 143 4 mg (11/22/2021), 143 4 mg (9/27/2021), 143 4 mg (10/11/2021), 143 4 mg (11/8/2021), 143 4 mg (11/29/2021)  trastuzumab (HERCEPTIN) chemo infusion, 304 mg, Intravenous, Once, 26 of 26 cycles  Administration: 300 mg (9/13/2021), 150 mg (9/20/2021), 150 mg (10/4/2021), 150 mg (10/18/2021), 150 mg (10/25/2021), 150 mg (11/1/2021), 150 mg (11/15/2021), 150 mg (11/22/2021), 150 mg (9/27/2021), 150 mg (10/11/2021), 450 mg (12/6/2021), 440 mg (8/15/2022), 440 mg (9/6/2022), 150 mg (11/8/2021), 150 mg (11/29/2021), 450 mg (12/27/2021), 450 mg (1/17/2022), 450 mg (2/7/2022), 450 mg (2/28/2022), 450 mg (3/21/2022), 450 mg (4/11/2022), 450 mg (5/2/2022), 430 mg (5/26/2022), 440 mg (6/13/2022), 440 mg (7/6/2022), 440 mg (7/25/2022)     1/3/2022 - 1/31/2022 Radiation    Treatments:  Course: C1  Plan ID Energy Fractions Dose per Fraction (cGy) Total Dose Delivered (cGy) Elapsed Days   Hypo R Bolus 6X 7 / 7 267 1,869 16   Hypo R Brst 6X 8 / 8 267 2,136 18   R Brst e 9E 5 / 5 200 1,000 6    Treatment Dates:  1/3/2022 - 1/31/2022  Review of Systems:  Review of Systems   Constitutional: Negative  HENT: Negative  Eyes: Negative  Respiratory: Negative  Cardiovascular: Negative  Gastrointestinal: Negative  Endocrine: Negative  Genitourinary: Negative  Musculoskeletal: Negative  Skin: Negative  Allergic/Immunologic: Negative  Neurological: Negative  Hematological: Negative  Psychiatric/Behavioral: Negative          Clinical Trial: no        Health Maintenance   Topic Date Due   • Hepatitis C Screening  Never done   • Pneumococcal Vaccine: Pediatrics (0 to 5 Years) and At-Risk Patients (6 to 59 Years) (1 - PCV) Never done   • HIV Screening  Never done   • Annual Physical  Never done   • Cervical Cancer Screening  Never done   • COVID-19 Vaccine (2 - Anderson risk series) 09/12/2021   • Influenza Vaccine (1) 09/01/2022   • BMI: Followup Plan  06/23/2023   • Depression Screening  10/17/2023   • DTaP,Tdap,and Td Vaccines (9 - Td or Tdap) 07/22/2023   • BMI: Adult  04/24/2024   • IPV Vaccine  Completed   • Meningococcal ACWY Vaccine  Completed   • HPV Vaccine  Completed   • HIB Vaccine  Aged Out   • Hepatitis A Vaccine  Aged Out     Patient Active Problem List   Diagnosis   • Nevus   • Malignant melanoma of skin of abdomen ([address] )   • Multiple benign melanocytic nevi   • Spitz nevus of forearm, left   • Enlarged thyroid   • Malignant neoplasm of upper-inner quadrant of right breast in female, estrogen receptor positive ([address] )   • Breast lump or mass   • Use of tamoxifen (Nolvadex)   • History of lumpectomy of right breast     Past Medical History:   Diagnosis Date   • Benign paroxysmal positional vertigo due to bilateral vestibular disorder 1/24/2019   • Breast cancer ([address] ) 07/19/2021    right breast   • Cancer [facility])    • History of chemotherapy 2021    right breast cancer   • Iron deficiency anemia due to chronic blood loss 1/15/2014   • Melanoma ([facility] [address] ) 2018   • Neck pain 1/15/2014   • Warts 6/21/2016     Past Surgical History:   Procedure Laterality Date   • BREAST BIOPSY     • BREAST LUMPECTOMY Right 7/19/2021    Procedure: LYNDSAY  DIRECTED LUMPECTOMY;  Surgeon: Jazmín Miller MD;  Location: [facility];  Service: Surgical Oncology   • LYMPH NODE BIOPSY Right 7/19/2021    Procedure: LYMPHATIC MAPPING WITH BLUE AND RADIOACTIVE DYES, SENTINEL LYMPH NODE BIOPSY, injection at 6092;  Surgeon: Jazmín Miller MD;  Location: [facility];  Service: Surgical Oncology   • MRI BREAST BIOPSY LEFT (ALL INCLUSIVE) Left 2/10/2023   • SKIN CANCER EXCISION     • TONSILLECTOMY AND ADENOIDECTOMY     • WISDOM TOOTH EXTRACTION       Family History   Problem Relation Age of Onset   • Prostate cancer Father    • Kidney cancer Maternal Grandfather    • Breast cancer Maternal Aunt 48     Social History     Socioeconomic History   • Marital status: Single     Spouse name: Not on file   • Number of children: Not on file   • Years of education: Not on file   • Highest education level: Not on file   Occupational History   • Not on file   Tobacco Use   • Smoking status: Never   • Smokeless tobacco: Never   Vaping Use   • Vaping Use: Never used   Substance and Sexual Activity   • Alcohol use: Yes     Comment: Socially   • Drug use: No   • Sexual activity: Yes   Other Topics Concern   • Not on file   Social History Narrative   • Not on file     Social Determinants of Health     Financial Resource Strain: Not on file   Food Insecurity: Not on file   Transportation Needs: Not on file   Physical Activity: Not on file   Stress: Not on file   Social Connections: Not on file   Intimate Partner Violence: Not on file   Housing Stability: Not on file       Current Outpatient Medications:   •  b complex vitamins capsule, Take 1 capsule by mouth daily, Disp: , Rfl:   •  Calcium Carbonate-Vit D-Min (CALCIUM 1200 PO), Take by mouth, Disp: , Rfl:   •  cholecalciferol (VITAMIN D3) 1,000 units tablet, Take 1,000 Units by mouth daily 4,000 IU daily, Disp: , Rfl:   •  tamoxifen (NOLVADEX) 20 mg tablet, Take 1 tablet (20 mg total) by mouth daily, Disp: 90 tablet, Rfl: 3  No Known Allergies  There were no vitals filed for this visit

## 2023-08-28 ENCOUNTER — HOSPITAL ENCOUNTER (OUTPATIENT)
Dept: MAMMOGRAPHY | Facility: CLINIC | Age: 28
Discharge: HOME | End: 2023-08-28
Payer: COMMERCIAL

## 2023-08-28 VITALS — HEIGHT: 64 IN | WEIGHT: 172 LBS | BODY MASS INDEX: 29.37 KG/M2

## 2023-08-28 DIAGNOSIS — C50.211 MALIGNANT NEOPLASM OF UPPER-INNER QUADRANT OF RIGHT BREAST IN FEMALE, ESTROGEN RECEPTOR POSITIVE (HCC): ICD-10-CM

## 2023-08-28 DIAGNOSIS — Z17.0 MALIGNANT NEOPLASM OF UPPER-INNER QUADRANT OF RIGHT BREAST IN FEMALE, ESTROGEN RECEPTOR POSITIVE (HCC): ICD-10-CM

## 2023-08-28 PROCEDURE — G0279 TOMOSYNTHESIS, MAMMO: HCPCS

## 2023-08-28 PROCEDURE — 77066 DX MAMMO INCL CAD BI: CPT

## 2023-09-07 ENCOUNTER — OFFICE VISIT (OUTPATIENT)
Dept: SURGICAL ONCOLOGY | Facility: CLINIC | Age: 28
End: 2023-09-07
Payer: COMMERCIAL

## 2023-09-07 VITALS
WEIGHT: 171 LBS | HEART RATE: 78 BPM | OXYGEN SATURATION: 98 % | RESPIRATION RATE: 16 BRPM | HEIGHT: 64 IN | BODY MASS INDEX: 29.19 KG/M2 | SYSTOLIC BLOOD PRESSURE: 130 MMHG | TEMPERATURE: 98.4 F | DIASTOLIC BLOOD PRESSURE: 72 MMHG

## 2023-09-07 DIAGNOSIS — Z98.890 HISTORY OF LUMPECTOMY OF RIGHT BREAST: ICD-10-CM

## 2023-09-07 DIAGNOSIS — Z17.0 MALIGNANT NEOPLASM OF UPPER-INNER QUADRANT OF RIGHT BREAST IN FEMALE, ESTROGEN RECEPTOR POSITIVE (HCC): Primary | ICD-10-CM

## 2023-09-07 DIAGNOSIS — C50.211 MALIGNANT NEOPLASM OF UPPER-INNER QUADRANT OF RIGHT BREAST IN FEMALE, ESTROGEN RECEPTOR POSITIVE (HCC): Primary | ICD-10-CM

## 2023-09-07 PROCEDURE — 99214 OFFICE O/P EST MOD 30 MIN: CPT | Performed by: SURGERY

## 2023-09-07 RX ORDER — CETIRIZINE HYDROCHLORIDE 10 MG/1
TABLET ORAL
COMMUNITY
Start: 2023-08-11

## 2023-09-07 RX ORDER — IBUPROFEN 800 MG/1
TABLET ORAL
COMMUNITY
Start: 2023-07-26

## 2023-09-07 NOTE — PROGRESS NOTES
Surgical Oncology Follow Up  [facility]  [facility] [place] [address]  1995  [id number]      Chief Complaint   Patient presents with   • Follow-up     6 mo f/u, 8/28/23 b/l dx mammo B2, next mammo sched 9/10/24  2/2/23 b/l MRI B4 2/10/23 Left MRi bx 6:00 benign w/ dilated duct w/ foamy macrophages, periductal fibrosis, chronic inflammation and focal columanr cell change  7/19/21 right Lumpectomy, IDC, G 3, ER 65, MT 90, HER2 3+, 1.8 cm, Margins neg, 0/5 LN, Stage IA. on Bx ER 90, MT 70, HER2 3+. Personal hx of melanoma. VUS NF1. adj chem then tamoxifen w/ albandar. WBRT w/ fan. Assessment & Plan: This 67-year-old female with right early breast cancer s/p breast conservation surgery followed by radiation and chemotherapy overall she is doing well she had a mammogram which I reviewed and agree with the report. Recently she has had left breast invasive melanoma being well widely resected by her dermatologist0.3 mm lesion with negative margin. She also had a right breast skin lesion been resected by dermatologist but we do not have the pathology results as it is outside institution. We have asked her to obtain the pathology results once it is available. At today's visit bilateral breast examination no palpable mass or masses. Bilateral axillary and supraclavicular examination no palpable adenopat    Cancer History:     Oncology History   Malignant neoplasm of upper-inner quadrant of right breast in female, estrogen receptor positive ([address])   6/24/2021 Biopsy    US guided Right breast core biopsy:  1 o'clock 13-14 cm from the nipple  Right invasive ductal carcinoma  Grade 3  ER 90  MT 70   HER2 3+  Lymphovascular invasion: not identified    Results are malignant and concordant. Recommend surgical consultation.  Additionally, given patient's age and density of breast tissue, recommend enhanced breast MRI for further evaluation.      Done at [facility]     6/30/2021 Genetic Testing    The following genes were evaluated: RADHA, BRCA1, BRCA2, CDH1, CHEK2, PALB2, PTEN, STK11, TP53  Additional genes tested for a total of 36 genes  VUS NF1   [employer]     7/1/2021 Observation    Bilateral breast MRI with and without contrast: B6.    Biopsy confirmed malignancy  01:00 o'clock location right breast.  No axillary lymphadenopathy, extension to the chest wall, or concurrent lesions in either breast.       7/19/2021 Surgery    Right breast LYNDSAY  directed lumpectomy with sentinel lymph node biopsy:  Invasive mammary carcinoma of no special type; ductal  Grade 3  ER 65  SD 90  HER2 3+  18 mm  Margins negative  0/5 Lymph nodes  Stage IA.     8/23/2021 Genomic Testing    MammaPrint: High Risk  HER2 Type     9/8/2021 -  Cancer Staged    Staging form: Breast, AJCC 8th Edition  - Clinical: Stage IA (cT1c, cN0, cM0, G3, ER+, SD+, HER2+) - Signed by Sarah Zepeda MD on 9/8/2021  Histologic grading system: 3 grade system       9/13/2021 - 11/29/2021 Chemotherapy    fosaprepitant (EMEND) IVPB, 150 mg, Intravenous, Once, 0 of 6 cycles  pertuzumab (PERJETA) IVPB, 840 mg, Intravenous, Once, 0 of 18 cycles  CARBOplatin (PARAPLATIN) IVPB (GOG AUC DOSING), 760.8 mg, Intravenous, Once, 0 of 6 cycles  DOCEtaxel (TAXOTERE) chemo infusion, 75 mg/m2 = 132.8 mg, Intravenous, Once, 0 of 6 cycles  trastuzumab (HERCEPTIN) chemo infusion, 8 mg/kg = 610 mg, Intravenous, Once, 0 of 18 cycles       9/13/2021 - 9/6/2022 Chemotherapy    PACLItaxel (TAXOL) chemo IVPB, 80 mg/m2 = 143.4 mg, Intravenous, Once, 12 of 12 cycles  Administration: 143.4 mg (9/13/2021), 143.4 mg (9/20/2021), 143.4 mg (10/4/2021), 143.4 mg (10/18/2021), 143.4 mg (10/25/2021), 143.4 mg (11/1/2021), 143.4 mg (11/15/2021), 143.4 mg (11/22/2021), 143.4 mg (9/27/2021), 143.4 mg (10/11/2021), 143.4 mg (11/8/2021), 143.4 mg (11/29/2021)  trastuzumab (HERCEPTIN) chemo infusion, 304 mg, Intravenous, Once, 26 of 26 cycles  Administration: 300 mg (9/13/2021), 150 mg (9/20/2021), 150 mg (10/4/2021), 150 mg (10/18/2021), 150 mg (10/25/2021), 150 mg (11/1/2021), 150 mg (11/15/2021), 150 mg (11/22/2021), 150 mg (9/27/2021), 150 mg (10/11/2021), 450 mg (12/6/2021), 440 mg (8/15/2022), 440 mg (9/6/2022), 150 mg (11/8/2021), 150 mg (11/29/2021), 450 mg (12/27/2021), 450 mg (1/17/2022), 450 mg (2/7/2022), 450 mg (2/28/2022), 450 mg (3/21/2022), 450 mg (4/11/2022), 450 mg (5/2/2022), 430 mg (5/26/2022), 440 mg (6/13/2022), 440 mg (7/6/2022), 440 mg (7/25/2022)     1/3/2022 - 1/31/2022 Radiation    Treatments:  Course: C1  Plan ID Energy Fractions Dose per Fraction (cGy) Total Dose Delivered (cGy) Elapsed Days   Hypo R Bolus 6X 7 / 7 267 1,869 16   Hypo R Brst 6X 8 / 8 267 2,136 18   R Brst e 9E 5 / 5 200 1,000 6    Treatment Dates:  1/3/2022 - 1/31/2022. Interval History:   Follow-up with right breast cancer    Review of Systems:   Review of Systems   Constitutional: Negative for chills and fever. HENT: Negative for ear pain and sore throat. Eyes: Negative for pain and visual disturbance. Respiratory: Negative for cough and shortness of breath. Cardiovascular: Negative for chest pain and palpitations. Gastrointestinal: Negative for abdominal pain and vomiting. Genitourinary: Negative for dysuria and hematuria. Musculoskeletal: Negative for arthralgias and back pain. Skin: Negative for color change and rash. Neurological: Negative for seizures and syncope. All other systems reviewed and are negative.       Past Medical History     Patient Active Problem List   Diagnosis   • Nevus   • Malignant melanoma of skin of abdomen ([address])   • Multiple benign melanocytic nevi   • Spitz nevus of forearm, left   • Enlarged thyroid   • Malignant neoplasm of upper-inner quadrant of right breast in female, estrogen receptor positive ([address])   • Breast lump or mass   • Use of tamoxifen (Nolvadex)   • History of lumpectomy of right breast     Past Medical History:   Diagnosis Date   • Benign paroxysmal positional vertigo due to bilateral vestibular disorder 01/24/2019   • Breast cancer ([address]) 07/19/2021    right breast   • Cancer [facility])    • History of chemotherapy 2021    right breast cancer   • History of radiation therapy 2021    right breast cancer   • Iron deficiency anemia due to chronic blood loss 01/15/2014   • Melanoma ([address]) 2018   • Melanoma ([address]) 07/2023    left breast   • Neck pain 01/15/2014   • Warts 06/21/2016     Past Surgical History:   Procedure Laterality Date   • BREAST BIOPSY     • BREAST LUMPECTOMY Right 07/19/2021    Procedure: LYNDSAY  DIRECTED LUMPECTOMY;  Surgeon: Mamadou Killian MD;  Location: [facility];  Service: Surgical Oncology   • LYMPH NODE BIOPSY Right 07/19/2021    Procedure: LYMPHATIC MAPPING WITH BLUE AND RADIOACTIVE DYES, SENTINEL LYMPH NODE BIOPSY, injection at 56;  Surgeon: Mamadou Killian MD;  Location: [facility];  Service: Surgical Oncology   • MRI BREAST BIOPSY LEFT (ALL INCLUSIVE) Left 02/10/2023    Benign   • SKIN CANCER EXCISION     • TONSILLECTOMY AND ADENOIDECTOMY     • WISDOM TOOTH EXTRACTION       Family History   Problem Relation Age of Onset   • Prostate cancer Father    • Kidney cancer Maternal Grandfather    • Breast cancer Maternal Aunt 48     Social History     Socioeconomic History   • Marital status: Single     Spouse name: Not on file   • Number of children: Not on file   • Years of education: Not on file   • Highest education level: Not on file   Occupational History   • Not on file   Tobacco Use   • Smoking status: Never   • Smokeless tobacco: Never   Vaping Use   • Vaping Use: Never used   Substance and Sexual Activity   • Alcohol use: Yes     Comment: Socially   • Drug use: No   • Sexual activity: Yes   Other Topics Concern   • Not on file   Social History Narrative   • Not on file     Social Determinants of Health     Financial Resource Strain: Not on file Food Insecurity: Not on file   Transportation Needs: Not on file   Physical Activity: Not on file   Stress: Not on file   Social Connections: Not on file   Intimate Partner Violence: Not on file   Housing Stability: Not on file       Current Outpatient Medications:   •  b complex vitamins capsule, Take 1 capsule by mouth daily, Disp: , Rfl:   •  Calcium Carbonate-Vit D-Min (CALCIUM 1200 PO), Take by mouth, Disp: , Rfl:   •  cetirizine (ZyrTEC) 10 mg tablet, TAKE 1 TABLET BY MOUTH DAILY FOR 15 DAYS., Disp: , Rfl:   •  cholecalciferol (VITAMIN D3) 1,000 units tablet, Take 1,000 Units by mouth daily 4,000 IU daily, Disp: , Rfl:   •  ibuprofen (MOTRIN) 800 mg tablet, TAKE 1 TABLET BY MOUTH WITH FOOD EVERY 8 HOURS AS NEEDED FOR PAIN, Disp: , Rfl:   •  tamoxifen (NOLVADEX) 20 mg tablet, Take 1 tablet (20 mg total) by mouth daily, Disp: 90 tablet, Rfl: 3  No Known Allergies    Physical Exam:     Vitals:    09/07/23 1032   BP: 130/72   Pulse: 78   Resp: 16   Temp: 98.4 °F (36.9 °C)   SpO2: 98%     Physical Exam  Constitutional:       Appearance: Normal appearance. HENT:      Head: Normocephalic and atraumatic. Nose: Nose normal.      Mouth/Throat:      Mouth: Mucous membranes are moist.   Eyes:      Pupils: Pupils are equal, round, and reactive to light. Cardiovascular:      Rate and Rhythm: Normal rate. Pulses: Normal pulses. Heart sounds: Normal heart sounds. Pulmonary:      Effort: Pulmonary effort is normal.      Breath sounds: Normal breath sounds. Chest:          Comments: Right breast well-healed surgical scar no evidence of local recurrence. Right breast no palpable mass masses nipple discharge nipple retraction or skin changes other than surgical scars.   Right breast at the medial aspect nylon sutures are in place from recent surgery from dermatologist.  She has an appointment upcoming week for suture removals  Left breast has a large scar from melanoma removal  By her dermatologist no palpable mass masses or nipple discharge nipple retraction. Bilateral axillary and supraclavicular examination no palpable adenopathy  Abdominal:      General: Bowel sounds are normal.      Palpations: Abdomen is soft. Musculoskeletal:         General: Normal range of motion. Cervical back: Normal range of motion and neck supple. Skin:     General: Skin is warm. Neurological:      General: No focal deficit present. Mental Status: She is alert and oriented to person, place, and time. Psychiatric:         Mood and Affect: Mood normal.         Behavior: Behavior normal.         Thought Content: Thought content normal.         Judgment: Judgment normal.           Results & Discussion:   Mammogram:  Narrative & Impression   DIAGNOSIS: Malignant neoplasm of upper-inner quadrant of right breast in female, estrogen receptor positive ([address])      TECHNIQUE:  Digital diagnostic mammography was performed. Computer Aided Detection (CAD) analyzed all applicable images.     COMPARISONS: Prior breast imaging dated: 08/02/2022, 07/19/2021, and 06/23/2021     RELEVANT HISTORY:   Family Breast Cancer History: History of breast cancer in Maternal Aunt. Family Medical History: Family medical history includes breast cancer in maternal aunt. Personal History: Hormone history includes tamoxifen. Surgical history includes breast biopsy and lumpectomy. Medical history includes breast cancer and history of chemotherapy.     RISK ASSESSMENT:   Tyrer-Cuzick risk assessment reporting was suppressed due to the patient's history and/or demographic factors.     TISSUE DENSITY:   The breasts are heterogeneously dense, which may obscure small masses.      INDICATION: Stefany Carrasquillo is a 28 y.o. female presenting for history of right breast cancer.     FINDINGS:   Bilateral  There are no suspicious masses, grouped microcalcifications or areas of unexplained architectural distortion.  The skin and nipple areolar complex are unremarkable. Biopsy marker clip is noted in the left breast.  Postsurgical changes are noted in each breast.        IMPRESSION:   No evidence of malignancy.           ASSESSMENT/BI-RADS CATEGORY:  Left: 2 - Benign  Right: 2 - Benign  Overall: 2 - Benign     RECOMMENDATION:       - Diagnostic mammogram in 1 year for both breasts. I did review mammogram films and agree with the report I did discussed in detail nature of breast breast cancer and she is on tamoxifen and tolerating well. She denies of shortness of breath or leg swelling or any other major side effects of tamoxifen. she understands and  agrees . All patient questions were answered. Advance Care Planning/Advance Directives: Ulysses Pata, MD discussed the disease status with Rodo Villalobos  today 09/07/23  treatment plans and follow-up with the patient.

## 2023-09-17 ENCOUNTER — APPOINTMENT (EMERGENCY)
Dept: RADIOLOGY | Facility: HOSPITAL | Age: 28
End: 2023-09-17
Payer: COMMERCIAL

## 2023-09-17 ENCOUNTER — HOSPITAL ENCOUNTER (EMERGENCY)
Facility: HOSPITAL | Age: 28
Discharge: HOME | End: 2023-09-17
Attending: EMERGENCY MEDICINE
Payer: COMMERCIAL

## 2023-09-17 VITALS
HEART RATE: 75 BPM | OXYGEN SATURATION: 100 % | DIASTOLIC BLOOD PRESSURE: 69 MMHG | SYSTOLIC BLOOD PRESSURE: 102 MMHG | TEMPERATURE: 98.8 F | RESPIRATION RATE: 16 BRPM

## 2023-09-17 DIAGNOSIS — R00.2 PALPITATIONS: Primary | ICD-10-CM

## 2023-09-17 LAB
ALBUMIN SERPL BCP-MCNC: 4.1 G/DL (ref 3.5–5)
ALP SERPL-CCNC: 53 U/L (ref 34–104)
ALT SERPL W P-5'-P-CCNC: 13 U/L (ref 7–52)
ANION GAP SERPL CALCULATED.3IONS-SCNC: 6 MMOL/L
AST SERPL W P-5'-P-CCNC: 21 U/L (ref 13–39)
BASOPHILS # BLD AUTO: 0.01 THOUSANDS/ÂΜL (ref 0–0.1)
BASOPHILS NFR BLD AUTO: 0 % (ref 0–1)
BILIRUB SERPL-MCNC: 0.51 MG/DL (ref 0.2–1)
BUN SERPL-MCNC: 11 MG/DL (ref 5–25)
CALCIUM SERPL-MCNC: 9.2 MG/DL (ref 8.4–10.2)
CARDIAC TROPONIN I PNL SERPL HS: <2 NG/L
CHLORIDE SERPL-SCNC: 108 MMOL/L (ref 96–108)
CO2 SERPL-SCNC: 23 MMOL/L (ref 21–32)
CREAT SERPL-MCNC: 0.71 MG/DL (ref 0.6–1.3)
EOSINOPHIL # BLD AUTO: 0.07 THOUSAND/ÂΜL (ref 0–0.61)
EOSINOPHIL NFR BLD AUTO: 1 % (ref 0–6)
ERYTHROCYTE [DISTWIDTH] IN BLOOD BY AUTOMATED COUNT: 12.9 % (ref 11.6–15.1)
GFR SERPL CREATININE-BSD FRML MDRD: 116 ML/MIN/1.73SQ M
GLUCOSE SERPL-MCNC: 100 MG/DL (ref 65–140)
HCT VFR BLD AUTO: 34.8 % (ref 34.8–46.1)
HGB BLD-MCNC: 11.4 G/DL (ref 11.5–15.4)
IMM GRANULOCYTES # BLD AUTO: 0.02 THOUSAND/UL (ref 0–0.2)
IMM GRANULOCYTES NFR BLD AUTO: 0 % (ref 0–2)
LYMPHOCYTES # BLD AUTO: 1.45 THOUSANDS/ÂΜL (ref 0.6–4.47)
LYMPHOCYTES NFR BLD AUTO: 25 % (ref 14–44)
MCH RBC QN AUTO: 29 PG (ref 26.8–34.3)
MCHC RBC AUTO-ENTMCNC: 32.8 G/DL (ref 31.4–37.4)
MCV RBC AUTO: 89 FL (ref 82–98)
MONOCYTES # BLD AUTO: 0.52 THOUSAND/ÂΜL (ref 0.17–1.22)
MONOCYTES NFR BLD AUTO: 9 % (ref 4–12)
NEUTROPHILS # BLD AUTO: 3.84 THOUSANDS/ÂΜL (ref 1.85–7.62)
NEUTS SEG NFR BLD AUTO: 65 % (ref 43–75)
NRBC BLD AUTO-RTO: 0 /100 WBCS
PLATELET # BLD AUTO: 223 THOUSANDS/UL (ref 149–390)
PMV BLD AUTO: 10.3 FL (ref 8.9–12.7)
POTASSIUM SERPL-SCNC: 3.9 MMOL/L (ref 3.5–5.3)
PROT SERPL-MCNC: 7 G/DL (ref 6.4–8.4)
RBC # BLD AUTO: 3.93 MILLION/UL (ref 3.81–5.12)
SODIUM SERPL-SCNC: 137 MMOL/L (ref 135–147)
TSH SERPL DL<=0.05 MIU/L-ACNC: 0.9 UIU/ML (ref 0.45–4.5)
WBC # BLD AUTO: 5.91 THOUSAND/UL (ref 4.31–10.16)

## 2023-09-17 PROCEDURE — 84443 ASSAY THYROID STIM HORMONE: CPT | Performed by: PHYSICIAN ASSISTANT

## 2023-09-17 PROCEDURE — 99285 EMERGENCY DEPT VISIT HI MDM: CPT | Performed by: PHYSICIAN ASSISTANT

## 2023-09-17 PROCEDURE — 99285 EMERGENCY DEPT VISIT HI MDM: CPT

## 2023-09-17 PROCEDURE — 71046 X-RAY EXAM CHEST 2 VIEWS: CPT

## 2023-09-17 PROCEDURE — 36415 COLL VENOUS BLD VENIPUNCTURE: CPT | Performed by: PHYSICIAN ASSISTANT

## 2023-09-17 PROCEDURE — 84484 ASSAY OF TROPONIN QUANT: CPT | Performed by: PHYSICIAN ASSISTANT

## 2023-09-17 PROCEDURE — 93005 ELECTROCARDIOGRAM TRACING: CPT

## 2023-09-17 PROCEDURE — 85025 COMPLETE CBC W/AUTO DIFF WBC: CPT | Performed by: PHYSICIAN ASSISTANT

## 2023-09-17 PROCEDURE — 80053 COMPREHEN METABOLIC PANEL: CPT | Performed by: PHYSICIAN ASSISTANT

## 2023-09-17 NOTE — DISCHARGE INSTRUCTIONS
Please follow-up with your family doctor and cardiologist. Return to the ER with any worsening symptoms.

## 2023-09-17 NOTE — ED PROVIDER NOTES
History  Chief Complaint   Patient presents with   • Palpitations     Pt reports hx of heart murmer, and will occasionally get "a flutter" in her heart. Today this sensation continues happening and when it happens she feels like she cant catch her breath       32yo female with a history of breast cancer currently in remission on Tamoxifen presenting with her mother for evaluation of palpitations. She reports intermittent fluttering in chest from time to time. She has been having increased palpitations today and she feels like she cannot catch her breath when she has the palpitations. She denies any chest pain, dizziness, syncope. No alcohol or caffeine use. No family history of cardiac disease. She follows with cardiology and had a normal echocardiogram in April 2023 with an EF of 65%. History provided by:  Patient and medical records   used: No        Prior to Admission Medications   Prescriptions Last Dose Informant Patient Reported? Taking? Calcium Carbonate-Vit D-Min (CALCIUM 1200 PO)  Self Yes No   Sig: Take by mouth   b complex vitamins capsule  Self Yes No   Sig: Take 1 capsule by mouth daily   cetirizine (ZyrTEC) 10 mg tablet  Self Yes No   Sig: TAKE 1 TABLET BY MOUTH DAILY FOR 15 DAYS.    cholecalciferol (VITAMIN D3) 1,000 units tablet  Self Yes No   Sig: Take 1,000 Units by mouth daily 4,000 IU daily   ibuprofen (MOTRIN) 800 mg tablet  Self Yes No   Sig: TAKE 1 TABLET BY MOUTH WITH FOOD EVERY 8 HOURS AS NEEDED FOR PAIN   tamoxifen (NOLVADEX) 20 mg tablet  Self No No   Sig: Take 1 tablet (20 mg total) by mouth daily      Facility-Administered Medications: None       Past Medical History:   Diagnosis Date   • Benign paroxysmal positional vertigo due to bilateral vestibular disorder 01/24/2019   • Breast cancer ([address]) 07/19/2021    right breast   • Cancer [facility])    • History of chemotherapy 2021    right breast cancer   • History of radiation therapy 2021    right breast cancer   • Iron deficiency anemia due to chronic blood loss 01/15/2014   • Melanoma ([address]) 2018   • Melanoma ([address]) 07/2023    left breast   • Neck pain 01/15/2014   • Warts 06/21/2016       Past Surgical History:   Procedure Laterality Date   • BREAST BIOPSY     • BREAST LUMPECTOMY Right 07/19/2021    Procedure: LYNDSAY  DIRECTED LUMPECTOMY;  Surgeon: Kyle Up MD;  Location: [facility];  Service: Surgical Oncology   • LYMPH NODE BIOPSY Right 07/19/2021    Procedure: LYMPHATIC MAPPING WITH BLUE AND RADIOACTIVE DYES, SENTINEL LYMPH NODE BIOPSY, injection at 8497;  Surgeon: Kyle Up MD;  Location: [facility];  Service: Surgical Oncology   • MRI BREAST BIOPSY LEFT (ALL INCLUSIVE) Left 02/10/2023    Benign   • SKIN CANCER EXCISION     • TONSILLECTOMY AND ADENOIDECTOMY     • WISDOM TOOTH EXTRACTION         Family History   Problem Relation Age of Onset   • Prostate cancer Father    • Kidney cancer Maternal Grandfather    • Breast cancer Maternal Aunt 48     I have reviewed and agree with the history as documented. E-Cigarette/Vaping   • E-Cigarette Use Never User      E-Cigarette/Vaping Substances   • Nicotine No    • THC No    • CBD No    • Flavoring No    • Other No    • Unknown No      Social History     Tobacco Use   • Smoking status: Never   • Smokeless tobacco: Never   Vaping Use   • Vaping Use: Never used   Substance Use Topics   • Alcohol use: Yes     Comment: Socially   • Drug use: No       Review of Systems   Constitutional: Negative for chills and fever. HENT: Negative for drooling and voice change. Eyes: Negative for discharge and redness. Respiratory: Positive for shortness of breath. Negative for stridor. Cardiovascular: Positive for palpitations. Negative for chest pain and leg swelling. Gastrointestinal: Negative for nausea and vomiting. Musculoskeletal: Negative for neck pain and neck stiffness. Skin: Negative for color change and rash.    Neurological: Negative for seizures and syncope. Psychiatric/Behavioral: Negative for confusion. The patient is not nervous/anxious. All other systems reviewed and are negative. Physical Exam  Physical Exam  Vitals and nursing note reviewed. Constitutional:       General: She is not in acute distress. Appearance: She is well-developed. She is not diaphoretic. HENT:      Head: Normocephalic and atraumatic. Right Ear: External ear normal.      Left Ear: External ear normal.      Nose: Nose normal.   Eyes:      General: No scleral icterus. Right eye: No discharge. Left eye: No discharge. Conjunctiva/sclera: Conjunctivae normal.   Cardiovascular:      Rate and Rhythm: Normal rate and regular rhythm. Heart sounds: Normal heart sounds. No murmur heard. Pulmonary:      Effort: Pulmonary effort is normal. No respiratory distress. Breath sounds: Normal breath sounds. No stridor. No wheezing or rales. Musculoskeletal:         General: No deformity. Normal range of motion. Cervical back: Normal range of motion and neck supple. Right lower leg: No edema. Left lower leg: No edema. Lymphadenopathy:      Cervical: No cervical adenopathy. Skin:     General: Skin is warm and dry. Neurological:      Mental Status: She is alert. She is not disoriented. GCS: GCS eye subscore is 4. GCS verbal subscore is 5. GCS motor subscore is 6.    Psychiatric:         Behavior: Behavior normal.         Vital Signs  ED Triage Vitals [09/17/23 1333]   Temperature Pulse Respirations Blood Pressure SpO2   98.8 °F (37.1 °C) 73 15 134/92 100 %      Temp Source Heart Rate Source Patient Position - Orthostatic VS BP Location FiO2 (%)   Oral Monitor Sitting Right arm --      Pain Score       --           Vitals:    09/17/23 1333 09/17/23 1430   BP: 134/92 102/69   Pulse: 73 75   Patient Position - Orthostatic VS: Sitting          Visual Acuity      ED Medications  Medications - No data to display    Diagnostic Studies  Results Reviewed     Procedure Component Value Units Date/Time    TSH, 3rd generation with Free T4 reflex [[id number]]  (Normal) Collected: 09/17/23 1358    Lab Status: Final result Specimen: Blood from Arm, Right Updated: 09/17/23 1437     TSH 3RD GENERATON 0.896 uIU/mL     HS Troponin 0hr (reflex protocol) [[id number]]  (Normal) Collected: 09/17/23 1358    Lab Status: Final result Specimen: Blood from Arm, Right Updated: 09/17/23 1429     hs TnI 0hr <2 ng/L     Comprehensive metabolic panel [[id number]] Collected: 09/17/23 1358    Lab Status: Final result Specimen: Blood from Arm, Right Updated: 09/17/23 1420     Sodium 137 mmol/L      Potassium 3.9 mmol/L      Chloride 108 mmol/L      CO2 23 mmol/L      ANION GAP 6 mmol/L      BUN 11 mg/dL      Creatinine 0.71 mg/dL      Glucose 100 mg/dL      Calcium 9.2 mg/dL      AST 21 U/L      ALT 13 U/L      Alkaline Phosphatase 53 U/L      Total Protein 7.0 g/dL      Albumin 4.1 g/dL      Total Bilirubin 0.51 mg/dL      eGFR 116 ml/min/1.73sq m     Narrative:      [place] guidelines for Chronic Kidney Disease (CKD):   •  Stage 1 with normal or high GFR (GFR > 90 mL/min/1.73 square meters)  •  Stage 2 Mild CKD (GFR = 60-89 mL/min/1.73 square meters)  •  Stage 3A Moderate CKD (GFR = 45-59 mL/min/1.73 square meters)  •  Stage 3B Moderate CKD (GFR = 30-44 mL/min/1.73 square meters)  •  Stage 4 Severe CKD (GFR = 15-29 mL/min/1.73 square meters)  •  Stage 5 End Stage CKD (GFR <15 mL/min/1.73 square meters)  Note: GFR calculation is accurate only with a steady state creatinine    CBC and differential [[id number]]  (Abnormal) Collected: 09/17/23 1358    Lab Status: Final result Specimen: Blood from Arm, Right Updated: 09/17/23 1403     WBC 5.91 Thousand/uL      RBC 3.93 Million/uL      Hemoglobin 11.4 g/dL      Hematocrit 34.8 %      MCV 89 fL      MCH 29.0 pg      MCHC 32.8 g/dL      RDW 12.9 %      MPV 10.3 fL      Platelets 020 Thousands/uL nRBC 0 /100 WBCs      Neutrophils Relative 65 %      Immat GRANS % 0 %      Lymphocytes Relative 25 %      Monocytes Relative 9 %      Eosinophils Relative 1 %      Basophils Relative 0 %      Neutrophils Absolute 3.84 Thousands/µL      Immature Grans Absolute 0.02 Thousand/uL      Lymphocytes Absolute 1.45 Thousands/µL      Monocytes Absolute 0.52 Thousand/µL      Eosinophils Absolute 0.07 Thousand/µL      Basophils Absolute 0.01 Thousands/µL                  XR chest 2 views   ED Interpretation by Chandrakant Ahn PA-C (09/17 8206)   No acute abnormality                 Procedures  ECG 12 Lead Documentation Only    Date/Time: 9/17/2023 5:28 PM    Performed by: Chandrakant Ahn PA-C  Authorized by: Chandrakant Ahn PA-C    Indications / Diagnosis:  Palpitations  ECG reviewed by me, the ED Provider: yes    Patient location:  ED  Rate:     ECG rate:  71    ECG rate assessment: normal    Rhythm:     Rhythm: sinus rhythm    Ectopy:     Ectopy: none    QRS:     QRS axis:  Normal  Conduction:     Conduction: normal    ST segments:     ST segments:  Normal  T waves:     T waves: non-specific               ED Course               SBIRT 20yo+    Flowsheet Row Most Recent Value   Initial Alcohol Screen: US AUDIT-C     1. How often do you have a drink containing alcohol? 0 Filed at: 09/17/2023 1333   2. How many drinks containing alcohol do you have on a typical day you are drinking? 0 Filed at: 09/17/2023 1333   3a. Male UNDER 65: How often do you have five or more drinks on one occasion? 0 Filed at: 09/17/2023 1333   3b. FEMALE Any Age, or MALE 65+: How often do you have 4 or more drinks on one occassion? 0 Filed at: 09/17/2023 1333   Audit-C Score 0 Filed at: 09/17/2023 1333   SHIRA: How many times in the past year have you. .. Used an illegal drug or used a prescription medication for non-medical reasons?  Never Filed at: 09/17/2023 1333                    Medical Decision Making  27yoF presenting for worsening palpitations today. Feels like a fluttering in her chest. Associated with shortness of breath. No CP. No dizziness or syncope. Hx of breast cancer currently on Tamoxifen. She is afebrile and hemodynamically stable. She is well appearing in no distress. Exam is reassuring. Initial ED plan: Place on cardiac monitor. Check cardiac lab, TSH, EKG, and CXR. Final assessment: Labs unremarkable including normal electrolytes and TSH. EKG shows NSR with nonspecific T wave changes. Intervals normal. No ectopy present. High sensitivity troponin is <2. CXR appears clear. No telemetry events and she has not had any symptoms during ED stay. She is stable for discharge. Advised close PCP and cardiology follow-up. ED return precautions discussed. Patient expressed understanding and is agreeable to plan. Patient discharged in stable condition. Palpitations: acute illness or injury  Amount and/or Complexity of Data Reviewed  Labs: ordered. Radiology: ordered and independent interpretation performed. ECG/medicine tests: ordered and independent interpretation performed. Disposition  Final diagnoses:   Palpitations     Time reflects when diagnosis was documented in both MDM as applicable and the Disposition within this note     Time User Action Codes Description Comment    9/17/2023  2:57 PM [address] [facility] [address], [address] [R00.2] Palpitations       ED Disposition     ED Disposition   Discharge    Condition   Stable    Date/Time   Sun Sep 17, 2023  2:57 PM    [facility] discharge to home/self care.                Follow-up Information     Follow up With Specialties Details Why Contact Info Additional Information    Lazaro Chadwick MD Internal Medicine Schedule an appointment as soon as possible for a visit   [phone]  [facility] 2  [facility] Emergency Department Emergency Medicine  If symptoms worsen [address] [facility] [address] [facility] Emergency Department, [place], [place], [address]          Discharge Medication List as of 9/17/2023  2:57 PM      CONTINUE these medications which have NOT CHANGED    Details   b complex vitamins capsule Take 1 capsule by mouth daily, Historical Med      Calcium Carbonate-Vit D-Min (CALCIUM 1200 PO) Take by mouth, Historical Med      cholecalciferol (VITAMIN D3) 1,000 units tablet Take 1,000 Units by mouth daily 4,000 IU daily, Historical Med      tamoxifen (NOLVADEX) 20 mg tablet Take 1 tablet (20 mg total) by mouth daily, Starting Thu 9/8/2022, Until Mon 4/24/2023, Normal      cetirizine (ZyrTEC) 10 mg tablet TAKE 1 TABLET BY MOUTH DAILY FOR 15 DAYS., Historical Med      ibuprofen (MOTRIN) 800 mg tablet TAKE 1 TABLET BY MOUTH WITH FOOD EVERY 8 HOURS AS NEEDED FOR PAIN, Historical Med             No discharge procedures on file.     PDMP Review     None          ED Provider  Electronically Signed by           Nano Ruiz PA-C  09/17/23 9170

## 2023-09-18 ENCOUNTER — VBI (OUTPATIENT)
Dept: INTERNAL MEDICINE CLINIC | Facility: CLINIC | Age: 28
End: 2023-09-18

## 2023-09-18 LAB
ATRIAL RATE: 71 BPM
P AXIS: 29 DEGREES
PR INTERVAL: 150 MS
QRS AXIS: 36 DEGREES
QRSD INTERVAL: 80 MS
QT INTERVAL: 402 MS
QTC INTERVAL: 436 MS
T WAVE AXIS: -6 DEGREES
VENTRICULAR RATE: 71 BPM

## 2023-09-18 PROCEDURE — 93010 ELECTROCARDIOGRAM REPORT: CPT | Performed by: INTERNAL MEDICINE

## 2023-09-18 NOTE — TELEPHONE ENCOUNTER
09/18/23 3:46 PM    Patient contacted post ED visit, [facility] department spoke with patient/caregiver and outreach was successful. Thank you.   Umu Irving  [facility]

## 2023-09-26 ENCOUNTER — TELEPHONE (OUTPATIENT)
Dept: SURGICAL ONCOLOGY | Facility: CLINIC | Age: 28
End: 2023-09-26

## 2023-09-26 NOTE — TELEPHONE ENCOUNTER
Called patient and left message for facility information and when patient had her melanoma removed to request the information needed for Dr. Doug Pike to review.

## 2023-10-11 ENCOUNTER — TELEPHONE (OUTPATIENT)
Dept: HEMATOLOGY ONCOLOGY | Facility: CLINIC | Age: 28
End: 2023-10-11

## 2023-10-11 DIAGNOSIS — C50.211 MALIGNANT NEOPLASM OF UPPER-INNER QUADRANT OF RIGHT BREAST IN FEMALE, ESTROGEN RECEPTOR POSITIVE: ICD-10-CM

## 2023-10-11 DIAGNOSIS — Z17.0 MALIGNANT NEOPLASM OF UPPER-INNER QUADRANT OF RIGHT BREAST IN FEMALE, ESTROGEN RECEPTOR POSITIVE: ICD-10-CM

## 2023-10-11 RX ORDER — TAMOXIFEN CITRATE 20 MG/1
20 TABLET ORAL DAILY
Qty: 90 TABLET | Refills: 3 | Status: SHIPPED | OUTPATIENT
Start: 2023-10-11

## 2023-10-11 NOTE — TELEPHONE ENCOUNTER
Medication Refill Request   Who are you speaking with? Patient   If it is not the patient, are they listed on an active communication consent form? N/A   Which medication is being requested for refill? Please list medication name and dosage  tamoxifen (NOLVADEX) 20 mg tablet    How many pills does the patient have left? 2 LEFT    Preferred Pharmacy / Address [facility]/pharmacy #    [phone] [place] [address]    Phone: [phone]   Fax: [phone]      Who is the prescribing provider?  Dr. Jeff Reed   Call back number  [phone]   Relevant Information N/A

## 2024-02-22 ENCOUNTER — OFFICE VISIT (OUTPATIENT)
Dept: INTERNAL MEDICINE CLINIC | Facility: CLINIC | Age: 29
End: 2024-02-22
Payer: COMMERCIAL

## 2024-02-22 ENCOUNTER — LAB (OUTPATIENT)
Dept: LAB | Facility: HOSPITAL | Age: 29
End: 2024-02-22
Payer: COMMERCIAL

## 2024-02-22 VITALS
WEIGHT: 168.2 LBS | DIASTOLIC BLOOD PRESSURE: 72 MMHG | HEIGHT: 64 IN | SYSTOLIC BLOOD PRESSURE: 112 MMHG | HEART RATE: 94 BPM | TEMPERATURE: 98.9 F | OXYGEN SATURATION: 99 % | RESPIRATION RATE: 18 BRPM | BODY MASS INDEX: 28.71 KG/M2

## 2024-02-22 DIAGNOSIS — R10.9 ABDOMINAL CRAMPING: ICD-10-CM

## 2024-02-22 DIAGNOSIS — R19.7 DIARRHEA, UNSPECIFIED TYPE: Primary | ICD-10-CM

## 2024-02-22 DIAGNOSIS — R19.7 DIARRHEA, UNSPECIFIED TYPE: ICD-10-CM

## 2024-02-22 PROBLEM — Z17.0 MALIGNANT NEOPLASM OF UPPER-INNER QUADRANT OF RIGHT BREAST IN FEMALE, ESTROGEN RECEPTOR POSITIVE: Status: RESOLVED | Noted: 2021-06-29 | Resolved: 2024-02-22

## 2024-02-22 PROBLEM — C50.211 MALIGNANT NEOPLASM OF UPPER-INNER QUADRANT OF RIGHT BREAST IN FEMALE, ESTROGEN RECEPTOR POSITIVE: Status: RESOLVED | Noted: 2021-06-29 | Resolved: 2024-02-22

## 2024-02-22 LAB
ALBUMIN SERPL BCP-MCNC: 4.2 G/DL (ref 3.5–5)
ALP SERPL-CCNC: 55 U/L (ref 34–104)
ALT SERPL W P-5'-P-CCNC: 11 U/L (ref 7–52)
ANION GAP SERPL CALCULATED.3IONS-SCNC: 5 MMOL/L
AST SERPL W P-5'-P-CCNC: 15 U/L (ref 13–39)
BASOPHILS # BLD AUTO: 0.02 THOUSANDS/ÂΜL (ref 0–0.1)
BASOPHILS NFR BLD AUTO: 0 % (ref 0–1)
BILIRUB SERPL-MCNC: 0.31 MG/DL (ref 0.2–1)
BUN SERPL-MCNC: 8 MG/DL (ref 5–25)
CALCIUM SERPL-MCNC: 9.1 MG/DL (ref 8.4–10.2)
CHLORIDE SERPL-SCNC: 109 MMOL/L (ref 96–108)
CO2 SERPL-SCNC: 28 MMOL/L (ref 21–32)
CREAT SERPL-MCNC: 0.8 MG/DL (ref 0.6–1.3)
EOSINOPHIL # BLD AUTO: 0.07 THOUSAND/ÂΜL (ref 0–0.61)
EOSINOPHIL NFR BLD AUTO: 1 % (ref 0–6)
ERYTHROCYTE [DISTWIDTH] IN BLOOD BY AUTOMATED COUNT: 13.2 % (ref 11.6–15.1)
GFR SERPL CREATININE-BSD FRML MDRD: 99 ML/MIN/1.73SQ M
GLUCOSE SERPL-MCNC: 94 MG/DL (ref 65–140)
HCT VFR BLD AUTO: 34.1 % (ref 34.8–46.1)
HGB BLD-MCNC: 11.2 G/DL (ref 11.5–15.4)
IMM GRANULOCYTES # BLD AUTO: 0.01 THOUSAND/UL (ref 0–0.2)
IMM GRANULOCYTES NFR BLD AUTO: 0 % (ref 0–2)
LYMPHOCYTES # BLD AUTO: 1.7 THOUSANDS/ÂΜL (ref 0.6–4.47)
LYMPHOCYTES NFR BLD AUTO: 29 % (ref 14–44)
MAGNESIUM SERPL-MCNC: 2 MG/DL (ref 1.9–2.7)
MCH RBC QN AUTO: 29.1 PG (ref 26.8–34.3)
MCHC RBC AUTO-ENTMCNC: 32.8 G/DL (ref 31.4–37.4)
MCV RBC AUTO: 89 FL (ref 82–98)
MONOCYTES # BLD AUTO: 0.51 THOUSAND/ÂΜL (ref 0.17–1.22)
MONOCYTES NFR BLD AUTO: 9 % (ref 4–12)
NEUTROPHILS # BLD AUTO: 3.56 THOUSANDS/ÂΜL (ref 1.85–7.62)
NEUTS SEG NFR BLD AUTO: 61 % (ref 43–75)
NRBC BLD AUTO-RTO: 0 /100 WBCS
PLATELET # BLD AUTO: 259 THOUSANDS/UL (ref 149–390)
PMV BLD AUTO: 10.1 FL (ref 8.9–12.7)
POTASSIUM SERPL-SCNC: 3.6 MMOL/L (ref 3.5–5.3)
PROT SERPL-MCNC: 6.8 G/DL (ref 6.4–8.4)
RBC # BLD AUTO: 3.85 MILLION/UL (ref 3.81–5.12)
SODIUM SERPL-SCNC: 142 MMOL/L (ref 135–147)
WBC # BLD AUTO: 5.87 THOUSAND/UL (ref 4.31–10.16)

## 2024-02-22 PROCEDURE — 87505 NFCT AGENT DETECTION GI: CPT

## 2024-02-22 PROCEDURE — 89055 LEUKOCYTE ASSESSMENT FECAL: CPT

## 2024-02-22 PROCEDURE — 83735 ASSAY OF MAGNESIUM: CPT

## 2024-02-22 PROCEDURE — 85025 COMPLETE CBC W/AUTO DIFF WBC: CPT

## 2024-02-22 PROCEDURE — 87493 C DIFF AMPLIFIED PROBE: CPT

## 2024-02-22 PROCEDURE — 80053 COMPREHEN METABOLIC PANEL: CPT

## 2024-02-22 PROCEDURE — 99213 OFFICE O/P EST LOW 20 MIN: CPT | Performed by: PHYSICIAN ASSISTANT

## 2024-02-22 PROCEDURE — 36415 COLL VENOUS BLD VENIPUNCTURE: CPT

## 2024-02-22 RX ORDER — DICYCLOMINE HYDROCHLORIDE 10 MG/1
10 CAPSULE ORAL
Qty: 40 CAPSULE | Refills: 0 | Status: SHIPPED | OUTPATIENT
Start: 2024-02-22

## 2024-02-22 NOTE — PROGRESS NOTES
"Assessment/Plan:   Diarrhea:  Watery diarrhea about 5 times per day, for 13 days. Cramping  Will check fecal leukocytes, d. Diff, enteric panel, cbc, cmp, mag  Will order bentyl 10mg QID PRN  Ambulatory referral to GI       Quality Measures:       No follow-ups on file.    No problem-specific Assessment & Plan notes found for this encounter.       Diagnoses and all orders for this visit:    Diarrhea, unspecified type  -     Clostridium difficile toxin by PCR with EIA; Future  -     CBC and differential; Future  -     Comprehensive metabolic panel; Future  -     Fecal leukocytes; Future  -     Magnesium; Future  -     Cancel: Stool culture; Future  -     Stool Enteric Bacterial Panel by PCR; Future          Subjective:      Patient ID: Leidy Man is a 28 y.o. female.    Patient is a 28-year-old female who presents in the office today for evaluation of diarrhea for 13 days.  She denies any recent travel, changes in diet, new medications/supplements.  She is unsure of when she last used antibiotics as she had a UTI a few months ago.  She reports about 5 episodes of diarrhea per day.  Stool is watery with mucus. No blood. Small amount of stool output. Bowel movements are not triggered by eating.  No change in appetite.  No nausea.  Intermittent \"cramping.\" No fever or vomiting.         ALLERGIES:  No Known Allergies    CURRENT MEDICATIONS:    Current Outpatient Medications:     b complex vitamins capsule, Take 1 capsule by mouth daily, Disp: , Rfl:     Calcium Carbonate-Vit D-Min (CALCIUM 1200 PO), Take by mouth, Disp: , Rfl:     cholecalciferol (VITAMIN D3) 1,000 units tablet, Take 1,000 Units by mouth daily 4,000 IU daily, Disp: , Rfl:     tamoxifen (NOLVADEX) 20 mg tablet, Take 1 tablet (20 mg total) by mouth daily, Disp: 90 tablet, Rfl: 3    cetirizine (ZyrTEC) 10 mg tablet, TAKE 1 TABLET BY MOUTH DAILY FOR 15 DAYS. (Patient not taking: Reported on 2/22/2024), Disp: , Rfl:     ibuprofen (MOTRIN) 800 mg " tablet, TAKE 1 TABLET BY MOUTH WITH FOOD EVERY 8 HOURS AS NEEDED FOR PAIN (Patient not taking: Reported on 2/22/2024), Disp: , Rfl:     ACTIVE PROBLEM LIST:  Patient Active Problem List   Diagnosis    Nevus    Malignant melanoma of skin of abdomen (HCC)    Multiple benign melanocytic nevi    Spitz nevus of forearm, left    Enlarged thyroid    Malignant neoplasm of upper-inner quadrant of right breast in female, estrogen receptor positive     Breast lump or mass    Use of tamoxifen (Nolvadex)    History of lumpectomy of right breast       PAST MEDICAL HISTORY:  Past Medical History:   Diagnosis Date    Benign paroxysmal positional vertigo due to bilateral vestibular disorder 01/24/2019    Breast cancer (HCC) 07/19/2021    right breast    Cancer (HCC)     History of chemotherapy 2021    right breast cancer    History of radiation therapy 2021    right breast cancer    Iron deficiency anemia due to chronic blood loss 01/15/2014    Melanoma (HCC) 2018    Melanoma (HCC) 07/2023    left breast    Neck pain 01/15/2014    Warts 06/21/2016       PAST SURGICAL HISTORY:  Past Surgical History:   Procedure Laterality Date    BREAST BIOPSY      BREAST LUMPECTOMY Right 07/19/2021    Procedure: LYNDSAY  DIRECTED LUMPECTOMY;  Surgeon: Carol Mccloud MD;  [facility];  Service: Surgical Oncology    LYMPH NODE BIOPSY Right 07/19/2021    Procedure: LYMPHATIC MAPPING WITH BLUE AND RADIOACTIVE DYES, SENTINEL LYMPH NODE BIOPSY, injection at 1030;  Surgeon: Carol Mccloud MD;  Location: [facility];  Service: Surgical Oncology    MRI BREAST BIOPSY LEFT (ALL INCLUSIVE) Left 02/10/2023    Benign    SKIN CANCER EXCISION      TONSILLECTOMY AND ADENOIDECTOMY      WISDOM TOOTH EXTRACTION         FAMILY HISTORY:  Family History   Problem Relation Age of Onset    Prostate cancer Father     Kidney cancer Maternal Grandfather     Breast cancer Maternal Aunt 50       SOCIAL HISTORY:  Social History     Socioeconomic History    Marital  "status: Single     Spouse name: Not on file    Number of children: Not on file    Years of education: Not on file    Highest education level: Not on file   Occupational History    Not on file   Tobacco Use    Smoking status: Never    Smokeless tobacco: Never   Vaping Use    Vaping status: Never Used   Substance and Sexual Activity    Alcohol use: Yes     Comment: Socially    Drug use: No    Sexual activity: Yes   Other Topics Concern    Not on file   Social History Narrative    Not on file     Social Determinants of Health     Financial Resource Strain: Not on file   Food Insecurity: Not on file   Transportation Needs: Not on file   Physical Activity: Not on file   Stress: Not on file   Social Connections: Not on file   Intimate Partner Violence: Not on file   Housing Stability: Not on file       Review of Systems   Gastrointestinal:  Positive for abdominal pain and diarrhea. Negative for nausea.         Objective:  Vitals:    02/22/24 1446   BP: 112/72   BP Location: Left arm   Patient Position: Sitting   Cuff Size: Standard   Pulse: 94   Resp: 18   Temp: 98.9 °F (37.2 °C)   TempSrc: Tympanic   SpO2: 99%   Weight: 76.3 kg (168 lb 3.2 oz)   Height: 5' 4\" (1.626 m)     Body mass index is 28.87 kg/m².     Physical Exam  Constitutional:       Appearance: Normal appearance.   HENT:      Nose: Nose normal.      Mouth/Throat:      Mouth: Mucous membranes are moist.   Cardiovascular:      Rate and Rhythm: Normal rate and regular rhythm.   Pulmonary:      Effort: Pulmonary effort is normal.      Breath sounds: Normal breath sounds.   Abdominal:      General: Abdomen is flat. Bowel sounds are normal. There is no distension.      Palpations: Abdomen is soft.      Tenderness: There is no abdominal tenderness.   Skin:     General: Skin is warm and dry.   Neurological:      General: No focal deficit present.      Mental Status: She is alert and oriented to person, place, and time.   Psychiatric:         Mood and Affect: Mood " normal.           RESULTS:  Hemoglobin   Date/Time Value Ref Range Status   09/17/2023 01:58 PM 11.4 (L) 11.5 - 15.4 g/dL Final     Hematocrit   Date/Time Value Ref Range Status   09/17/2023 01:58 PM 34.8 34.8 - 46.1 % Final     Platelets   Date/Time Value Ref Range Status   09/17/2023 01:58  149 - 390 Thousands/uL Final     TSH 3RD GENERATON   Date/Time Value Ref Range Status   09/17/2023 01:58 PM 0.896 0.450 - 4.500 uIU/mL Final     Comment:     The recommended reference ranges for TSH during pregnancy are as follows:   First trimester 0.1 to 2.5 uIU/mL   Second trimester  0.2 to 3.0 uIU/mL   Third trimester 0.3 to 3.0 uIU/m    Note: Normal ranges may not apply to patients who are transgender, non-binary, or whose legal sex, sex at birth, and gender identity differ.  Adult TSH (3rd generation) reference range follows the recommended guidelines of the American Thy[employer] Association, January, 2020.     Sodium   Date/Time Value Ref Range Status   09/17/2023 01:58  135 - 147 mmol/L Final     BUN   Date/Time Value Ref Range Status   09/17/2023 01:58 PM 11 5 - 25 mg/dL Final     Creatinine   Date/Time Value Ref Range Status   09/17/2023 01:58 PM 0.71 0.60 - 1.30 mg/dL Final     Comment:     Standardized to IDMS reference method      In chart    This note was created with voice recognition software.  Phonic, grammatical and spelling errors may be present within the note as a result.

## 2024-02-23 LAB
C COLI+JEJUNI TUF STL QL NAA+PROBE: NEGATIVE
C DIFF TOX GENS STL QL NAA+PROBE: NEGATIVE
EC STX1+STX2 GENES STL QL NAA+PROBE: NEGATIVE
SALMONELLA SP SPAO STL QL NAA+PROBE: NEGATIVE
SHIGELLA SP+EIEC IPAH STL QL NAA+PROBE: NEGATIVE
WBC SPEC QL GRAM STN: NORMAL

## 2024-02-23 NOTE — RESULT ENCOUNTER NOTE
Satish Forbes, so far the stool samples are negative for infection. I ordered the referral to GI. They will contact you to schedule the appointment.

## 2024-02-23 NOTE — RESULT ENCOUNTER NOTE
Satish Forbes. Your blood work looks ok. Your hemoglobin is a little low but it is stable and looks like this is your baseline.  I will reach back out once I get the results of the stool studies. Keep us posted if you have any change is symptoms!

## 2024-03-04 ENCOUNTER — OFFICE VISIT (OUTPATIENT)
Dept: GASTROENTEROLOGY | Facility: CLINIC | Age: 29
End: 2024-03-04
Payer: COMMERCIAL

## 2024-03-04 ENCOUNTER — LAB (OUTPATIENT)
Dept: LAB | Facility: HOSPITAL | Age: 29
End: 2024-03-04
Payer: COMMERCIAL

## 2024-03-04 ENCOUNTER — OFFICE VISIT (OUTPATIENT)
Dept: SURGICAL ONCOLOGY | Facility: CLINIC | Age: 29
End: 2024-03-04
Payer: COMMERCIAL

## 2024-03-04 VITALS
BODY MASS INDEX: 28.03 KG/M2 | HEART RATE: 76 BPM | RESPIRATION RATE: 18 BRPM | HEIGHT: 64 IN | WEIGHT: 164.2 LBS | OXYGEN SATURATION: 98 % | TEMPERATURE: 98.8 F | DIASTOLIC BLOOD PRESSURE: 76 MMHG | SYSTOLIC BLOOD PRESSURE: 118 MMHG

## 2024-03-04 VITALS
TEMPERATURE: 98 F | WEIGHT: 165 LBS | OXYGEN SATURATION: 98 % | SYSTOLIC BLOOD PRESSURE: 124 MMHG | HEIGHT: 64 IN | HEART RATE: 82 BPM | DIASTOLIC BLOOD PRESSURE: 68 MMHG | BODY MASS INDEX: 28.17 KG/M2 | RESPIRATION RATE: 16 BRPM

## 2024-03-04 DIAGNOSIS — Z79.810 USE OF TAMOXIFEN (NOLVADEX): Primary | ICD-10-CM

## 2024-03-04 DIAGNOSIS — K58.0 IRRITABLE BOWEL SYNDROME WITH DIARRHEA: Primary | ICD-10-CM

## 2024-03-04 DIAGNOSIS — R19.7 DIARRHEA, UNSPECIFIED TYPE: ICD-10-CM

## 2024-03-04 DIAGNOSIS — Z08 ENCOUNTER FOR FOLLOW-UP SURVEILLANCE OF BREAST CANCER: ICD-10-CM

## 2024-03-04 DIAGNOSIS — Z85.3 ENCOUNTER FOR FOLLOW-UP SURVEILLANCE OF BREAST CANCER: ICD-10-CM

## 2024-03-04 DIAGNOSIS — Z98.890 HISTORY OF LUMPECTOMY OF RIGHT BREAST: ICD-10-CM

## 2024-03-04 DIAGNOSIS — K58.0 IRRITABLE BOWEL SYNDROME WITH DIARRHEA: ICD-10-CM

## 2024-03-04 LAB — CRP SERPL QL: <1 MG/L

## 2024-03-04 PROCEDURE — 36415 COLL VENOUS BLD VENIPUNCTURE: CPT

## 2024-03-04 PROCEDURE — 86258 DGP ANTIBODY EACH IG CLASS: CPT

## 2024-03-04 PROCEDURE — 82784 ASSAY IGA/IGD/IGG/IGM EACH: CPT

## 2024-03-04 PROCEDURE — 86231 EMA EACH IG CLASS: CPT

## 2024-03-04 PROCEDURE — 86364 TISS TRNSGLTMNASE EA IG CLAS: CPT

## 2024-03-04 PROCEDURE — 99215 OFFICE O/P EST HI 40 MIN: CPT | Performed by: SURGERY

## 2024-03-04 PROCEDURE — 86140 C-REACTIVE PROTEIN: CPT

## 2024-03-04 PROCEDURE — 99204 OFFICE O/P NEW MOD 45 MIN: CPT | Performed by: PHYSICIAN ASSISTANT

## 2024-03-04 NOTE — H&P (VIEW-ONLY)
[facility]  Leidy Man 28 y.o. female MRN: [id number]       CC: New patient to establish for diarrhea    HPI: Leidy is a 28-year-old female with history of stage Ia grade 3 invasive ductal carcinoma and melanoma.  Patient presents the office today by referral of her PCP for acute onset diarrhea that began on February 10th.  Patient reports associated mucus in her stool, lower abdominal cramping and abdominal bloating.  No signs or GI bleeding.  Previous to this, patient denies history of chronic GI symptoms.  She had stool testing, which was negative for C. difficile, stool enteric pathogens and fecal leukocytes.  Patient reports the dicyclomine that she was prescribed was somewhat helpful for the abdominal cramping.    She has never had a colonoscopy.  Patient reports that her mother has diverticulosis.  There is no family history of GI malignancy or colon polyps to her knowledge.  No celiac disease either.    Review of Systems:    CONSTITUTIONAL: Denies any fever, chills, or rigors.   HEENT: No earache or tinnitus. Denies hearing loss or visual disturbances.  CARDIOVASCULAR: No chest pain or palpitations.   RESPIRATORY: Denies any cough, hemoptysis, shortness of breath or dyspnea on exertion.  GASTROINTESTINAL: As noted in the History of Present Illness.   GENITOURINARY: No problems with urination. Denies any hematuria or dysuria.  NEUROLOGIC: No dizziness or vertigo, denies headaches.   MUSCULOSKELETAL: Denies any muscle or joint pain.   SKIN: Denies skin rashes or itching.   ENDOCRINE: Denies excessive thirst. Denies intolerance to heat or cold.  PSYCHOSOCIAL: Denies depression or anxiety. Denies any recent memory loss.       Current Outpatient Medications   Medication Sig Dispense Refill    b complex vitamins capsule Take 1 capsule by mouth daily      Calcium Carbonate-Vit D-Min (CALCIUM 1200 PO) Take by mouth      cholecalciferol (VITAMIN D3) 1,000 units tablet Take 1,000 Units by  mouth daily 4,000 IU daily      tamoxifen (NOLVADEX) 20 mg tablet Take 1 tablet (20 mg total) by mouth daily 90 tablet 3    cetirizine (ZyrTEC) 10 mg tablet TAKE 1 TABLET BY MOUTH DAILY FOR 15 DAYS. (Patient not taking: Reported on 2/22/2024)      dicyclomine (BENTYL) 10 mg capsule Take 1 capsule (10 mg total) by mouth 3 (three) times a day before meals (Patient not taking: Reported on 3/4/2024) 40 capsule 0    ibuprofen (MOTRIN) 800 mg tablet TAKE 1 TABLET BY MOUTH WITH FOOD EVERY 8 HOURS AS NEEDED FOR PAIN (Patient not taking: Reported on 2/22/2024)       No current facility-administered medications for this visit.     Past Medical History:   Diagnosis Date    Benign paroxysmal positional vertigo due to bilateral vestibular disorder 01/24/2019    Breast cancer (HCC) 07/19/2021    right breast    Cancer (HCC)     History of chemotherapy 2021    right breast cancer    History of radiation therapy 2021    right breast cancer    Iron deficiency anemia due to chronic blood loss 01/15/2014    Melanoma (HCC) 2018    Melanoma (HCC) 07/2023    left breast    Neck pain 01/15/2014    Warts 06/21/2016     Past Surgical History:   Procedure Laterality Date    BREAST BIOPSY      BREAST LUMPECTOMY Right 07/19/2021    Procedure: LYNDSAY  DIRECTED LUMPECTOMY;  Surgeon: Carol Mccloud MD;  Location: [facility];  Service: Surgical Oncology    LYMPH NODE BIOPSY Right 07/19/2021    Procedure: LYMPHATIC MAPPING WITH BLUE AND RADIOACTIVE DYES, SENTINEL LYMPH NODE BIOPSY, injection at 1030;  Surgeon: Carol Mccloud MD;  Location: [facility];  Service: Surgical Oncology    MRI BREAST BIOPSY LEFT (ALL INCLUSIVE) Left 02/10/2023    Benign    SKIN CANCER EXCISION      TONSILLECTOMY AND ADENOIDECTOMY      WISDOM TOOTH EXTRACTION       Social History     Socioeconomic History    Marital status: Single     Spouse name: None    Number of children: None    Years of education: None    Highest education level: None   Occupational History     None   Tobacco Use    Smoking status: Never    Smokeless tobacco: Never   Vaping Use    Vaping status: Never Used   Substance and Sexual Activity    Alcohol use: Yes     Comment: Socially    Drug use: No    Sexual activity: Yes   Other Topics Concern    None   Social History Narrative    None     Social Determinants of Health     Financial Resource Strain: Not on file   Food Insecurity: Not on file   Transportation Needs: Not on file   Physical Activity: Not on file   Stress: Not on file   Social Connections: Not on file   Intimate Partner Violence: Not on file   Housing Stability: Not on file     Family History   Problem Relation Age of Onset    Prostate cancer Father     Kidney cancer Maternal Grandfather     Breast cancer Maternal Aunt 50            PHYSICAL EXAM:    There were no vitals filed for this visit.  General Appearance:   Alert and oriented x 3. Cooperative, and in no respiratory distress   HEENT:   Normocephalic, atraumatic, anicteric.     Neck:  Supple, symmetrical, trachea midline   Lungs:   Clear to auscultation bilaterally    Heart::   Regular rate and rhythm   Abdomen:   Soft, non-tender, non-distended; normal bowel sounds; no masses, no organomegaly    Genitalia:   Deferred    Rectal:   Deferred    Extremities:  No cyanosis, clubbing or edema    Pulses:  2+ and symmetric all extremities    Skin:  Skin color, texture, turgor normal, no rashes or lesions    Lymph nodes:  No palpable cervical or supraclavicular lymphadenopathy        Lab Results:   Results from last 6 Months   Lab Units 02/22/24  1522   WBC Thousand/uL 5.87   HEMOGLOBIN g/dL 11.2*   HEMATOCRIT % 34.1*   PLATELETS Thousands/uL 259   NEUTROS PCT % 61   LYMPHS PCT % 29   MONOS PCT % 9   EOS PCT % 1     Results from last 6 Months   Lab Units 02/22/24  1522   POTASSIUM mmol/L 3.6   CHLORIDE mmol/L 109*   CO2 mmol/L 28   BUN mg/dL 8   CREATININE mg/dL 0.80   CALCIUM mg/dL 9.1   ALK PHOS U/L 55   ALT U/L 11   AST U/L 15            "    Imaging Studies:   XR chest 2 views    Result Date: 9/18/2023  Impression: No acute cardiopulmonary disease. Findings are stable Workstation performed: [id number]       ASSESSMENT and PLAN:      1) Acute onset diarrhea, lower abdominal cramping and mucus in the stool - May be secondary to viral gastroenteritis with postinfectious irritable bowel syndrome.  Patient would like to avoid a colonoscopy.  However, as we discussed, if her symptoms are not improving, would recommend that she have this sooner than the screening age for colonoscopy which is age 44.  She voices understanding and agrees with plan.  - We will prescribe a Xifaxan course  - Check CRP and celiac disease antibody panel      Follow up in 8-12 weeks.      Portions of the record may have been created with voice recognition software.  Occasional wrong word or \"sound a like\" substitutions may have occurred due to the inherent limitations of voice recognition software.  Read the chart carefully and recognize, using context, where substitutions have occurred.  "

## 2024-03-04 NOTE — LETTER
March 6, 2024     Abdullahi Aceves MD  [address]  [address] [address]    Patient: Leidy Man   YOB: 1995   Date of Visit: 3/4/2024       Dear Dr. Aceves:    Thank you for referring Leidy Man to me for evaluation. Below are my notes for this consultation.    If you have questions, please do not hesitate to call me. I look forward to following your patient along with you.         Sincerely,        Vandana Mcgill PA-C        CC: No Recipients    Vandana Mcgill PA-C  3/4/2024  9:37 AM  Signed  [facility]  Leidy Man 28 y.o. female MRN: [id number]       CC: New patient to establish for diarrhea    HPI: Leidy is a 28-year-old female with history of stage Ia grade 3 invasive ductal carcinoma and melanoma.  Patient presents the office today by referral of her PCP for acute onset diarrhea that began on February 10th.  Patient reports associated mucus in her stool, lower abdominal cramping and abdominal bloating.  No signs or GI bleeding.  Previous to this, patient denies history of chronic GI symptoms.  She had stool testing, which was negative for C. difficile, stool enteric pathogens and fecal leukocytes.  Patient reports the dicyclomine that she was prescribed was somewhat helpful for the abdominal cramping.    She has never had a colonoscopy.  Patient reports that her mother has diverticulosis.  There is no family history of GI malignancy or colon polyps to her knowledge.  No celiac disease either.    Review of Systems:    CONSTITUTIONAL: Denies any fever, chills, or rigors.   HEENT: No earache or tinnitus. Denies hearing loss or visual disturbances.  CARDIOVASCULAR: No chest pain or palpitations.   RESPIRATORY: Denies any cough, hemoptysis, shortness of breath or dyspnea on exertion.  GASTROINTESTINAL: As noted in the History of Present Illness.   GENITOURINARY: No problems with urination. Denies any hematuria or dysuria.  NEUROLOGIC: No  dizziness or vertigo, denies headaches.   MUSCULOSKELETAL: Denies any muscle or joint pain.   SKIN: Denies skin rashes or itching.   ENDOCRINE: Denies excessive thirst. Denies intolerance to heat or cold.  PSYCHOSOCIAL: Denies depression or anxiety. Denies any recent memory loss.       Current Outpatient Medications   Medication Sig Dispense Refill   • b complex vitamins capsule Take 1 capsule by mouth daily     • Calcium Carbonate-Vit D-Min (CALCIUM 1200 PO) Take by mouth     • cholecalciferol (VITAMIN D3) 1,000 units tablet Take 1,000 Units by mouth daily 4,000 IU daily     • tamoxifen (NOLVADEX) 20 mg tablet Take 1 tablet (20 mg total) by mouth daily 90 tablet 3   • cetirizine (ZyrTEC) 10 mg tablet TAKE 1 TABLET BY MOUTH DAILY FOR 15 DAYS. (Patient not taking: Reported on 2/22/2024)     • dicyclomine (BENTYL) 10 mg capsule Take 1 capsule (10 mg total) by mouth 3 (three) times a day before meals (Patient not taking: Reported on 3/4/2024) 40 capsule 0   • ibuprofen (MOTRIN) 800 mg tablet TAKE 1 TABLET BY MOUTH WITH FOOD EVERY 8 HOURS AS NEEDED FOR PAIN (Patient not taking: Reported on 2/22/2024)       No current facility-administered medications for this visit.     Past Medical History:   Diagnosis Date   • Benign paroxysmal positional vertigo due to bilateral vestibular disorder 01/24/2019   • Breast cancer (HCC) 07/19/2021    right breast   • Cancer (HCC)    • History of chemotherapy 2021    right breast cancer   • History of radiation therapy 2021    right breast cancer   • Iron deficiency anemia due to chronic blood loss 01/15/2014   • Melanoma (HCC) 2018   • Melanoma (HCC) 07/2023    left breast   • Neck pain 01/15/2014   • Warts 06/21/2016     Past Surgical History:   Procedure Laterality Date   • BREAST BIOPSY     • BREAST LUMPECTOMY Right 07/19/2021    Procedure: LYNDSAY  DIRECTED LUMPECTOMY;  Surgeon: Carol Mccloud MD;  Location: [facility];  Service: Surgical Oncology   • LYMPH NODE BIOPSY Right  07/19/2021    Procedure: LYMPHATIC MAPPING WITH BLUE AND RADIOACTIVE DYES, SENTINEL LYMPH NODE BIOPSY, injection at 1030;  Surgeon: Carol Mccloud MD;  Location: [facility];  Service: Surgical Oncology   • MRI BREAST BIOPSY LEFT (ALL INCLUSIVE) Left 02/10/2023    Benign   • SKIN CANCER EXCISION     • TONSILLECTOMY AND ADENOIDECTOMY     • WISDOM TOOTH EXTRACTION       Social History     Socioeconomic History   • Marital status: Single     Spouse name: None   • Number of children: None   • Years of education: None   • Highest education level: None   Occupational History   • None   Tobacco Use   • Smoking status: Never   • Smokeless tobacco: Never   Vaping Use   • Vaping status: Never Used   Substance and Sexual Activity   • Alcohol use: Yes     Comment: Socially   • Drug use: No   • Sexual activity: Yes   Other Topics Concern   • None   Social History Narrative   • None     Social Determinants of Health     Financial Resource Strain: Not on file   Food Insecurity: Not on file   Transportation Needs: Not on file   Physical Activity: Not on file   Stress: Not on file   Social Connections: Not on file   Intimate Partner Violence: Not on file   Housing Stability: Not on file     Family History   Problem Relation Age of Onset   • Prostate cancer Father    • Kidney cancer Maternal Grandfather    • Breast cancer Maternal Aunt 50            PHYSICAL EXAM:    There were no vitals filed for this visit.  General Appearance:   Alert and oriented x 3. Cooperative, and in no respiratory distress   HEENT:   Normocephalic, atraumatic, anicteric.     Neck:  Supple, symmetrical, trachea midline   Lungs:   Clear to auscultation bilaterally    Heart::   Regular rate and rhythm   Abdomen:   Soft, non-tender, non-distended; normal bowel sounds; no masses, no organomegaly    Genitalia:   Deferred    Rectal:   Deferred    Extremities:  No cyanosis, clubbing or edema    Pulses:  2+ and symmetric all extremities    Skin:  Skin color, texture,  "turgor normal, no rashes or lesions    Lymph nodes:  No palpable cervical or supraclavicular lymphadenopathy        Lab Results:   Results from last 6 Months   Lab Units 02/22/24  1522   WBC Thousand/uL 5.87   HEMOGLOBIN g/dL 11.2*   HEMATOCRIT % 34.1*   PLATELETS Thousands/uL 259   NEUTROS PCT % 61   LYMPHS PCT % 29   MONOS PCT % 9   EOS PCT % 1     Results from last 6 Months   Lab Units 02/22/24  1522   POTASSIUM mmol/L 3.6   CHLORIDE mmol/L 109*   CO2 mmol/L 28   BUN mg/dL 8   CREATININE mg/dL 0.80   CALCIUM mg/dL 9.1   ALK PHOS U/L 55   ALT U/L 11   AST U/L 15               Imaging Studies:   XR chest 2 views    Result Date: 9/18/2023  Impression: No acute cardiopulmonary disease. Findings are stable Workstation performed: [id number]       ASSESSMENT and PLAN:      1) Acute onset diarrhea, lower abdominal cramping and mucus in the stool - May be secondary to viral gastroenteritis with postinfectious irritable bowel syndrome.  Patient would like to avoid a colonoscopy.  However, as we discussed, if her symptoms are not improving, would recommend that she have this sooner than the screening age for colonoscopy which is age 44.  She voices understanding and agrees with plan.  - We will prescribe a Xifaxan course  - Check CRP and celiac disease antibody panel      Follow up in 8-12 weeks.      Portions of the record may have been created with voice recognition software.  Occasional wrong word or \"sound a like\" substitutions may have occurred due to the inherent limitations of voice recognition software.  Read the chart carefully and recognize, using context, where substitutions have occurred.  "

## 2024-03-04 NOTE — PROGRESS NOTES
Surgical Oncology Follow Up  [facility]  [facility] [place]  [address] [address]    Leidy Man  1995  [id number]      Chief Complaint   Patient presents with    Follow-up     6 mo f/u next mammo sched 9/10/24, 8/28/23 b/l dx mammo, 2/2/23 b/l MRI B4 2/10/23 Left MRi bx 6:00 benign w/ dilated duct w/ foamy macrophages, periductal fibrosis, chronic inflammation and focal columanr cell change  7/19/21 right Lumpectomy, IDC, G 3, ER 65, ID 90, HER2 3+, 1.8 cm, Margins neg, 0/5 LN, Stage IA. on Bx ER 90, ID 70, HER2 3+. Personal hx of melanoma. VUS NF1. adj chem then tamoxifen w/ albandar. WBRT w/ fan.           Assessment & Plan:   This is a 28-year-old female follow-up with right breast cancer s/p breast conservation surgery and radiation.  She is on tamoxifen and tolerating well.  Her next maintenance mammogram is in September.  She had a left breast MRI guided biopsy and consistent with dilated ducts and macrophages.  She denies of any breast pain or any unexpected skin changes palpable or palpable adenopathy.  Will see her in 6 months time after her next month    Cancer History:     Oncology History   Malignant melanoma of skin of abdomen (HCC)   10/11/2018 Initial Diagnosis    Malignant melanoma of skin of abdomen (HCC)     6/24/2021 -  Cancer Staged    Staging form: Breast, AJCC 8th Edition  - Clinical stage from 6/24/2021: Stage IA (cT1c, cN0, cM0, G3, ER+, ID+, HER2+) - Signed by Carol Mccloud MD on 9/7/2023  Stage prefix: Initial diagnosis  Nuclear grade: G3  Histologic grading system: 3 grade system       Malignant neoplasm of upper-inner quadrant of right breast in female, estrogen receptor positive  (Resolved)   6/24/2021 Biopsy    US guided Right breast core biopsy:  1 o'clock 13-14 cm from the nipple  Right invasive ductal carcinoma  Grade 3  ER 90  ID 70   HER2 3+  Lymphovascular invasion: not identified    Results  are malignant and concordant.  Recommend surgical consultation. Additionally, given patient's age and density of breast tissue, recommend enhanced breast MRI for further evaluation.     Done at [facility]     6/30/2021 Genetic Testing    The following genes were evaluated: RADHA, BRCA1, BRCA2, CDH1, CHEK2, PALB2, PTEN, STK11, TP53  Additional genes tested for a total of 36 genes  VUS NF1   [facility]     7/1/2021 Observation    Bilateral breast MRI with and without contrast: B6.    Biopsy confirmed malignancy  01:00 o'clock location right breast.  No axillary lymphadenopathy, extension to the chest wall, or concurrent lesions in either breast.       7/19/2021 Surgery    Right breast LYNDSAY  directed lumpectomy with sentinel lymph node biopsy:  Invasive mammary carcinoma of no special type; ductal  Grade 3  ER 65  OR 90  HER2 3+  18 mm  Margins negative  0/5 Lymph nodes  Stage IA.     8/23/2021 Genomic Testing    MammaPrint: High Risk  HER2 Type     9/8/2021 -  Cancer Staged    Staging form: Breast, AJCC 8th Edition  - Clinical: Stage IA (cT1c, cN0, cM0, G3, ER+, OR+, HER2+) - Signed by Karen Dey MD on 9/8/2021  Histologic grading system: 3 grade system       9/13/2021 - 11/29/2021 Chemotherapy    fosaprepitant (EMEND) IVPB, 150 mg, Intravenous, Once, 0 of 6 cycles  pertuzumab (PERJETA) IVPB, 840 mg, Intravenous, Once, 0 of 18 cycles  CARBOplatin (PARAPLATIN) IVPB (GOG AUC DOSING), 760.8 mg, Intravenous, Once, 0 of 6 cycles  DOCEtaxel (TAXOTERE) chemo infusion, 75 mg/m2 = 132.8 mg, Intravenous, Once, 0 of 6 cycles  trastuzumab (HERCEPTIN) chemo infusion, 8 mg/kg = 610 mg, Intravenous, Once, 0 of 18 cycles       9/13/2021 - 9/6/2022 Chemotherapy    PACLItaxel (TAXOL) chemo IVPB, 80 mg/m2 = 143.4 mg, Intravenous, Once, 12 of 12 cycles  Administration: 143.4 mg (9/13/2021), 143.4 mg (9/20/2021), 143.4 mg (10/4/2021), 143.4 mg (10/18/2021), 143.4 mg (10/25/2021), 143.4 mg (11/1/2021), 143.4 mg (11/15/2021), 143.4 mg  (11/22/2021), 143.4 mg (9/27/2021), 143.4 mg (10/11/2021), 143.4 mg (11/8/2021), 143.4 mg (11/29/2021)  trastuzumab (HERCEPTIN) chemo infusion, 304 mg, Intravenous, Once, 26 of 26 cycles  Administration: 300 mg (9/13/2021), 150 mg (9/20/2021), 150 mg (10/4/2021), 150 mg (10/18/2021), 150 mg (10/25/2021), 150 mg (11/1/2021), 150 mg (11/15/2021), 150 mg (11/22/2021), 150 mg (9/27/2021), 150 mg (10/11/2021), 450 mg (12/6/2021), 440 mg (8/15/2022), 440 mg (9/6/2022), 150 mg (11/8/2021), 150 mg (11/29/2021), 450 mg (12/27/2021), 450 mg (1/17/2022), 450 mg (2/7/2022), 450 mg (2/28/2022), 450 mg (3/21/2022), 450 mg (4/11/2022), 450 mg (5/2/2022), 430 mg (5/26/2022), 440 mg (6/13/2022), 440 mg (7/6/2022), 440 mg (7/25/2022)     1/3/2022 - 1/31/2022 Radiation    Treatments:  Course: C1  Plan ID Energy Fractions Dose per Fraction (cGy) Total Dose Delivered (cGy) Elapsed Days   Hypo R Bolus 6X 7 / 7 267 1,869 16   Hypo R Brst 6X 8 / 8 267 2,136 18   R Brst e 9E 5 / 5 200 1,000 6    Treatment Dates:  1/3/2022 - 1/31/2022.            Interval History:   Follow-up with right breast cancer    Review of Systems:   Review of Systems   Constitutional:  Negative for chills and fever.   HENT:  Negative for ear pain and sore throat.    Eyes:  Negative for pain and visual disturbance.   Respiratory:  Negative for cough and shortness of breath.    Cardiovascular:  Negative for chest pain and palpitations.   Gastrointestinal:  Negative for abdominal pain and vomiting.   Genitourinary:  Negative for dysuria and hematuria.   Musculoskeletal:  Negative for arthralgias and back pain.   Skin:  Negative for color change and rash.   Neurological:  Negative for seizures and syncope.   All other systems reviewed and are negative.      Past Medical History     Patient Active Problem List   Diagnosis    Nevus    Malignant melanoma of skin of abdomen (HCC)    Multiple benign melanocytic nevi    Spitz nevus of forearm, left    Enlarged thyroid     Breast lump or mass    Use of tamoxifen (Nolvadex)    History of lumpectomy of right breast     Past Medical History:   Diagnosis Date    Benign paroxysmal positional vertigo due to bilateral vestibular disorder 01/24/2019    Breast cancer (HCC) 07/19/2021    right breast    Cancer (HCC)     History of chemotherapy 2021    right breast cancer    History of radiation therapy 2021    right breast cancer    Iron deficiency anemia due to chronic blood loss 01/15/2014    Melanoma (HCC) 2018    Melanoma (HCC) 07/2023    left breast    Neck pain 01/15/2014    Warts 06/21/2016     Past Surgical History:   Procedure Laterality Date    BREAST BIOPSY      BREAST LUMPECTOMY Right 07/19/2021    Procedure: LYNDSAY  DIRECTED LUMPECTOMY;  Surgeon: Carol Mccloud MD;  Location: [facility];  Service: Surgical Oncology    LYMPH NODE BIOPSY Right 07/19/2021    Procedure: LYMPHATIC MAPPING WITH BLUE AND RADIOACTIVE DYES, SENTINEL LYMPH NODE BIOPSY, injection at 1030;  Surgeon: Carol Mccloud MD;  Location: [facility];  Service: Surgical Oncology    MRI BREAST BIOPSY LEFT (ALL INCLUSIVE) Left 02/10/2023    Benign    SKIN CANCER EXCISION      TONSILLECTOMY AND ADENOIDECTOMY      WISDOM TOOTH EXTRACTION       Family History   Problem Relation Age of Onset    Prostate cancer Father     Kidney cancer Maternal Grandfather     Breast cancer Maternal Aunt 50     Social History     Socioeconomic History    Marital status: Single     Spouse name: Not on file    Number of children: Not on file    Years of education: Not on file    Highest education level: Not on file   Occupational History    Not on file   Tobacco Use    Smoking status: Never    Smokeless tobacco: Never   Vaping Use    Vaping status: Never Used   Substance and Sexual Activity    Alcohol use: Yes     Comment: Socially    Drug use: No    Sexual activity: Yes   Other Topics Concern    Not on file   Social History Narrative    Not on file     Social Determinants of Health      Financial Resource Strain: Not on file   Food Insecurity: Not on file   Transportation Needs: Not on file   Physical Activity: Not on file   Stress: Not on file   Social Connections: Not on file   Intimate Partner Violence: Not on file   Housing Stability: Not on file       Current Outpatient Medications:     b complex vitamins capsule, Take 1 capsule by mouth daily, Disp: , Rfl:     Calcium Carbonate-Vit D-Min (CALCIUM 1200 PO), Take by mouth, Disp: , Rfl:     cholecalciferol (VITAMIN D3) 1,000 units tablet, Take 1,000 Units by mouth daily 4,000 IU daily, Disp: , Rfl:     rifaximin (XIFAXAN) 550 mg tablet, Take 1 tablet (550 mg total) by mouth every 8 (eight) hours for 14 days, Disp: 42 tablet, Rfl: 0    tamoxifen (NOLVADEX) 20 mg tablet, Take 1 tablet (20 mg total) by mouth daily, Disp: 90 tablet, Rfl: 3    cetirizine (ZyrTEC) 10 mg tablet, TAKE 1 TABLET BY MOUTH DAILY FOR 15 DAYS. (Patient not taking: Reported on 2/22/2024), Disp: , Rfl:     dicyclomine (BENTYL) 10 mg capsule, Take 1 capsule (10 mg total) by mouth 3 (three) times a day before meals (Patient not taking: Reported on 3/4/2024), Disp: 40 capsule, Rfl: 0    ibuprofen (MOTRIN) 800 mg tablet, TAKE 1 TABLET BY MOUTH WITH FOOD EVERY 8 HOURS AS NEEDED FOR PAIN (Patient not taking: Reported on 2/22/2024), Disp: , Rfl:   No Known Allergies    Physical Exam:     Vitals:    03/04/24 1037   BP: 124/68   Pulse: 82   Resp: 16   Temp: 98 °F (36.7 °C)   SpO2: 98%     Physical Exam  Constitutional:       Appearance: Normal appearance.   HENT:      Head: Normocephalic and atraumatic.      Nose: Nose normal.      Mouth/Throat:      Mouth: Mucous membranes are moist.   Eyes:      Pupils: Pupils are equal, round, and reactive to light.   Cardiovascular:      Rate and Rhythm: Normal rate.      Pulses: Normal pulses.      Heart sounds: Normal heart sounds.   Pulmonary:      Effort: Pulmonary effort is normal.      Breath sounds: Normal breath sounds.   Chest:           Comments: Right breast and right axillary well-healed surgical scars.  No palpable mass masses nipple discharge nipple retraction or skin changes other than treatment related.  Right axillary and supraclavicular examination no palpable mass masses nipple discharge nipple retraction or skin changes.  Left axillary and supraclavicular examination no palpable adenopathy.    Patient was examined seated as well as supine position.  Abdominal:      General: Bowel sounds are normal.      Palpations: Abdomen is soft.   Musculoskeletal:         General: Normal range of motion.      Cervical back: Normal range of motion and neck supple.   Skin:     General: Skin is warm.   Neurological:      General: No focal deficit present.      Mental Status: She is alert and oriented to person, place, and time.   Psychiatric:         Mood and Affect: Mood normal.         Behavior: Behavior normal.         Thought Content: Thought content normal.         Judgment: Judgment normal.           Results & Discussion:   I did review the benefits of endocrine therapy tamoxifen alternatives and possible complications.  She will continue her self breast examination and annual diagnostic mammogram.  She was told to call us with any questions or concerns in the interim she understands and agrees  she understands and  agrees . All patient questions were answered.       Advance Care Planning/Advance Directives:  I Carol Mccloud MD discussed the disease status with Leidy Man  today 03/04/24  treatment plans and follow-up with the patient.

## 2024-03-04 NOTE — PROGRESS NOTES
[facility]  Leidy Man 28 y.o. female MRN: [id number]       CC: New patient to establish for diarrhea    HPI: Leidy is a 28-year-old female with history of stage Ia grade 3 invasive ductal carcinoma and melanoma.  Patient presents the office today by referral of her PCP for acute onset diarrhea that began on February 10th.  Patient reports associated mucus in her stool, lower abdominal cramping and abdominal bloating.  No signs or GI bleeding.  Previous to this, patient denies history of chronic GI symptoms.  She had stool testing, which was negative for C. difficile, stool enteric pathogens and fecal leukocytes.  Patient reports the dicyclomine that she was prescribed was somewhat helpful for the abdominal cramping.    She has never had a colonoscopy.  Patient reports that her mother has diverticulosis.  There is no family history of GI malignancy or colon polyps to her knowledge.  No celiac disease either.    Review of Systems:    CONSTITUTIONAL: Denies any fever, chills, or rigors.   HEENT: No earache or tinnitus. Denies hearing loss or visual disturbances.  CARDIOVASCULAR: No chest pain or palpitations.   RESPIRATORY: Denies any cough, hemoptysis, shortness of breath or dyspnea on exertion.  GASTROINTESTINAL: As noted in the History of Present Illness.   GENITOURINARY: No problems with urination. Denies any hematuria or dysuria.  NEUROLOGIC: No dizziness or vertigo, denies headaches.   MUSCULOSKELETAL: Denies any muscle or joint pain.   SKIN: Denies skin rashes or itching.   ENDOCRINE: Denies excessive thirst. Denies intolerance to heat or cold.  PSYCHOSOCIAL: Denies depression or anxiety. Denies any recent memory loss.       Current Outpatient Medications   Medication Sig Dispense Refill    b complex vitamins capsule Take 1 capsule by mouth daily      Calcium Carbonate-Vit D-Min (CALCIUM 1200 PO) Take by mouth      cholecalciferol (VITAMIN D3) 1,000 units tablet Take 1,000 Units by  mouth daily 4,000 IU daily      tamoxifen (NOLVADEX) 20 mg tablet Take 1 tablet (20 mg total) by mouth daily 90 tablet 3    cetirizine (ZyrTEC) 10 mg tablet TAKE 1 TABLET BY MOUTH DAILY FOR 15 DAYS. (Patient not taking: Reported on 2/22/2024)      dicyclomine (BENTYL) 10 mg capsule Take 1 capsule (10 mg total) by mouth 3 (three) times a day before meals (Patient not taking: Reported on 3/4/2024) 40 capsule 0    ibuprofen (MOTRIN) 800 mg tablet TAKE 1 TABLET BY MOUTH WITH FOOD EVERY 8 HOURS AS NEEDED FOR PAIN (Patient not taking: Reported on 2/22/2024)       No current facility-administered medications for this visit.     Past Medical History:   Diagnosis Date    Benign paroxysmal positional vertigo due to bilateral vestibular disorder 01/24/2019    Breast cancer (HCC) 07/19/2021    right breast    Cancer (HCC)     History of chemotherapy 2021    right breast cancer    History of radiation therapy 2021    right breast cancer    Iron deficiency anemia due to chronic blood loss 01/15/2014    Melanoma (HCC) 2018    Melanoma (HCC) 07/2023    left breast    Neck pain 01/15/2014    Warts 06/21/2016     Past Surgical History:   Procedure Laterality Date    BREAST BIOPSY      BREAST LUMPECTOMY Right 07/19/2021    Procedure: LYNDSAY  DIRECTED LUMPECTOMY;  Surgeon: Carol Mccloud MD;  Location: [facility];  Service: Surgical Oncology    LYMPH NODE BIOPSY Right 07/19/2021    Procedure: LYMPHATIC MAPPING WITH BLUE AND RADIOACTIVE DYES, SENTINEL LYMPH NODE BIOPSY, injection at 1030;  Surgeon: Carol Mccloud MD;  Location: [facility];  Service: Surgical Oncology    MRI BREAST BIOPSY LEFT (ALL INCLUSIVE) Left 02/10/2023    Benign    SKIN CANCER EXCISION      TONSILLECTOMY AND ADENOIDECTOMY      WISDOM TOOTH EXTRACTION       Social History     Socioeconomic History    Marital status: Single     Spouse name: None    Number of children: None    Years of education: None    Highest education level: None   Occupational History     None   Tobacco Use    Smoking status: Never    Smokeless tobacco: Never   Vaping Use    Vaping status: Never Used   Substance and Sexual Activity    Alcohol use: Yes     Comment: Socially    Drug use: No    Sexual activity: Yes   Other Topics Concern    None   Social History Narrative    None     Social Determinants of Health     Financial Resource Strain: Not on file   Food Insecurity: Not on file   Transportation Needs: Not on file   Physical Activity: Not on file   Stress: Not on file   Social Connections: Not on file   Intimate Partner Violence: Not on file   Housing Stability: Not on file     Family History   Problem Relation Age of Onset    Prostate cancer Father     Kidney cancer Maternal Grandfather     Breast cancer Maternal Aunt 50            PHYSICAL EXAM:    There were no vitals filed for this visit.  General Appearance:   Alert and oriented x 3. Cooperative, and in no respiratory distress   HEENT:   Normocephalic, atraumatic, anicteric.     Neck:  Supple, symmetrical, trachea midline   Lungs:   Clear to auscultation bilaterally    Heart::   Regular rate and rhythm   Abdomen:   Soft, non-tender, non-distended; normal bowel sounds; no masses, no organomegaly    Genitalia:   Deferred    Rectal:   Deferred    Extremities:  No cyanosis, clubbing or edema    Pulses:  2+ and symmetric all extremities    Skin:  Skin color, texture, turgor normal, no rashes or lesions    Lymph nodes:  No palpable cervical or supraclavicular lymphadenopathy        Lab Results:   Results from last 6 Months   Lab Units 02/22/24  1522   WBC Thousand/uL 5.87   HEMOGLOBIN g/dL 11.2*   HEMATOCRIT % 34.1*   PLATELETS Thousands/uL 259   NEUTROS PCT % 61   LYMPHS PCT % 29   MONOS PCT % 9   EOS PCT % 1     Results from last 6 Months   Lab Units 02/22/24  1522   POTASSIUM mmol/L 3.6   CHLORIDE mmol/L 109*   CO2 mmol/L 28   BUN mg/dL 8   CREATININE mg/dL 0.80   CALCIUM mg/dL 9.1   ALK PHOS U/L 55   ALT U/L 11   AST U/L 15            "    Imaging Studies:   XR chest 2 views    Result Date: 9/18/2023  Impression: No acute cardiopulmonary disease. Findings are stable Workstation performed: [id number]       ASSESSMENT and PLAN:      1) Acute onset diarrhea, lower abdominal cramping and mucus in the stool - May be secondary to viral gastroenteritis with postinfectious irritable bowel syndrome.  Patient would like to avoid a colonoscopy.  However, as we discussed, if her symptoms are not improving, would recommend that she have this sooner than the screening age for colonoscopy which is age 44.  She voices understanding and agrees with plan.  - We will prescribe a Xifaxan course  - Check CRP and celiac disease antibody panel      Follow up in 8-12 weeks.      Portions of the record may have been created with voice recognition software.  Occasional wrong word or \"sound a like\" substitutions may have occurred due to the inherent limitations of voice recognition software.  Read the chart carefully and recognize, using context, where substitutions have occurred.  "

## 2024-03-05 ENCOUNTER — TELEPHONE (OUTPATIENT)
Age: 29
End: 2024-03-05

## 2024-03-05 NOTE — TELEPHONE ENCOUNTER
----- Message from Juana Elkins sent at 3/4/2024  4:27 PM EST -----  Regarding: FW: Medicine update  Contact: [phone]    ----- Message -----  From: Leidy Man  Sent: 3/4/2024   4:13 PM EST  To: [facility]  Subject: Medicine update                                  [Prescription Delay] Leidy: Your insurance requires a Prior Authorization for your prescription. This requires your doctor to submit the authorization paperwork to your insurance, who will then review it and determine your coverage. This typically takes between 1 and 14 days. Our goal is to get you the lowest copay, and we are following up with your provider and insurance. No action is needed from you at this time. To learn more about the Prior Authorization process, you can visit [email or web address]    This is the message i got from the company

## 2024-03-05 NOTE — TELEPHONE ENCOUNTER
PA for Xifaxan    Submitted via  []CMM-KEY   []Mobstats-Case ID #   [x]Faxed to [facility] [place]   []Other website   []Phone call Case ID #     Office notes sent, clinical questions answered. Awaiting determination    Turnaround time for your insurance to make a decision on your Prior Authorization can take 7-21 business days.

## 2024-03-06 ENCOUNTER — DOCUMENTATION (OUTPATIENT)
Dept: GASTROENTEROLOGY | Facility: CLINIC | Age: 29
End: 2024-03-06

## 2024-03-06 ENCOUNTER — TELEPHONE (OUTPATIENT)
Age: 29
End: 2024-03-06

## 2024-03-06 LAB
ENDOMYSIUM IGA SER QL: POSITIVE
GLIADIN PEPTIDE IGA SER-ACNC: >150 UNITS (ref 0–19)
GLIADIN PEPTIDE IGG SER-ACNC: 74 UNITS (ref 0–19)
IGA SERPL-MCNC: 216 MG/DL (ref 87–352)
TTG IGA SER-ACNC: >100 U/ML (ref 0–3)
TTG IGG SER-ACNC: 20 U/ML (ref 0–5)

## 2024-03-06 NOTE — TELEPHONE ENCOUNTER
PA for Xifaxan Approved   Date(s) approved until 3/19/2024  Case #[id number]    Patient advised by [x] [employer] Message                      [] Phone call       Pharmacy advised by [x]Fax                                     []Phone call    Approval letter scanned into Media Yes

## 2024-03-06 NOTE — TELEPHONE ENCOUNTER
Called pt to advise Medication Xifaxin has been approved by the insurance. Your pharmacy has been made aware of your approval, please call them to see when your medication will be available for .    [x]Spoke with pt. Verbal understanding  []LMOM   []L/M to call office back. Communication consent not updated in chart  []Other

## 2024-03-06 NOTE — TELEPHONE ENCOUNTER
Please explain to the patient that we sent the prescription to fill Rx as we discussed.  If it is approved and it is a low cost to her, they we will send her a message through text message to mail it to her home.  If it is a high co-pay, we can provide samples hopefully.

## 2024-03-06 NOTE — TELEPHONE ENCOUNTER
Patient called and asked to speak with the office in [place] about this RX.    She did not seem to know about [facility] and the process. She states the last phone call she got was confusing and there seem to be a communication barrier.    I relayed Vandana's message to her about how [facility] works. She asks how long this will take, I have no information on that.     The AUTH team sent a message to her saying it was approved and to contact her pharmacy which she did but [facility] is her pharmacy.    Please have someone call her to confirm process with her.  [phone]

## 2024-03-06 NOTE — TELEPHONE ENCOUNTER
Patients GI provider:  FERMIN Mcgill    Number to return call: [phone]    Reason for call: Pt calling stating she received message that prior auth for medication Xifaxan has been approved. Pt called [facility] & was informed that FERMIN Mcgill has not signed off on this yet. Medication not at pharmacy yet. Pt insisting on a phone call from the clinical team today on this.    Scheduled procedure/appointment date if applicable: Apt 5/31/24

## 2024-03-07 ENCOUNTER — TELEPHONE (OUTPATIENT)
Dept: GASTROENTEROLOGY | Facility: CLINIC | Age: 29
End: 2024-03-07

## 2024-03-07 ENCOUNTER — PREP FOR PROCEDURE (OUTPATIENT)
Dept: GASTROENTEROLOGY | Facility: CLINIC | Age: 29
End: 2024-03-07

## 2024-03-07 DIAGNOSIS — R19.7 DIARRHEA, UNSPECIFIED TYPE: Primary | ICD-10-CM

## 2024-03-07 DIAGNOSIS — R89.4 ABNORMAL CELIAC ANTIBODY PANEL: ICD-10-CM

## 2024-03-07 NOTE — TELEPHONE ENCOUNTER
Called Leidy and scheduled a EGD   reviewed and My Charted prep Instructions    Scheduled date of EGD(as of today):3/22/24  Physician performing EGD:Gonzales  Location of EGD:[facility]  Instructions reviewed with patient by:Mo gan  Clearances: none  
Spoke with patient regarding results, and suspicion for celiac disease.  We discussed that the choice test for diagnosis would be an endoscopy with small bowel biopsy.  She voices understanding and agrees with plan.  We also discussed that we are going to hold off on the patient taking Xifaxan.  We will see how much the medication will cost.  If we still need to use it, I will let the patient know.    We will schedule an endoscopy with Dr. Rolon on a Monday or Tuesday.  Mo, please arrange  
Ectopic pregnancy, tubal    H/O tubal ligation

## 2024-03-12 ENCOUNTER — TELEPHONE (OUTPATIENT)
Dept: HEMATOLOGY ONCOLOGY | Facility: CLINIC | Age: 29
End: 2024-03-12

## 2024-03-12 NOTE — TELEPHONE ENCOUNTER
----- Message from Tanisha Tyler RN sent at 3/12/2024  2:50 PM EDT -----  Regarding: FW: Appointment  Contact: [phone]  Patient is agreeable to rescheduling appt out, if you could call her when you get a chance, to get her rescheduled. Thank you!!  ----- Message -----  From: Leidy Man  Sent: 3/12/2024   2:49 PM EDT  To: Tanisha Tyler RN  Subject: Appointment                                      Oh ok. I can see a new doctor at [facility] if that’s the case. Can i make the appointment for later this month?

## 2024-03-12 NOTE — TELEPHONE ENCOUNTER
I called and left a voicemail for the PT in regards to her new appt that I scheduled for 04/03/24 at 9:40 am. I left the [facility] number if this does not work.

## 2024-03-12 NOTE — TELEPHONE ENCOUNTER
I called patient and she stated that she did not know who Dr. Patterson is or why she had an appt with him. I explained that Dr. Dempsey was no longer coming to the [place] office and that she was scheduled with Dr. Patterson for follow-up. She did not wish to keep the appt and stated that she is feeling fine.

## 2024-03-22 ENCOUNTER — ANESTHESIA (OUTPATIENT)
Dept: GASTROENTEROLOGY | Facility: HOSPITAL | Age: 29
End: 2024-03-22

## 2024-03-22 ENCOUNTER — HOSPITAL ENCOUNTER (OUTPATIENT)
Dept: GASTROENTEROLOGY | Facility: HOSPITAL | Age: 29
Setting detail: OUTPATIENT SURGERY
End: 2024-03-22
Payer: COMMERCIAL

## 2024-03-22 ENCOUNTER — ANESTHESIA EVENT (OUTPATIENT)
Dept: GASTROENTEROLOGY | Facility: HOSPITAL | Age: 29
End: 2024-03-22

## 2024-03-22 VITALS
OXYGEN SATURATION: 100 % | SYSTOLIC BLOOD PRESSURE: 127 MMHG | TEMPERATURE: 97.6 F | HEIGHT: 64 IN | BODY MASS INDEX: 28 KG/M2 | HEART RATE: 70 BPM | RESPIRATION RATE: 17 BRPM | WEIGHT: 164.02 LBS | DIASTOLIC BLOOD PRESSURE: 72 MMHG

## 2024-03-22 DIAGNOSIS — R19.7 DIARRHEA, UNSPECIFIED TYPE: ICD-10-CM

## 2024-03-22 DIAGNOSIS — R89.4 ABNORMAL CELIAC ANTIBODY PANEL: ICD-10-CM

## 2024-03-22 LAB
EXT PREGNANCY TEST URINE: NEGATIVE
EXT. CONTROL: NORMAL

## 2024-03-22 PROCEDURE — 81025 URINE PREGNANCY TEST: CPT | Performed by: INTERNAL MEDICINE

## 2024-03-22 PROCEDURE — 88305 TISSUE EXAM BY PATHOLOGIST: CPT | Performed by: PATHOLOGY

## 2024-03-22 PROCEDURE — 88342 IMHCHEM/IMCYTCHM 1ST ANTB: CPT | Performed by: PATHOLOGY

## 2024-03-22 PROCEDURE — 43239 EGD BIOPSY SINGLE/MULTIPLE: CPT | Performed by: INTERNAL MEDICINE

## 2024-03-22 RX ORDER — SODIUM CHLORIDE, SODIUM LACTATE, POTASSIUM CHLORIDE, CALCIUM CHLORIDE 600; 310; 30; 20 MG/100ML; MG/100ML; MG/100ML; MG/100ML
125 INJECTION, SOLUTION INTRAVENOUS CONTINUOUS
Status: DISCONTINUED | OUTPATIENT
Start: 2024-03-22 | End: 2024-03-26 | Stop reason: HOSPADM

## 2024-03-22 RX ORDER — SODIUM CHLORIDE, SODIUM LACTATE, POTASSIUM CHLORIDE, CALCIUM CHLORIDE 600; 310; 30; 20 MG/100ML; MG/100ML; MG/100ML; MG/100ML
INJECTION, SOLUTION INTRAVENOUS CONTINUOUS PRN
Status: DISCONTINUED | OUTPATIENT
Start: 2024-03-22 | End: 2024-03-22

## 2024-03-22 RX ORDER — PROPOFOL 10 MG/ML
INJECTION, EMULSION INTRAVENOUS AS NEEDED
Status: DISCONTINUED | OUTPATIENT
Start: 2024-03-22 | End: 2024-03-22

## 2024-03-22 RX ORDER — LIDOCAINE HYDROCHLORIDE 20 MG/ML
INJECTION, SOLUTION EPIDURAL; INFILTRATION; INTRACAUDAL; PERINEURAL AS NEEDED
Status: DISCONTINUED | OUTPATIENT
Start: 2024-03-22 | End: 2024-03-22

## 2024-03-22 RX ADMIN — SODIUM CHLORIDE, SODIUM LACTATE, POTASSIUM CHLORIDE, AND CALCIUM CHLORIDE: .6; .31; .03; .02 INJECTION, SOLUTION INTRAVENOUS at 12:32

## 2024-03-22 RX ADMIN — LIDOCAINE HYDROCHLORIDE 100 MG: 20 INJECTION, SOLUTION EPIDURAL; INFILTRATION; INTRACAUDAL; PERINEURAL at 13:46

## 2024-03-22 RX ADMIN — PROPOFOL 20 MG: 10 INJECTION, EMULSION INTRAVENOUS at 13:53

## 2024-03-22 RX ADMIN — PROPOFOL 100 MG: 10 INJECTION, EMULSION INTRAVENOUS at 13:48

## 2024-03-22 RX ADMIN — PROPOFOL 30 MG: 10 INJECTION, EMULSION INTRAVENOUS at 13:49

## 2024-03-22 RX ADMIN — PROPOFOL 30 MG: 10 INJECTION, EMULSION INTRAVENOUS at 13:51

## 2024-03-22 RX ADMIN — PROPOFOL 100 MG: 10 INJECTION, EMULSION INTRAVENOUS at 13:47

## 2024-03-22 NOTE — INTERVAL H&P NOTE
H&P reviewed. After examining the patient I find no changes in the patients condition since the H&P had been written.    Vitals:    03/22/24 1205   BP: 132/68   Pulse: 82   Resp: 16   Temp: (!) 97.2 °F (36.2 °C)   SpO2: 100%

## 2024-03-22 NOTE — ANESTHESIA PREPROCEDURE EVALUATION
Procedure:  EGD    Relevant Problems   GYN   (+) Malignant neoplasm of upper-inner quadrant of right breast in female, estrogen receptor positive  (Resolved)      Surgery/Wound/Pain   (+) History of lumpectomy of right breast      H/o chemo/rads  Physical Exam    Airway    Mallampati score: II  TM Distance: >3 FB  Neck ROM: full     Dental   Comment: Denies loose teeth     Cardiovascular  Cardiovascular exam normal    Pulmonary  Pulmonary exam normal     Other Findings  Portions of exam deferred due to low yield and/or unknown COVID statuspost-pubertal.      Anesthesia Plan  ASA Score- 2     Anesthesia Type- IV sedation with anesthesia with ASA Monitors.         Additional Monitors:     Airway Plan:            Plan Factors-Exercise tolerance (METS): >4 METS.    Chart reviewed.   Existing labs reviewed. Patient summary reviewed.    Patient is not a current smoker.              Induction- intravenous.    Postoperative Plan-     Informed Consent- Anesthetic plan and risks discussed with patient.  I personally reviewed this patient with the CRNA. Discussed and agreed on the Anesthesia Plan with the CRNA..

## 2024-03-22 NOTE — ANESTHESIA POSTPROCEDURE EVALUATION
Post-Op Assessment Note    CV Status:  Stable  Pain Score: 0    Pain management: adequate       Mental Status:  Alert and awake   Hydration Status:  Euvolemic   PONV Controlled:  Controlled   Airway Patency:  Patent     Post Op Vitals Reviewed: Yes    No anethesia notable event occurred.    Staff: CRNA               /64 (03/22/24 1357)    Temp 97.6 °F (36.4 °C) (03/22/24 1357)    Pulse 83 (03/22/24 1357)   Resp 20 (03/22/24 1357)    SpO2 100 % (03/22/24 1357)

## 2024-03-27 DIAGNOSIS — K90.0 CELIAC DISEASE: Primary | ICD-10-CM

## 2024-04-03 ENCOUNTER — OFFICE VISIT (OUTPATIENT)
Dept: HEMATOLOGY ONCOLOGY | Facility: CLINIC | Age: 29
End: 2024-04-03
Payer: COMMERCIAL

## 2024-04-03 VITALS
WEIGHT: 162.5 LBS | RESPIRATION RATE: 16 BRPM | TEMPERATURE: 97.6 F | DIASTOLIC BLOOD PRESSURE: 72 MMHG | OXYGEN SATURATION: 98 % | HEART RATE: 68 BPM | BODY MASS INDEX: 27.74 KG/M2 | SYSTOLIC BLOOD PRESSURE: 128 MMHG | HEIGHT: 64 IN

## 2024-04-03 DIAGNOSIS — K90.0 CELIAC DISEASE: ICD-10-CM

## 2024-04-03 DIAGNOSIS — D22.9 MULTIPLE BENIGN MELANOCYTIC NEVI: ICD-10-CM

## 2024-04-03 DIAGNOSIS — Z85.3 ENCOUNTER FOR FOLLOW-UP SURVEILLANCE OF BREAST CANCER: ICD-10-CM

## 2024-04-03 DIAGNOSIS — Z08 ENCOUNTER FOR FOLLOW-UP SURVEILLANCE OF BREAST CANCER: ICD-10-CM

## 2024-04-03 DIAGNOSIS — C43.59 MALIGNANT MELANOMA OF SKIN OF ABDOMEN (HCC): Primary | ICD-10-CM

## 2024-04-03 PROCEDURE — 99214 OFFICE O/P EST MOD 30 MIN: CPT | Performed by: INTERNAL MEDICINE

## 2024-04-04 PROBLEM — K90.0 CELIAC DISEASE: Status: ACTIVE | Noted: 2024-04-04

## 2024-04-04 NOTE — PROGRESS NOTES
Hematology/Oncology Outpatient Follow- up Note  Leidy Man 28 y.o. female MRN: @ Encounter: [id number]        Date:  4/4/2024        Assessment / Plan:    1 triple positive grade 3 right breast cancer T1 cN0 M0 stage Ia.  2 celiac disease  Plan: Patient will be for come back here for follow-up 1 year sees her surgeon in 6 months.  She will need yearly mammograms.  Continues tamoxifen.  Will be on for 5 years.  No other major suggestions.  She has been seen by GI for her celiac disease.  She previously has been counseled not to get pregnant for the first 2 years since her cancer diagnosis and ideally at least 5 years while on tamoxifen until 12/2026.        HPI: 28-year-old female with a history of invasive ductal carcinoma right breast triple positive status post right lumpectomy with sentinel lymph node biopsy on 7/19/2021 she underwent a year of maintenance Herceptin after receiving 3 months of Herceptin and weekly Taxol.  She is presently on tamoxifen.  She feels well denies complaints mammograms are negative and up-to-date.  Otherwise she appears to be feeling and doing reasonably well not complaining        Interval history: Note from 3/8/2023  1 Right breast invasive ductal carcinoma, grade 3, ER 90%, CT 70%, HER2 positive by IHC.Genetic testing negative.  Status post right breast lumpectomy with sentinel lymph node biopsy on  July 19, 2021. Pathology showed invasive mammary carcinoma of no special type.  The tumor measures 1.8 cm.  Grade 3.  All margins negative.  All lymph nodes negative.  Grade 2 DCIS present.  No evidence of lymphovascular invasion.  Final pathologic stage [id number].  Overall, the patient tolerated the procedure very well. ECHO showed EF 65%. Patient had a 2nd opinion from [facility]. Dr. Ridge Mckinley recommended adjuvant TH weekly for 3 months followed by total of 1 year of maintenance Herceptin target  which she has completed.  Chemotherapy TH was given on September 13,  2021 through November 29, 2021.  Patient used cold cap to preserve her hair in our [facility]. The patient also had oocytes cryopreservation done on September 3, 2021. Patient consulted Dr. Dey.  Adjuvant radiation therapy started in January 2022 through end of that month.     Maintenance Herceptin started on December 6, 2021. Last echo study on December 17, 2021 showed ejection fraction 68%.  Echo study in March 18, 2022 showed ejection fraction 60%.  Patient will continue Herceptin for total of 1 year to end 11/7/2022.     Patient started tamoxifen 20 mg daily on December 20, 2021. Patient tolerated treatment very well and now on surveillance.     Repeat lab and echo study per Cardio-onc  2.   Genetic predisposition:   [employer] breast cancer panel negative..  3.   History of palpitation.    Echo study showed normal LV function, LVEF 60 %.  Repeat echo study per Cardio-Onc  4.   Fertility  Preservation:  Patient consulted Reproductive Medicine. Oocytes cryopreservation with 48 eggs saved on September 3, 2021.    5.  History of melanoma in 2018.  Status post wide local excision.  No evidence of local recurrence. Sees a Dermatologist 1-2x a year.  6. Skin lesions, status post wide local excision. Follow-up with Dermatology.         Cancer Staging:  Cancer Staging   Malignant melanoma of skin of abdomen (HCC)  Staging form: Breast, AJCC 8th Edition  - Clinical stage from 6/24/2021: Stage IA (cT1c, cN0, cM0, G3, ER+, MA+, HER2+) - Signed by Carol Mccloud MD on 9/7/2023  Stage prefix: Initial diagnosis  Nuclear grade: G3  Histologic grading system: 3 grade system      Molecular Testing:     Previous Hematologic/ Oncologic History:    Oncology History   Malignant melanoma of skin of abdomen (HCC)   10/11/2018 Initial Diagnosis    Malignant melanoma of skin of abdomen (HCC)     6/24/2021 -  Cancer Staged    Staging form: Breast, AJCC 8th Edition  - Clinical stage from 6/24/2021: Stage IA (cT1c, cN0,  cM0, G3, ER+, FL+, HER2+) - Signed by Carol Mccloud MD on 9/7/2023  Stage prefix: Initial diagnosis  Nuclear grade: G3  Histologic grading system: 3 grade system       Malignant neoplasm of upper-inner quadrant of right breast in female, estrogen receptor positive (HCC) (Resolved)   6/24/2021 Biopsy    US guided Right breast core biopsy:  1 o'clock 13-14 cm from the nipple  Right invasive ductal carcinoma  Grade 3  ER 90  FL 70   HER2 3+  Lymphovascular invasion: not identified    Results are malignant and concordant.  Recommend surgical consultation. Additionally, given patient's age and density of breast tissue, recommend enhanced breast MRI for further evaluation.     Done at [facility]     6/30/2021 Genetic Testing    The following genes were evaluated: RADHA, BRCA1, BRCA2, CDH1, CHEK2, PALB2, PTEN, STK11, TP53  Additional genes tested for a total of 36 genes  VUS NF1   [employer]     7/1/2021 Observation    Bilateral breast MRI with and without contrast: B6.    Biopsy confirmed malignancy  01:00 o'clock location right breast.  No axillary lymphadenopathy, extension to the chest wall, or concurrent lesions in either breast.       7/19/2021 Surgery    Right breast LYNDSAY  directed lumpectomy with sentinel lymph node biopsy:  Invasive mammary carcinoma of no special type; ductal  Grade 3  ER 65  FL 90  HER2 3+  18 mm  Margins negative  0/5 Lymph nodes  Stage IA.     8/23/2021 Genomic Testing    MammaPrint: High Risk  HER2 Type     9/8/2021 -  Cancer Staged    Staging form: Breast, AJCC 8th Edition  - Clinical: Stage IA (cT1c, cN0, cM0, G3, ER+, FL+, HER2+) - Signed by Karen Dey MD on 9/8/2021  Histologic grading system: 3 grade system       9/13/2021 - 11/29/2021 Chemotherapy    fosaprepitant (EMEND) IVPB, 150 mg, Intravenous, Once, 0 of 6 cycles  pertuzumab (PERJETA) IVPB, 840 mg, Intravenous, Once, 0 of 18 cycles  CARBOplatin (PARAPLATIN) IVPB (GOG AUC DOSING), 760.8 mg, Intravenous, Once, 0 of 6  cycles  DOCEtaxel (TAXOTERE) chemo infusion, 75 mg/m2 = 132.8 mg, Intravenous, Once, 0 of 6 cycles  trastuzumab (HERCEPTIN) chemo infusion, 8 mg/kg = 610 mg, Intravenous, Once, 0 of 18 cycles       9/13/2021 - 9/6/2022 Chemotherapy    PACLItaxel (TAXOL) chemo IVPB, 80 mg/m2 = 143.4 mg, Intravenous, Once, 12 of 12 cycles  Administration: 143.4 mg (9/13/2021), 143.4 mg (9/20/2021), 143.4 mg (10/4/2021), 143.4 mg (10/18/2021), 143.4 mg (10/25/2021), 143.4 mg (11/1/2021), 143.4 mg (11/15/2021), 143.4 mg (11/22/2021), 143.4 mg (9/27/2021), 143.4 mg (10/11/2021), 143.4 mg (11/8/2021), 143.4 mg (11/29/2021)  trastuzumab (HERCEPTIN) chemo infusion, 304 mg, Intravenous, Once, 26 of 26 cycles  Administration: 300 mg (9/13/2021), 150 mg (9/20/2021), 150 mg (10/4/2021), 150 mg (10/18/2021), 150 mg (10/25/2021), 150 mg (11/1/2021), 150 mg (11/15/2021), 150 mg (11/22/2021), 150 mg (9/27/2021), 150 mg (10/11/2021), 450 mg (12/6/2021), 440 mg (8/15/2022), 440 mg (9/6/2022), 150 mg (11/8/2021), 150 mg (11/29/2021), 450 mg (12/27/2021), 450 mg (1/17/2022), 450 mg (2/7/2022), 450 mg (2/28/2022), 450 mg (3/21/2022), 450 mg (4/11/2022), 450 mg (5/2/2022), 430 mg (5/26/2022), 440 mg (6/13/2022), 440 mg (7/6/2022), 440 mg (7/25/2022)     1/3/2022 - 1/31/2022 Radiation    Treatments:  Course: C1  Plan ID Energy Fractions Dose per Fraction (cGy) Total Dose Delivered (cGy) Elapsed Days   Hypo R Bolus 6X 7 / 7 267 1,869 16   Hypo R Brst 6X 8 / 8 267 2,136 18   R Brst e 9E 5 / 5 200 1,000 6    Treatment Dates:  1/3/2022 - 1/31/2022.          Current Hematologic/ Oncologic Treatment:       Cycle 1         Test Results:    Imaging: EGD    Result Date: 3/22/2024  Narrative: Table formatting from the original result was not included.  [facility] [address] [facility] [address] [phone] DATE OF SERVICE: 3/22/24 PHYSICIAN(S): Attending: Cass Rolon MD Fellow: No Staff Documented INDICATION: Abnormal celiac  antibody panel, Diarrhea, unspecified type POST-OP DIAGNOSIS: See the impression below. PREPROCEDURE: Informed consent was obtained for the procedure, including sedation.  Risks of perforation, hemorrhage, adverse drug reaction and aspiration were discussed. The patient was placed in the left lateral decubitus position. Patient was explained about the risks and benefits of the procedure. Risks including but not limited to bleeding, infection, and perforation were explained in detail. Also explained about less than 100% sensitivity with the exam and other alternatives. PROCEDURE: EGD DETAILS OF PROCEDURE: Patient was taken to the procedure room where a time out was performed to confirm correct patient and correct procedure. The patient underwent monitored anesthesia care, which was administered by an anesthesia professional. The patient's blood pressure, heart rate, level of consciousness, respirations, oxygen, ECG and ETCO2 were monitored throughout the procedure. The scope was introduced through the mouth and advanced to the second part of the duodenum. Retroflexion was performed in the fundus. The patient experienced no blood loss. The procedure was not difficult. The patient tolerated the procedure well. There were no apparent adverse events. ANESTHESIA INFORMATION: ASA: II Anesthesia Type: IV Sedation with Anesthesia MEDICATIONS: No administrations occurring from 1341 to 1354 on 03/22/24 FINDINGS: The esophagus appeared normal. Z-line is 36 cm from the incisors. Mild edematous and erythematous mucosa in the body of the stomach Performed multiple forceps biopsies in the body of the stomach, incisura and antrum to rule out H. pylori Fissured and scalloped mucosa in the 2nd part of the duodenum, consistent with celiac disease Performed multiple forceps biopsies in the 1st part of the duodenum and 2nd part of the duodenum. Rule out Celiac disease. SPECIMENS: ID Type Source Tests Collected by Time Destination A :    "Duodenum   3/22/2024  1:54 PM      Impression: The esophagus appeared normal. Abnormal mucosa Performed forceps biopsies in the body of the stomach, incisura and antrum to rule out H. pylori Performed forceps biopsies in the 1st part of the duodenum and 2nd part of the duodenum RECOMMENDATION:  Await pathology results  Follow up with GI Clinic   Cass Rolon MD             Labs:   Lab Results   Component Value Date    WBC 5.87 02/22/2024    HGB 11.2 (L) 02/22/2024    HCT 34.1 (L) 02/22/2024    MCV 89 02/22/2024     02/22/2024     Lab Results   Component Value Date    K 3.6 02/22/2024     (H) 02/22/2024    CO2 28 02/22/2024    BUN 8 02/22/2024    CREATININE 0.80 02/22/2024    GLUF 95 09/06/2022    CALCIUM 9.1 02/22/2024    AST 15 02/22/2024    ALT 11 02/22/2024    ALKPHOS 55 02/22/2024    EGFR 99 02/22/2024         No results found for: \"SPEP\", \"UPEP\"    No results found for: \"PSA\"    No results found for: \"CEA\"    No results found for: \"\"    No results found for: \"AFP\"    Lab Results   Component Value Date    IRON 118 04/15/2019    TIBC 464 (H) 04/15/2019    FERRITIN 7 (L) 04/15/2019       No results found for: \"[id number]\"      ROS: Review of Systems   Constitutional: Negative.    HENT: Negative.     Eyes: Negative.    Respiratory: Negative.     Cardiovascular: Negative.    Gastrointestinal: Negative.    Endocrine: Negative.    Genitourinary: Negative.    Musculoskeletal: Negative.    Skin: Negative.    Allergic/Immunologic: Negative.    Neurological: Negative.    Hematological: Negative.          Current Medications: Reviewed  Allergies: Reviewed  PMH/FH/SH:  Reviewed      Physical Exam:    Body surface area is 1.79 meters squared.    Wt Readings from Last 3 Encounters:   04/03/24 73.7 kg (162 lb 8 oz)   03/22/24 74.4 kg (164 lb 0.4 oz)   03/04/24 74.8 kg (165 lb)        Temp Readings from Last 3 Encounters:   04/03/24 97.6 °F (36.4 °C) (Temporal)   03/22/24 97.6 °F (36.4 °C) (Temporal) "   03/04/24 98 °F (36.7 °C) (Temporal)        BP Readings from Last 3 Encounters:   04/03/24 128/72   03/22/24 127/72   03/04/24 124/68         Pulse Readings from Last 3 Encounters:   04/03/24 68   03/22/24 70   03/04/24 82     @LASTSAO2(3)@      Physical Exam  Constitutional:       Appearance: Normal appearance. She is normal weight.   HENT:      Head: Normocephalic and atraumatic.   Eyes:      Extraocular Movements: Extraocular movements intact.      Conjunctiva/sclera: Conjunctivae normal.      Pupils: Pupils are equal, round, and reactive to light.   Cardiovascular:      Rate and Rhythm: Normal rate and regular rhythm.      Heart sounds: Normal heart sounds.   Pulmonary:      Effort: Pulmonary effort is normal.      Breath sounds: Normal breath sounds.   Abdominal:      General: Abdomen is flat. Bowel sounds are normal.      Palpations: Abdomen is soft.   Musculoskeletal:         General: Normal range of motion.      Cervical back: Normal range of motion and neck supple.   Skin:     General: Skin is warm and dry.   Neurological:      General: No focal deficit present.      Mental Status: She is alert and oriented to person, place, and time. Mental status is at baseline.     Well-healed scar on right breast no masses no masses left breast no axillary adenopathy.      Goals and Barriers:  Current Goal: Prolong Survival from Cancer.   Barriers: None.      Patient's Capacity to Self Care:  Patient is able to self care.    Code Status: [unfilled]  Advance Directive and Living Will:      Power of :

## 2024-05-31 ENCOUNTER — OFFICE VISIT (OUTPATIENT)
Dept: GASTROENTEROLOGY | Facility: CLINIC | Age: 29
End: 2024-05-31
Payer: COMMERCIAL

## 2024-05-31 VITALS
OXYGEN SATURATION: 97 % | DIASTOLIC BLOOD PRESSURE: 80 MMHG | TEMPERATURE: 98 F | HEIGHT: 64 IN | RESPIRATION RATE: 18 BRPM | BODY MASS INDEX: 25.78 KG/M2 | WEIGHT: 151 LBS | SYSTOLIC BLOOD PRESSURE: 122 MMHG | HEART RATE: 61 BPM

## 2024-05-31 DIAGNOSIS — K90.0 CELIAC DISEASE: Primary | ICD-10-CM

## 2024-05-31 PROCEDURE — 99213 OFFICE O/P EST LOW 20 MIN: CPT | Performed by: PHYSICIAN ASSISTANT

## 2024-05-31 NOTE — PROGRESS NOTES
[facility]  Leidy Man 28 y.o. female MRN: [id number]       CC: Follow-up, recently diagnosed celiac disease    HPI: Leidy is a 28-year-old female with history of stage Ia grade 3 invasive ductal carcinoma and melanoma.  Patient also has history of recently diagnosed celiac disease in March 2022.  Patient was last seen by us for consultation in March for new onset diarrhea and lower abdominal cramping.  During her workup, her celiac disease antibody panel was grossly positive.  We scheduled EGD to investigate.  The small bowel appeared fissured and scalloped consistent with celiac disease.  Biopsies confirming the diagnosis, March 3C/4C.     Patient has been gluten-free since her diagnosis in March.  She is already seeing some improvement in her symptoms.  She no longer has abdominal cramping, her stools are becoming more solid again.  She reports that she does have occasional days where she has bloating and diarrhea.  Patient reports that she does not feel the need for a nutritionist at this time, and has been doing her own research about gluten-free diet.  There is no other family members with history of gluten allergy to her knowledge.    Review of Systems:    CONSTITUTIONAL: Denies any fever, chills, or rigors. Good appetite, and no recent weight loss.  HEENT: No earache or tinnitus. Denies hearing loss or visual disturbances.  CARDIOVASCULAR: No chest pain or palpitations.   RESPIRATORY: Denies any cough, hemoptysis, shortness of breath or dyspnea on exertion.  GASTROINTESTINAL: As noted in the History of Present Illness.   GENITOURINARY: No problems with urination. Denies any hematuria or dysuria.  NEUROLOGIC: No dizziness or vertigo, denies headaches.   MUSCULOSKELETAL: Denies any muscle or joint pain.   SKIN: Denies skin rashes or itching.   ENDOCRINE: Denies excessive thirst. Denies intolerance to heat or cold.  PSYCHOSOCIAL: Denies depression or anxiety. Denies any recent  memory loss.       Current Outpatient Medications   Medication Sig Dispense Refill    b complex vitamins capsule Take 1 capsule by mouth daily      Calcium Carbonate-Vit D-Min (CALCIUM 1200 PO) Take by mouth      cetirizine (ZyrTEC) 10 mg tablet TAKE 1 TABLET BY MOUTH DAILY FOR 15 DAYS. (Patient not taking: Reported on 2/22/2024)      cholecalciferol (VITAMIN D3) 1,000 units tablet Take 1,000 Units by mouth daily 4,000 IU daily      dicyclomine (BENTYL) 10 mg capsule Take 1 capsule (10 mg total) by mouth 3 (three) times a day before meals (Patient not taking: Reported on 3/4/2024) 40 capsule 0    ibuprofen (MOTRIN) 800 mg tablet TAKE 1 TABLET BY MOUTH WITH FOOD EVERY 8 HOURS AS NEEDED FOR PAIN (Patient not taking: Reported on 2/22/2024)      tamoxifen (NOLVADEX) 20 mg tablet Take 1 tablet (20 mg total) by mouth daily 90 tablet 3     No current facility-administered medications for this visit.     Past Medical History:   Diagnosis Date    Benign paroxysmal positional vertigo due to bilateral vestibular disorder 01/24/2019    Breast cancer (HCC) 07/19/2021    right breast    Cancer (HCC)     History of chemotherapy 2021    right breast cancer    History of radiation therapy 2021    right breast cancer    Iron deficiency anemia due to chronic blood loss 01/15/2014    Melanoma (HCC) 2018    Melanoma (HCC) 07/2023    left breast    Neck pain 01/15/2014    Warts 06/21/2016     Past Surgical History:   Procedure Laterality Date    BREAST BIOPSY      BREAST LUMPECTOMY Right 07/19/2021    Procedure: LYNDSAY  DIRECTED LUMPECTOMY;  Surgeon: Carol Mccloud MD;  Location: [facility];  Service: Surgical Oncology    LYMPH NODE BIOPSY Right 07/19/2021    Procedure: LYMPHATIC MAPPING WITH BLUE AND RADIOACTIVE DYES, SENTINEL LYMPH NODE BIOPSY, injection at 1030;  Surgeon: Carol Mccloud MD;  Location: [facility];  Service: Surgical Oncology    MRI BREAST BIOPSY LEFT (ALL INCLUSIVE) Left 02/10/2023    Benign    SKIN CANCER  EXCISION      TONSILLECTOMY AND ADENOIDECTOMY      WISDOM TOOTH EXTRACTION       Social History     Socioeconomic History    Marital status: Single     Spouse name: Not on file    Number of children: Not on file    Years of education: Not on file    Highest education level: Not on file   Occupational History    Not on file   Tobacco Use    Smoking status: Never    Smokeless tobacco: Never   Vaping Use    Vaping status: Never Used   Substance and Sexual Activity    Alcohol use: Yes     Comment: Socially    Drug use: No    Sexual activity: Yes   Other Topics Concern    Not on file   Social History Narrative    Not on file     Social Determinants of Health     Financial Resource Strain: Not on file   Food Insecurity: Not on file   Transportation Needs: Not on file   Physical Activity: Not on file   Stress: Not on file   Social Connections: Not on file   Intimate Partner Violence: Not on file   Housing Stability: Not on file     Family History   Problem Relation Age of Onset    Prostate cancer Father     Kidney cancer Maternal Grandfather     Breast cancer Maternal Aunt 50            PHYSICAL EXAM:    There were no vitals filed for this visit.  General Appearance:   Alert and oriented x 3. Cooperative, and in no respiratory distress   HEENT:   Normocephalic, atraumatic, anicteric.     Neck:  Supple, symmetrical, trachea midline   Lungs:   Clear to auscultation bilaterally    Heart::   Regular rate and rhythm   Abdomen:   Soft, non-tender, non-distended; normal bowel sounds; no masses, no organomegaly    Genitalia:   Deferred    Rectal:   Deferred    Extremities:  No cyanosis, clubbing or edema    Pulses:  2+ and symmetric all extremities    Skin:  Skin color, texture, turgor normal, no rashes or lesions    Lymph nodes:  No palpable cervical or supraclavicular lymphadenopathy        Lab Results:   Results from last 6 Months   Lab Units 02/22/24  1522   WBC Thousand/uL 5.87   HEMOGLOBIN g/dL 11.2*   HEMATOCRIT % 34.1*  "  PLATELETS Thousands/uL 259   SEGS PCT % 61   LYMPHO PCT % 29   MONO PCT % 9   EOS PCT % 1     Results from last 6 Months   Lab Units 02/22/24  1522   POTASSIUM mmol/L 3.6   CHLORIDE mmol/L 109*   CO2 mmol/L 28   BUN mg/dL 8   CREATININE mg/dL 0.80   CALCIUM mg/dL 9.1   ALK PHOS U/L 55   ALT U/L 11   AST U/L 15               Imaging Studies:   EGD    Result Date: 3/22/2024  Impression: The esophagus appeared normal. Abnormal mucosa Performed forceps biopsies in the body of the stomach, incisura and antrum to rule out H. pylori Performed forceps biopsies in the 1st part of the duodenum and 2nd part of the duodenum RECOMMENDATION:  Await pathology results  Follow up with GI Clinic   Cass Rolon MD       ASSESSMENT and PLAN:      1) Celiac disease - Newly diagnosed.  Patient seeing improvement after 2 months of gluten-free thus far.  - We will have patient have blood work done at the end of June to check her celiac disease antibody panel, vitamin testing  - Afterwards, likely we will do another set of labs in 6 months  - Again, the patient does not feel the need for nutritionist at this time and has been navigating a gluten-free diet while on her own.  She owns a restaurant, and has been very careful about cross-contamination.  - Encouraged the patient to call or MyChart message with any questions or concerns  - Discussed having first-degree family members tested        Follow up in September.      Portions of the record may have been created with voice recognition software.  Occasional wrong word or \"sound a like\" substitutions may have occurred due to the inherent limitations of voice recognition software.  Read the chart carefully and recognize, using context, where substitutions have occurred.  "

## 2024-06-12 ENCOUNTER — TELEPHONE (OUTPATIENT)
Dept: SURGICAL ONCOLOGY | Facility: CLINIC | Age: 29
End: 2024-06-12

## 2024-06-12 DIAGNOSIS — C50.211 MALIGNANT NEOPLASM OF UPPER-INNER QUADRANT OF RIGHT BREAST IN FEMALE, ESTROGEN RECEPTOR POSITIVE (HCC): Primary | ICD-10-CM

## 2024-06-12 DIAGNOSIS — Z17.0 MALIGNANT NEOPLASM OF UPPER-INNER QUADRANT OF RIGHT BREAST IN FEMALE, ESTROGEN RECEPTOR POSITIVE (HCC): Primary | ICD-10-CM

## 2024-06-19 ENCOUNTER — HOSPITAL ENCOUNTER (OUTPATIENT)
Dept: MAMMOGRAPHY | Facility: CLINIC | Age: 29
Discharge: HOME | End: 2024-06-19
Payer: COMMERCIAL

## 2024-06-19 ENCOUNTER — HOSPITAL ENCOUNTER (OUTPATIENT)
Dept: ULTRASOUND IMAGING | Facility: CLINIC | Age: 29
Discharge: HOME | End: 2024-06-19
Payer: COMMERCIAL

## 2024-06-19 VITALS — BODY MASS INDEX: 25.78 KG/M2 | WEIGHT: 151 LBS | HEIGHT: 64 IN

## 2024-06-19 DIAGNOSIS — C50.211 MALIGNANT NEOPLASM OF UPPER-INNER QUADRANT OF RIGHT BREAST IN FEMALE, ESTROGEN RECEPTOR POSITIVE (HCC): ICD-10-CM

## 2024-06-19 DIAGNOSIS — Z17.0 MALIGNANT NEOPLASM OF UPPER-INNER QUADRANT OF RIGHT BREAST IN FEMALE, ESTROGEN RECEPTOR POSITIVE (HCC): ICD-10-CM

## 2024-06-19 PROCEDURE — G0279 TOMOSYNTHESIS, MAMMO: HCPCS

## 2024-06-19 PROCEDURE — 77066 DX MAMMO INCL CAD BI: CPT

## 2024-06-19 PROCEDURE — 76642 ULTRASOUND BREAST LIMITED: CPT

## 2024-06-20 NOTE — PROGRESS NOTES
Met with patient and Dr. Tim Sebastian regarding recommendation for;    ___X__ RIGHT ______LEFT      __X___Ultrasound guided  ______Stereotactic breast biopsy.      __X___Verbalized understanding.      Blood thinners:  No: __X___ Yes: ______ What:                 Biopsy teaching sheet given:  Yes: ___X___ No: ________    Pt given contact information and adv to call with any questions/needs    Patient advised to arrive at 0730 for a 0800 appointment

## 2024-06-21 ENCOUNTER — HOSPITAL ENCOUNTER (OUTPATIENT)
Dept: ULTRASOUND IMAGING | Facility: CLINIC | Age: 29
End: 2024-06-21
Payer: COMMERCIAL

## 2024-06-21 ENCOUNTER — HOSPITAL ENCOUNTER (OUTPATIENT)
Dept: MAMMOGRAPHY | Facility: CLINIC | Age: 29
Discharge: HOME | End: 2024-06-21
Payer: COMMERCIAL

## 2024-06-21 VITALS — SYSTOLIC BLOOD PRESSURE: 123 MMHG | DIASTOLIC BLOOD PRESSURE: 78 MMHG | HEART RATE: 72 BPM

## 2024-06-21 DIAGNOSIS — R92.8 ABNORMAL MAMMOGRAM: ICD-10-CM

## 2024-06-21 PROCEDURE — 88342 IMHCHEM/IMCYTCHM 1ST ANTB: CPT | Performed by: PATHOLOGY

## 2024-06-21 PROCEDURE — 88341 IMHCHEM/IMCYTCHM EA ADD ANTB: CPT | Performed by: PATHOLOGY

## 2024-06-21 PROCEDURE — A4648 IMPLANTABLE TISSUE MARKER: HCPCS

## 2024-06-21 PROCEDURE — 88305 TISSUE EXAM BY PATHOLOGIST: CPT | Performed by: PATHOLOGY

## 2024-06-21 PROCEDURE — 19083 BX BREAST 1ST LESION US IMAG: CPT

## 2024-06-21 RX ORDER — LIDOCAINE HYDROCHLORIDE 10 MG/ML
5 INJECTION, SOLUTION EPIDURAL; INFILTRATION; INTRACAUDAL; PERINEURAL ONCE
Status: DISCONTINUED | OUTPATIENT
Start: 2024-06-21 | End: 2024-06-25 | Stop reason: HOSPADM

## 2024-06-21 NOTE — PROGRESS NOTES
Ice pack given:    __X___yes _____no    Discharge instructions reviewed and given to patient:    __X___yes _____no    Discharged via:    __X___amulatory    _____wheelchair    _____stretcher    Biopsy site clean and dry with no bleeding on discharge:    __X___yes ____no  Patient is to get results from Dr. Maxi Mccloud.  Ok to leave a message.

## 2024-06-21 NOTE — PROGRESS NOTES
Procedure type:    __x___ultrasound guided _____stereotactic    Breast:    _____Left ___x__Right    Location: 2 o'clock     Needle: 12 gauge sky     # of passes: 3     Clip: Hydromark butterfly    Performed by: Dr. Satnam Heredia     Pressure held for 5 minutes by: Екатерина Valenzuela Strips:    ___x__yes _____no    Band aid:    __x___yes_____no    Tape and guaze:    _____yes ___x__no    Tolerated procedure:    __x___yes _____no

## 2024-06-24 NOTE — PROGRESS NOTES
Post procedure call completed    Bleeding: _____yes __X___no (pt denies)    Pain: _____yes ___X___no (pt denies, used ice, no need for OTC pain meds)    Redness/Swelling: ______yes ___X___no (pt denies)    Band aid removed: ___X__yes _____no     Steri-Strips intact: ___X___yes _____no (discussed with patient to remove steri strips on Wednesday if they have not come off on their own)    Pt with no questions at this time, pedro Mccloud's office will call when results available, adv to call with any questions or concerns, has name/# for contact

## 2024-06-25 ENCOUNTER — TELEPHONE (OUTPATIENT)
Dept: MAMMOGRAPHY | Facility: CLINIC | Age: 29
End: 2024-06-25

## 2024-06-25 PROCEDURE — 88342 IMHCHEM/IMCYTCHM 1ST ANTB: CPT | Performed by: PATHOLOGY

## 2024-06-25 PROCEDURE — 88341 IMHCHEM/IMCYTCHM EA ADD ANTB: CPT | Performed by: PATHOLOGY

## 2024-06-25 PROCEDURE — 88305 TISSUE EXAM BY PATHOLOGIST: CPT | Performed by: PATHOLOGY

## 2024-07-17 ENCOUNTER — OFFICE VISIT (OUTPATIENT)
Dept: SURGICAL ONCOLOGY | Facility: CLINIC | Age: 29
End: 2024-07-17
Payer: COMMERCIAL

## 2024-07-17 VITALS
WEIGHT: 151 LBS | HEIGHT: 64 IN | BODY MASS INDEX: 25.78 KG/M2 | OXYGEN SATURATION: 97 % | SYSTOLIC BLOOD PRESSURE: 114 MMHG | DIASTOLIC BLOOD PRESSURE: 80 MMHG | HEART RATE: 82 BPM

## 2024-07-17 DIAGNOSIS — Z98.890 HISTORY OF LUMPECTOMY OF RIGHT BREAST: ICD-10-CM

## 2024-07-17 DIAGNOSIS — C50.211 MALIGNANT NEOPLASM OF UPPER-INNER QUADRANT OF RIGHT BREAST IN FEMALE, ESTROGEN RECEPTOR POSITIVE (HCC): Primary | ICD-10-CM

## 2024-07-17 DIAGNOSIS — Z17.0 MALIGNANT NEOPLASM OF UPPER-INNER QUADRANT OF RIGHT BREAST IN FEMALE, ESTROGEN RECEPTOR POSITIVE (HCC): Primary | ICD-10-CM

## 2024-07-17 DIAGNOSIS — Z85.3 ENCOUNTER FOR FOLLOW-UP SURVEILLANCE OF BREAST CANCER: ICD-10-CM

## 2024-07-17 DIAGNOSIS — Z79.810 USE OF TAMOXIFEN (NOLVADEX): ICD-10-CM

## 2024-07-17 DIAGNOSIS — Z08 ENCOUNTER FOR FOLLOW-UP SURVEILLANCE OF BREAST CANCER: ICD-10-CM

## 2024-07-17 PROCEDURE — 99215 OFFICE O/P EST HI 40 MIN: CPT | Performed by: SURGERY

## 2024-07-17 NOTE — PROGRESS NOTES
Surgical Oncology Follow Up  [facility]  [facility] [place]  [address] [address]    Leidy TOTH Donovan  1995  [id number]      Chief Complaint   Patient presents with    Follow-up     Discussion of excision of right breast        Assessment & Plan:   28-year-old female with diagnosis of right breast cancer s/p breast conservation surgery followed by radiation approximately 3 years ago.  She had a persistent fibrocystic tissue at the breast conservation site.  This was noticed to be more prominent last visit this was followed by ultrasound and biopsy.  Biopsy is consistent with fibrocystic tissue and no evidence of malignancy.  At today's visit since biopsy palpable mass has significantly decreased in size.  We extensively discussed the options of excision or close surveillance.  She and her mom prefers to be close surveillance at this point given the wound healing concerns.  So therefore we will follow her in 1 month's time.  She was told if she changes her mind to undergo surgical excision she can call us and come in sooner.  They understand and will follow her in 1 month.    Cancer History:     Oncology History Overview Note   with past medical history of early stage melanoma status post resection from abdomen, was diagnosed with stage IA grade 3 invasive ductal carcinoma of the right breast status post lumpectomy and sentinel lymph node biopsy. Tumor was ER/VT/HER2 positive. She underwent adjuvant chemotherapy with TH x 3 followed by Herceptin monotherapy.  She completed Herceptin and is now maintained on tamoxifen.  She completed adjuvant radiation on 1/31/22. She was last seen 4/24/23 and presents today for follow up.       3/4/24 Dr. Mccloud  next maintenance mammogram is in September.     4/3/24 Dr. Leonardo negro need yearly mammograms. Continues tamoxifen. Will be on for 5 years.   She previously has been counseled not to get pregnant for the  first 2 years since her cancer diagnosis and ideally at least 5 years while on tamoxifen until 2026.       Upcomin/10/24 Mammogram  24 Dr. Mccloud  25 Dr. Patterson     Malignant melanoma of skin of abdomen (HCC)   10/11/2018 Initial Diagnosis    Malignant melanoma of skin of abdomen (HCC)     2021 -  Cancer Staged    Staging form: Breast, AJCC 8th Edition  - Clinical stage from 2021: Stage IA (cT1c, cN0, cM0, G3, ER+, VT+, HER2+) - Signed by Carol Mccloud MD on 2023  Stage prefix: Initial diagnosis  Nuclear grade: G3  Histologic grading system: 3 grade system       Malignant neoplasm of upper-inner quadrant of right breast in female, estrogen receptor positive (HCC)   2021 Biopsy    US guided Right breast core biopsy:  1 o'clock 13-14 cm from the nipple  Right invasive ductal carcinoma  Grade 3  ER 90  VT 70   HER2 3+  Lymphovascular invasion: not identified    Results are malignant and concordant.  Recommend surgical consultation. Additionally, given patient's age and density of breast tissue, recommend enhanced breast MRI for further evaluation.     Done at [facility]     2021 Genetic Testing    The following genes were evaluated: RADHA, BRCA1, BRCA2, CDH1, CHEK2, PALB2, PTEN, STK11, TP53  Additional genes tested for a total of 36 genes  VUS NF1   [employer]     2021 Observation    Bilateral breast MRI with and without contrast: B6.    Biopsy confirmed malignancy  01:00 o'clock location right breast.  No axillary lymphadenopathy, extension to the chest wall, or concurrent lesions in either breast.       2021 Surgery    Right breast LYNDSAY  directed lumpectomy with sentinel lymph node biopsy:  Invasive mammary carcinoma of no special type; ductal  Grade 3  ER 65  VT 90  HER2 3+  18 mm  Margins negative  0/5 Lymph nodes  Stage IA.     2021 Genomic Testing    MammaPrint: High Risk  HER2 Type     2021 -  Cancer Staged    Staging form: Breast, AJCC 8th  Edition  - Clinical: Stage IA (cT1c, cN0, cM0, G3, ER+, WA+, HER2+) - Signed by Karen Dey MD on 9/8/2021  Histologic grading system: 3 grade system       9/13/2021 - 11/29/2021 Chemotherapy    fosaprepitant (EMEND) IVPB, 150 mg, Intravenous, Once, 0 of 6 cycles  pertuzumab (PERJETA) IVPB, 840 mg, Intravenous, Once, 0 of 18 cycles  CARBOplatin (PARAPLATIN) IVPB (GOG AUC DOSING), 760.8 mg, Intravenous, Once, 0 of 6 cycles  DOCEtaxel (TAXOTERE) chemo infusion, 75 mg/m2 = 132.8 mg, Intravenous, Once, 0 of 6 cycles  trastuzumab (HERCEPTIN) chemo infusion, 8 mg/kg = 610 mg, Intravenous, Once, 0 of 18 cycles       9/13/2021 - 9/6/2022 Chemotherapy    PACLItaxel (TAXOL) chemo IVPB, 80 mg/m2 = 143.4 mg, Intravenous, Once, 12 of 12 cycles  Administration: 143.4 mg (9/13/2021), 143.4 mg (9/20/2021), 143.4 mg (10/4/2021), 143.4 mg (10/18/2021), 143.4 mg (10/25/2021), 143.4 mg (11/1/2021), 143.4 mg (11/15/2021), 143.4 mg (11/22/2021), 143.4 mg (9/27/2021), 143.4 mg (10/11/2021), 143.4 mg (11/8/2021), 143.4 mg (11/29/2021)  trastuzumab (HERCEPTIN) chemo infusion, 304 mg, Intravenous, Once, 26 of 26 cycles  Administration: 300 mg (9/13/2021), 150 mg (9/20/2021), 150 mg (10/4/2021), 150 mg (10/18/2021), 150 mg (10/25/2021), 150 mg (11/1/2021), 150 mg (11/15/2021), 150 mg (11/22/2021), 150 mg (9/27/2021), 150 mg (10/11/2021), 450 mg (12/6/2021), 440 mg (8/15/2022), 440 mg (9/6/2022), 150 mg (11/8/2021), 150 mg (11/29/2021), 450 mg (12/27/2021), 450 mg (1/17/2022), 450 mg (2/7/2022), 450 mg (2/28/2022), 450 mg (3/21/2022), 450 mg (4/11/2022), 450 mg (5/2/2022), 430 mg (5/26/2022), 440 mg (6/13/2022), 440 mg (7/6/2022), 440 mg (7/25/2022)     1/3/2022 - 1/31/2022 Radiation    Treatments:  Course: C1  Plan ID Energy Fractions Dose per Fraction (cGy) Total Dose Delivered (cGy) Elapsed Days   Hypo R Bolus 6X 7 / 7 267 1,869 16   Hypo R Brst 6X 8 / 8 267 2,136 18   R Brst e 9E 5 / 5 200 1,000 6    Treatment Dates:  1/3/2022 - 1/31/2022.             Interval History:   Follow-up with right breast cancer    Review of Systems:   Review of Systems   Constitutional:  Negative for chills and fever.   HENT:  Negative for ear pain and sore throat.    Eyes:  Negative for pain and visual disturbance.   Respiratory:  Negative for cough and shortness of breath.    Cardiovascular:  Negative for chest pain and palpitations.   Gastrointestinal:  Negative for abdominal pain and vomiting.   Genitourinary:  Negative for dysuria and hematuria.   Musculoskeletal:  Negative for arthralgias and back pain.   Skin:  Negative for color change and rash.   Neurological:  Negative for seizures and syncope.   All other systems reviewed and are negative.      Past Medical History     Patient Active Problem List   Diagnosis    Nevus    Malignant melanoma of skin of abdomen (HCC)    Multiple benign melanocytic nevi    Spitz nevus of forearm, left    Enlarged thyroid    Malignant neoplasm of upper-inner quadrant of right breast in female, estrogen receptor positive (HCC)    Breast lump or mass    Use of tamoxifen (Nolvadex)    History of lumpectomy of right breast    Encounter for follow-up surveillance of breast cancer    Celiac disease     Past Medical History:   Diagnosis Date    Benign paroxysmal positional vertigo due to bilateral vestibular disorder 01/24/2019    Breast cancer (HCC) 07/19/2021    right breast    Cancer (HCC)     Celiac disease     Celiac disease     History of chemotherapy 2021    right breast cancer    History of radiation therapy 2021    right breast cancer    Iron deficiency anemia due to chronic blood loss 01/15/2014    Melanoma (HCC) 2018    Melanoma (HCC) 07/2023    left breast    Neck pain 01/15/2014    Warts 06/21/2016     Past Surgical History:   Procedure Laterality Date    BREAST BIOPSY      BREAST LUMPECTOMY Right 07/19/2021    Procedure: LYNDSAY  DIRECTED LUMPECTOMY;  Surgeon: Carol Mccloud MD;  Location: [facility] OR;  Service: Surgical  Oncology    LYMPH NODE BIOPSY Right 07/19/2021    Procedure: LYMPHATIC MAPPING WITH BLUE AND RADIOACTIVE DYES, SENTINEL LYMPH NODE BIOPSY, injection at 1030;  Surgeon: Carol Mccloud MD;  Location: [facility];  Service: Surgical Oncology    MRI BREAST BIOPSY LEFT (ALL INCLUSIVE) Left 02/10/2023    Benign    SKIN CANCER EXCISION      TONSILLECTOMY AND ADENOIDECTOMY      US GUIDED BREAST BIOPSY RIGHT COMPLETE Right 6/21/2024    WISDOM TOOTH EXTRACTION       Family History   Problem Relation Age of Onset    Prostate cancer Father     Kidney cancer Maternal Grandfather     Breast cancer Maternal Aunt 50     Social History     Socioeconomic History    Marital status: Single     Spouse name: Not on file    Number of children: Not on file    Years of education: Not on file    Highest education level: Not on file   Occupational History    Not on file   Tobacco Use    Smoking status: Never    Smokeless tobacco: Never   Vaping Use    Vaping status: Never Used   Substance and Sexual Activity    Alcohol use: Yes     Comment: Socially    Drug use: No    Sexual activity: Yes   Other Topics Concern    Not on file   Social History Narrative    Not on file     Social Determinants of Health     Financial Resource Strain: Not on file   Food Insecurity: Not on file   Transportation Needs: Not on file   Physical Activity: Not on file   Stress: Not on file   Social Connections: Unknown (6/18/2024)    Received from [employer]     How often do you feel lonely or isolated from those around you? (Adult - for ages 18 years and over): Not on file   Intimate Partner Violence: Not on file   Housing Stability: Not on file       Current Outpatient Medications:     b complex vitamins capsule, Take 1 capsule by mouth daily, Disp: , Rfl:     Calcium Carbonate-Vit D-Min (CALCIUM 1200 PO), Take by mouth, Disp: , Rfl:     cholecalciferol (VITAMIN D3) 1,000 units tablet, Take 1,000 Units by mouth daily 4,000 IU daily, Disp: , Rfl:      tamoxifen (NOLVADEX) 20 mg tablet, Take 1 tablet (20 mg total) by mouth daily, Disp: 90 tablet, Rfl: 3    cetirizine (ZyrTEC) 10 mg tablet, TAKE 1 TABLET BY MOUTH DAILY FOR 15 DAYS. (Patient not taking: Reported on 2/22/2024), Disp: , Rfl:     dicyclomine (BENTYL) 10 mg capsule, Take 1 capsule (10 mg total) by mouth 3 (three) times a day before meals (Patient not taking: Reported on 3/4/2024), Disp: 40 capsule, Rfl: 0    ibuprofen (MOTRIN) 800 mg tablet, TAKE 1 TABLET BY MOUTH WITH FOOD EVERY 8 HOURS AS NEEDED FOR PAIN (Patient not taking: Reported on 2/22/2024), Disp: , Rfl:   No Known Allergies    Physical Exam:     Vitals:    07/17/24 1012   BP: 114/80   Pulse: 82   SpO2: 97%     Physical Exam  Constitutional:       Appearance: Normal appearance.   HENT:      Head: Normocephalic and atraumatic.      Nose: Nose normal.      Mouth/Throat:      Mouth: Mucous membranes are moist.   Eyes:      Pupils: Pupils are equal, round, and reactive to light.   Cardiovascular:      Rate and Rhythm: Normal rate.      Pulses: Normal pulses.      Heart sounds: Normal heart sounds.   Pulmonary:      Effort: Pulmonary effort is normal.      Breath sounds: Normal breath sounds.   Chest:          Comments: Right breast at the surgical scar site at 2:00 previously palpated mass significantly smaller in size.  This was biopsied and negative for malignancy.  No palpable mass masses nipple discharge nipple retraction or skin changes.  Right axillary and supraclavicular examination no palpable adenopathy.    Left breast no palpable mass masses nipple discharge nipple retraction or skin changes.  Left axillary and supraclavicular examination no palpable adenopathy.    Patient was examined seated as well as supine position.  Abdominal:      General: Bowel sounds are normal.      Palpations: Abdomen is soft.   Musculoskeletal:         General: Normal range of motion.      Cervical back: Normal range of motion and neck supple.   Skin:      General: Skin is warm.   Neurological:      General: No focal deficit present.      Mental Status: She is alert and oriented to person, place, and time.   Psychiatric:         Mood and Affect: Mood normal.         Behavior: Behavior normal.         Thought Content: Thought content normal.         Judgment: Judgment normal.           Results & Discussion:   Right breast, 2:00, 13 cm from nipple (ultrasound-guided 12-gauge marquee core biopsy, 3 passes):     - Fibroadipose tissue with hemorrhage, dense stromal fibrosis and histiocytic reaction (see comment).      Comment:  The findings may relate to the patient's prior surgery, however excision may be considered if clinical suspicion persists.  I did review pathology in detail and discussed.  She is tolerating well her tamoxifen and will continue.  We did discuss the benefit alternative and possible complications.  All patient's and her mom's questions were answered to their satisfaction .I did discussed in detail nature of breast fibrosis at the treatment site especially with surgery and radiation.  She was told to call with any questions or concerns in the interim otherwise I will see her in 1 month she understands and  agrees . All patient questions were answered.       Advance Care Planning/Advance Directives:  I Carol Mccloud MD discussed the disease status with Leidy Man  today 07/17/24  treatment plans and follow-up with the patient.

## 2024-09-30 ENCOUNTER — OFFICE VISIT (OUTPATIENT)
Dept: RADIATION ONCOLOGY | Facility: CLINIC | Age: 29
End: 2024-09-30
Attending: RADIOLOGY
Payer: COMMERCIAL

## 2024-09-30 VITALS
DIASTOLIC BLOOD PRESSURE: 76 MMHG | TEMPERATURE: 97.3 F | BODY MASS INDEX: 26.09 KG/M2 | WEIGHT: 152 LBS | SYSTOLIC BLOOD PRESSURE: 118 MMHG | HEART RATE: 78 BPM | OXYGEN SATURATION: 97 % | RESPIRATION RATE: 16 BRPM

## 2024-09-30 DIAGNOSIS — C43.59 MALIGNANT MELANOMA OF SKIN OF ABDOMEN (HCC): Primary | ICD-10-CM

## 2024-09-30 PROCEDURE — 99213 OFFICE O/P EST LOW 20 MIN: CPT | Performed by: RADIOLOGY

## 2024-09-30 PROCEDURE — 99211 OFF/OP EST MAY X REQ PHY/QHP: CPT | Performed by: RADIOLOGY

## 2024-09-30 PROCEDURE — G0463 HOSPITAL OUTPT CLINIC VISIT: HCPCS | Performed by: RADIOLOGY

## 2024-09-30 RX ORDER — UBIDECARENONE 75 MG
CAPSULE ORAL DAILY
COMMUNITY

## 2024-09-30 NOTE — PROGRESS NOTES
Leidy Man 1995 is a 28 y.o. female with past medical history of early stage melanoma status post resection from abdomen, was diagnosed with stage IA grade 3 invasive ductal carcinoma of the right breast status post lumpectomy and sentinel lymph node biopsy. Tumor was ER/MO/HER2 positive. She underwent adjuvant chemotherapy with TH x 3 followed by Herceptin monotherapy.  She completed Herceptin and is now maintained on tamoxifen.  She completed adjuvant radiation on 1/31/22. She was last seen 4/24/23 and presents today for follow up.       4/3/24 Dr. Patterson  1 triple positive grade 3 right breast cancer T1 cN0 M0 stage Ia.  2 celiac disease  Plan: Patient will be for come back here for follow-up 1 year sees her surgeon in 6 months.  She will need yearly mammograms.  Continues tamoxifen.  Will be on for 5 years.  No other major suggestions.  She has been seen by GI for her celiac disease.  She previously has been counseled not to get pregnant for the first 2 years since her cancer diagnosis and ideally at least 5 years while on tamoxifen until 12/2026.      6/19/24 B/L mammogram and right US  RIGHT  1) MASS [D]  Mammo diagnostic bilateral w 3d & cad: There is a partially imaged mass seen in the upper inner quadrant of the right breast. The mass correlates with the palpable mass reported by the patient.   US breast right limited (diagnostic): There is a 32 mm x 16 mm x 27 mm oval, hypoechoic mass with circumscribed margins seen in the right breast at 2 o'clock, 13 cm from the nipple. The mass correlates with the palpable mass reported by the patient.    LEFT  Mammo diagnostic bilateral w 3d & cad  There are no suspicious masses, grouped microcalcifications or areas of unexplained architectural distortion. The skin and nipple areolar complex are unremarkable.  Biopsy marker clip is noted.  Benign-appearing calcifications are noted.  RECOMMENDATION:       - Ultrasound-guided breast biopsy for the right  breast.       - Diagnostic mammogram in 1 year for the left breast.      24 Us guided biopsy right breast  A.  Right breast, 2:00, 13 cm from nipple (ultrasound-guided 12-gauge marquee core biopsy, 3 passes):     - Fibroadipose tissue with hemorrhage, dense stromal fibrosis and histiocytic reaction (see comment).       24 Dr. Mccloud  She had a persistent fibrocystic tissue at the breast conservation site. This was noticed to be more prominent last visit this was followed by ultrasound and biopsy. Biopsy is consistent with fibrocystic tissue and no evidence of malignancy.   since biopsy palpable mass has significantly decreased in size. We extensively discussed the options of excision or close surveillance. She and her mom prefers to be close surveillance at this point given the wound healing concerns.       Upcomin24 Dr. Mccloud  25 Dr. Patterson    Follow up visit     Oncology History   Malignant melanoma of skin of abdomen (HCC)   10/11/2018 Initial Diagnosis    Malignant melanoma of skin of abdomen (HCC)     2021 -  Cancer Staged    Staging form: Breast, AJCC 8th Edition  - Clinical stage from 2021: Stage IA (cT1c, cN0, cM0, G3, ER+, AZ+, HER2+) - Signed by Carol Mccloud MD on 2023  Stage prefix: Initial diagnosis  Nuclear grade: G3  Histologic grading system: 3 grade system       Malignant neoplasm of upper-inner quadrant of right breast in female, estrogen receptor positive (HCC)   2021 Biopsy    US guided Right breast core biopsy:  1 o'clock 13-14 cm from the nipple  Right invasive ductal carcinoma  Grade 3  ER 90  AZ 70   HER2 3+  Lymphovascular invasion: not identified    Results are malignant and concordant.  Recommend surgical consultation. Additionally, given patient's age and density of breast tissue, recommend enhanced breast MRI for further evaluation.     Done at [facility]     2021 Genetic Testing    The following genes were evaluated: RADHA, BRCA1, BRCA2,  CDH1, CHEK2, PALB2, PTEN, STK11, TP53  Additional genes tested for a total of 36 genes  VUS NF1   [facility]     7/1/2021 Observation    Bilateral breast MRI with and without contrast: B6.    Biopsy confirmed malignancy  01:00 o'clock location right breast.  No axillary lymphadenopathy, extension to the chest wall, or concurrent lesions in either breast.       7/19/2021 Surgery    Right breast LYNDSAY  directed lumpectomy with sentinel lymph node biopsy:  Invasive mammary carcinoma of no special type; ductal  Grade 3  ER 65  MN 90  HER2 3+  18 mm  Margins negative  0/5 Lymph nodes  Stage IA.     8/23/2021 Genomic Testing    MammaPrint: High Risk  HER2 Type     9/8/2021 -  Cancer Staged    Staging form: Breast, AJCC 8th Edition  - Clinical: Stage IA (cT1c, cN0, cM0, G3, ER+, MN+, HER2+) - Signed by Karen Dey MD on 9/8/2021  Histologic grading system: 3 grade system       9/13/2021 - 11/29/2021 Chemotherapy    fosaprepitant (EMEND) IVPB, 150 mg, Intravenous, Once, 0 of 6 cycles  pertuzumab (PERJETA) IVPB, 840 mg, Intravenous, Once, 0 of 18 cycles  CARBOplatin (PARAPLATIN) IVPB (GOG AUC DOSING), 760.8 mg, Intravenous, Once, 0 of 6 cycles  DOCEtaxel (TAXOTERE) chemo infusion, 75 mg/m2 = 132.8 mg, Intravenous, Once, 0 of 6 cycles  trastuzumab (HERCEPTIN) chemo infusion, 8 mg/kg = 610 mg, Intravenous, Once, 0 of 18 cycles       9/13/2021 - 9/6/2022 Chemotherapy    PACLItaxel (TAXOL) chemo IVPB, 80 mg/m2 = 143.4 mg, Intravenous, Once, 12 of 12 cycles  Administration: 143.4 mg (9/13/2021), 143.4 mg (9/20/2021), 143.4 mg (10/4/2021), 143.4 mg (10/18/2021), 143.4 mg (10/25/2021), 143.4 mg (11/1/2021), 143.4 mg (11/15/2021), 143.4 mg (11/22/2021), 143.4 mg (9/27/2021), 143.4 mg (10/11/2021), 143.4 mg (11/8/2021), 143.4 mg (11/29/2021)  trastuzumab (HERCEPTIN) chemo infusion, 304 mg, Intravenous, Once, 26 of 26 cycles  Administration: 300 mg (9/13/2021), 150 mg (9/20/2021), 150 mg (10/4/2021), 150 mg (10/18/2021), 150 mg  (10/25/2021), 150 mg (11/1/2021), 150 mg (11/15/2021), 150 mg (11/22/2021), 150 mg (9/27/2021), 150 mg (10/11/2021), 450 mg (12/6/2021), 440 mg (8/15/2022), 440 mg (9/6/2022), 150 mg (11/8/2021), 150 mg (11/29/2021), 450 mg (12/27/2021), 450 mg (1/17/2022), 450 mg (2/7/2022), 450 mg (2/28/2022), 450 mg (3/21/2022), 450 mg (4/11/2022), 450 mg (5/2/2022), 430 mg (5/26/2022), 440 mg (6/13/2022), 440 mg (7/6/2022), 440 mg (7/25/2022)     1/3/2022 - 1/31/2022 Radiation    Treatments:  Course: C1  Plan ID Energy Fractions Dose per Fraction (cGy) Total Dose Delivered (cGy) Elapsed Days   Hypo R Bolus 6X 7 / 7 267 1,869 16   Hypo R Brst 6X 8 / 8 267 2,136 18   R Brst e 9E 5 / 5 200 1,000 6    Treatment Dates:  1/3/2022 - 1/31/2022.          Review of Systems:  Review of Systems   Constitutional:  Negative for fatigue.   Musculoskeletal:         Good ROM right arm   Skin:  Positive for color change (hyperpigmentation right breast).        Lump at scar right breast       Clinical Trial: no    Pregnancy test needed:  not applicable    Health Maintenance   Topic Date Due    Hepatitis C Screening  Never done    Pneumococcal Vaccine: Pediatrics (0 to 5 Years) and At-Risk Patients (6 to 64 Years) (1 of 2 - PCV) Never done    HIV Screening  Never done    Annual Physical  Never done    Zoster Vaccine (1 of 2) Never done    Cervical Cancer Screening  Never done    COVID-19 Vaccine (2 - Anderson risk series) 09/12/2021    BMI: Followup Plan  06/23/2023    DTaP,Tdap,and Td Vaccines (9 - Td or Tdap) 07/22/2023    Depression Screening  04/24/2024    Influenza Vaccine (1) 09/01/2024    BMI: Adult  07/17/2025    RSV Vaccine Age 60+ Years (1 - 1-dose 60+ series) 11/10/2054    IPV Vaccine  Completed    Meningococcal ACWY Vaccine  Completed    HPV Vaccine  Completed    RSV Vaccine age 0-20 Months  Aged Out    HIB Vaccine  Aged Out    Hepatitis A Vaccine  Aged Out     Patient Active Problem List   Diagnosis    Nevus    Malignant melanoma of  skin of abdomen (HCC)    Multiple benign melanocytic nevi    Spitz nevus of forearm, left    Enlarged thyroid    Malignant neoplasm of upper-inner quadrant of right breast in female, estrogen receptor positive (HCC)    Breast lump or mass    Use of tamoxifen (Nolvadex)    History of lumpectomy of right breast    Encounter for follow-up surveillance of breast cancer    Celiac disease     Past Medical History:   Diagnosis Date    Benign paroxysmal positional vertigo due to bilateral vestibular disorder 01/24/2019    Breast cancer (HCC) 07/19/2021    right breast    Cancer (HCC)     Celiac disease     Celiac disease     History of chemotherapy 2021    right breast cancer    History of radiation therapy 2021    right breast cancer    Iron deficiency anemia due to chronic blood loss 01/15/2014    Melanoma (HCC) 2018    Melanoma (HCC) 07/2023    left breast    Neck pain 01/15/2014    Warts 06/21/2016     Past Surgical History:   Procedure Laterality Date    BREAST BIOPSY      BREAST LUMPECTOMY Right 07/19/2021    Procedure: LYNDSAY  DIRECTED LUMPECTOMY;  Surgeon: Carol Mccloud MD;  Location: [facility];  Service: Surgical Oncology    LYMPH NODE BIOPSY Right 07/19/2021    Procedure: LYMPHATIC MAPPING WITH BLUE AND RADIOACTIVE DYES, SENTINEL LYMPH NODE BIOPSY, injection at 1030;  Surgeon: Carol Mccloud MD;  Location: [facility];  Service: Surgical Oncology    MRI BREAST BIOPSY LEFT (ALL INCLUSIVE) Left 02/10/2023    Benign    SKIN CANCER EXCISION      TONSILLECTOMY AND ADENOIDECTOMY      US GUIDED BREAST BIOPSY RIGHT COMPLETE Right 6/21/2024    WISDOM TOOTH EXTRACTION       Family History   Problem Relation Age of Onset    Prostate cancer Father     Kidney cancer Maternal Grandfather     Breast cancer Maternal Aunt 50     Social History     Socioeconomic History    Marital status: Single     Spouse name: Not on file    Number of children: Not on file    Years of education: Not on file    Highest education level: Not  on file   Occupational History    Not on file   Tobacco Use    Smoking status: Never    Smokeless tobacco: Never   Vaping Use    Vaping status: Never Used   Substance and Sexual Activity    Alcohol use: Yes     Comment: Socially    Drug use: No    Sexual activity: Yes   Other Topics Concern    Not on file   Social History Narrative    Not on file     Social Determinants of Health     Financial Resource Strain: Not on file   Food Insecurity: Not on file   Transportation Needs: Not on file   Physical Activity: Not on file   Stress: Not on file   Social Connections: Unknown (6/18/2024)    Received from [employer]    Social [employer]     How often do you feel lonely or isolated from those around you? (Adult - for ages 18 years and over): Not on file   Intimate Partner Violence: Not on file   Housing Stability: Not on file       Current Outpatient Medications:     b complex vitamins capsule, Take 1 capsule by mouth daily, Disp: , Rfl:     Calcium Carbonate-Vit D-Min (CALCIUM 1200 PO), Take by mouth, Disp: , Rfl:     cetirizine (ZyrTEC) 10 mg tablet, TAKE 1 TABLET BY MOUTH DAILY FOR 15 DAYS. (Patient not taking: Reported on 2/22/2024), Disp: , Rfl:     cholecalciferol (VITAMIN D3) 1,000 units tablet, Take 1,000 Units by mouth daily 4,000 IU daily, Disp: , Rfl:     dicyclomine (BENTYL) 10 mg capsule, Take 1 capsule (10 mg total) by mouth 3 (three) times a day before meals (Patient not taking: Reported on 3/4/2024), Disp: 40 capsule, Rfl: 0    ibuprofen (MOTRIN) 800 mg tablet, TAKE 1 TABLET BY MOUTH WITH FOOD EVERY 8 HOURS AS NEEDED FOR PAIN (Patient not taking: Reported on 2/22/2024), Disp: , Rfl:     tamoxifen (NOLVADEX) 20 mg tablet, Take 1 tablet (20 mg total) by mouth daily, Disp: 90 tablet, Rfl: 3  No Known Allergies  There were no vitals filed for this visit.

## 2024-10-01 NOTE — PROGRESS NOTES
Follow-up - Radiation Oncology   Leidy Man 1995 28 y.o. female [id number]      History of Present Illness   Cancer Staging   Malignant melanoma of skin of abdomen (HCC)  Staging form: Breast, AJCC 8th Edition  - Clinical stage from 6/24/2021: Stage IA (cT1c, cN0, cM0, G3, ER+, IN+, HER2+) - Signed by Carol Mccloud MD on 9/7/2023  Stage prefix: Initial diagnosis  Nuclear grade: G3  Histologic grading system: 3 grade system    Malignant neoplasm of upper-inner quadrant of right breast in female, estrogen receptor positive (HCC)  Staging form: Breast, AJCC 8th Edition  - Clinical: Stage IA (cT1c, cN0, cM0, G3, ER+, IN+, HER2+) - Signed by Karen Dey MD on 9/8/2021  Histologic grading system: 3 grade system      HPI  Leidy Man is a 28 y.o.woman with past medical history of early stage melanoma status post resection from abdomen diagnosed with stage IA grade 3 invasive ductal carcinoma of the right breast status post lumpectomy and sentinel lymph node biopsy. Tumor was ER/IN/HER2 positive. She underwent adjuvant chemotherapy with TH x 3 followed by Herceptin monotherapy.  She completed Herceptin and is now maintained on tamoxifen.  She completed adjuvant radiation on 1/31/22. She was last seen 4/24/23 and presents today for follow up.      4/3/24 Dr. Patterson  1 triple positive grade 3 right breast cancer T1 cN0 M0 stage Ia.  2 celiac disease  Plan: Patient will be for come back here for follow-up 1 year sees her surgeon in 6 months.  She will need yearly mammograms.  Continues tamoxifen.  Will be on for 5 years.  No other major suggestions.  She has been seen by GI for her celiac disease.  She previously has been counseled not to get pregnant for the first 2 years since her cancer diagnosis and ideally at least 5 years while on tamoxifen until 12/2026.        6/19/24 B/L mammogram and right US  RIGHT  1) MASS [D]  Mammo diagnostic bilateral w 3d & cad: There is a partially imaged mass seen  in the upper inner quadrant of the right breast. The mass correlates with the palpable mass reported by the patient.   US breast right limited (diagnostic): There is a 32 mm x 16 mm x 27 mm oval, hypoechoic mass with circumscribed margins seen in the right breast at 2 o'clock, 13 cm from the nipple. The mass correlates with the palpable mass reported by the patient.    LEFT  Mammo diagnostic bilateral w 3d & cad  There are no suspicious masses, grouped microcalcifications or areas of unexplained architectural distortion. The skin and nipple areolar complex are unremarkable.  Biopsy marker clip is noted.  Benign-appearing calcifications are noted.  RECOMMENDATION:       - Ultrasound-guided breast biopsy for the right breast.       - Diagnostic mammogram in 1 year for the left breast.        6/21/24 Us guided biopsy right breast  A.  Right breast, 2:00, 13 cm from nipple (ultrasound-guided 12-gauge marquee core biopsy, 3 passes):     - Fibroadipose tissue with hemorrhage, dense stromal fibrosis and histiocytic reaction (see comment).      7/17/24 Dr. Mccloud  She had a persistent fibrocystic tissue at the breast conservation site. This was noticed to be more prominent last visit this was followed by ultrasound and biopsy. Biopsy is consistent with fibrocystic tissue and no evidence of malignancy.   since biopsy palpable mass has significantly decreased in size. We extensively discussed the options of excision or close surveillance. She and her mom prefers to be close surveillance at this point given the wound healing concerns.       The patient notes that the fibrosis area has decreased in size post biopsy.  She denies significant breast pain or tenderness.  The patient has no new breast related complaints.  She denies new palpable nodules, suspicious skin changes, or nipple discharge the breast bilaterally.     She denies any upper extremity edema or shoulder restriction. She is compliant with GYN follow-up and denies  vaginal bleeding. She offers no complaints regarding tamoxifen.    She is compliant with Medical Oncology and Surgical Oncology follow-up.      Upcomin24 Dr. Mccloud  25 Dr. Patterson        Historical Information   Oncology History   Malignant melanoma of skin of abdomen (HCC)   10/11/2018 Initial Diagnosis    Malignant melanoma of skin of abdomen (HCC)     2021 -  Cancer Staged    Staging form: Breast, AJCC 8th Edition  - Clinical stage from 2021: Stage IA (cT1c, cN0, cM0, G3, ER+, KS+, HER2+) - Signed by Carol Mccloud MD on 2023  Stage prefix: Initial diagnosis  Nuclear grade: G3  Histologic grading system: 3 grade system       Malignant neoplasm of upper-inner quadrant of right breast in female, estrogen receptor positive (HCC)   2021 Biopsy    US guided Right breast core biopsy:  1 o'clock 13-14 cm from the nipple  Right invasive ductal carcinoma  Grade 3  ER 90  KS 70   HER2 3+  Lymphovascular invasion: not identified    Results are malignant and concordant.  Recommend surgical consultation. Additionally, given patient's age and density of breast tissue, recommend enhanced breast MRI for further evaluation.     Done at [facility]     2021 Genetic Testing    The following genes were evaluated: RADHA, BRCA1, BRCA2, CDH1, CHEK2, PALB2, PTEN, STK11, TP53  Additional genes tested for a total of 36 genes  VUS NF1   [employer]     2021 Observation    Bilateral breast MRI with and without contrast: B6.    Biopsy confirmed malignancy  01:00 o'clock location right breast.  No axillary lymphadenopathy, extension to the chest wall, or concurrent lesions in either breast.       2021 Surgery    Right breast LYNDSAY  directed lumpectomy with sentinel lymph node biopsy:  Invasive mammary carcinoma of no special type; ductal  Grade 3  ER 65  KS 90  HER2 3+  18 mm  Margins negative  0/5 Lymph nodes  Stage IA.     2021 Genomic Testing    MammaPrint: High Risk  HER2 Type      9/8/2021 -  Cancer Staged    Staging form: Breast, AJCC 8th Edition  - Clinical: Stage IA (cT1c, cN0, cM0, G3, ER+, MT+, HER2+) - Signed by Karen Dey MD on 9/8/2021  Histologic grading system: 3 grade system       9/13/2021 - 11/29/2021 Chemotherapy    fosaprepitant (EMEND) IVPB, 150 mg, Intravenous, Once, 0 of 6 cycles  pertuzumab (PERJETA) IVPB, 840 mg, Intravenous, Once, 0 of 18 cycles  CARBOplatin (PARAPLATIN) IVPB (GOG AUC DOSING), 760.8 mg, Intravenous, Once, 0 of 6 cycles  DOCEtaxel (TAXOTERE) chemo infusion, 75 mg/m2 = 132.8 mg, Intravenous, Once, 0 of 6 cycles  trastuzumab (HERCEPTIN) chemo infusion, 8 mg/kg = 610 mg, Intravenous, Once, 0 of 18 cycles       9/13/2021 - 9/6/2022 Chemotherapy    PACLItaxel (TAXOL) chemo IVPB, 80 mg/m2 = 143.4 mg, Intravenous, Once, 12 of 12 cycles  Administration: 143.4 mg (9/13/2021), 143.4 mg (9/20/2021), 143.4 mg (10/4/2021), 143.4 mg (10/18/2021), 143.4 mg (10/25/2021), 143.4 mg (11/1/2021), 143.4 mg (11/15/2021), 143.4 mg (11/22/2021), 143.4 mg (9/27/2021), 143.4 mg (10/11/2021), 143.4 mg (11/8/2021), 143.4 mg (11/29/2021)  trastuzumab (HERCEPTIN) chemo infusion, 304 mg, Intravenous, Once, 26 of 26 cycles  Administration: 300 mg (9/13/2021), 150 mg (9/20/2021), 150 mg (10/4/2021), 150 mg (10/18/2021), 150 mg (10/25/2021), 150 mg (11/1/2021), 150 mg (11/15/2021), 150 mg (11/22/2021), 150 mg (9/27/2021), 150 mg (10/11/2021), 450 mg (12/6/2021), 440 mg (8/15/2022), 440 mg (9/6/2022), 150 mg (11/8/2021), 150 mg (11/29/2021), 450 mg (12/27/2021), 450 mg (1/17/2022), 450 mg (2/7/2022), 450 mg (2/28/2022), 450 mg (3/21/2022), 450 mg (4/11/2022), 450 mg (5/2/2022), 430 mg (5/26/2022), 440 mg (6/13/2022), 440 mg (7/6/2022), 440 mg (7/25/2022)     1/3/2022 - 1/31/2022 Radiation    Treatments:  Course: C1  Plan ID Energy Fractions Dose per Fraction (cGy) Total Dose Delivered (cGy) Elapsed Days   Hypo R Bolus 6X 7 / 7 267 1,869 16   Hypo R Brst 6X 8 / 8 267 2,136 18   R Brst e 9E  5 / 5 200 1,000 6    Treatment Dates:  1/3/2022 - 1/31/2022.          Past Medical History:   Diagnosis Date    Benign paroxysmal positional vertigo due to bilateral vestibular disorder 01/24/2019    Breast cancer (HCC) 07/19/2021    right breast    Cancer (HCC)     Celiac disease     Celiac disease     History of chemotherapy 2021    right breast cancer    History of radiation therapy 2021    right breast cancer    Iron deficiency anemia due to chronic blood loss 01/15/2014    Melanoma (HCC) 2018    Melanoma (HCC) 07/2023    left breast    Neck pain 01/15/2014    Warts 06/21/2016     Past Surgical History:   Procedure Laterality Date    BREAST BIOPSY      BREAST LUMPECTOMY Right 07/19/2021    Procedure: LYNDSAY  DIRECTED LUMPECTOMY;  Surgeon: Carol Mccloud MD;  Location: [facility];  Service: Surgical Oncology    LYMPH NODE BIOPSY Right 07/19/2021    Procedure: LYMPHATIC MAPPING WITH BLUE AND RADIOACTIVE DYES, SENTINEL LYMPH NODE BIOPSY, injection at 1030;  Surgeon: Carol Mccloud MD;  Location: [facility];  Service: Surgical Oncology    MRI BREAST BIOPSY LEFT (ALL INCLUSIVE) Left 02/10/2023    Benign    SKIN CANCER EXCISION      TONSILLECTOMY AND ADENOIDECTOMY      US GUIDED BREAST BIOPSY RIGHT COMPLETE Right 6/21/2024    WISDOM TOOTH EXTRACTION         Social History   Social History     Substance and Sexual Activity   Alcohol Use Yes    Comment: Socially     Social History     Substance and Sexual Activity   Drug Use No     Social History     Tobacco Use   Smoking Status Never   Smokeless Tobacco Never         Meds/Allergies     Current Outpatient Medications:     Calcium Carbonate-Vit D-Min (CALCIUM 1200 PO), Take by mouth, Disp: , Rfl:     cyanocobalamin (VITAMIN B-12) 100 mcg tablet, Take by mouth daily, Disp: , Rfl:     tamoxifen (NOLVADEX) 20 mg tablet, Take 1 tablet (20 mg total) by mouth daily, Disp: 90 tablet, Rfl: 3    b complex vitamins capsule, Take 1 capsule by mouth daily (Patient not taking:  Reported on 9/30/2024), Disp: , Rfl:     cetirizine (ZyrTEC) 10 mg tablet, TAKE 1 TABLET BY MOUTH DAILY FOR 15 DAYS. (Patient not taking: Reported on 2/22/2024), Disp: , Rfl:     cholecalciferol (VITAMIN D3) 1,000 units tablet, Take 1,000 Units by mouth daily 4,000 IU daily (Patient not taking: Reported on 9/30/2024), Disp: , Rfl:     dicyclomine (BENTYL) 10 mg capsule, Take 1 capsule (10 mg total) by mouth 3 (three) times a day before meals (Patient not taking: Reported on 3/4/2024), Disp: 40 capsule, Rfl: 0    ibuprofen (MOTRIN) 800 mg tablet, TAKE 1 TABLET BY MOUTH WITH FOOD EVERY 8 HOURS AS NEEDED FOR PAIN (Patient not taking: Reported on 2/22/2024), Disp: , Rfl:   No Known Allergies      Review of Systems   Constitutional:  Negative for fatigue.   Musculoskeletal:         Good ROM right arm   Skin:  Positive for color change (hyperpigmentation right breast).        Lump at scar right breast          OBJECTIVE:   /76   Pulse 78   Temp (!) 97.3 °F (36.3 °C)   Resp 16   Wt 68.9 kg (152 lb)   SpO2 97%   BMI 26.09 kg/m²   Karnofsky: 80 - Normal activity with effort; some signs or symptoms of disease    Physical Exam  Vitals and nursing note reviewed.   Constitutional:       General: She is not in acute distress.     Appearance: She is well-developed.   Cardiovascular:      Rate and Rhythm: Normal rate and regular rhythm.   Pulmonary:      Breath sounds: No wheezing, rhonchi or rales.   Chest:          Comments: Breast exam demonstrates breasts are symmetric in size and contour.  There is a well-healed upper right breast lumpectomy scar associated with mild hyperpigmentation and slight telangiectasias and significant fibrosis.  This is nontender.  There are old biopsy scars medial right breast and outer left breast scar.  No palpable masses or suspicious skin changes of breasts bilaterally.  Abdominal:      Palpations: Abdomen is soft.      Tenderness: There is no abdominal tenderness.   Musculoskeletal:  "     Right lower leg: No edema.      Left lower leg: No edema.      Comments: No upper extremity edema and full range of motion bilaterally   Lymphadenopathy:      Cervical: No cervical adenopathy.      Upper Body:      Right upper body: No supraclavicular or axillary adenopathy.      Left upper body: No supraclavicular or axillary adenopathy.   Neurological:      Mental Status: She is alert and oriented to person, place, and time.      Gait: Gait normal.          Assessment/Plan:  Leidy Man is a 28 y.o. woman with a history of grade 3 stage IA invasive ductal carcinoma of the right breast status post lumpectomy and sentinel lymph node biopsy. Tumor was ER/AK/HER2 positive. She underwent adjuvant chemotherapy with TH x 3 followed by Herceptin monotherapy.  She completed adjuvant Herceptin and is now maintained on tamoxifen, which she tolerates well.  She sees GYN per report.  She completed adjuvant radiation on 1/31/22.      She remains clinically LUCINA.  -Recent biopsy of fibrotic lumpectomy site was benign.  Patient feels that the area is less prominent post biopsy.  -Repeat imaging due next year to be scheduled by Dr. Mccloud.  -Follow-up 1 year. We will see her sooner should the need arise.    Tamoxifen therapy  -She is tolerating tamoxifen well.  She is compliant with GYN follow-up per patient.  She is compliant with Medical Oncology follow-up.     Cosmesis  -I instructed her to continue sun protection to the irradiated skin and use of emollients as needed.    -She has been offered resection, but it is unclear if this would have an overall improved cosmetic outcome.  This can be done with Dr. Mccloud, or referral to Plastics would be indicated if she wishes to discuss.  She defers surgical resection at this time.       Karen Dey MD  10/1/2024,12:45 PM    Portions of the record may have been created with voice recognition software.  Occasional wrong word or \"sound a like\" substitutions may have occurred due " to the inherent limitations of voice recognition software.  Read the chart carefully and recognize, using context, where substitutions have occurred.

## 2024-10-10 DIAGNOSIS — C50.211 MALIGNANT NEOPLASM OF UPPER-INNER QUADRANT OF RIGHT BREAST IN FEMALE, ESTROGEN RECEPTOR POSITIVE (HCC): ICD-10-CM

## 2024-10-10 DIAGNOSIS — Z17.0 MALIGNANT NEOPLASM OF UPPER-INNER QUADRANT OF RIGHT BREAST IN FEMALE, ESTROGEN RECEPTOR POSITIVE (HCC): ICD-10-CM

## 2024-10-10 RX ORDER — TAMOXIFEN CITRATE 20 MG/1
20 TABLET ORAL DAILY
Qty: 90 TABLET | Refills: 3 | Status: SHIPPED | OUTPATIENT
Start: 2024-10-10

## 2024-10-10 NOTE — TELEPHONE ENCOUNTER
Patient is completely out of Tamoxifen. Patient will like a call back from office once orders are signed and sent.     Pharmacy: [facility]       Thank you!

## 2024-11-04 ENCOUNTER — OFFICE VISIT (OUTPATIENT)
Dept: SURGICAL ONCOLOGY | Facility: CLINIC | Age: 29
End: 2024-11-04
Payer: COMMERCIAL

## 2024-11-04 VITALS
BODY MASS INDEX: 25.95 KG/M2 | HEIGHT: 64 IN | HEART RATE: 72 BPM | DIASTOLIC BLOOD PRESSURE: 84 MMHG | WEIGHT: 152 LBS | TEMPERATURE: 98.1 F | SYSTOLIC BLOOD PRESSURE: 126 MMHG | OXYGEN SATURATION: 97 %

## 2024-11-04 DIAGNOSIS — Z98.890 HISTORY OF LUMPECTOMY OF RIGHT BREAST: ICD-10-CM

## 2024-11-04 DIAGNOSIS — Z08 ENCOUNTER FOR FOLLOW-UP SURVEILLANCE OF BREAST CANCER: ICD-10-CM

## 2024-11-04 DIAGNOSIS — Z85.3 ENCOUNTER FOR FOLLOW-UP SURVEILLANCE OF BREAST CANCER: ICD-10-CM

## 2024-11-04 DIAGNOSIS — Z17.0 MALIGNANT NEOPLASM OF UPPER-INNER QUADRANT OF RIGHT BREAST IN FEMALE, ESTROGEN RECEPTOR POSITIVE (HCC): Primary | ICD-10-CM

## 2024-11-04 DIAGNOSIS — C50.211 MALIGNANT NEOPLASM OF UPPER-INNER QUADRANT OF RIGHT BREAST IN FEMALE, ESTROGEN RECEPTOR POSITIVE (HCC): Primary | ICD-10-CM

## 2024-11-04 DIAGNOSIS — Z79.810 USE OF TAMOXIFEN (NOLVADEX): ICD-10-CM

## 2024-11-04 PROCEDURE — 99214 OFFICE O/P EST MOD 30 MIN: CPT | Performed by: SURGERY

## 2024-11-04 NOTE — PROGRESS NOTES
Surgical Oncology Follow Up  [facility]  [facility] [place]  [address] [place] [address]    Leidy Man  1995  [id number]      Chief Complaint   Patient presents with   • Follow-up     4 Month Follow Up        Assessment & Plan:   28-year-old female follow-up with stage Ia breast cancer.  She is overall doing well previously palpated biopsy biopsy-proven negative for malignancy region is significantly softer and smaller.  She is overall doing well.  Her next mammogram is in February which we will order and see her.  She is on tamoxifen tolerating well no major side effects such as shortness of breath leg swelling or vaginal spotting.  Will see her after her next mammogram.    Cancer History:     Oncology History   Malignant melanoma of skin of abdomen (HCC)   10/11/2018 Initial Diagnosis    Malignant melanoma of skin of abdomen (HCC)     6/24/2021 -  Cancer Staged    Staging form: Breast, AJCC 8th Edition  - Clinical stage from 6/24/2021: Stage IA (cT1c, cN0, cM0, G3, ER+, RI+, HER2+) - Signed by Carol Mccloud MD on 9/7/2023  Stage prefix: Initial diagnosis  Nuclear grade: G3  Histologic grading system: 3 grade system       Malignant neoplasm of upper-inner quadrant of right breast in female, estrogen receptor positive (HCC)   6/24/2021 Biopsy    US guided Right breast core biopsy:  1 o'clock 13-14 cm from the nipple  Right invasive ductal carcinoma  Grade 3  ER 90  RI 70   HER2 3+  Lymphovascular invasion: not identified    Results are malignant and concordant.  Recommend surgical consultation. Additionally, given patient's age and density of breast tissue, recommend enhanced breast MRI for further evaluation.     Done at [facility]     6/30/2021 Genetic Testing    The following genes were evaluated: RADHA, BRCA1, BRCA2, CDH1, CHEK2, PALB2, PTEN, STK11, TP53  Additional genes tested for a total of 36 genes  VUS NF1   [employer]     7/1/2021  Observation    Bilateral breast MRI with and without contrast: B6.    Biopsy confirmed malignancy  01:00 o'clock location right breast.  No axillary lymphadenopathy, extension to the chest wall, or concurrent lesions in either breast.       7/19/2021 Surgery    Right breast LYNDSAY  directed lumpectomy with sentinel lymph node biopsy:  Invasive mammary carcinoma of no special type; ductal  Grade 3  ER 65  IA 90  HER2 3+  18 mm  Margins negative  0/5 Lymph nodes  Stage IA.     8/23/2021 Genomic Testing    MammaPrint: High Risk  HER2 Type     9/8/2021 -  Cancer Staged    Staging form: Breast, AJCC 8th Edition  - Clinical: Stage IA (cT1c, cN0, cM0, G3, ER+, IA+, HER2+) - Signed by Karen Dey MD on 9/8/2021  Histologic grading system: 3 grade system       9/13/2021 - 11/29/2021 Chemotherapy    fosaprepitant (EMEND) IVPB, 150 mg, Intravenous, Once, 0 of 6 cycles  pertuzumab (PERJETA) IVPB, 840 mg, Intravenous, Once, 0 of 18 cycles  CARBOplatin (PARAPLATIN) IVPB (GOG AUC DOSING), 760.8 mg, Intravenous, Once, 0 of 6 cycles  DOCEtaxel (TAXOTERE) chemo infusion, 75 mg/m2 = 132.8 mg, Intravenous, Once, 0 of 6 cycles  trastuzumab (HERCEPTIN) chemo infusion, 8 mg/kg = 610 mg, Intravenous, Once, 0 of 18 cycles       9/13/2021 - 9/6/2022 Chemotherapy    PACLItaxel (TAXOL) chemo IVPB, 80 mg/m2 = 143.4 mg, Intravenous, Once, 12 of 12 cycles  Administration: 143.4 mg (9/13/2021), 143.4 mg (9/20/2021), 143.4 mg (10/4/2021), 143.4 mg (10/18/2021), 143.4 mg (10/25/2021), 143.4 mg (11/1/2021), 143.4 mg (11/15/2021), 143.4 mg (11/22/2021), 143.4 mg (9/27/2021), 143.4 mg (10/11/2021), 143.4 mg (11/8/2021), 143.4 mg (11/29/2021)  trastuzumab (HERCEPTIN) chemo infusion, 304 mg, Intravenous, Once, 26 of 26 cycles  Administration: 300 mg (9/13/2021), 150 mg (9/20/2021), 150 mg (10/4/2021), 150 mg (10/18/2021), 150 mg (10/25/2021), 150 mg (11/1/2021), 150 mg (11/15/2021), 150 mg (11/22/2021), 150 mg (9/27/2021), 150 mg (10/11/2021), 450 mg  (12/6/2021), 440 mg (8/15/2022), 440 mg (9/6/2022), 150 mg (11/8/2021), 150 mg (11/29/2021), 450 mg (12/27/2021), 450 mg (1/17/2022), 450 mg (2/7/2022), 450 mg (2/28/2022), 450 mg (3/21/2022), 450 mg (4/11/2022), 450 mg (5/2/2022), 430 mg (5/26/2022), 440 mg (6/13/2022), 440 mg (7/6/2022), 440 mg (7/25/2022)     1/3/2022 - 1/31/2022 Radiation    Treatments:  Course: C1  Plan ID Energy Fractions Dose per Fraction (cGy) Total Dose Delivered (cGy) Elapsed Days   Hypo R Bolus 6X 7 / 7 267 1,869 16   Hypo R Brst 6X 8 / 8 267 2,136 18   R Brst e 9E 5 / 5 200 1,000 6    Treatment Dates:  1/3/2022 - 1/31/2022.            Interval History:   Follow-up with right breast cancer    Review of Systems:   Review of Systems   Constitutional:  Negative for chills and fever.   HENT:  Negative for ear pain and sore throat.    Eyes:  Negative for pain and visual disturbance.   Respiratory:  Negative for cough and shortness of breath.    Cardiovascular:  Negative for chest pain and palpitations.   Gastrointestinal:  Negative for abdominal pain and vomiting.   Genitourinary:  Negative for dysuria and hematuria.   Musculoskeletal:  Negative for arthralgias and back pain.   Skin:  Negative for color change and rash.   Neurological:  Negative for seizures and syncope.   All other systems reviewed and are negative.    Past Medical History     Patient Active Problem List   Diagnosis   • Nevus   • Malignant melanoma of skin of abdomen (HCC)   • Multiple benign melanocytic nevi   • Spitz nevus of forearm, left   • Enlarged thyroid   • Malignant neoplasm of upper-inner quadrant of right breast in female, estrogen receptor positive (HCC)   • Breast lump or mass   • Use of tamoxifen (Nolvadex)   • History of lumpectomy of right breast   • Encounter for follow-up surveillance of breast cancer   • Celiac disease     Past Medical History:   Diagnosis Date   • Benign paroxysmal positional vertigo due to bilateral vestibular disorder 01/24/2019   •  Breast cancer (HCC) 07/19/2021    right breast   • Cancer (HCC)    • Celiac disease    • Celiac disease    • History of chemotherapy 2021    right breast cancer   • History of radiation therapy 2021    right breast cancer   • Iron deficiency anemia due to chronic blood loss 01/15/2014   • Melanoma (HCC) 2018   • Melanoma (HCC) 07/2023    left breast   • Neck pain 01/15/2014   • Warts 06/21/2016     Past Surgical History:   Procedure Laterality Date   • BREAST BIOPSY     • BREAST LUMPECTOMY Right 07/19/2021    Procedure: LYNDSAY  DIRECTED LUMPECTOMY;  Surgeon: Carol Mccloud MD;  Location: [facility];  Service: Surgical Oncology   • LYMPH NODE BIOPSY Right 07/19/2021    Procedure: LYMPHATIC MAPPING WITH BLUE AND RADIOACTIVE DYES, SENTINEL LYMPH NODE BIOPSY, injection at 1030;  Surgeon: Carol Mccloud MD;  Location: [facility];  Service: Surgical Oncology   • MRI BREAST BIOPSY LEFT (ALL INCLUSIVE) Left 02/10/2023    Benign   • SKIN CANCER EXCISION     • TONSILLECTOMY AND ADENOIDECTOMY     • US GUIDED BREAST BIOPSY RIGHT COMPLETE Right 6/21/2024   • WISDOM TOOTH EXTRACTION       Family History   Problem Relation Age of Onset   • Prostate cancer Father    • Kidney cancer Maternal Grandfather    • Breast cancer Maternal Aunt 50     Social History     Socioeconomic History   • Marital status: Single     Spouse name: Not on file   • Number of children: Not on file   • Years of education: Not on file   • Highest education level: Not on file   Occupational History   • Not on file   Tobacco Use   • Smoking status: Never   • Smokeless tobacco: Never   Vaping Use   • Vaping status: Never Used   Substance and Sexual Activity   • Alcohol use: Yes     Comment: Socially   • Drug use: No   • Sexual activity: Yes   Other Topics Concern   • Not on file   Social History Narrative   • Not on file     Social Determinants of Health     Financial Resource Strain: Not on file   Food Insecurity: Not on file   Transportation Needs: Not on  file   Physical Activity: Not on file   Stress: Not on file   Social Connections: Unknown (6/18/2024)    Received from [facility]    Social Connections    • How often do you feel lonely or isolated from those around you? (Adult - for ages 18 years and over): Not on file   Intimate Partner Violence: Not on file   Housing Stability: Not on file       Current Outpatient Medications:   •  cyanocobalamin (VITAMIN B-12) 100 mcg tablet, Take by mouth daily, Disp: , Rfl:   •  tamoxifen (NOLVADEX) 20 mg tablet, Take 1 tablet (20 mg total) by mouth daily, Disp: 90 tablet, Rfl: 3  •  b complex vitamins capsule, Take 1 capsule by mouth daily (Patient not taking: Reported on 9/30/2024), Disp: , Rfl:   •  Calcium Carbonate-Vit D-Min (CALCIUM 1200 PO), Take by mouth (Patient not taking: Reported on 11/4/2024), Disp: , Rfl:   •  cetirizine (ZyrTEC) 10 mg tablet, TAKE 1 TABLET BY MOUTH DAILY FOR 15 DAYS. (Patient not taking: Reported on 2/22/2024), Disp: , Rfl:   •  cholecalciferol (VITAMIN D3) 1,000 units tablet, Take 1,000 Units by mouth daily 4,000 IU daily (Patient not taking: Reported on 9/30/2024), Disp: , Rfl:   •  dicyclomine (BENTYL) 10 mg capsule, Take 1 capsule (10 mg total) by mouth 3 (three) times a day before meals (Patient not taking: Reported on 3/4/2024), Disp: 40 capsule, Rfl: 0  •  ibuprofen (MOTRIN) 800 mg tablet, TAKE 1 TABLET BY MOUTH WITH FOOD EVERY 8 HOURS AS NEEDED FOR PAIN (Patient not taking: Reported on 2/22/2024), Disp: , Rfl:   No Known Allergies    Physical Exam:     Vitals:    11/04/24 1029   BP: 126/84   Pulse: 72   Temp: 98.1 °F (36.7 °C)   SpO2: 97%     Physical Exam  Constitutional:       Appearance: Normal appearance.   HENT:      Head: Normocephalic and atraumatic.      Nose: Nose normal.      Mouth/Throat:      Mouth: Mucous membranes are moist.   Eyes:      Pupils: Pupils are equal, round, and reactive to light.   Cardiovascular:      Rate and Rhythm: Normal rate.      Pulses: Normal pulses.       Heart sounds: Normal heart sounds.   Pulmonary:      Effort: Pulmonary effort is normal.      Breath sounds: Normal breath sounds.   Chest:          Comments: Well-healed right breast and right axillary surgical scar no palpable abnormal mass or masses no unexpected skin changes.  Right axilla and supraclavicular examination no palpable adenopathy.  Left breast well-healed surgical scar from melanoma resection.  No palpable mass or masses.  Left axilla and supraclavicular examination no palpable adenopathy.  Patient was examined seated as well as supine position.  Abdominal:      General: Bowel sounds are normal.      Palpations: Abdomen is soft.   Musculoskeletal:         General: Normal range of motion.      Cervical back: Normal range of motion and neck supple.   Skin:     General: Skin is warm.   Neurological:      General: No focal deficit present.      Mental Status: She is alert and oriented to person, place, and time.   Psychiatric:         Mood and Affect: Mood normal.         Behavior: Behavior normal.         Thought Content: Thought content normal.         Judgment: Judgment normal.       Results & Discussion:   60  ADDENDUM:     PATHOLOGY RESULTS:   1) Mass [D] (Right; Upper Inner; 2 o'clock; 13 cm from nipple)  The pathology results indicate a Benign finding with fibroadipose tissue, old hemorrhage, stromal fibrosis, and histocytes.   Radiology and pathology are concordant.        RECOMMENDATION:       - Clinical Management for the right breast.       - Diagnostic Mammogram in 1 year for both breasts.        END ADDENDUM      Addended by Satnam Heredia MD on 6/25/2024  7:53 AM     Study Result    Narrative & Impression   DIAGNOSIS: Abnormal mammogram      INDICATION: Leidy Man is a 28 y.o. female presenting for right breast biopsy.     Prior to the procedure, previous imaging was reviewed.  The procedure was explained to the patient in detail.  All questions were answered.  Written and  verbal informed consent was obtained.     FINDINGS:   RIGHT  1) MASS [D]: Images of the right breast mass in the upper inner quadrant at 2 o'clock, 13 cm from the nipple were obtained. A core needle biopsy of a 32 mm x 16 mm x 27 mm mass was performed under ultrasound guidance using an inferolateral approach. The skin was prepped in the usual fashion. Anesthesia was administered using lidocaine 1%. A 12 G device was advanced. Using the device, 3 samples were collected.   A butterfly clip was inserted into the target area.  Clip placement was documented in 2 orthogonal sonographic projections.  Post-placement digital mammographic imaging demonstrates the clip was at the site. The patient tolerated the procedure well. There were no immediate complications.       IMPRESSION:   1.  Uncomplicated ultrasound-guided core needle biopsy right breast 2:00 mass, with placement of a HydroMARK butterfly clip.  2.  Prior to the procedure, I explained to the patient there is a high probability of benign pathology result, with the mass most likely representing her lumpectomy bed.      RECOMMENDATION:       - Waiting for pathology for the right breast.     I did review biopsy results.  Negative for malignancy and concordant imaging findings.  We also discussed the benefit alternative and possible complication risks of tamoxifen.  She will continue her tamoxifen.  I did discussed in detail nature of breast cancer in young female monthly self breast examination and annual diagnostic mammogram need were reviewed and discussed.  she understands and  agrees . All patient questions were answered.       Advance Care Planning/Advance Directives:  I Carol Mccloud MD discussed the disease status with Leidy Man  today 11/04/24  treatment plans and follow-up with the patient.

## 2025-04-07 ENCOUNTER — APPOINTMENT (OUTPATIENT)
Dept: LAB | Facility: HOSPITAL | Age: 30
End: 2025-04-07
Attending: INTERNAL MEDICINE
Payer: COMMERCIAL

## 2025-04-07 ENCOUNTER — OFFICE VISIT (OUTPATIENT)
Dept: HEMATOLOGY ONCOLOGY | Facility: CLINIC | Age: 30
End: 2025-04-07
Payer: COMMERCIAL

## 2025-04-07 VITALS
SYSTOLIC BLOOD PRESSURE: 120 MMHG | TEMPERATURE: 98.3 F | HEIGHT: 64 IN | WEIGHT: 160 LBS | RESPIRATION RATE: 18 BRPM | OXYGEN SATURATION: 98 % | HEART RATE: 78 BPM | DIASTOLIC BLOOD PRESSURE: 74 MMHG | BODY MASS INDEX: 27.31 KG/M2

## 2025-04-07 DIAGNOSIS — C50.211 MALIGNANT NEOPLASM OF UPPER-INNER QUADRANT OF RIGHT BREAST IN FEMALE, ESTROGEN RECEPTOR POSITIVE (HCC): ICD-10-CM

## 2025-04-07 DIAGNOSIS — R30.0 DYSURIA: ICD-10-CM

## 2025-04-07 DIAGNOSIS — C50.211 MALIGNANT NEOPLASM OF UPPER-INNER QUADRANT OF RIGHT BREAST IN FEMALE, ESTROGEN RECEPTOR POSITIVE (HCC): Primary | ICD-10-CM

## 2025-04-07 DIAGNOSIS — Z17.0 MALIGNANT NEOPLASM OF UPPER-INNER QUADRANT OF RIGHT BREAST IN FEMALE, ESTROGEN RECEPTOR POSITIVE (HCC): ICD-10-CM

## 2025-04-07 DIAGNOSIS — Z17.0 MALIGNANT NEOPLASM OF UPPER-INNER QUADRANT OF RIGHT BREAST IN FEMALE, ESTROGEN RECEPTOR POSITIVE (HCC): Primary | ICD-10-CM

## 2025-04-07 LAB
BILIRUB UR QL STRIP: NEGATIVE
CLARITY UR: CLEAR
COLOR UR: YELLOW
GLUCOSE UR STRIP-MCNC: NEGATIVE MG/DL
HGB UR QL STRIP.AUTO: NEGATIVE
KETONES UR STRIP-MCNC: ABNORMAL MG/DL
LEUKOCYTE ESTERASE UR QL STRIP: NEGATIVE
NITRITE UR QL STRIP: NEGATIVE
PH UR STRIP.AUTO: 7 [PH]
PROT UR STRIP-MCNC: NEGATIVE MG/DL
SP GR UR STRIP.AUTO: 1.03 (ref 1–1.03)
UROBILINOGEN UR STRIP-ACNC: <2 MG/DL

## 2025-04-07 PROCEDURE — 81003 URINALYSIS AUTO W/O SCOPE: CPT

## 2025-04-07 PROCEDURE — 99215 OFFICE O/P EST HI 40 MIN: CPT | Performed by: INTERNAL MEDICINE

## 2025-04-07 NOTE — ASSESSMENT & PLAN NOTE
Cancer Staging   Malignant melanoma of skin of abdomen (HCC)  Staging form: Breast, AJCC 8th Edition  - Clinical stage from 6/24/2021: Stage IA (cT1c, cN0, cM0, G3, ER+, MD+, HER2+) - Signed by Carol Mccloud MD on 9/7/2023    Malignant neoplasm of upper-inner quadrant of right breast in female, estrogen receptor positive (HCC)  Staging form: Breast, AJCC 8th Edition  - Clinical: Stage IA (cT1c, cN0, cM0, G3, ER+, MD+, HER2+) - Signed by Karen Dey MD on 9/8/2021    Status post right breast lumpectomy with sentinel lymph node biopsy on  July 19, 2021. Pathology showed invasive mammary carcinoma of no special type.  The tumor measures 1.8 cm.  Grade 3.  All margins negative.  All lymph nodes negative.  Grade 2 DCIS present.  No evidence of lymphovascular invasion.  Final pathologic stage [id number].    Chemotherapy TH was given on September 13, 2021 through November 29, 2021.  Patient used cold cap to preserve her hair in our [facility]. The patient also had oocytes cryopreservation done on September 3, 2021. Adjuvant radiation therapy started in January 2022 through end of that month.  Maintenance Herceptin started on December 6, 2021 to complete 1 year.  Has been on tamoxifen.  To complete at least 5 years of tamoxifen treatment.  Discussed that patient would need to hold tamoxifen prior to planning conceiving.  Follows with Dr. Mccloud.  Mammograms ordered by Dr. Mccloud.  Orders:    UA w Reflex to Microscopic w Reflex to Culture; Future

## 2025-04-07 NOTE — PROGRESS NOTES
Name: Leidy Man      : 1995      MRN: [id number]  Encounter Provider: Ashish Flores MD  Encounter Date: 2025   Encounter department: [facility] [place]  :  Assessment & Plan  Malignant neoplasm of upper-inner quadrant of right breast in female, estrogen receptor positive (HCC)   Cancer Staging   Malignant melanoma of skin of abdomen (HCC)  Staging form: Breast, AJCC 8th Edition  - Clinical stage from 2021: Stage IA (cT1c, cN0, cM0, G3, ER+, AR+, HER2+) - Signed by Carol Mccloud MD on 2023    Malignant neoplasm of upper-inner quadrant of right breast in female, estrogen receptor positive (HCC)  Staging form: Breast, AJCC 8th Edition  - Clinical: Stage IA (cT1c, cN0, cM0, G3, ER+, AR+, HER2+) - Signed by Karen Dey MD on 2021    Status post right breast lumpectomy with sentinel lymph node biopsy on  2021. Pathology showed invasive mammary carcinoma of no special type.  The tumor measures 1.8 cm.  Grade 3.  All margins negative.  All lymph nodes negative.  Grade 2 DCIS present.  No evidence of lymphovascular invasion.  Final pathologic stage [id number].    Chemotherapy TH was given on 2021 through 2021.  Patient used cold cap to preserve her hair in our [facility]. The patient also had oocytes cryopreservation done on September 3, 2021. Adjuvant radiation therapy started in 2022 through end of that month.  Maintenance Herceptin started on 2021 to complete 1 year.  Has been on tamoxifen.  To complete at least 5 years of tamoxifen treatment.  Discussed that patient would need to hold tamoxifen prior to planning conceiving.  Follows with Dr. Mccloud.  Mammograms ordered by Dr. Mccloud.  Orders:    UA w Reflex to Microscopic w Reflex to Culture; Future    Dysuria  Check urinalysis with reflex to culture.             Return in about 6 months (around 10/7/2025) for Office  Visit.    History of Present Illness   Chief Complaint   Patient presents with    Follow-up   History of Present Illness  See detailed onc history below.     Right breast invasive ductal carcinoma, grade 3, ER 90%, MN 70%, HER2 positive by IHC.Genetic testing negative.  Status post right breast lumpectomy with sentinel lymph node biopsy on  July 19, 2021. Pathology showed invasive mammary carcinoma of no special type.  The tumor measures 1.8 cm.  Grade 3.  All margins negative.  All lymph nodes negative.  Grade 2 DCIS present.  No evidence of lymphovascular invasion.  Final pathologic stage [id number].  Overall, the patient tolerated the procedure very well. ECHO showed EF 65%. Patient had a 2nd opinion from [facility]. Dr. Ridge Mckinley recommended adjuvant TH weekly for 3 months followed by total of 1 year of maintenance Herceptin target  which she has completed.  Chemotherapy TH was given on September 13, 2021 through November 29, 2021.  Patient used cold cap to preserve her hair in our [facility]. The patient also had oocytes cryopreservation done on September 3, 2021. Adjuvant radiation therapy started in January 2022 through end of that month.  Maintenance Herceptin started on December 6, 2021 to complete 1 year.    Interval History:  Patient presents for follow-up and is a transfer of care from Dr. Patterson.  She feels fine and offers no complaints.  Has been tolerating tamoxifen well.  Has occasional night sweats.  No hot flashes.  No abnormal uterine bleeding.No nipple discharge.  Would like to get pregnant at some point.  Had oocyte cryopreservation in September 2021.    Oncology History   Cancer Staging   Malignant melanoma of skin of abdomen (HCC)  Staging form: Breast, AJCC 8th Edition  - Clinical stage from 6/24/2021: Stage IA (cT1c, cN0, cM0, G3, ER+, MN+, HER2+) - Signed by Carol Mccloud MD on 9/7/2023  Stage prefix: Initial diagnosis  Nuclear grade: G3  Histologic grading  system: 3 grade system    Malignant neoplasm of upper-inner quadrant of right breast in female, estrogen receptor positive (HCC)  Staging form: Breast, AJCC 8th Edition  - Clinical: Stage IA (cT1c, cN0, cM0, G3, ER+, KS+, HER2+) - Signed by Karen Dey MD on 9/8/2021  Histologic grading system: 3 grade system  Oncology History   Malignant melanoma of skin of abdomen (HCC)   10/11/2018 Initial Diagnosis    Malignant melanoma of skin of abdomen (HCC)     6/24/2021 -  Cancer Staged    Staging form: Breast, AJCC 8th Edition  - Clinical stage from 6/24/2021: Stage IA (cT1c, cN0, cM0, G3, ER+, KS+, HER2+) - Signed by Carol Mccloud MD on 9/7/2023  Stage prefix: Initial diagnosis  Nuclear grade: G3  Histologic grading system: 3 grade system       Malignant neoplasm of upper-inner quadrant of right breast in female, estrogen receptor positive (HCC)   6/24/2021 Biopsy    US guided Right breast core biopsy:  1 o'clock 13-14 cm from the nipple  Right invasive ductal carcinoma  Grade 3  ER 90  KS 70   HER2 3+  Lymphovascular invasion: not identified    Results are malignant and concordant.  Recommend surgical consultation. Additionally, given patient's age and density of breast tissue, recommend enhanced breast MRI for further evaluation.     Done at [facility]     6/30/2021 Genetic Testing    The following genes were evaluated: RADHA, BRCA1, BRCA2, CDH1, CHEK2, PALB2, PTEN, STK11, TP53  Additional genes tested for a total of 36 genes  VUS NF1   [employer]     7/1/2021 Observation    Bilateral breast MRI with and without contrast: B6.    Biopsy confirmed malignancy  01:00 o'clock location right breast.  No axillary lymphadenopathy, extension to the chest wall, or concurrent lesions in either breast.       7/19/2021 Surgery    Right breast LYNDSAY  directed lumpectomy with sentinel lymph node biopsy:  Invasive mammary carcinoma of no special type; ductal  Grade 3  ER 65  KS 90  HER2 3+  18 mm  Margins negative  0/5 Lymph  nodes  Stage IA.     8/23/2021 Genomic Testing    MammaPrint: High Risk  HER2 Type     9/8/2021 -  Cancer Staged    Staging form: Breast, AJCC 8th Edition  - Clinical: Stage IA (cT1c, cN0, cM0, G3, ER+, SD+, HER2+) - Signed by Karen Dey MD on 9/8/2021  Histologic grading system: 3 grade system       9/13/2021 - 11/29/2021 Chemotherapy    fosaprepitant (EMEND) IVPB, 150 mg, Intravenous, Once, 0 of 6 cycles  pertuzumab (PERJETA) IVPB, 840 mg, Intravenous, Once, 0 of 18 cycles  CARBOplatin (PARAPLATIN) IVPB (GOG AUC DOSING), 760.8 mg, Intravenous, Once, 0 of 6 cycles  DOCEtaxel (TAXOTERE) chemo infusion, 75 mg/m2 = 132.8 mg, Intravenous, Once, 0 of 6 cycles  trastuzumab (HERCEPTIN) chemo infusion, 8 mg/kg = 610 mg, Intravenous, Once, 0 of 18 cycles       9/13/2021 - 9/6/2022 Chemotherapy    PACLItaxel (TAXOL) chemo IVPB, 80 mg/m2 = 143.4 mg, Intravenous, Once, 12 of 12 cycles  Administration: 143.4 mg (9/13/2021), 143.4 mg (9/20/2021), 143.4 mg (10/4/2021), 143.4 mg (10/18/2021), 143.4 mg (10/25/2021), 143.4 mg (11/1/2021), 143.4 mg (11/15/2021), 143.4 mg (11/22/2021), 143.4 mg (9/27/2021), 143.4 mg (10/11/2021), 143.4 mg (11/8/2021), 143.4 mg (11/29/2021)  trastuzumab (HERCEPTIN) chemo infusion, 304 mg, Intravenous, Once, 26 of 26 cycles  Administration: 300 mg (9/13/2021), 150 mg (9/20/2021), 150 mg (10/4/2021), 150 mg (10/18/2021), 150 mg (10/25/2021), 150 mg (11/1/2021), 150 mg (11/15/2021), 150 mg (11/22/2021), 150 mg (9/27/2021), 150 mg (10/11/2021), 450 mg (12/6/2021), 440 mg (8/15/2022), 440 mg (9/6/2022), 150 mg (11/8/2021), 150 mg (11/29/2021), 450 mg (12/27/2021), 450 mg (1/17/2022), 450 mg (2/7/2022), 450 mg (2/28/2022), 450 mg (3/21/2022), 450 mg (4/11/2022), 450 mg (5/2/2022), 430 mg (5/26/2022), 440 mg (6/13/2022), 440 mg (7/6/2022), 440 mg (7/25/2022)     1/3/2022 - 1/31/2022 Radiation    Treatments:  Course: C1  Plan ID Energy Fractions Dose per Fraction (cGy) Total Dose Delivered (cGy) Elapsed Days  "  Hypo R Bolus 6X 7 / 7 267 1,869 16   Hypo R Brst 6X 8 / 8 267 2,136 18   R Brst e 9E 5 / 5 200 1,000 6    Treatment Dates:  1/3/2022 - 1/31/2022.         Pertinent Medical History   04/07/25: reviewed     Review of Systems  14 point review of systems was negative except that mentioned in HPI.        Objective   /74 (BP Location: Left arm, Patient Position: Sitting, Cuff Size: Adult)   Pulse 78   Temp 98.3 °F (36.8 °C) (Temporal)   Resp 18   Ht 5' 4\" (1.626 m)   Wt 72.6 kg (160 lb)   SpO2 98%   BMI 27.46 kg/m²     Pain Screening:  Pain Score: 0-No pain  ECOG   0  Physical Exam  Vitals and nursing note reviewed.   Constitutional:       General: She is not in acute distress.     Appearance: Normal appearance.   HENT:      Head: Normocephalic and atraumatic.      Mouth/Throat:      Mouth: Mucous membranes are moist.      Pharynx: Oropharynx is clear.   Eyes:      General: No scleral icterus.     Extraocular Movements: Extraocular movements intact.      Conjunctiva/sclera: Conjunctivae normal.   Cardiovascular:      Rate and Rhythm: Normal rate and regular rhythm.      Pulses: Normal pulses.      Heart sounds: No murmur heard.  Pulmonary:      Effort: Pulmonary effort is normal.      Breath sounds: Normal breath sounds. No wheezing, rhonchi or rales.   Abdominal:      General: Bowel sounds are normal.      Palpations: Abdomen is soft.      Tenderness: There is no abdominal tenderness.   Musculoskeletal:         General: No swelling or tenderness. Normal range of motion.      Cervical back: Neck supple.   Lymphadenopathy:      Cervical: No cervical adenopathy.   Skin:     General: Skin is warm and dry.      Coloration: Skin is not pale.      Findings: No bruising, erythema or rash.   Neurological:      General: No focal deficit present.      Mental Status: She is alert and oriented to person, place, and time.      Motor: No weakness.   Psychiatric:         Mood and Affect: Mood normal.         Behavior: " "Behavior normal.       Physical Exam      Results    Labs: I have reviewed the following labs:  No results found for: \"WBC\", \"RBC\", \"HGB\", \"HCT\", \"MCV\", \"MCH\", \"RDW\", \"PLT\", \"NEUTOPHILPCT\", \"LYMPHOPCT\", \"MONOPCT\", \"EOSPCT\", \"BASOPCT\", \"IMMGRANS\", \"NEUTROABS\"  No results found for: \"NA\", \"K\", \"CL\", \"CO2\", \"ANIONGAP\", \"BUN\", \"CREATININE\", \"GLUCOSE\", \"GLUF\", \"CALCIUM\", \"CORRECTEDCA\", \"AST\", \"ALT\", \"ALKPHOS\", \"TP\", \"ALB\", \"TBILI\", \"EGFR\"          Disclaimer: This document was prepared using [employer] technology. If a word or phrase is confusing, or does not make sense, this is likely due to recognition error which was not discovered during this clinician's review. If you believe an error has occurred, please contact me through [employer] service line for toya?cation.      Follow Up    All questions were answered to the patient's satisfaction during this encounter. The patient knows the contact information for our office and knows to reach out for any relevant concerns related to this encounter. They are to call for any temperature 100.4 or higher, new symptoms including but not restricted to shaking chills, decreased appetite, nausea, vomiting, diarrhea, increased fatigue, shortness of breath or chest pain, confusion, and not feeling the strength to come to the clinic. For all other listed problems and medical diagnosis in their chart - they are managed by PCP and/or other specialists, which the patient acknowledges.     I spent 41 minutes reviewing the records (labs, clinician notes, outside records, medical history, ordering medicine/tests/procedures, monitoring of anti-neoplastic toxicities, interpreting the imaging/labs previously done) and coordination of care as well as direct time with the patient today, of which greater than 50% of the time was spent in counseling and coordination of care with the patient/family.    "

## 2025-04-08 ENCOUNTER — TELEPHONE (OUTPATIENT)
Dept: HEMATOLOGY ONCOLOGY | Facility: CLINIC | Age: 30
End: 2025-04-08

## 2025-04-08 NOTE — TELEPHONE ENCOUNTER
Called and made pt aware that urinalysis that was done yesterday did not show any infection and no antibiotics would be ordered. She is encouraged to drink fluids and cranberry juice and to contact us if any symptoms persist. Verbalizes understanding

## 2025-05-05 ENCOUNTER — OFFICE VISIT (OUTPATIENT)
Dept: SURGICAL ONCOLOGY | Facility: CLINIC | Age: 30
End: 2025-05-05
Payer: COMMERCIAL

## 2025-05-05 VITALS
SYSTOLIC BLOOD PRESSURE: 124 MMHG | WEIGHT: 162 LBS | BODY MASS INDEX: 27.66 KG/M2 | HEIGHT: 64 IN | DIASTOLIC BLOOD PRESSURE: 90 MMHG | TEMPERATURE: 97.6 F | HEART RATE: 90 BPM | OXYGEN SATURATION: 97 %

## 2025-05-05 DIAGNOSIS — C50.211 MALIGNANT NEOPLASM OF UPPER-INNER QUADRANT OF RIGHT BREAST IN FEMALE, ESTROGEN RECEPTOR POSITIVE (HCC): ICD-10-CM

## 2025-05-05 DIAGNOSIS — Z85.3 ENCOUNTER FOR FOLLOW-UP SURVEILLANCE OF BREAST CANCER: Primary | ICD-10-CM

## 2025-05-05 DIAGNOSIS — Z08 ENCOUNTER FOR FOLLOW-UP SURVEILLANCE OF BREAST CANCER: Primary | ICD-10-CM

## 2025-05-05 DIAGNOSIS — Z17.0 MALIGNANT NEOPLASM OF UPPER-INNER QUADRANT OF RIGHT BREAST IN FEMALE, ESTROGEN RECEPTOR POSITIVE (HCC): ICD-10-CM

## 2025-05-05 DIAGNOSIS — Z98.890 HISTORY OF LUMPECTOMY OF RIGHT BREAST: ICD-10-CM

## 2025-05-05 DIAGNOSIS — Z79.810 USE OF TAMOXIFEN (NOLVADEX): ICD-10-CM

## 2025-05-05 PROCEDURE — 99214 OFFICE O/P EST MOD 30 MIN: CPT | Performed by: SURGERY

## 2025-05-05 NOTE — PROGRESS NOTES
Name: Leidy Man      : 1995      MRN: [id number]  Encounter Provider: Carol Mccloud MD  Encounter Date: 2025   Encounter department: [facility] [place]  :  Assessment & Plan  Encounter for follow-up surveillance of breast cancer         Malignant neoplasm of upper-inner quadrant of right breast in female, estrogen receptor positive (HCC)         History of lumpectomy of right breast         Use of tamoxifen (Nolvadex)         29-year-old female here for follow-up with right breast cancer diagnosed in 2021 she is on tamoxifen tolerating well no shortness of breath leg swelling or vaginal spotting.  She denies of any breast pain nipple discharge nipple retraction or skin changes other than treatment related.  Previously palpated mass in the right partial lumpectomy bed no longer palpable.  She we will see her in 6 months with mammogram.    Right breast cancer    History of Present Illness   Leidy Man is a 29 y.o. year old female who presents for follow-up and surveillance for breast cancer.     Oncology History   Cancer Staging   Malignant melanoma of skin of abdomen (HCC)  Staging form: Breast, AJCC 8th Edition  - Clinical stage from 2021: Stage IA (cT1c, cN0, cM0, G3, ER+, IA+, HER2+) - Signed by Carol Mccloud MD on 2023  Stage prefix: Initial diagnosis  Nuclear grade: G3  Histologic grading system: 3 grade system    Malignant neoplasm of upper-inner quadrant of right breast in female, estrogen receptor positive (HCC)  Staging form: Breast, AJCC 8th Edition  - Clinical: Stage IA (cT1c, cN0, cM0, G3, ER+, IA+, HER2+) - Signed by Karen Dey MD on 2021  Histologic grading system: 3 grade system  Oncology History   Malignant melanoma of skin of abdomen (HCC)   10/11/2018 Initial Diagnosis    Malignant melanoma of skin of abdomen (HCC)     2021 -  Cancer Staged    Staging form: Breast, AJCC 8th Edition  -  Clinical stage from 6/24/2021: Stage IA (cT1c, cN0, cM0, G3, ER+, LA+, HER2+) - Signed by Carol Mccloud MD on 9/7/2023  Stage prefix: Initial diagnosis  Nuclear grade: G3  Histologic grading system: 3 grade system       Malignant neoplasm of upper-inner quadrant of right breast in female, estrogen receptor positive (HCC)   6/24/2021 Biopsy    US guided Right breast core biopsy:  1 o'clock 13-14 cm from the nipple  Right invasive ductal carcinoma  Grade 3  ER 90  LA 70   HER2 3+  Lymphovascular invasion: not identified    Results are malignant and concordant.  Recommend surgical consultation. Additionally, given patient's age and density of breast tissue, recommend enhanced breast MRI for further evaluation.     Done at [facility]     6/30/2021 Genetic Testing    The following genes were evaluated: RADHA, BRCA1, BRCA2, CDH1, CHEK2, PALB2, PTEN, STK11, TP53  Additional genes tested for a total of 36 genes  VUS NF1   [employer]     7/1/2021 Observation    Bilateral breast MRI with and without contrast: B6.    Biopsy confirmed malignancy  01:00 o'clock location right breast.  No axillary lymphadenopathy, extension to the chest wall, or concurrent lesions in either breast.       7/19/2021 Surgery    Right breast LYNDSAY  directed lumpectomy with sentinel lymph node biopsy:  Invasive mammary carcinoma of no special type; ductal  Grade 3  ER 65  LA 90  HER2 3+  18 mm  Margins negative  0/5 Lymph nodes  Stage IA.     8/23/2021 Genomic Testing    MammaPrint: High Risk  HER2 Type     9/8/2021 -  Cancer Staged    Staging form: Breast, AJCC 8th Edition  - Clinical: Stage IA (cT1c, cN0, cM0, G3, ER+, LA+, HER2+) - Signed by Karen Dey MD on 9/8/2021  Histologic grading system: 3 grade system       9/13/2021 - 11/29/2021 Chemotherapy    fosaprepitant (EMEND) IVPB, 150 mg, Intravenous, Once, 0 of 6 cycles  pertuzumab (PERJETA) IVPB, 840 mg, Intravenous, Once, 0 of 18 cycles  CARBOplatin (PARAPLATIN) IVPB (GOG AUC DOSING), 760.8  mg, Intravenous, Once, 0 of 6 cycles  DOCEtaxel (TAXOTERE) chemo infusion, 75 mg/m2 = 132.8 mg, Intravenous, Once, 0 of 6 cycles  trastuzumab (HERCEPTIN) chemo infusion, 8 mg/kg = 610 mg, Intravenous, Once, 0 of 18 cycles       9/13/2021 - 9/6/2022 Chemotherapy    PACLItaxel (TAXOL) chemo IVPB, 80 mg/m2 = 143.4 mg, Intravenous, Once, 12 of 12 cycles  Administration: 143.4 mg (9/13/2021), 143.4 mg (9/20/2021), 143.4 mg (10/4/2021), 143.4 mg (10/18/2021), 143.4 mg (10/25/2021), 143.4 mg (11/1/2021), 143.4 mg (11/15/2021), 143.4 mg (11/22/2021), 143.4 mg (9/27/2021), 143.4 mg (10/11/2021), 143.4 mg (11/8/2021), 143.4 mg (11/29/2021)  trastuzumab (HERCEPTIN) chemo infusion, 304 mg, Intravenous, Once, 26 of 26 cycles  Administration: 300 mg (9/13/2021), 150 mg (9/20/2021), 150 mg (10/4/2021), 150 mg (10/18/2021), 150 mg (10/25/2021), 150 mg (11/1/2021), 150 mg (11/15/2021), 150 mg (11/22/2021), 150 mg (9/27/2021), 150 mg (10/11/2021), 450 mg (12/6/2021), 440 mg (8/15/2022), 440 mg (9/6/2022), 150 mg (11/8/2021), 150 mg (11/29/2021), 450 mg (12/27/2021), 450 mg (1/17/2022), 450 mg (2/7/2022), 450 mg (2/28/2022), 450 mg (3/21/2022), 450 mg (4/11/2022), 450 mg (5/2/2022), 430 mg (5/26/2022), 440 mg (6/13/2022), 440 mg (7/6/2022), 440 mg (7/25/2022)     1/3/2022 - 1/31/2022 Radiation    Treatments:  Course: C1  Plan ID Energy Fractions Dose per Fraction (cGy) Total Dose Delivered (cGy) Elapsed Days   Hypo R Bolus 6X 7 / 7 267 1,869 16   Hypo R Brst 6X 8 / 8 267 2,136 18   R Brst e 9E 5 / 5 200 1,000 6    Treatment Dates:  1/3/2022 - 1/31/2022.         Review of Systems   Constitutional:  Negative for chills and fever.   HENT:  Negative for ear pain and sore throat.    Eyes:  Negative for pain and visual disturbance.   Respiratory:  Negative for cough and shortness of breath.    Cardiovascular:  Negative for chest pain and palpitations.   Gastrointestinal:  Negative for abdominal pain and vomiting.   Genitourinary:  Negative  "for dysuria and hematuria.   Musculoskeletal:  Negative for arthralgias and back pain.   Skin:  Negative for color change and rash.   Neurological:  Negative for seizures and syncope.   All other systems reviewed and are negative.   A complete review of systems is negative other than that noted above in the HPI.    Medical History Reviewed by provider this encounter:  Tobacco  Allergies  Meds  Problems  Med Hx  Surg Hx  Fam Hx     .       Objective   /90 (BP Location: Left arm, Patient Position: Sitting)   Pulse 90   Temp 97.6 °F (36.4 °C) (Temporal)   Ht 5' 4\" (1.626 m)   Wt 73.5 kg (162 lb)   SpO2 97%   BMI 27.81 kg/m²     Pain Screening:     ECOG    Physical Exam  Constitutional:       Appearance: Normal appearance.   HENT:      Head: Normocephalic and atraumatic.      Nose: Nose normal.      Mouth/Throat:      Mouth: Mucous membranes are moist.   Eyes:      Pupils: Pupils are equal, round, and reactive to light.   Cardiovascular:      Rate and Rhythm: Normal rate.      Pulses: Normal pulses.      Heart sounds: Normal heart sounds.   Pulmonary:      Effort: Pulmonary effort is normal.      Breath sounds: Normal breath sounds.   Chest:          Comments: Well-healed bilateral breast surgical scars.  Bilateral breast examination no palpable mass, masses nipple discharge or unexpected skin changes.  Bilateral axillary and supraclavicular examination no palpable adenopathy.  Patient was examined seated as well as supine position.  Abdominal:      General: Bowel sounds are normal.      Palpations: Abdomen is soft.   Musculoskeletal:         General: Normal range of motion.      Cervical back: Normal range of motion and neck supple.   Skin:     General: Skin is warm.   Neurological:      General: No focal deficit present.      Mental Status: She is alert and oriented to person, place, and time.   Psychiatric:         Mood and Affect: Mood normal.         Behavior: Behavior normal.         Thought " "Content: Thought content normal.         Judgment: Judgment normal.          Labs: I have reviewed pertinent labs. No results found for: \"WBC\", \"RBC\", \"HGB\", \"HCT\", \"MCV\", \"MCH\", \"RDW\", \"PLT\", \"NEUTOPHILPCT\", \"BANDSPCT\", \"LYMPHOPCT\", \"MONOPCT\", \"EOSPCT\", \"BASOPCT\", \"IMMGRANS\", \"NEUTROABS\"   No results found for: \"SODIUM\", \"K\", \"CL\", \"CO2\", \"AGAP\", \"BUN\", \"CREATININE\", \"GLUC\", \"GLUF\", \"CALCIUM\", \"CORRECTEDCA\", \"AST\", \"ALT\", \"ALKPHOS\", \"TP\", \"ALB\", \"TBILI\", \"EGFR\"   Appointment on 04/07/2025   Component Date Value Ref Range Status    Color, UA 04/07/2025 Yellow   Final    Clarity, UA 04/07/2025 Clear   Final    Specific Gravity, UA 04/07/2025 1.026  1.003 - 1.030 Final    pH, UA 04/07/2025 7.0  4.5, 5.0, 5.5, 6.0, 6.5, 7.0, 7.5, 8.0 Final    Leukocytes, UA 04/07/2025 Negative  Negative Final    Nitrite, UA 04/07/2025 Negative  Negative Final    Protein, UA 04/07/2025 Negative  Negative mg/dl Final    Glucose, UA 04/07/2025 Negative  Negative mg/dl Final    Ketones, UA 04/07/2025 Trace (A)  Negative mg/dl Final    Urobilinogen, UA 04/07/2025 <2.0  <2.0 mg/dl mg/dl Final    Bilirubin, UA 04/07/2025 Negative  Negative Final    Occult Blood, UA 04/07/2025 Negative  Negative Final     Pathology: I have reviewed pathology reports described above.    Radiology Results Review : No pertinent imaging studies reviewed.  [place]: yes    Administrative Statements   I have spent a total time of 30 minutes in caring for this patient on the day of the visit/encounter including Prognosis, Risks and benefits of tx options, Instructions for management, Patient and family education, Importance of tx compliance, Risk factor reductions, Impressions, Counseling / Coordination of care, Documenting in the medical record, Reviewing/placing orders in the medical record (including tests, medications, and/or procedures), and Obtaining or reviewing history  .  "

## 2025-05-14 ENCOUNTER — OFFICE VISIT (OUTPATIENT)
Dept: URGENT CARE | Facility: MEDICAL CENTER | Age: 30
End: 2025-05-14
Payer: COMMERCIAL

## 2025-05-14 VITALS
SYSTOLIC BLOOD PRESSURE: 134 MMHG | DIASTOLIC BLOOD PRESSURE: 59 MMHG | WEIGHT: 162 LBS | RESPIRATION RATE: 18 BRPM | OXYGEN SATURATION: 99 % | BODY MASS INDEX: 27.81 KG/M2 | HEART RATE: 86 BPM | TEMPERATURE: 98.1 F

## 2025-05-14 DIAGNOSIS — J02.9 PHARYNGITIS WITH VIRAL SYNDROME: Primary | ICD-10-CM

## 2025-05-14 DIAGNOSIS — B34.9 PHARYNGITIS WITH VIRAL SYNDROME: Primary | ICD-10-CM

## 2025-05-14 LAB — S PYO AG THROAT QL: NEGATIVE

## 2025-05-14 PROCEDURE — 87880 STREP A ASSAY W/OPTIC: CPT

## 2025-05-14 PROCEDURE — 87070 CULTURE OTHR SPECIMN AEROBIC: CPT

## 2025-05-14 PROCEDURE — 99213 OFFICE O/P EST LOW 20 MIN: CPT

## 2025-05-14 RX ORDER — METHYLPREDNISOLONE 4 MG/1
TABLET ORAL
Qty: 1 EACH | Refills: 0 | Status: SHIPPED | OUTPATIENT
Start: 2025-05-14

## 2025-05-14 NOTE — PATIENT INSTRUCTIONS
POCT rapid strepA: Negative    Will send out for culture.   If positive will treat with antibiotics.      Differential diagnoses discussed with patient.    Will opt to treat patient with steroids to help with sore throat.  Take steroids as prescribed.   Recommend to take them with food  Do not take Ibuprofen while on steroids.   May take Acetaminophen for pain.    Rest   Encourage fluids  Warm salt water gargles  May use chloraseptic spray  Throat lozenges  Throat Coat Tea  Tsp of honey  Warm tea with honey. May use lemon    Wash hands frequently  Don't share drinks    Tylenol/Ibuprofen for pain/fever    If you develop a prolonged high fever, difficulty breathing, trouble swallowing, worsening pain, difficulty managing secretions, feel like your throat is closing, decreased fluid intake, or urination, any new or concerning symptoms please proceed ER.    Follow up with PCP in 3-5 days.  Proceed to ER if symptoms worsen.    If tests are performed, our office will contact you with results only if changes need to made to the care plan discussed with you at the visit. You can review your full results on [facility] Mychart.

## 2025-05-14 NOTE — PROGRESS NOTES
[facility]        NAME: Leidy Man is a 29 y.o. female  : 1995    MRN: [id number]  DATE: May 14, 2025  TIME: 10:56 AM    Assessment and Plan   Pharyngitis with viral syndrome [J02.9, B34.9]  1. Pharyngitis with viral syndrome  POCT rapid ANTIGEN strepA    Throat culture    methylPREDNISolone 4 MG tablet therapy pack            Patient Instructions   POCT rapid strepA: Negative    Will send out for culture.   If positive will treat with antibiotics.      Differential diagnoses discussed with patient.    Will opt to treat patient with steroids to help with sore throat.  Take steroids as prescribed.   Recommend to take them with food  Do not take Ibuprofen while on steroids.   May take Acetaminophen for pain.    Rest   Encourage fluids  Warm salt water gargles  May use chloraseptic spray  Throat lozenges  Throat Coat Tea  Tsp of honey  Warm tea with honey. May use lemon    Wash hands frequently  Don't share drinks    Tylenol/Ibuprofen for pain/fever    If you develop a prolonged high fever, difficulty breathing, trouble swallowing, worsening pain, difficulty managing secretions, feel like your throat is closing, decreased fluid intake, or urination, any new or concerning symptoms please proceed ER.    Follow up with PCP in 3-5 days.  Proceed to ER if symptoms worsen.    If tests are performed, our office will contact you with results only if changes need to made to the care plan discussed with you at the visit. You can review your full results on [facility].    Chief Complaint     Chief Complaint   Patient presents with   • Cold Like Symptoms     Pt. With cough, sore throat, and ear fullness that began yesterday. No fevers.          History of Present Illness       URI   This is a new problem. The current episode started yesterday.       Review of Systems   Review of Systems      Current Medications     Current Medications[1]    Current Allergies     Allergies as of 2025   • (No  Known Allergies)            The following portions of the patient's history were reviewed and updated as appropriate: allergies, current medications, past family history, past medical history, past social history, past surgical history and problem list.     Past Medical History:   Diagnosis Date   • Benign paroxysmal positional vertigo due to bilateral vestibular disorder 01/24/2019   • Breast cancer (HCC) 07/19/2021    right breast   • Cancer (HCC)    • Celiac disease    • Celiac disease    • History of chemotherapy 2021    right breast cancer   • History of radiation therapy 2021    right breast cancer   • Iron deficiency anemia due to chronic blood loss 01/15/2014   • Melanoma (HCC) 2018   • Melanoma (HCC) 07/2023    left breast   • Neck pain 01/15/2014   • Warts 06/21/2016       Past Surgical History:   Procedure Laterality Date   • BREAST BIOPSY     • BREAST LUMPECTOMY Right 07/19/2021    Procedure: LYNDSAY  DIRECTED LUMPECTOMY;  Surgeon: Carol Mccloud MD;  Location: [facility];  Service: Surgical Oncology   • LYMPH NODE BIOPSY Right 07/19/2021    Procedure: LYMPHATIC MAPPING WITH BLUE AND RADIOACTIVE DYES, SENTINEL LYMPH NODE BIOPSY, injection at 1030;  Surgeon: Carol Mccloud MD;  Location: [facility];  Service: Surgical Oncology   • MRI BREAST BIOPSY LEFT (ALL INCLUSIVE) Left 02/10/2023    Benign   • SKIN CANCER EXCISION     • TONSILLECTOMY AND ADENOIDECTOMY     • US GUIDED BREAST BIOPSY RIGHT COMPLETE Right 6/21/2024   • WISDOM TOOTH EXTRACTION         Family History   Problem Relation Age of Onset   • Prostate cancer Father    • Kidney cancer Maternal Grandfather    • Breast cancer Maternal Aunt 50         Medications have been verified.        Objective   /59   Pulse 86   Temp 98.1 °F (36.7 °C)   Resp 18   Wt 73.5 kg (162 lb)   SpO2 99%   BMI 27.81 kg/m²        Physical Exam     Physical Exam                     [1]    Current Outpatient Medications:   •  Calcium Carbonate-Vit D-Min  (CALCIUM 1200 PO), Take by mouth, Disp: , Rfl:   •  cyanocobalamin (VITAMIN B-12) 100 mcg tablet, Take by mouth in the morning., Disp: , Rfl:   •  methylPREDNISolone 4 MG tablet therapy pack, Use as directed on package, Disp: 1 each, Rfl: 0  •  tamoxifen (NOLVADEX) 20 mg tablet, Take 1 tablet (20 mg total) by mouth daily, Disp: 90 tablet, Rfl: 3 Calcium Carbonate-Vit D-Min (CALCIUM 1200 PO), Take by mouth, Disp: , Rfl:   •  cyanocobalamin (VITAMIN B-12) 100 mcg tablet, Take by mouth in the morning., Disp: , Rfl:   •  methylPREDNISolone 4 MG tablet therapy pack, Use as directed on package, Disp: 1 each, Rfl: 0  •  tamoxifen (NOLVADEX) 20 mg tablet, Take 1 tablet (20 mg total) by mouth daily, Disp: 90 tablet, Rfl: 3

## 2025-05-16 LAB — BACTERIA THROAT CULT: NORMAL

## 2025-06-24 ENCOUNTER — HOSPITAL ENCOUNTER (OUTPATIENT)
Dept: MAMMOGRAPHY | Facility: CLINIC | Age: 30
Discharge: HOME | End: 2025-06-24
Attending: SURGERY
Payer: COMMERCIAL

## 2025-06-24 DIAGNOSIS — Z17.0 MALIGNANT NEOPLASM OF UPPER-INNER QUADRANT OF RIGHT BREAST IN FEMALE, ESTROGEN RECEPTOR POSITIVE (HCC): ICD-10-CM

## 2025-06-24 DIAGNOSIS — C50.211 MALIGNANT NEOPLASM OF UPPER-INNER QUADRANT OF RIGHT BREAST IN FEMALE, ESTROGEN RECEPTOR POSITIVE (HCC): ICD-10-CM

## 2025-06-24 PROCEDURE — 77066 DX MAMMO INCL CAD BI: CPT

## 2025-06-24 PROCEDURE — G0279 TOMOSYNTHESIS, MAMMO: HCPCS

## 2025-07-30 ENCOUNTER — APPOINTMENT (OUTPATIENT)
Dept: LAB | Facility: HOSPITAL | Age: 30
End: 2025-07-30
Payer: COMMERCIAL

## 2025-07-30 ENCOUNTER — APPOINTMENT (EMERGENCY)
Dept: CT IMAGING | Facility: HOSPITAL | Age: 30
End: 2025-07-30
Payer: COMMERCIAL

## 2025-07-30 ENCOUNTER — HOSPITAL ENCOUNTER (EMERGENCY)
Facility: HOSPITAL | Age: 30
Discharge: HOME | End: 2025-07-30
Attending: EMERGENCY MEDICINE | Admitting: EMERGENCY MEDICINE
Payer: COMMERCIAL

## 2025-07-30 ENCOUNTER — APPOINTMENT (EMERGENCY)
Dept: RADIOLOGY | Facility: HOSPITAL | Age: 30
End: 2025-07-30
Payer: COMMERCIAL

## 2025-07-30 ENCOUNTER — OFFICE VISIT (OUTPATIENT)
Dept: INTERNAL MEDICINE CLINIC | Facility: CLINIC | Age: 30
End: 2025-07-30
Payer: COMMERCIAL

## 2025-07-30 VITALS
DIASTOLIC BLOOD PRESSURE: 59 MMHG | BODY MASS INDEX: 27.31 KG/M2 | TEMPERATURE: 98.5 F | HEIGHT: 64 IN | WEIGHT: 160 LBS | HEART RATE: 88 BPM | RESPIRATION RATE: 18 BRPM | SYSTOLIC BLOOD PRESSURE: 122 MMHG | OXYGEN SATURATION: 99 %

## 2025-07-30 VITALS
HEIGHT: 64 IN | BODY MASS INDEX: 27.81 KG/M2 | SYSTOLIC BLOOD PRESSURE: 126 MMHG | HEART RATE: 103 BPM | OXYGEN SATURATION: 97 % | DIASTOLIC BLOOD PRESSURE: 74 MMHG | TEMPERATURE: 102.1 F

## 2025-07-30 DIAGNOSIS — R50.9 FEVER AND CHILLS: ICD-10-CM

## 2025-07-30 DIAGNOSIS — R93.89 ABNORMAL CT SCAN: ICD-10-CM

## 2025-07-30 DIAGNOSIS — R10.9 FLANK PAIN: ICD-10-CM

## 2025-07-30 DIAGNOSIS — R50.9 FEVER: Primary | ICD-10-CM

## 2025-07-30 DIAGNOSIS — R50.9 FEVER AND CHILLS: Primary | ICD-10-CM

## 2025-07-30 LAB
ALBUMIN SERPL BCG-MCNC: 4.1 G/DL (ref 3.5–5)
ALBUMIN SERPL BCG-MCNC: 4.2 G/DL (ref 3.5–5)
ALP SERPL-CCNC: 50 U/L (ref 34–104)
ALP SERPL-CCNC: 50 U/L (ref 34–104)
ALT SERPL W P-5'-P-CCNC: 10 U/L (ref 7–52)
ALT SERPL W P-5'-P-CCNC: 9 U/L (ref 7–52)
ANION GAP SERPL CALCULATED.3IONS-SCNC: 10 MMOL/L (ref 4–13)
ANION GAP SERPL CALCULATED.3IONS-SCNC: 6 MMOL/L (ref 4–13)
AST SERPL W P-5'-P-CCNC: 12 U/L (ref 13–39)
AST SERPL W P-5'-P-CCNC: 14 U/L (ref 13–39)
BACTERIA UR QL AUTO: ABNORMAL /HPF
BACTERIA UR QL AUTO: ABNORMAL /HPF
BASOPHILS # BLD AUTO: 0.02 THOUSANDS/ÂΜL (ref 0–0.1)
BASOPHILS # BLD MANUAL: 0 THOUSAND/UL (ref 0–0.1)
BASOPHILS NFR BLD AUTO: 0 % (ref 0–1)
BASOPHILS NFR MAR MANUAL: 0 % (ref 0–1)
BILIRUB SERPL-MCNC: 0.38 MG/DL (ref 0.2–1)
BILIRUB SERPL-MCNC: 0.44 MG/DL (ref 0.2–1)
BILIRUB UR QL STRIP: NEGATIVE
BILIRUB UR QL STRIP: NEGATIVE
BUN SERPL-MCNC: 7 MG/DL (ref 5–25)
BUN SERPL-MCNC: 8 MG/DL (ref 5–25)
CALCIUM SERPL-MCNC: 8.9 MG/DL (ref 8.4–10.2)
CALCIUM SERPL-MCNC: 9.3 MG/DL (ref 8.4–10.2)
CHLORIDE SERPL-SCNC: 104 MMOL/L (ref 96–108)
CHLORIDE SERPL-SCNC: 104 MMOL/L (ref 96–108)
CLARITY UR: CLEAR
CLARITY UR: CLEAR
CO2 SERPL-SCNC: 21 MMOL/L (ref 21–32)
CO2 SERPL-SCNC: 26 MMOL/L (ref 21–32)
COLOR UR: ABNORMAL
COLOR UR: YELLOW
CREAT SERPL-MCNC: 0.68 MG/DL (ref 0.6–1.3)
CREAT SERPL-MCNC: 0.79 MG/DL (ref 0.6–1.3)
EOSINOPHIL # BLD AUTO: 0.01 THOUSAND/ÂΜL (ref 0–0.61)
EOSINOPHIL # BLD MANUAL: 0 THOUSAND/UL (ref 0–0.4)
EOSINOPHIL NFR BLD AUTO: 0 % (ref 0–6)
EOSINOPHIL NFR BLD MANUAL: 0 % (ref 0–6)
ERYTHROCYTE [DISTWIDTH] IN BLOOD BY AUTOMATED COUNT: 13.4 % (ref 11.6–15.1)
ERYTHROCYTE [DISTWIDTH] IN BLOOD BY AUTOMATED COUNT: 13.9 % (ref 11.6–15.1)
EXT PREGNANCY TEST URINE: NEGATIVE
EXT. CONTROL: NORMAL
FLUAV AG UPPER RESP QL IA.RAPID: NEGATIVE
FLUBV AG UPPER RESP QL IA.RAPID: NEGATIVE
GFR SERPL CREATININE-BSD FRML MDRD: 100 ML/MIN/1.73SQ M
GFR SERPL CREATININE-BSD FRML MDRD: 117 ML/MIN/1.73SQ M
GLUCOSE P FAST SERPL-MCNC: 107 MG/DL (ref 65–99)
GLUCOSE SERPL-MCNC: 103 MG/DL (ref 65–140)
GLUCOSE UR STRIP-MCNC: NEGATIVE MG/DL
GLUCOSE UR STRIP-MCNC: NEGATIVE MG/DL
HCT VFR BLD AUTO: 36.2 % (ref 34.8–46.1)
HCT VFR BLD AUTO: 37.2 % (ref 34.8–46.1)
HGB BLD-MCNC: 11.9 G/DL (ref 11.5–15.4)
HGB BLD-MCNC: 12.4 G/DL (ref 11.5–15.4)
HGB UR QL STRIP.AUTO: NEGATIVE
HGB UR QL STRIP.AUTO: NEGATIVE
IMM GRANULOCYTES # BLD AUTO: 0.03 THOUSAND/UL (ref 0–0.2)
IMM GRANULOCYTES NFR BLD AUTO: 0 % (ref 0–2)
KETONES UR STRIP-MCNC: ABNORMAL MG/DL
KETONES UR STRIP-MCNC: ABNORMAL MG/DL
LEUKOCYTE ESTERASE UR QL STRIP: ABNORMAL
LEUKOCYTE ESTERASE UR QL STRIP: ABNORMAL
LIPASE SERPL-CCNC: 27 U/L (ref 11–82)
LYMPHOCYTES # BLD AUTO: 0.83 THOUSAND/UL (ref 0.6–4.47)
LYMPHOCYTES # BLD AUTO: 1.08 THOUSANDS/ÂΜL (ref 0.6–4.47)
LYMPHOCYTES # BLD AUTO: 17 % (ref 14–44)
LYMPHOCYTES NFR BLD AUTO: 15 % (ref 14–44)
MCH RBC QN AUTO: 28.9 PG (ref 26.8–34.3)
MCH RBC QN AUTO: 29.4 PG (ref 26.8–34.3)
MCHC RBC AUTO-ENTMCNC: 32.9 G/DL (ref 31.4–37.4)
MCHC RBC AUTO-ENTMCNC: 33.3 G/DL (ref 31.4–37.4)
MCV RBC AUTO: 88 FL (ref 82–98)
MCV RBC AUTO: 88 FL (ref 82–98)
MONOCYTES # BLD AUTO: 0.25 THOUSAND/UL (ref 0–1.22)
MONOCYTES # BLD AUTO: 0.65 THOUSAND/ÂΜL (ref 0.17–1.22)
MONOCYTES NFR BLD AUTO: 9 % (ref 4–12)
MONOCYTES NFR BLD: 5 % (ref 4–12)
MUCOUS THREADS UR QL AUTO: ABNORMAL
MUCOUS THREADS UR QL AUTO: ABNORMAL
NEUTROPHILS # BLD AUTO: 5.38 THOUSANDS/ÂΜL (ref 1.85–7.62)
NEUTROPHILS # BLD MANUAL: 3.82 THOUSAND/UL (ref 1.85–7.62)
NEUTS BAND NFR BLD MANUAL: 2 % (ref 0–8)
NEUTS SEG NFR BLD AUTO: 76 % (ref 43–75)
NEUTS SEG NFR BLD AUTO: 76 % (ref 43–75)
NITRITE UR QL STRIP: NEGATIVE
NITRITE UR QL STRIP: NEGATIVE
NON-SQ EPI CELLS URNS QL MICRO: ABNORMAL /HPF
NON-SQ EPI CELLS URNS QL MICRO: ABNORMAL /HPF
NRBC BLD AUTO-RTO: 0 /100 WBCS
PH UR STRIP.AUTO: 5.5 [PH]
PH UR STRIP.AUTO: 5.5 [PH]
PLATELET # BLD AUTO: 178 THOUSANDS/UL (ref 149–390)
PLATELET # BLD AUTO: 221 THOUSANDS/UL (ref 149–390)
PLATELET BLD QL SMEAR: ADEQUATE
PMV BLD AUTO: 10.6 FL (ref 8.9–12.7)
PMV BLD AUTO: 12.8 FL (ref 8.9–12.7)
POTASSIUM SERPL-SCNC: 3.7 MMOL/L (ref 3.5–5.3)
POTASSIUM SERPL-SCNC: 3.9 MMOL/L (ref 3.5–5.3)
PROT SERPL-MCNC: 7.2 G/DL (ref 6.4–8.4)
PROT SERPL-MCNC: 7.2 G/DL (ref 6.4–8.4)
PROT UR STRIP-MCNC: ABNORMAL MG/DL
PROT UR STRIP-MCNC: NEGATIVE MG/DL
RBC # BLD AUTO: 4.12 MILLION/UL (ref 3.81–5.12)
RBC # BLD AUTO: 4.22 MILLION/UL (ref 3.81–5.12)
RBC #/AREA URNS AUTO: ABNORMAL /HPF
RBC #/AREA URNS AUTO: ABNORMAL /HPF
RBC MORPH BLD: NORMAL
SARS-COV+SARS-COV-2 AG RESP QL IA.RAPID: NEGATIVE
SODIUM SERPL-SCNC: 135 MMOL/L (ref 135–147)
SODIUM SERPL-SCNC: 136 MMOL/L (ref 135–147)
SP GR UR STRIP.AUTO: 1.02 (ref 1–1.03)
SP GR UR STRIP.AUTO: 1.03 (ref 1–1.03)
UROBILINOGEN UR STRIP-ACNC: <2 MG/DL
UROBILINOGEN UR STRIP-ACNC: <2 MG/DL
WBC # BLD AUTO: 4.9 THOUSAND/UL (ref 4.31–10.16)
WBC # BLD AUTO: 7.17 THOUSAND/UL (ref 4.31–10.16)
WBC #/AREA URNS AUTO: ABNORMAL /HPF
WBC #/AREA URNS AUTO: ABNORMAL /HPF

## 2025-07-30 PROCEDURE — 83690 ASSAY OF LIPASE: CPT

## 2025-07-30 PROCEDURE — 85007 BL SMEAR W/DIFF WBC COUNT: CPT

## 2025-07-30 PROCEDURE — 99213 OFFICE O/P EST LOW 20 MIN: CPT | Performed by: PHYSICIAN ASSISTANT

## 2025-07-30 PROCEDURE — 80053 COMPREHEN METABOLIC PANEL: CPT

## 2025-07-30 PROCEDURE — 87811 SARS-COV-2 COVID19 W/OPTIC: CPT

## 2025-07-30 PROCEDURE — 81025 URINE PREGNANCY TEST: CPT

## 2025-07-30 PROCEDURE — 74177 CT ABD & PELVIS W/CONTRAST: CPT

## 2025-07-30 PROCEDURE — 99284 EMERGENCY DEPT VISIT MOD MDM: CPT

## 2025-07-30 PROCEDURE — 96374 THER/PROPH/DIAG INJ IV PUSH: CPT

## 2025-07-30 PROCEDURE — 85027 COMPLETE CBC AUTOMATED: CPT

## 2025-07-30 PROCEDURE — 81001 URINALYSIS AUTO W/SCOPE: CPT

## 2025-07-30 PROCEDURE — 85025 COMPLETE CBC W/AUTO DIFF WBC: CPT

## 2025-07-30 PROCEDURE — 99285 EMERGENCY DEPT VISIT HI MDM: CPT

## 2025-07-30 PROCEDURE — 36415 COLL VENOUS BLD VENIPUNCTURE: CPT

## 2025-07-30 PROCEDURE — 71046 X-RAY EXAM CHEST 2 VIEWS: CPT

## 2025-07-30 PROCEDURE — 87804 INFLUENZA ASSAY W/OPTIC: CPT

## 2025-07-30 PROCEDURE — 87086 URINE CULTURE/COLONY COUNT: CPT

## 2025-07-30 RX ORDER — KETOROLAC TROMETHAMINE 30 MG/ML
15 INJECTION, SOLUTION INTRAMUSCULAR; INTRAVENOUS ONCE
Status: COMPLETED | OUTPATIENT
Start: 2025-07-30 | End: 2025-07-30

## 2025-07-30 RX ORDER — IBUPROFEN 800 MG/1
800 TABLET, FILM COATED ORAL 3 TIMES DAILY
Qty: 21 TABLET | Refills: 0 | Status: SHIPPED | OUTPATIENT
Start: 2025-07-30

## 2025-07-30 RX ADMIN — IOHEXOL 100 ML: 350 INJECTION, SOLUTION INTRAVENOUS at 16:21

## 2025-07-30 RX ADMIN — KETOROLAC TROMETHAMINE 15 MG: 30 INJECTION, SOLUTION INTRAMUSCULAR at 15:56

## 2025-07-31 LAB — BACTERIA UR CULT: NORMAL

## (undated) DEVICE — NEEDLE 25G X 1 1/2

## (undated) DEVICE — SUT SILK 2-0 18 IN A185H

## (undated) DEVICE — INTENDED FOR TISSUE SEPARATION, AND OTHER PROCEDURES THAT REQUIRE A SHARP SURGICAL BLADE TO PUNCTURE OR CUT.: Brand: BARD-PARKER SAFETY BLADES SIZE 15, STERILE

## (undated) DEVICE — GLOVE SRG BIOGEL 7.5

## (undated) DEVICE — CHEST/BREAST DRAPE: Brand: CONVERTORS

## (undated) DEVICE — ELECTRODE BLADE MOD E-Z CLEAN  2.75IN 7CM -0012AM

## (undated) DEVICE — SUT VICRYL 3-0 SH 27 IN J416H

## (undated) DEVICE — DRAPE INTESTINAL ISOLATION BAG

## (undated) DEVICE — CHLORAPREP HI-LITE 26ML ORANGE

## (undated) DEVICE — ADHESIVE SKIN CLOSR DERMABOND PRINEO

## (undated) DEVICE — PENCIL ELECTROSURG E-Z CLEAN -0035H

## (undated) DEVICE — UTILITY MARKER,BLACK WITH LABELS: Brand: DEVON

## (undated) DEVICE — LIGACLIP MCA MULTIPLE CLIP APPLIERS, 20 MEDIUM CLIPS: Brand: LIGACLIP

## (undated) DEVICE — SYRINGE 1ML TB 26G X 3/8 SAFETY

## (undated) DEVICE — SUT MONOCRYL 4-0 PS-2 18 IN Y496G

## (undated) DEVICE — SMOKE EVACUATION TUBING WITH 8 IN INTEGRAL WAND AND SPONGE GUARD: Brand: BUFFALO FILTER

## (undated) DEVICE — VIOLET BRAIDED (POLYGLACTIN 910), SYNTHETIC ABSORBABLE SUTURE: Brand: COATED VICRYL

## (undated) DEVICE — MARGIN MARKER SET

## (undated) DEVICE — LIGHT HANDLE COVER SLEEVE DISP BLUE STELLAR

## (undated) DEVICE — DRAPE PROBE NEO-PROBE/ULTRASOUND

## (undated) DEVICE — BETHLEHEM UNIVERSAL MINOR GEN: Brand: CARDINAL HEALTH

## (undated) DEVICE — STERILE RUBBER BANDS

## (undated) DEVICE — MAYO STAND COVER: Brand: CONVERTORS

## (undated) DEVICE — DRAPE EQUIPMENT RF WAND

## (undated) DEVICE — IMPERVIOUS STOCKINETTE: Brand: DEROYAL

## (undated) DEVICE — VIAL DECANTER

## (undated) DEVICE — TUBING SUCTION 5MM X 12 FT